# Patient Record
Sex: MALE | Race: WHITE | NOT HISPANIC OR LATINO | Employment: OTHER | URBAN - METROPOLITAN AREA
[De-identification: names, ages, dates, MRNs, and addresses within clinical notes are randomized per-mention and may not be internally consistent; named-entity substitution may affect disease eponyms.]

---

## 2017-04-26 ENCOUNTER — HOSPITAL ENCOUNTER (OUTPATIENT)
Dept: SLEEP MEDICINE | Facility: HOSPITAL | Age: 72
Discharge: HOME | End: 2017-04-26
Attending: INTERNAL MEDICINE | Admitting: INTERNAL MEDICINE

## 2017-05-04 ENCOUNTER — HOSPITAL ENCOUNTER (OUTPATIENT)
Dept: FAMILY MEDICINE CLINIC | Facility: CLINIC | Age: 72
Setting detail: SPECIMEN
Discharge: HOME | End: 2017-05-04
Attending: FAMILY MEDICINE | Admitting: FAMILY MEDICINE

## 2017-05-04 LAB
ALBUMIN SERPL-MCNC: 4.1 G/DL (ref 3.5–4.8)
ALBUMIN/GLOB SERPL: 1.4 {RATIO} (ref 1–1.7)
ALP SERPL-CCNC: 57 IU/L (ref 32–91)
ALT SERPL-CCNC: 60 IU/L (ref 17–63)
ANION GAP SERPL CALC-SCNC: 12.7 MMOL/L (ref 10–20)
AST SERPL-CCNC: 36 IU/L (ref 15–41)
BILIRUB SERPL-MCNC: 0.8 MG/DL (ref 0.3–1.2)
BUN SERPL-MCNC: 12 MG/DL (ref 8–20)
BUN/CREAT SERPL: 13.3 (ref 6.2–20.3)
CALCIUM SERPL-MCNC: 9.6 MG/DL (ref 8.9–10.3)
CHLORIDE SERPL-SCNC: 108 MMOL/L (ref 101–111)
CONV CO2: 28 MMOL/L (ref 22–32)
CONV MICROALBUM.,U,RANDOM: 5 MG/L
CONV TOTAL PROTEIN: 7.1 G/DL (ref 6.1–7.9)
CREAT UR-MCNC: 0.9 MG/DL (ref 0.7–1.2)
GLOBULIN UR ELPH-MCNC: 3 G/DL (ref 2.5–3.8)
GLUCOSE SERPL-MCNC: 110 MG/DL (ref 65–99)
POTASSIUM SERPL-SCNC: 4.7 MMOL/L (ref 3.6–5.1)
SODIUM SERPL-SCNC: 144 MMOL/L (ref 136–144)

## 2017-10-23 ENCOUNTER — HOSPITAL ENCOUNTER (OUTPATIENT)
Dept: FAMILY MEDICINE CLINIC | Facility: CLINIC | Age: 72
Setting detail: SPECIMEN
Discharge: HOME | End: 2017-10-23
Attending: FAMILY MEDICINE | Admitting: FAMILY MEDICINE

## 2017-10-26 ENCOUNTER — HOSPITAL ENCOUNTER (OUTPATIENT)
Dept: SLEEP MEDICINE | Facility: HOSPITAL | Age: 72
Discharge: HOME | End: 2017-10-26
Attending: INTERNAL MEDICINE | Admitting: INTERNAL MEDICINE

## 2017-12-11 ENCOUNTER — HOSPITAL ENCOUNTER (OUTPATIENT)
Dept: PREOP | Facility: HOSPITAL | Age: 72
Setting detail: HOSPITAL OUTPATIENT SURGERY
Discharge: HOME | End: 2017-12-11
Attending: OPHTHALMOLOGY | Admitting: OPHTHALMOLOGY

## 2017-12-11 LAB
ANION GAP SERPL CALC-SCNC: 12.9 MMOL/L (ref 10–20)
BASOPHILS # BLD AUTO: 0.1 10*3/UL (ref 0–0.2)
BASOPHILS NFR BLD AUTO: 1 % (ref 0–2)
BUN SERPL-MCNC: 10 MG/DL (ref 8–20)
BUN/CREAT SERPL: 12.5 (ref 6.2–20.3)
CALCIUM SERPL-MCNC: 8.7 MG/DL (ref 8.9–10.3)
CHLORIDE SERPL-SCNC: 106 MMOL/L (ref 101–111)
CONV CO2: 22 MMOL/L (ref 22–32)
CREAT UR-MCNC: 0.8 MG/DL (ref 0.7–1.2)
DIFFERENTIAL METHOD BLD: (no result)
EOSINOPHIL # BLD AUTO: 0.2 10*3/UL (ref 0–0.3)
EOSINOPHIL # BLD AUTO: 3 % (ref 0–3)
ERYTHROCYTE [DISTWIDTH] IN BLOOD BY AUTOMATED COUNT: 13.8 % (ref 11.5–14.5)
GLUCOSE BLD-MCNC: 112 MG/DL (ref 70–105)
GLUCOSE SERPL-MCNC: 129 MG/DL (ref 65–99)
HCT VFR BLD AUTO: 44.8 % (ref 40–54)
HGB BLD-MCNC: 14.7 G/DL (ref 14–18)
LYMPHOCYTES # BLD AUTO: 1.6 10*3/UL (ref 0.8–4.8)
LYMPHOCYTES NFR BLD AUTO: 26 % (ref 18–42)
MCH RBC QN AUTO: 29.8 PG (ref 26–32)
MCHC RBC AUTO-ENTMCNC: 32.9 G/DL (ref 32–36)
MCV RBC AUTO: 90.5 FL (ref 80–94)
MONOCYTES # BLD AUTO: 0.6 10*3/UL (ref 0.1–1.3)
MONOCYTES NFR BLD AUTO: 10 % (ref 2–11)
NEUTROPHILS # BLD AUTO: 3.8 10*3/UL (ref 2.3–8.6)
NEUTROPHILS NFR BLD AUTO: 60 % (ref 50–75)
NRBC BLD AUTO-RTO: 0 /100{WBCS}
NRBC/RBC NFR BLD MANUAL: 0 10*3/UL
PLATELET # BLD AUTO: 204 10*3/UL (ref 150–450)
PMV BLD AUTO: 8.2 FL (ref 7.4–10.4)
POTASSIUM SERPL-SCNC: 3.9 MMOL/L (ref 3.6–5.1)
RBC # BLD AUTO: 4.95 10*6/UL (ref 4.6–6)
SODIUM SERPL-SCNC: 137 MMOL/L (ref 136–144)
WBC # BLD AUTO: 6.3 10*3/UL (ref 4.5–11.5)

## 2018-03-23 ENCOUNTER — HOSPITAL ENCOUNTER (OUTPATIENT)
Dept: FAMILY MEDICINE CLINIC | Facility: CLINIC | Age: 73
Setting detail: SPECIMEN
Discharge: HOME | End: 2018-03-23
Attending: FAMILY MEDICINE | Admitting: FAMILY MEDICINE

## 2018-04-25 ENCOUNTER — HOSPITAL ENCOUNTER (OUTPATIENT)
Dept: SLEEP MEDICINE | Facility: HOSPITAL | Age: 73
Discharge: HOME | End: 2018-04-25
Attending: INTERNAL MEDICINE | Admitting: INTERNAL MEDICINE

## 2018-10-24 ENCOUNTER — HOSPITAL ENCOUNTER (OUTPATIENT)
Dept: FAMILY MEDICINE CLINIC | Facility: CLINIC | Age: 73
Setting detail: SPECIMEN
Discharge: HOME | End: 2018-10-24
Attending: FAMILY MEDICINE | Admitting: FAMILY MEDICINE

## 2018-10-24 LAB
ALBUMIN SERPL-MCNC: 3.5 G/DL (ref 3.5–4.8)
ALBUMIN/GLOB SERPL: 1.5 {RATIO} (ref 1–1.7)
ALP SERPL-CCNC: 62 IU/L (ref 32–91)
ALT SERPL-CCNC: 40 IU/L (ref 17–63)
ANION GAP SERPL CALC-SCNC: 13.2 MMOL/L (ref 10–20)
AST SERPL-CCNC: 28 IU/L (ref 15–41)
BACTERIA SPEC AEROBE CULT: NORMAL
BASOPHILS # BLD AUTO: 0 10*3/UL (ref 0–0.2)
BASOPHILS NFR BLD AUTO: 1 % (ref 0–2)
BILIRUB SERPL-MCNC: 0.5 MG/DL (ref 0.3–1.2)
BUN SERPL-MCNC: 14 MG/DL (ref 8–20)
BUN/CREAT SERPL: 17.5 (ref 6.2–20.3)
CALCIUM SERPL-MCNC: 8.5 MG/DL (ref 8.9–10.3)
CHLORIDE SERPL-SCNC: 108 MMOL/L (ref 101–111)
CHOLEST SERPL-MCNC: 121 MG/DL
CHOLEST/HDLC SERPL: 3.3 {RATIO}
COLONY COUNT: NORMAL
CONV CO2: 26 MMOL/L (ref 22–32)
CONV LDL CHOLESTEROL DIRECT: 76 MG/DL (ref 0–100)
CONV TOTAL PROTEIN: 5.9 G/DL (ref 6.1–7.9)
CREAT UR-MCNC: 0.8 MG/DL (ref 0.7–1.2)
DIFFERENTIAL METHOD BLD: (no result)
EOSINOPHIL # BLD AUTO: 0.2 10*3/UL (ref 0–0.3)
EOSINOPHIL # BLD AUTO: 4 % (ref 0–3)
ERYTHROCYTE [DISTWIDTH] IN BLOOD BY AUTOMATED COUNT: 14.2 % (ref 11.5–14.5)
GLOBULIN UR ELPH-MCNC: 2.4 G/DL (ref 2.5–3.8)
GLUCOSE SERPL-MCNC: 104 MG/DL (ref 65–99)
HBA1C MFR BLD: 6.1 % (ref 0–5.6)
HCT VFR BLD AUTO: 38.9 % (ref 40–54)
HDLC SERPL-MCNC: 36 MG/DL
HGB BLD-MCNC: 13.2 G/DL (ref 14–18)
LDLC/HDLC SERPL: 2.1 {RATIO}
LIPID INTERPRETATION: ABNORMAL
LYMPHOCYTES # BLD AUTO: 1.5 10*3/UL (ref 0.8–4.8)
LYMPHOCYTES NFR BLD AUTO: 25 % (ref 18–42)
Lab: NORMAL
MCH RBC QN AUTO: 30.5 PG (ref 26–32)
MCHC RBC AUTO-ENTMCNC: 33.8 G/DL (ref 32–36)
MCV RBC AUTO: 90.4 FL (ref 80–94)
MICRO REPORT STATUS: NORMAL
MONOCYTES # BLD AUTO: 0.5 10*3/UL (ref 0.1–1.3)
MONOCYTES NFR BLD AUTO: 8 % (ref 2–11)
NEUTROPHILS # BLD AUTO: 3.8 10*3/UL (ref 2.3–8.6)
NEUTROPHILS NFR BLD AUTO: 62 % (ref 50–75)
NRBC BLD AUTO-RTO: 0 /100{WBCS}
NRBC/RBC NFR BLD MANUAL: 0 10*3/UL
PLATELET # BLD AUTO: 206 10*3/UL (ref 150–450)
PMV BLD AUTO: 8.7 FL (ref 7.4–10.4)
POTASSIUM SERPL-SCNC: 4.2 MMOL/L (ref 3.6–5.1)
RBC # BLD AUTO: 4.31 10*6/UL (ref 4.6–6)
SODIUM SERPL-SCNC: 143 MMOL/L (ref 136–144)
SPECIMEN SOURCE: NORMAL
TRIGL SERPL-MCNC: 75 MG/DL
VLDLC SERPL CALC-MCNC: 9 MG/DL
WBC # BLD AUTO: 6 10*3/UL (ref 4.5–11.5)

## 2018-10-31 ENCOUNTER — HOSPITAL ENCOUNTER (OUTPATIENT)
Dept: SLEEP MEDICINE | Facility: HOSPITAL | Age: 73
Discharge: HOME | End: 2018-10-31
Attending: INTERNAL MEDICINE | Admitting: INTERNAL MEDICINE

## 2019-01-25 ENCOUNTER — HOSPITAL ENCOUNTER (OUTPATIENT)
Dept: CARDIOLOGY | Facility: HOSPITAL | Age: 74
Discharge: HOME | End: 2019-01-25
Attending: FAMILY MEDICINE | Admitting: FAMILY MEDICINE

## 2019-03-27 ENCOUNTER — HOSPITAL ENCOUNTER (OUTPATIENT)
Dept: PHYSICAL THERAPY | Facility: HOSPITAL | Age: 74
Setting detail: RECURRING SERIES
Discharge: HOME | End: 2019-04-24
Attending: FAMILY MEDICINE | Admitting: FAMILY MEDICINE

## 2019-07-02 RX ORDER — MAGNESIUM CARB/ALUMINUM HYDROX 105-160MG
296 TABLET,CHEWABLE ORAL ONCE
Status: DISCONTINUED | OUTPATIENT
Start: 2019-07-02 | End: 2019-07-12 | Stop reason: HOSPADM

## 2019-07-10 RX ORDER — MULTIPLE VITAMINS W/ MINERALS TAB 9MG-400MCG
1 TAB ORAL DAILY
COMMUNITY

## 2019-07-10 RX ORDER — ISOSORBIDE MONONITRATE 60 MG/1
120 TABLET, EXTENDED RELEASE ORAL DAILY
COMMUNITY
End: 2019-09-24 | Stop reason: SDUPTHER

## 2019-07-10 RX ORDER — PANTOPRAZOLE SODIUM 40 MG/1
40 TABLET, DELAYED RELEASE ORAL EVERY EVENING
COMMUNITY
End: 2019-09-24 | Stop reason: SDUPTHER

## 2019-07-10 RX ORDER — LISINOPRIL 10 MG/1
10 TABLET ORAL NIGHTLY
COMMUNITY
End: 2019-09-24 | Stop reason: SDUPTHER

## 2019-07-10 RX ORDER — ASPIRIN 325 MG
325 TABLET ORAL EVERY EVENING
COMMUNITY
End: 2021-10-20

## 2019-07-10 RX ORDER — SIMVASTATIN 40 MG
40 TABLET ORAL NIGHTLY
COMMUNITY
End: 2019-11-12 | Stop reason: SDUPTHER

## 2019-07-11 ENCOUNTER — ANESTHESIA EVENT (OUTPATIENT)
Dept: GASTROENTEROLOGY | Facility: HOSPITAL | Age: 74
End: 2019-07-11

## 2019-07-12 ENCOUNTER — ANESTHESIA (OUTPATIENT)
Dept: GASTROENTEROLOGY | Facility: HOSPITAL | Age: 74
End: 2019-07-12

## 2019-07-12 ENCOUNTER — HOSPITAL ENCOUNTER (OUTPATIENT)
Facility: HOSPITAL | Age: 74
Setting detail: HOSPITAL OUTPATIENT SURGERY
Discharge: HOME | End: 2019-07-12
Attending: INTERNAL MEDICINE | Admitting: INTERNAL MEDICINE

## 2019-07-12 ENCOUNTER — ON CAMPUS - OUTPATIENT (OUTPATIENT)
Dept: URBAN - METROPOLITAN AREA HOSPITAL 85 | Facility: HOSPITAL | Age: 74
End: 2019-07-12
Payer: COMMERCIAL

## 2019-07-12 VITALS
SYSTOLIC BLOOD PRESSURE: 124 MMHG | BODY MASS INDEX: 35.3 KG/M2 | OXYGEN SATURATION: 94 % | TEMPERATURE: 96.9 F | RESPIRATION RATE: 20 BRPM | HEIGHT: 69 IN | HEART RATE: 65 BPM | DIASTOLIC BLOOD PRESSURE: 68 MMHG | WEIGHT: 238.32 LBS

## 2019-07-12 DIAGNOSIS — K64.8 OTHER HEMORRHOIDS: ICD-10-CM

## 2019-07-12 DIAGNOSIS — Z86.010 PERSONAL HISTORY OF COLONIC POLYPS: ICD-10-CM

## 2019-07-12 DIAGNOSIS — D12.4 BENIGN NEOPLASM OF DESCENDING COLON: ICD-10-CM

## 2019-07-12 DIAGNOSIS — Z86.010 HISTORY OF COLON POLYPS: ICD-10-CM

## 2019-07-12 DIAGNOSIS — K64.4 RESIDUAL HEMORRHOIDAL SKIN TAGS: ICD-10-CM

## 2019-07-12 DIAGNOSIS — K57.30 DIVERTICULOSIS OF LARGE INTESTINE WITHOUT PERFORATION OR ABS: ICD-10-CM

## 2019-07-12 LAB — GLUCOSE BLDC GLUCOMTR-MCNC: 129 MG/DL (ref 70–105)

## 2019-07-12 PROCEDURE — 82962 GLUCOSE BLOOD TEST: CPT

## 2019-07-12 PROCEDURE — 88305 TISSUE EXAM BY PATHOLOGIST: CPT | Performed by: INTERNAL MEDICINE

## 2019-07-12 PROCEDURE — 45380 COLONOSCOPY AND BIOPSY: CPT | Mod: PT | Performed by: INTERNAL MEDICINE

## 2019-07-12 PROCEDURE — 25010000002 PROPOFOL 200 MG/20ML EMULSION: Performed by: ANESTHESIOLOGY

## 2019-07-12 RX ORDER — SODIUM CHLORIDE 0.9 % (FLUSH) 0.9 %
3-10 SYRINGE (ML) INJECTION AS NEEDED
Status: DISCONTINUED | OUTPATIENT
Start: 2019-07-12 | End: 2019-07-12 | Stop reason: HOSPADM

## 2019-07-12 RX ORDER — LIDOCAINE HYDROCHLORIDE 10 MG/ML
0.5 INJECTION, SOLUTION EPIDURAL; INFILTRATION; INTRACAUDAL; PERINEURAL ONCE AS NEEDED
Status: DISCONTINUED | OUTPATIENT
Start: 2019-07-12 | End: 2019-07-12 | Stop reason: HOSPADM

## 2019-07-12 RX ORDER — SODIUM CHLORIDE 9 MG/ML
9 INJECTION, SOLUTION INTRAVENOUS CONTINUOUS PRN
Status: DISCONTINUED | OUTPATIENT
Start: 2019-07-12 | End: 2019-07-12 | Stop reason: HOSPADM

## 2019-07-12 RX ORDER — SODIUM CHLORIDE 0.9 % (FLUSH) 0.9 %
3 SYRINGE (ML) INJECTION AS NEEDED
Status: DISCONTINUED | OUTPATIENT
Start: 2019-07-12 | End: 2019-07-12 | Stop reason: HOSPADM

## 2019-07-12 RX ORDER — LIDOCAINE HYDROCHLORIDE 20 MG/ML
INJECTION, SOLUTION EPIDURAL; INFILTRATION; INTRACAUDAL; PERINEURAL AS NEEDED
Status: DISCONTINUED | OUTPATIENT
Start: 2019-07-12 | End: 2019-07-12 | Stop reason: SURG

## 2019-07-12 RX ORDER — SODIUM CHLORIDE, SODIUM LACTATE, POTASSIUM CHLORIDE, CALCIUM CHLORIDE 600; 310; 30; 20 MG/100ML; MG/100ML; MG/100ML; MG/100ML
1000 INJECTION, SOLUTION INTRAVENOUS CONTINUOUS
Status: DISCONTINUED | OUTPATIENT
Start: 2019-07-12 | End: 2019-07-12 | Stop reason: HOSPADM

## 2019-07-12 RX ORDER — MAGNESIUM CARB/ALUMINUM HYDROX 105-160MG
296 TABLET,CHEWABLE ORAL ONCE
Status: DISCONTINUED | OUTPATIENT
Start: 2019-07-12 | End: 2019-07-12 | Stop reason: HOSPADM

## 2019-07-12 RX ORDER — PROPOFOL 10 MG/ML
INJECTION, EMULSION INTRAVENOUS AS NEEDED
Status: DISCONTINUED | OUTPATIENT
Start: 2019-07-12 | End: 2019-07-12 | Stop reason: SURG

## 2019-07-12 RX ORDER — SODIUM CHLORIDE 0.9 % (FLUSH) 0.9 %
3 SYRINGE (ML) INJECTION EVERY 12 HOURS SCHEDULED
Status: DISCONTINUED | OUTPATIENT
Start: 2019-07-12 | End: 2019-07-12 | Stop reason: HOSPADM

## 2019-07-12 RX ORDER — LIDOCAINE HYDROCHLORIDE 10 MG/ML
0.5 INJECTION, SOLUTION INFILTRATION; PERINEURAL ONCE AS NEEDED
Status: DISCONTINUED | OUTPATIENT
Start: 2019-07-12 | End: 2019-07-12 | Stop reason: HOSPADM

## 2019-07-12 RX ADMIN — SODIUM CHLORIDE 9 ML/HR: 0.9 INJECTION, SOLUTION INTRAVENOUS at 07:44

## 2019-07-12 RX ADMIN — PROPOFOL 220 MG: 10 INJECTION, EMULSION INTRAVENOUS at 09:23

## 2019-07-12 RX ADMIN — LIDOCAINE HYDROCHLORIDE 100 MG: 20 INJECTION, SOLUTION EPIDURAL; INFILTRATION; INTRACAUDAL; PERINEURAL at 09:08

## 2019-07-12 NOTE — ANESTHESIA POSTPROCEDURE EVALUATION
Patient: Heri Vasquez    Procedure Summary     Date:  07/12/19 Room / Location:  [facility] ENDOSCOPY [facility] / [facility]    Anesthesia Start:  0902 Anesthesia Stop:  0928    Procedure:  COLONOSCOPY with polypectomy x1 (N/A Rectum) Diagnosis:      Surgeon:  Graham Byrd MD Provider:  Ema Ruelas MD    Anesthesia Type:  MAC ASA Status:  3          Anesthesia Type: MAC  Last vitals  BP   124/68 (07/12/19 0950)   Temp   96.9 °F (36.1 °C) (07/12/19 0732)   Pulse   65 (07/12/19 0950)   Resp   20 (07/12/19 0930)     SpO2   94 % (07/12/19 0950)     Post Anesthesia Care and Evaluation    Patient location during evaluation: PACU  Patient participation: complete - patient participated  Level of consciousness: awake  Pain scale: See nurse's notes for pain score.  Pain management: adequate  Airway patency: patent  Anesthetic complications: No anesthetic complications  PONV Status: none  Cardiovascular status: acceptable  Respiratory status: acceptable  Hydration status: acceptable    Comments: Patient seen and examined postoperatively; vital signs stable; SpO2 greater than or equal to 90%; cardiopulmonary status stable; nausea/vomiting adequately controlled; pain adequately controlled; no apparent anesthesia complications; patient discharged from anesthesia care when discharge criteria were met

## 2019-07-12 NOTE — H&P
Patient Care Team:  Olesya Cueva MD as PCP - General (Family Medicine)  Olesya Cueva MD as PCP - Claims Attributed      Subjective .     History of present illness:    Heri Vasquez is a 74 y.o. male who presents today for Procedure(s):  COLONOSCOPY for the indications listed below.     The updated Patient Profile was reviewed prior to the procedure, in conjunction with the Physical Exam, including medical conditions, surgical procedures, medications, allergies, family history and social history.     Pre-operatively, I reviewed the indication(s) for the procedure, the risks of the procedure [including but not limited to: unexpected bleeding possibly requiring hospitalization and/or unplanned repeat procedures, perforation possibly requiring surgical treatment, missed lesions and complications of sedation/MAC (also explained by anesthesia staff)].     I have evaluated the patient for risks associated with the planned anesthesia and the procedure to be performed and find the patient an acceptable candidate for IV sedation.    Multiple opportunities were provided for any questions or concerns, and all questions were answered satisfactorily before any anesthesia was administered. We will proceed with the planned procedure.      ASSESSMENT/PLAN:  Colonoscopy      Past Medical History:  Past Medical History:   Diagnosis Date   • Arthritis     Dr Mosqueda   • Coronary heart disease 01/2016    single vessel disease, nl stress test (copied from [facility])   • Coronary heart disease 10/31/2017    cath 10/31/17 - Low % Blockages- N o Intervention   (copied from [facility])   • Diverticulosis    • GERD (gastroesophageal reflux disease)    • Hearing loss    • Hyperlipidemia    • Low back pain    • SIDDHARTHA treated with BiPAP    • Pain management     injections Lumbar spine X2 with Muprhys Pain management @ [facility] (copied from [facility])   • Physical therapy evaluation, initial     @ [facility] in [place] 6 weeks x3 per week,  Kalen leal (copied from [facility])   • Rectal bleed 2012    hemorrhoids   • Retinal hemorrhage of right eye 2014   • Type 2 diabetes mellitus (CMS/HCC)    • Uses brace        Past Surgical History:  Past Surgical History:   Procedure Laterality Date   • BELPHAROPTOSIS REPAIR  2017     Dr. grimes (copied from [facility])   • BROW LIFT  2017    Dr. Grimes at [facility]   • CARDIAC CATHETERIZATION  2012    at [facility]- single vessel disease. (copied from [facility])   • CARDIAC CATHETERIZATION  10/13/2017    no intervention - Low % Blockages.   • COLON SURGERY  2014    colonscopy   • HERNIA REPAIR  2008    umbilical hernia repair   • KNEE ARTHROSCOPY Right 2014    Dr. Mosqueda   • LASIK      lasik and Cataract Extraction, sep 11 and right 2014- [facility] (copied from [facility])   • TOTAL KNEE ARTHROPLASTY Left 2009    Dr. Mosqueda (copied from [facility])       Social History:  Social History     Tobacco Use   • Smoking status: Former Smoker     Last attempt to quit: 2000     Years since quittin.5   • Smokeless tobacco: Former User   Substance Use Topics   • Alcohol use: No   • Drug use: No       Family History:  Family History   Problem Relation Age of Onset   • Heart disease Mother         CHF   • Hypertension Mother    • Stroke Maternal Grandfather    • Diabetes Other    • Diabetes Other    • Stroke Other        Medications:  Medications Prior to Admission   Medication Sig Dispense Refill Last Dose   • aspirin 325 MG tablet Take 325 mg by mouth Every Evening.   2019 at Unknown time   • calcium citrate-vitamin d (CITRACAL) 200-250 MG-UNIT tablet tablet Take 1 tablet by mouth 2 (Two) Times a Day.   2019 at Unknown time   • docusate sodium (COLACE) 50 MG capsule Take  by mouth 3 (Three) Times a Day As Needed for Constipation.   2019 at Unknown time   • isosorbide mononitrate (IMDUR) 60 MG 24 hr tablet Take 120 mg by mouth Daily.   2019 at Unknown time   •  lisinopril (PRINIVIL,ZESTRIL) 10 MG tablet Take 10 mg by mouth Every Night.   7/11/2019 at Unknown time   • metFORMIN (GLUCOPHAGE) 500 MG tablet Take 500 mg by mouth 2 (Two) Times a Day With Meals.   7/11/2019 at Unknown time   • Multiple Vitamins-Minerals (MULTIVITAMIN WITH MINERALS) tablet tablet Take 1 tablet by mouth Daily.   7/11/2019 at Unknown time   • pantoprazole (PROTONIX) 40 MG EC tablet Take 40 mg by mouth Every Evening.   7/11/2019 at Unknown time   • simvastatin (ZOCOR) 40 MG tablet Take 40 mg by mouth Every Night.   7/11/2019 at Unknown time       Scheduled Meds:  magnesium citrate 296 mL Oral Once   sodium chloride 3 mL Intravenous Q12H     Continuous Infusions:  lactated ringers 1,000 mL    sodium chloride 9 mL/hr Last Rate: 9 mL/hr (07/12/19 0744)     PRN Meds:.lidocaine  •  lidocaine PF 1%  •  sodium chloride  •  sodium chloride  •  sodium chloride    ALLERGIES:  Patient has no known allergies.        Objective     Vital Signs:   Temp:  [96.9 °F (36.1 °C)] 96.9 °F (36.1 °C)  Heart Rate:  [70] 70  Resp:  [19] 19  BP: (137)/(62) 137/62    Physical Exam:      General Appearance:    Awake and alert, in no acute distress   Lungs:     Clear to auscultation bilaterally, respirations regular, even and unlabored    Heart:    Regular rhythm and normal rate, normal S1 and S2, no            murmur, no gallop, no rub   Abdomen:     Normal bowel sounds, soft, non-tender, no rebound or guarding, non-distended, no hepatosplenomegaly        Results Review:   I reviewed the patient's labs and imaging.  Lab Results (last 24 hours)     Procedure Component Value Units Date/Time    POC Glucose Once [[id number]]  (Abnormal) Collected:  07/12/19 0748    Specimen:  Blood Updated:  07/12/19 0753     Glucose 129 mg/dL      Comment: Serial Number: [id number]:  [id number]             Imaging Results (last 24 hours)     ** No results found for the last 24 hours. **             I discussed the patients findings and my  recommendations with the patient.  Graham Byrd MD  07/12/19  8:43 AM

## 2019-07-12 NOTE — ANESTHESIA PREPROCEDURE EVALUATION
Anesthesia Evaluation     Nursing notes reviewed                Airway   Mallampati: I  TM distance: >3 FB  Neck ROM: full  No difficulty expected  Dental - normal exam     Pulmonary - normal exam   (+) sleep apnea,   Cardiovascular - normal exam    (+) CAD, hyperlipidemia,       Neuro/Psych  GI/Hepatic/Renal/Endo    (+)  GERD, GI bleeding, diabetes mellitus type 2,     Musculoskeletal     Abdominal  - normal exam    Bowel sounds: normal.   Substance History      OB/GYN          Other   (+) arthritis                     Anesthesia Plan    ASA 3     MAC     Anesthetic plan, all risks, benefits, and alternatives have been provided, discussed and informed consent has been obtained with: patient.

## 2019-07-12 NOTE — DISCHARGE INSTRUCTIONS
A responsible adult should stay with you and you should rest quietly for the rest of the day.    Do not drink alcohol, drive, operate any heavy machinery or power tools or make any legal/important decisions for the next 24 hours.     Progress your diet as tolerated.  If you begin to experience severe pain, increased shortness of breath, racing heartbeat or a fever above 101 F, seek immediate medical attention. Follow up with MD as instructed.

## 2019-07-12 NOTE — OP NOTE
COLONOSCOPY Procedure Report    Patient Name:  Heri Vasquez  YOB: 1945    Date of Surgery:  7/12/2019     Pre-Op Diagnosis:  History of polyps         Procedure/CPT® Codes:      Procedure(s):  COLONOSCOPY with polypectomy x1    Staff:  Surgeon(s):  Graham Byrd MD      Anesthesia: Monitor Anesthesia Care    Description of Procedure:  A description of the procedure as well as risks, benefits and alternative methods were explained to the patient who voiced understanding and signed the corresponding consent form. A physical exam was performed and vital signs were monitored throughout the procedure.    A rectal exam was performed which was normal. An Olympus colonoscope was placed into the rectum and proceeded under direct visualization through the colon until the cecum and appendiceal orifice were identified. Careful visualization occurred upon slow withdraw of the scope. The scope was then retroflexed and the distal rectum was visualized. The quality of the prep was good. The procedure was not difficult and there were no immediate complications.    Findings:   Cecum ileocecal valve well identified.  Mucosa looks normal in the ascending transverse descending colon area.  One small polyp removed with a cold biopsy forcep from descending colon.  Diverticulosis sigmoid descending colon noticed with moderate-sized internal/external hemorrhoids.    Impression:  diverticulosis  Small polyp removed from descending colon with cold biopsy.  Hemorrhoids.    Recommendations:  Follow with the biopsy results.  Follow up with GI clinic as needed  Follow up with PCP as scheduled  Follow up with biopsy report  Given his advanced age and small polyp hold off on recall for colonoscopy unless symptomatic.  Benefiber 1 scoop 2x/day       Graham Byrd MD     Date: 7/12/2019    Time: 9:26 AM

## 2019-07-15 LAB
LAB AP CASE REPORT: NORMAL
PATH REPORT.FINAL DX SPEC: NORMAL
PATH REPORT.GROSS SPEC: NORMAL

## 2019-07-17 ENCOUNTER — OFFICE VISIT (OUTPATIENT)
Dept: FAMILY MEDICINE CLINIC | Facility: CLINIC | Age: 74
End: 2019-07-17

## 2019-07-17 VITALS
TEMPERATURE: 97 F | OXYGEN SATURATION: 96 % | DIASTOLIC BLOOD PRESSURE: 72 MMHG | HEART RATE: 81 BPM | BODY MASS INDEX: 37.98 KG/M2 | HEIGHT: 67 IN | SYSTOLIC BLOOD PRESSURE: 126 MMHG | WEIGHT: 242 LBS

## 2019-07-17 DIAGNOSIS — I83.811 VARICOSE VEINS OF RIGHT LOWER EXTREMITY WITH PAIN: Primary | ICD-10-CM

## 2019-07-17 DIAGNOSIS — R06.02 SHORT OF BREATH ON EXERTION: ICD-10-CM

## 2019-07-17 DIAGNOSIS — R09.81 NASAL CONGESTION: ICD-10-CM

## 2019-07-17 PROBLEM — R42 DIZZINESS AND GIDDINESS: Status: ACTIVE | Noted: 2019-03-13

## 2019-07-17 PROBLEM — R00.1 BRADYCARDIA: Status: ACTIVE | Noted: 2018-09-21

## 2019-07-17 PROBLEM — E78.5 HYPERLIPIDEMIA: Status: ACTIVE | Noted: 2019-07-17

## 2019-07-17 PROBLEM — M19.90 ARTHRITIS: Status: ACTIVE | Noted: 2019-07-17

## 2019-07-17 PROCEDURE — 99214 OFFICE O/P EST MOD 30 MIN: CPT | Performed by: FAMILY MEDICINE

## 2019-07-17 RX ORDER — LANCETS 33 GAUGE
EACH MISCELLANEOUS
Refills: 3 | COMMUNITY
Start: 2019-05-31 | End: 2020-02-24 | Stop reason: SDUPTHER

## 2019-07-17 RX ORDER — MECLIZINE HYDROCHLORIDE 25 MG/1
TABLET ORAL
COMMUNITY
Start: 2019-03-12 | End: 2019-10-16

## 2019-07-17 RX ORDER — BACLOFEN 10 MG/1
TABLET ORAL
Refills: 1 | COMMUNITY
Start: 2019-05-31 | End: 2019-10-16

## 2019-07-17 RX ORDER — NAPROXEN 500 MG/1
500 TABLET ORAL 2 TIMES DAILY PRN
Refills: 3 | COMMUNITY
Start: 2019-05-31 | End: 2019-10-16

## 2019-07-17 RX ORDER — DICLOFENAC SODIUM 75 MG/1
TABLET, DELAYED RELEASE ORAL
COMMUNITY
Start: 2019-04-20 | End: 2019-10-16

## 2019-07-17 RX ORDER — NITROGLYCERIN 0.4 MG/1
0.4 TABLET SUBLINGUAL
Refills: 4 | COMMUNITY
Start: 2019-04-17 | End: 2021-06-24 | Stop reason: SDUPTHER

## 2019-07-17 NOTE — PROGRESS NOTES
Subjective   Chief Complaint   Patient presents with   • Follow-up     post colonoscopy   • Breathing Problem   • Leg Pain     rt, thinks varicose veins     Heri Vasquez is a 74 y.o. male.     Breathing Problem   He complains of cough, difficulty breathing and shortness of breath. There is no chest tightness, frequent throat clearing, hoarse voice, sputum production or wheezing. This is a recurrent problem. The current episode started more than 1 year ago. The problem occurs intermittently. The problem has been gradually worsening. The cough is non-productive. Associated symptoms include dyspnea on exertion and nasal congestion. Pertinent negatives include no chest pain, ear congestion, fever, rhinorrhea, sore throat or weight loss. His symptoms are aggravated by exercise. His symptoms are alleviated by rest. Risk factors for lung disease include smoking/tobacco exposure.   Leg Pain    There was no injury mechanism. The pain is present in the right leg. The pain is moderate. The pain has been fluctuating since onset. He reports no foreign bodies present. The symptoms are aggravated by movement.      Past Medical History:   Diagnosis Date   • Arthritis     Dr Mosqueda   • Coronary heart disease 01/2016    single vessel disease, nl stress test (copied from [facility])   • Coronary heart disease 10/31/2017    cath 10/31/17 - Low % Blockages- N o Intervention   (copied from [facility])   • Diverticulosis    • GERD (gastroesophageal reflux disease)    • Hearing loss    • Hyperlipidemia    • Low back pain    • SIDDHARTHA treated with BiPAP    • Pain management     injections Lumbar spine X2 with [facility] Pain management @ [facility] (copied from [facility])   • Physical therapy evaluation, initial     @ [facility] in [place] 6 weeks x3 per week, [facility]al (copied from [facility])   • Rectal bleed 08/2012    hemorrhoids   • Retinal hemorrhage of right eye 05/2014   • Type 2 diabetes mellitus (CMS/HCC)    • Uses brace      Past Surgical  History:   Procedure Laterality Date   • BELPHAROPTOSIS REPAIR  2017     Dr. grimes (copied from [facility])   • BROW LIFT  2017    Dr. Grimes at [facility]   • CARDIAC CATHETERIZATION  2012    at [facility]- single vessel disease. (copied from [facility])   • CARDIAC CATHETERIZATION  10/13/2017    no intervention - Low % Blockages.   • COLON SURGERY  2014    colonscopy   • COLONOSCOPY N/A 2019    Procedure: COLONOSCOPY with polypectomy x1;  Surgeon: Graham Byrd MD;  Location: [facility] ENDOSCOPY;  Service: Gastroenterology   • HERNIA REPAIR  2008    umbilical hernia repair   • KNEE ARTHROSCOPY Right 2014    Dr. Mosqueda   • LASIK      lasik and Cataract Extraction, sep 11 and right 2014- [facility] (copied from [facility])   • TOTAL KNEE ARTHROPLASTY Left 2009    Dr. Mosqueda (copied from [facility])     No Known Allergies  Social History     Socioeconomic History   • Marital status:      Spouse name: Not on file   • Number of children: Not on file   • Years of education: Not on file   • Highest education level: Not on file   Tobacco Use   • Smoking status: Former Smoker     Last attempt to quit: 2000     Years since quittin.5   • Smokeless tobacco: Former User   Substance and Sexual Activity   • Alcohol use: No   • Drug use: No   • Sexual activity: Defer     Social History     Tobacco Use   Smoking Status Former Smoker   • Last attempt to quit:    • Years since quittin.5   Smokeless Tobacco Former User       family history includes Diabetes in his other and other; Heart disease in his mother; Hypertension in his mother; Stroke in his maternal grandfather and other.  Current Outpatient Medications on File Prior to Visit   Medication Sig Dispense Refill   • baclofen (LIORESAL) 10 MG tablet TAKE 1 2 TO 1 (ONE HALF TO ONE) TABLET BY MOUTH THREE TIMES DAILY AS NEEDED FOR MUSCLE SPASM  1   • diclofenac (VOLTAREN) 75 MG EC tablet DICLOFENAC SODIUM 75 MG TBEC     •  hydrocortisone 2.5 % cream HYDROCORTISONE 2.5 % CREA     • ONE TOUCH ULTRA TEST test strip 1 each by Other route Daily.  3   • ONETOUCH DELICA LANCETS 33G misc USE 1 LANCET TO CHECK GLUCOSE ONCE DAILY  3   • aspirin 325 MG tablet Take 325 mg by mouth Every Evening.     • betamethasone valerate (VALISONE) 0.1 % cream BETAMETHASONE VALERATE 0.1 % CREA     • calcium carbonate-vitamin d (CALTRATE 600+D) 600-400 MG-UNIT per tablet CALTRATE 600+D 600-400 MG-UNIT ORAL TABLET     • calcium citrate-vitamin d (CITRACAL) 200-250 MG-UNIT tablet tablet Take 1 tablet by mouth 2 (Two) Times a Day.     • docusate sodium (COLACE) 50 MG capsule Take  by mouth 3 (Three) Times a Day As Needed for Constipation.     • isosorbide mononitrate (IMDUR) 60 MG 24 hr tablet Take 120 mg by mouth Daily.     • lisinopril (PRINIVIL,ZESTRIL) 10 MG tablet Take 10 mg by mouth Every Night.     • meclizine (ANTIVERT) 25 MG tablet MECLIZINE HCL 25 MG TABS     • metFORMIN (GLUCOPHAGE) 500 MG tablet Take 500 mg by mouth 2 (Two) Times a Day With Meals.     • Multiple Vitamins-Minerals (MULTIVITAMIN WITH MINERALS) tablet tablet Take 1 tablet by mouth Daily.     • naproxen (NAPROSYN) 500 MG tablet Take 500 mg by mouth 2 (Two) Times a Day As Needed. for pain  3   • nitroglycerin (NITROSTAT) 0.4 MG SL tablet   4   • pantoprazole (PROTONIX) 40 MG EC tablet Take 40 mg by mouth Every Evening.     • simvastatin (ZOCOR) 40 MG tablet Take 40 mg by mouth Every Night.       No current facility-administered medications on file prior to visit.      Patient Active Problem List   Diagnosis   • Varicose veins of right lower extremity with pain   • Nasal congestion       The following portions of the patient's history were reviewed and updated as appropriate: allergies, current medications, past family history, past medical history, past social history, past surgical history and problem list.    Review of Systems   Constitutional: Negative for chills, fever and unexpected  "weight loss.   HENT: Negative for hoarse voice, rhinorrhea, sinus pressure and sore throat.    Eyes: Negative for blurred vision.   Respiratory: Positive for cough and shortness of breath. Negative for sputum production and wheezing.    Cardiovascular: Positive for dyspnea on exertion. Negative for chest pain and palpitations.   Gastrointestinal: Negative for abdominal pain.   Endocrine: Negative for polyuria.   Skin: Negative for rash.   Neurological: Negative for dizziness and headache.   Hematological: Negative for adenopathy.   Psychiatric/Behavioral: Negative for depressed mood.       Objective   /72 (BP Location: Right arm, Patient Position: Sitting, Cuff Size: Adult)   Pulse 81   Temp 97 °F (36.1 °C) (Oral)   Ht 168.9 cm (66.5\")   Wt 110 kg (242 lb)   SpO2 96%   BMI 38.47 kg/m²   Physical Exam   Constitutional: He appears well-developed and well-nourished.   HENT:   Head: Normocephalic and atraumatic.   Cardiovascular: Normal rate and regular rhythm.   Pulmonary/Chest: Effort normal and breath sounds normal.   Musculoskeletal: He exhibits tenderness.   Right leg   Neurological: He is alert.   Skin: Skin is warm.   Psychiatric: He has a normal mood and affect.       Admission on 07/12/2019, Discharged on 07/12/2019   Component Date Value Ref Range Status   • Glucose 07/12/2019 129* 70 - 105 mg/dL Final    Serial Number: [id number]:  [id number]   • Case Report 07/12/2019    Final                    Value:Surgical Pathology Report                         Case: [id number]                                  Authorizing Provider:  Graham Byrd MD          Collected:           07/12/2019 09:22 AM          Ordering Location:     [facility]  Received:            07/12/2019 10:14 AM                                 SUITES                                                                       Pathologist:           Alvarez Bryant MD                                                           "   Specimen:    Large Intestine, Left / Descending Colon, x1                                              • Final Diagnosis 07/12/2019    Final                    Value:This result contains rich text formatting which cannot be displayed here.   • Gross Description 07/12/2019    Final                    Value:This result contains rich text formatting which cannot be displayed here.           Assessment/Plan       Heri was seen today for follow-up, breathing problem and leg pain.    Diagnoses and all orders for this visit:    Varicose veins of right lower extremity with pain  -     Ambulatory Referral to Vascular Surgery    Nasal congestion  -     Ambulatory Referral to ENT (Otolaryngology)    Short of breath on exertion  -     Full Pulmonary Function Test With Bronchodilator; Future

## 2019-07-25 ENCOUNTER — HOSPITAL ENCOUNTER (OUTPATIENT)
Dept: RESPIRATORY THERAPY | Facility: HOSPITAL | Age: 74
Discharge: HOME | End: 2019-07-25
Admitting: FAMILY MEDICINE

## 2019-07-25 VITALS — BODY MASS INDEX: 36.68 KG/M2 | HEIGHT: 68 IN | WEIGHT: 242 LBS

## 2019-07-25 DIAGNOSIS — R06.02 SHORT OF BREATH ON EXERTION: ICD-10-CM

## 2019-07-25 PROCEDURE — 94010 BREATHING CAPACITY TEST: CPT

## 2019-07-25 PROCEDURE — 94726 PLETHYSMOGRAPHY LUNG VOLUMES: CPT

## 2019-07-25 PROCEDURE — 94729 DIFFUSING CAPACITY: CPT

## 2019-07-25 RX ORDER — ALBUTEROL SULFATE 2.5 MG/3ML
2.5 SOLUTION RESPIRATORY (INHALATION) ONCE AS NEEDED
Status: DISCONTINUED | OUTPATIENT
Start: 2019-07-25 | End: 2019-07-26 | Stop reason: HOSPADM

## 2019-09-12 ENCOUNTER — HOSPITAL ENCOUNTER (EMERGENCY)
Facility: HOSPITAL | Age: 74
Discharge: HOME | End: 2019-09-12
Admitting: EMERGENCY MEDICINE

## 2019-09-12 ENCOUNTER — APPOINTMENT (OUTPATIENT)
Dept: CT IMAGING | Facility: HOSPITAL | Age: 74
End: 2019-09-12

## 2019-09-12 VITALS
SYSTOLIC BLOOD PRESSURE: 143 MMHG | WEIGHT: 244.49 LBS | RESPIRATION RATE: 16 BRPM | DIASTOLIC BLOOD PRESSURE: 84 MMHG | TEMPERATURE: 98 F | OXYGEN SATURATION: 95 % | HEIGHT: 68 IN | HEART RATE: 71 BPM | BODY MASS INDEX: 37.05 KG/M2

## 2019-09-12 DIAGNOSIS — R10.32 ABDOMINAL PAIN, LEFT LOWER QUADRANT: ICD-10-CM

## 2019-09-12 DIAGNOSIS — M54.50 ACUTE LEFT-SIDED LOW BACK PAIN WITHOUT SCIATICA: Primary | ICD-10-CM

## 2019-09-12 LAB
ALBUMIN SERPL-MCNC: 3.5 G/DL (ref 3.5–4.8)
ALBUMIN/GLOB SERPL: 1.5 G/DL (ref 1–1.7)
ALP SERPL-CCNC: 51 U/L (ref 32–91)
ALT SERPL W P-5'-P-CCNC: 64 U/L (ref 17–63)
ANION GAP SERPL CALCULATED.3IONS-SCNC: 12.9 MMOL/L (ref 5–15)
AST SERPL-CCNC: 44 U/L (ref 15–41)
BASOPHILS # BLD AUTO: 0.1 10*3/MM3 (ref 0–0.2)
BASOPHILS NFR BLD AUTO: 1 % (ref 0–1.5)
BILIRUB SERPL-MCNC: 0.7 MG/DL (ref 0.3–1.2)
BILIRUB UR QL STRIP: NEGATIVE
BUN BLD-MCNC: 15 MG/DL (ref 8–20)
BUN/CREAT SERPL: 18.8 (ref 6.2–20.3)
CALCIUM SPEC-SCNC: 8.5 MG/DL (ref 8.9–10.3)
CHLORIDE SERPL-SCNC: 108 MMOL/L (ref 101–111)
CLARITY UR: CLEAR
CO2 SERPL-SCNC: 25 MMOL/L (ref 22–32)
COLOR UR: YELLOW
CREAT BLD-MCNC: 0.8 MG/DL (ref 0.7–1.2)
DEPRECATED RDW RBC AUTO: 45.9 FL (ref 37–54)
EOSINOPHIL # BLD AUTO: 0.2 10*3/MM3 (ref 0–0.4)
EOSINOPHIL NFR BLD AUTO: 2.9 % (ref 0.3–6.2)
ERYTHROCYTE [DISTWIDTH] IN BLOOD BY AUTOMATED COUNT: 14.6 % (ref 12.3–15.4)
GFR SERPL CREATININE-BSD FRML MDRD: 94 ML/MIN/1.73
GLOBULIN UR ELPH-MCNC: 2.4 GM/DL (ref 2.5–3.8)
GLUCOSE BLD-MCNC: 125 MG/DL (ref 65–99)
GLUCOSE UR STRIP-MCNC: NEGATIVE MG/DL
HCT VFR BLD AUTO: 42.3 % (ref 37.5–51)
HGB BLD-MCNC: 14.3 G/DL (ref 13–17.7)
HGB UR QL STRIP.AUTO: NEGATIVE
KETONES UR QL STRIP: NEGATIVE
LEUKOCYTE ESTERASE UR QL STRIP.AUTO: NEGATIVE
LYMPHOCYTES # BLD AUTO: 1.6 10*3/MM3 (ref 0.7–3.1)
LYMPHOCYTES NFR BLD AUTO: 19.9 % (ref 19.6–45.3)
MCH RBC QN AUTO: 30.7 PG (ref 26.6–33)
MCHC RBC AUTO-ENTMCNC: 33.7 G/DL (ref 31.5–35.7)
MCV RBC AUTO: 91.2 FL (ref 79–97)
MONOCYTES # BLD AUTO: 0.7 10*3/MM3 (ref 0.1–0.9)
MONOCYTES NFR BLD AUTO: 8.5 % (ref 5–12)
NEUTROPHILS # BLD AUTO: 5.3 10*3/MM3 (ref 1.7–7)
NEUTROPHILS NFR BLD AUTO: 67.7 % (ref 42.7–76)
NITRITE UR QL STRIP: NEGATIVE
NRBC BLD AUTO-RTO: 0.1 /100 WBC (ref 0–0.2)
PH UR STRIP.AUTO: 5.5 [PH] (ref 5–8)
PLATELET # BLD AUTO: 291 10*3/MM3 (ref 140–450)
PMV BLD AUTO: 8.7 FL (ref 6–12)
POTASSIUM BLD-SCNC: 3.9 MMOL/L (ref 3.6–5.1)
PROT SERPL-MCNC: 5.9 G/DL (ref 6.1–7.9)
PROT UR QL STRIP: NEGATIVE
RBC # BLD AUTO: 4.64 10*6/MM3 (ref 4.14–5.8)
SODIUM BLD-SCNC: 142 MMOL/L (ref 136–144)
SP GR UR STRIP: 1.02 (ref 1–1.03)
UROBILINOGEN UR QL STRIP: NORMAL
WBC NRBC COR # BLD: 7.8 10*3/MM3 (ref 3.4–10.8)

## 2019-09-12 PROCEDURE — 25010000002 MORPHINE PER 10 MG: Performed by: NURSE PRACTITIONER

## 2019-09-12 PROCEDURE — 85025 COMPLETE CBC W/AUTO DIFF WBC: CPT | Performed by: NURSE PRACTITIONER

## 2019-09-12 PROCEDURE — 81003 URINALYSIS AUTO W/O SCOPE: CPT | Performed by: NURSE PRACTITIONER

## 2019-09-12 PROCEDURE — 25010000002 ONDANSETRON PER 1 MG: Performed by: NURSE PRACTITIONER

## 2019-09-12 PROCEDURE — 80053 COMPREHEN METABOLIC PANEL: CPT | Performed by: NURSE PRACTITIONER

## 2019-09-12 PROCEDURE — 0 IOPAMIDOL PER 1 ML: Performed by: NURSE PRACTITIONER

## 2019-09-12 PROCEDURE — 99283 EMERGENCY DEPT VISIT LOW MDM: CPT

## 2019-09-12 PROCEDURE — 74177 CT ABD & PELVIS W/CONTRAST: CPT

## 2019-09-12 PROCEDURE — 96375 TX/PRO/DX INJ NEW DRUG ADDON: CPT

## 2019-09-12 PROCEDURE — 96374 THER/PROPH/DIAG INJ IV PUSH: CPT

## 2019-09-12 RX ORDER — SODIUM CHLORIDE 0.9 % (FLUSH) 0.9 %
10 SYRINGE (ML) INJECTION AS NEEDED
Status: DISCONTINUED | OUTPATIENT
Start: 2019-09-12 | End: 2019-09-12 | Stop reason: HOSPADM

## 2019-09-12 RX ORDER — MORPHINE SULFATE 4 MG/ML
4 INJECTION, SOLUTION INTRAMUSCULAR; INTRAVENOUS ONCE
Status: COMPLETED | OUTPATIENT
Start: 2019-09-12 | End: 2019-09-12

## 2019-09-12 RX ORDER — ONDANSETRON 2 MG/ML
4 INJECTION INTRAMUSCULAR; INTRAVENOUS ONCE
Status: COMPLETED | OUTPATIENT
Start: 2019-09-12 | End: 2019-09-12

## 2019-09-12 RX ORDER — TRAMADOL HYDROCHLORIDE 50 MG/1
50 TABLET ORAL EVERY 6 HOURS PRN
Qty: 12 TABLET | Refills: 0 | Status: SHIPPED | OUTPATIENT
Start: 2019-09-12 | End: 2019-10-16

## 2019-09-12 RX ORDER — ONDANSETRON 4 MG/1
4 TABLET, ORALLY DISINTEGRATING ORAL EVERY 8 HOURS PRN
Qty: 20 TABLET | Refills: 0 | Status: SHIPPED | OUTPATIENT
Start: 2019-09-12 | End: 2019-10-16

## 2019-09-12 RX ADMIN — MORPHINE SULFATE 4 MG: 4 INJECTION INTRAVENOUS at 16:22

## 2019-09-12 RX ADMIN — ONDANSETRON 4 MG: 2 INJECTION INTRAMUSCULAR; INTRAVENOUS at 16:22

## 2019-09-12 RX ADMIN — IOPAMIDOL 100 ML: 755 INJECTION, SOLUTION INTRAVENOUS at 18:14

## 2019-09-12 NOTE — DISCHARGE INSTRUCTIONS
Medication as prescribed.  May take Tylenol as well.  Follow-up with your doctor tomorrow.  Return for new or worsening concerns specifically fever, increased pain, nausea, vomiting

## 2019-09-12 NOTE — ED PROVIDER NOTES
Subjective   Chief Complaint: Abdominal pain  Context: Patient reports left lower abdominal pain that radiates into the left lower back.  He reports it started 3 days ago.  He reports nausea with no vomiting.  No fever.  He reports that he had some diarrhea yesterday.  Urinary symptoms.  Duration: 3 days  Timing: Constant  Severity: Moderate  Associated symptoms and or modifying factors: As above          History provided by:  Patient      Review of Systems   Constitutional: Negative for chills and fever.   HENT: Negative for congestion and sore throat.    Eyes: Negative for redness.   Respiratory: Negative for cough and shortness of breath.    Cardiovascular: Negative for chest pain.   Gastrointestinal: Positive for abdominal pain, diarrhea and nausea. Negative for vomiting.   Genitourinary: Negative for dysuria.   Musculoskeletal: Positive for back pain.   Skin: Negative for rash.   Neurological: Negative for headaches.   All other systems reviewed and are negative.      Past Medical History:   Diagnosis Date   • Arthritis     Dr Mosqueda   • Coronary heart disease 01/2016    single vessel disease, nl stress test (copied from [facility])   • Coronary heart disease 10/31/2017    cath 10/31/17 - Low % Blockages- N o Intervention   (copied from [facility])   • Diverticulosis    • GERD (gastroesophageal reflux disease)    • Hearing loss    • Hyperlipidemia    • Low back pain    • SIDDHARTHA treated with BiPAP    • Pain management     injections Lumbar spine X2 with Muprhys Pain management @ [facility] (copied from [facility])   • Physical therapy evaluation, initial     @ [facility] in [place] 6 weeks x3 per week, [facility] leal (copied from [facility])   • Rectal bleed 08/2012    hemorrhoids   • Retinal hemorrhage of right eye 05/2014   • Type 2 diabetes mellitus (CMS/[facility])    • Uses brace        No Known Allergies    Past Surgical History:   Procedure Laterality Date   • BELPHAROPTOSIS REPAIR  12/11/2017     Dr. grimes (copied from  [facility])   • BROW LIFT  2017    Dr. Trent at [facility]   • CARDIAC CATHETERIZATION  2012    at [facility]- single vessel disease. (copied from [facility])   • CARDIAC CATHETERIZATION  10/13/2017    no intervention - Low % Blockages.   • COLON SURGERY  2014    colonscopy   • COLONOSCOPY N/A 2019    Procedure: COLONOSCOPY with polypectomy x1;  Surgeon: Graham Byrd MD;  Location: [facility] ENDOSCOPY;  Service: Gastroenterology   • HERNIA REPAIR  2008    umbilical hernia repair   • KNEE ARTHROSCOPY Right 2014    Dr. Mosqueda   • LASIK      lasik and Cataract Extraction, sep 11 and right 2014- [facility]. (copied from [facility])   • TOTAL KNEE ARTHROPLASTY Left 2009    Dr. Mosqueda (copied from [facility])       Family History   Problem Relation Age of Onset   • Heart disease Mother         CHF   • Hypertension Mother    • Stroke Maternal Grandfather    • Diabetes Other    • Diabetes Other    • Stroke Other        Social History     Socioeconomic History   • Marital status:      Spouse name: Not on file   • Number of children: Not on file   • Years of education: Not on file   • Highest education level: Not on file   Tobacco Use   • Smoking status: Former Smoker     Last attempt to quit: 2000     Years since quittin.7   • Smokeless tobacco: Former User   Substance and Sexual Activity   • Alcohol use: No   • Drug use: No   • Sexual activity: Defer           Objective   Physical Exam  The patient is well-developed, well-nourished, alert, cooperative and in no acute distress.    HEENT exam is normocephalic and atraumatic. Pupils equal ,round, reactive to light. Extraocular muscles are intact. Conjunctivae non- injected, sclerae anicteric, lids without ptosis, edema or erythema.  Mucous membranes are moist.     Neck is supple, non-tender without lymphadenopathy.  Lungs clear to auscultation bilaterally.    Cardiovascular. Regular rate and rhythm     Abdomen is soft nondistended.  Tender  to palpate left lower quadrant.  No guarding or rebound noted.     Back shows no CVA tenderness.     Extremities: There is no significant deformity or joint abnormality. No edema.    Neurologic: Oriented x3 without focal neurological deficits.    Skin shows no rash, petechiae, or purpura.    Procedures           ED Course  ED Course as of Sep 12 1904   Thu Sep 12, 2019   1903 CBC unremarkable.  Urinalysis unremarkable.  Glucose 125, ALT 64, AST 44  [AC]   1903 CT abdomen pelvis with no acute intra-abdominal abnormalities.  There is diverticulosis but no diverticulitis  [AC]      ED Course User Index  [AC] Margarette Perales APRN      Labs Reviewed   COMPREHENSIVE METABOLIC PANEL - Abnormal; Notable for the following components:       Result Value    Glucose 125 (*)     Calcium 8.5 (*)     Total Protein 5.9 (*)     ALT (SGPT) 64 (*)     AST (SGOT) 44 (*)     Globulin 2.4 (*)     All other components within normal limits    Narrative:     The MDRD GFR formula is only valid for adults with stable renal function between ages 18 and 70.   URINALYSIS W/ CULTURE IF INDICATED - Normal    Narrative:     Urine microscopic not indicated.   CBC WITH AUTO DIFFERENTIAL - Normal   CBC AND DIFFERENTIAL    Narrative:     The following orders were created for panel order CBC & Differential.  Procedure                               Abnormality         Status                     ---------                               -----------         ------                     CBC Auto Differential[[id number]]        Normal              Final result                 Please view results for these tests on the individual orders.     Ct Abdomen Pelvis With Contrast    Result Date: 9/12/2019  1.. No acute abnormality is identified. 2. There is diverticulosis with no CT evidence of diverticulitis.  Electronically Signed By-Selina Colin On:9/12/2019 6:25 PM This report was finalized on [id number] by  Selina Colin, .    Medications   sodium chloride 0.9  "% flush 10 mL (not administered)   ondansetron (ZOFRAN) injection 4 mg (4 mg Intravenous Given 9/12/19 1622)   Morphine sulfate (PF) injection 4 mg (4 mg Intravenous Given 9/12/19 1622)   iopamidol (ISOVUE-370) 76 % injection 100 mL (100 mL Intravenous Given 9/12/19 1814)     /64 (BP Location: Right arm, Patient Position: Sitting)   Pulse 81   Temp 97.4 °F (36.3 °C) (Oral)   Resp 18   Ht 172.7 cm (68\")   Wt 111 kg (244 lb 7.8 oz)   SpO2 95%   BMI 37.17 kg/m²               MDM  Number of Diagnoses or Management Options  Abdominal pain, left lower quadrant:   Acute left-sided low back pain without sciatica:   Diagnosis management comments: MEDICAL DECISION  Comorbidities: Noted in past history  Differentials: Muscular, diverticulitis, ureteral calculus; this list is not all inclusive and does not constitute the entirety of considered causes  Radiology interpretation:  Images reviewed by me and interpreted by radiologist, ED abdomen pelvis shows no acute intra-abdominal abnormality, no diverticulitis.  No ureteral calculus.  Lab interpretation:  Labs viewed by me significant for, unremarkable      Results and plan for discharge were discussed.  Inspect was queried.  Prescription for Zofran and tramadol.         Amount and/or Complexity of Data Reviewed  Clinical lab tests: reviewed and ordered  Tests in the radiology section of CPT®: reviewed and ordered        Final diagnoses:   Acute left-sided low back pain without sciatica   Abdominal pain, left lower quadrant              Margarette Perales, APRN  09/12/19 1905    "

## 2019-09-17 ENCOUNTER — APPOINTMENT (OUTPATIENT)
Dept: CT IMAGING | Facility: HOSPITAL | Age: 74
End: 2019-09-17

## 2019-09-17 ENCOUNTER — HOSPITAL ENCOUNTER (EMERGENCY)
Facility: HOSPITAL | Age: 74
Discharge: HOME | End: 2019-09-17
Admitting: EMERGENCY MEDICINE

## 2019-09-17 VITALS
SYSTOLIC BLOOD PRESSURE: 153 MMHG | HEART RATE: 78 BPM | HEIGHT: 68 IN | RESPIRATION RATE: 17 BRPM | TEMPERATURE: 98 F | BODY MASS INDEX: 35.32 KG/M2 | WEIGHT: 233.03 LBS | DIASTOLIC BLOOD PRESSURE: 74 MMHG | OXYGEN SATURATION: 96 %

## 2019-09-17 DIAGNOSIS — R10.32 LEFT GROIN PAIN: Primary | ICD-10-CM

## 2019-09-17 DIAGNOSIS — R31.0 GROSS HEMATURIA: ICD-10-CM

## 2019-09-17 LAB
ALBUMIN SERPL-MCNC: 4 G/DL (ref 3.5–4.8)
ALBUMIN/GLOB SERPL: 1.4 G/DL (ref 1–1.7)
ALP SERPL-CCNC: 49 U/L (ref 32–91)
ALT SERPL W P-5'-P-CCNC: 62 U/L (ref 17–63)
ANION GAP SERPL CALCULATED.3IONS-SCNC: 14.9 MMOL/L (ref 5–15)
AST SERPL-CCNC: 44 U/L (ref 15–41)
BACTERIA UR QL AUTO: ABNORMAL /HPF
BASOPHILS # BLD AUTO: 0.1 10*3/MM3 (ref 0–0.2)
BASOPHILS NFR BLD AUTO: 0.9 % (ref 0–1.5)
BILIRUB SERPL-MCNC: 0.7 MG/DL (ref 0.3–1.2)
BILIRUB UR QL STRIP: NEGATIVE
BUN BLD-MCNC: 11 MG/DL (ref 8–20)
BUN/CREAT SERPL: 13.8 (ref 6.2–20.3)
CALCIUM SPEC-SCNC: 8.5 MG/DL (ref 8.9–10.3)
CHLORIDE SERPL-SCNC: 102 MMOL/L (ref 101–111)
CLARITY UR: CLEAR
CO2 SERPL-SCNC: 24 MMOL/L (ref 22–32)
COLOR UR: YELLOW
CREAT BLD-MCNC: 0.8 MG/DL (ref 0.7–1.2)
DEPRECATED RDW RBC AUTO: 45.1 FL (ref 37–54)
EOSINOPHIL # BLD AUTO: 0 10*3/MM3 (ref 0–0.4)
EOSINOPHIL NFR BLD AUTO: 0.5 % (ref 0.3–6.2)
ERYTHROCYTE [DISTWIDTH] IN BLOOD BY AUTOMATED COUNT: 14.4 % (ref 12.3–15.4)
GFR SERPL CREATININE-BSD FRML MDRD: 94 ML/MIN/1.73
GLOBULIN UR ELPH-MCNC: 2.9 GM/DL (ref 2.5–3.8)
GLUCOSE BLD-MCNC: 147 MG/DL (ref 65–99)
GLUCOSE UR STRIP-MCNC: NEGATIVE MG/DL
HCT VFR BLD AUTO: 45.4 % (ref 37.5–51)
HGB BLD-MCNC: 15.4 G/DL (ref 13–17.7)
HGB UR QL STRIP.AUTO: ABNORMAL
HYALINE CASTS UR QL AUTO: ABNORMAL /LPF
KETONES UR QL STRIP: NEGATIVE
LEUKOCYTE ESTERASE UR QL STRIP.AUTO: NEGATIVE
LIPASE SERPL-CCNC: 18 U/L (ref 22–51)
LYMPHOCYTES # BLD AUTO: 1 10*3/MM3 (ref 0.7–3.1)
LYMPHOCYTES NFR BLD AUTO: 13.6 % (ref 19.6–45.3)
MCH RBC QN AUTO: 30.3 PG (ref 26.6–33)
MCHC RBC AUTO-ENTMCNC: 33.9 G/DL (ref 31.5–35.7)
MCV RBC AUTO: 89.4 FL (ref 79–97)
MONOCYTES # BLD AUTO: 0.4 10*3/MM3 (ref 0.1–0.9)
MONOCYTES NFR BLD AUTO: 5.7 % (ref 5–12)
NEUTROPHILS # BLD AUTO: 5.8 10*3/MM3 (ref 1.7–7)
NEUTROPHILS NFR BLD AUTO: 79.3 % (ref 42.7–76)
NITRITE UR QL STRIP: NEGATIVE
NRBC BLD AUTO-RTO: 0 /100 WBC (ref 0–0.2)
PH UR STRIP.AUTO: 6.5 [PH] (ref 5–8)
PLATELET # BLD AUTO: 256 10*3/MM3 (ref 140–450)
PMV BLD AUTO: 8.1 FL (ref 6–12)
POTASSIUM BLD-SCNC: 3.9 MMOL/L (ref 3.6–5.1)
PROT SERPL-MCNC: 6.9 G/DL (ref 6.1–7.9)
PROT UR QL STRIP: NEGATIVE
RBC # BLD AUTO: 5.08 10*6/MM3 (ref 4.14–5.8)
RBC # UR: ABNORMAL /HPF
REF LAB TEST METHOD: ABNORMAL
SODIUM BLD-SCNC: 137 MMOL/L (ref 136–144)
SP GR UR STRIP: 1.01 (ref 1–1.03)
SQUAMOUS #/AREA URNS HPF: ABNORMAL /HPF
UROBILINOGEN UR QL STRIP: ABNORMAL
WBC NRBC COR # BLD: 7.3 10*3/MM3 (ref 3.4–10.8)
WBC UR QL AUTO: ABNORMAL /HPF

## 2019-09-17 PROCEDURE — 81001 URINALYSIS AUTO W/SCOPE: CPT | Performed by: PHYSICIAN ASSISTANT

## 2019-09-17 PROCEDURE — 80053 COMPREHEN METABOLIC PANEL: CPT | Performed by: PHYSICIAN ASSISTANT

## 2019-09-17 PROCEDURE — 85025 COMPLETE CBC W/AUTO DIFF WBC: CPT | Performed by: PHYSICIAN ASSISTANT

## 2019-09-17 PROCEDURE — 83690 ASSAY OF LIPASE: CPT | Performed by: PHYSICIAN ASSISTANT

## 2019-09-17 PROCEDURE — 99284 EMERGENCY DEPT VISIT MOD MDM: CPT

## 2019-09-17 PROCEDURE — 74176 CT ABD & PELVIS W/O CONTRAST: CPT

## 2019-09-17 PROCEDURE — 96374 THER/PROPH/DIAG INJ IV PUSH: CPT

## 2019-09-17 PROCEDURE — 25010000002 KETOROLAC TROMETHAMINE PER 15 MG: Performed by: PHYSICIAN ASSISTANT

## 2019-09-17 RX ORDER — LIDOCAINE 50 MG/G
1 PATCH TOPICAL ONCE
Status: DISCONTINUED | OUTPATIENT
Start: 2019-09-17 | End: 2019-09-17 | Stop reason: HOSPADM

## 2019-09-17 RX ORDER — HYDROCODONE BITARTRATE AND ACETAMINOPHEN 7.5; 325 MG/1; MG/1
1 TABLET ORAL ONCE
Status: COMPLETED | OUTPATIENT
Start: 2019-09-17 | End: 2019-09-17

## 2019-09-17 RX ORDER — SODIUM CHLORIDE 0.9 % (FLUSH) 0.9 %
10 SYRINGE (ML) INJECTION AS NEEDED
Status: DISCONTINUED | OUTPATIENT
Start: 2019-09-17 | End: 2019-09-17 | Stop reason: HOSPADM

## 2019-09-17 RX ORDER — METHOCARBAMOL 500 MG/1
500 TABLET, FILM COATED ORAL ONCE
Status: COMPLETED | OUTPATIENT
Start: 2019-09-17 | End: 2019-09-17

## 2019-09-17 RX ORDER — KETOROLAC TROMETHAMINE 15 MG/ML
15 INJECTION, SOLUTION INTRAMUSCULAR; INTRAVENOUS ONCE
Status: COMPLETED | OUTPATIENT
Start: 2019-09-17 | End: 2019-09-17

## 2019-09-17 RX ORDER — CEFDINIR 300 MG/1
300 CAPSULE ORAL 2 TIMES DAILY
Qty: 14 CAPSULE | Refills: 0 | Status: SHIPPED | OUTPATIENT
Start: 2019-09-17 | End: 2019-10-16

## 2019-09-17 RX ADMIN — KETOROLAC TROMETHAMINE 15 MG: 15 INJECTION, SOLUTION INTRAMUSCULAR; INTRAVENOUS at 10:44

## 2019-09-17 RX ADMIN — LIDOCAINE 1 PATCH: 50 PATCH CUTANEOUS at 10:44

## 2019-09-17 RX ADMIN — HYDROCODONE BITARTRATE AND ACETAMINOPHEN 1 TABLET: 7.5; 325 TABLET ORAL at 14:13

## 2019-09-17 RX ADMIN — METHOCARBAMOL 500 MG: 500 TABLET ORAL at 10:43

## 2019-09-17 NOTE — DISCHARGE INSTRUCTIONS
Return to the ER for any worsening symptoms.  Follow-up with your primary care doctor tomorrow for further management.

## 2019-09-17 NOTE — ED PROVIDER NOTES
Subjective   History:  Patient is a 74-year-old male who presents to the ER with left lower quadrant pain radiating to the back.  He reports he was seen here a week ago for similar he had a CT at that time which was negative.  He reports his pain is no better.  He has not followed up with his PCP yet.  Denies any other symptoms such as nausea vomiting fevers chills diarrhea constipation.    Onset: 1 Week  Location: [facility]  Duration: constant  Character: sharp pain   Aggravating/Alleviating factors: None  Radiation radiating to back  Severity: moderate              Review of Systems   HENT: Negative.    Respiratory: Negative.    Cardiovascular: Negative.    Gastrointestinal: Positive for abdominal pain.   Genitourinary: Negative.    Musculoskeletal: Positive for back pain.   Skin: Negative.    Neurological: Negative.    Psychiatric/Behavioral: Negative.        Past Medical History:   Diagnosis Date   • Arthritis     Dr Mosqueda   • Coronary heart disease 01/2016    single vessel disease, nl stress test (copied from [facility])   • Coronary heart disease 10/31/2017    cath 10/31/17 - Low % Blockages- N o Intervention   (copied from [facility])   • Diverticulosis    • GERD (gastroesophageal reflux disease)    • Hearing loss    • Hyperlipidemia    • Low back pain    • SIDDHARTHA treated with BiPAP    • Pain management     injections Lumbar spine X2 with Muprhys Pain management @ [facility] (copied from [facility])   • Physical therapy evaluation, initial     @ [facility] in [place] 6 weeks x3 per week, Kalen leal (copied from [facility])   • Rectal bleed 08/2012    hemorrhoids   • Retinal hemorrhage of right eye 05/2014   • Type 2 diabetes mellitus (CMS/HCC)    • Uses brace        No Known Allergies    Past Surgical History:   Procedure Laterality Date   • BELPHAROPTOSIS REPAIR  12/11/2017     Dr. grimes (copied from [facility])   • BROW LIFT  12/11/2017    Dr. Grimes at [facility]   • CARDIAC CATHETERIZATION  03/2012    at [facility]- single vessel  disease. (copied from [facility])   • CARDIAC CATHETERIZATION  10/13/2017    no intervention - Low % Blockages.   • COLON SURGERY  2014    colonscopy   • COLONOSCOPY N/A 2019    Procedure: COLONOSCOPY with polypectomy x1;  Surgeon: Graham Byrd MD;  Location: [facility] ENDOSCOPY;  Service: Gastroenterology   • HERNIA REPAIR  2008    umbilical hernia repair   • KNEE ARTHROSCOPY Right 2014    Dr. Mosqueda   • LASIK      lasik and Cataract Extraction, sep 11 and right 2014- [facility]. (copied from [facility])   • TOTAL KNEE ARTHROPLASTY Left 2009    Dr. Mosqueda (copied from [facility])       Family History   Problem Relation Age of Onset   • Heart disease Mother         CHF   • Hypertension Mother    • Stroke Maternal Grandfather    • Diabetes Other    • Diabetes Other    • Stroke Other        Social History     Socioeconomic History   • Marital status:      Spouse name: Not on file   • Number of children: Not on file   • Years of education: Not on file   • Highest education level: Not on file   Tobacco Use   • Smoking status: Former Smoker     Last attempt to quit: 2000     Years since quittin.7   • Smokeless tobacco: Former User   Substance and Sexual Activity   • Alcohol use: No   • Drug use: No   • Sexual activity: Defer           Objective   Physical Exam   Constitutional: He is oriented to person, place, and time. He appears well-developed and well-nourished.   HENT:   Head: Normocephalic and atraumatic.   Eyes: Pupils are equal, round, and reactive to light.   Neck: Normal range of motion.   Pulmonary/Chest: Effort normal.   Abdominal:   Mild pain with deep palpation of left lower quadrant.   Musculoskeletal: Normal range of motion.   Neurological: He is alert and oriented to person, place, and time.   Skin: Skin is warm and dry.   Psychiatric: He has a normal mood and affect. His behavior is normal.       Procedures           ED Course        Ct Abdomen Pelvis Without  Contrast    Result Date: 9/17/2019   1. No renal calculi. 2. No obstructive uropathy. 3. Mildly enlarged prostate gland. 4. Sigmoid colonic diverticulosis without acute diverticulitis. 5. Chronic scarring versus subsegmental atelectasis in the lung bases. 6. Benign right renal cyst. 7. The study is limited by noncontrast technique.  Electronically Signed By-José Miguel Weiss On:9/17/2019 2:20 PM This report was finalized on [id number] by  José Miguel Weiss, .    Labs Reviewed   COMPREHENSIVE METABOLIC PANEL - Abnormal; Notable for the following components:       Result Value    Glucose 147 (*)     Calcium 8.5 (*)     AST (SGOT) 44 (*)     All other components within normal limits    Narrative:     The MDRD GFR formula is only valid for adults with stable renal function between ages 18 and 70.   LIPASE - Abnormal; Notable for the following components:    Lipase 18 (*)     All other components within normal limits   URINALYSIS W/ CULTURE IF INDICATED - Abnormal; Notable for the following components:    Blood, UA Large (3+) (*)     All other components within normal limits   CBC WITH AUTO DIFFERENTIAL - Abnormal; Notable for the following components:    Neutrophil % 79.3 (*)     Lymphocyte % 13.6 (*)     All other components within normal limits   URINALYSIS, MICROSCOPIC ONLY - Abnormal; Notable for the following components:    RBC, UA Too Numerous to Count (*)     WBC, UA 0-2 (*)     All other components within normal limits   CBC AND DIFFERENTIAL    Narrative:     The following orders were created for panel order CBC & Differential.  Procedure                               Abnormality         Status                     ---------                               -----------         ------                     CBC Auto Differential[[id number]]        Abnormal            Final result                 Please view results for these tests on the individual orders.     Medications   sodium chloride 0.9 % flush 10 mL (not administered)    lidocaine (LIDODERM) 5 % 1 patch (1 patch Transdermal Medication Applied 9/17/19 1044)   ketorolac (TORADOL) injection 15 mg (15 mg Intravenous Given 9/17/19 1044)   methocarbamol (ROBAXIN) tablet 500 mg (500 mg Oral Given 9/17/19 1043)   HYDROcodone-acetaminophen (NORCO) 7.5-325 MG per tablet 1 tablet (1 tablet Oral Given 9/17/19 1413)               MDM  Number of Diagnoses or Management Options  Gross hematuria:   Left groin pain:   Diagnosis management comments: DISPOSITION:   Chart Review:  Comorbidity:  has a past medical history of Arthritis, Coronary heart disease (01/2016), Coronary heart disease (10/31/2017), Diverticulosis, GERD (gastroesophageal reflux disease), Hearing loss, Hyperlipidemia, Low back pain, SIDDHARTHA treated with BiPAP, Pain management, Physical therapy evaluation, initial, Rectal bleed (08/2012), Retinal hemorrhage of right eye (05/2014), Type 2 diabetes mellitus (CMS/[facility]), and Uses brace.  Differentials:this list is not all inclusive and does not constitute the entirety of considered causes -->  Nepphrolithiasis, UTI, left groin strain   Labs: CBC - WBC/Hgb/Hct/Plts: --/15.4/45.4/-- (09/17 1031) LYTES - Na/K/Cl/CO2: 137/3.9/102/24.0 (09/17 1031) CHEM - BUN/Cr/glu/ALT/AST/amyl/lip:11/--/--/62/44*/--/18* (09/17 1031)  UA shows gross hematuria and WBCs    Imaging: Was interpreted by physician and reviewed by myself:  Ct Abdomen Pelvis Without Contrast    Result Date: 9/17/2019   1. No renal calculi. 2. No obstructive uropathy. 3. Mildly enlarged prostate gland. 4. Sigmoid colonic diverticulosis without acute diverticulitis. 5. Chronic scarring versus subsegmental atelectasis in the lung bases. 6. Benign right renal cyst. 7. The study is limited by noncontrast technique.  Electronically Signed By-José Miguel Weiss On:9/17/2019 2:20 PM This report was finalized on [id number] by  José Miguel Weiss, .      Disposition/Treatment:  74-year-old male who presents to the ER complaining of left groin pain.  He was  seen here a week ago but reports he is no better.  He now has gross hematuria.  Patient CT without contrast was unremarkable.  Patient was given antibiotics and Robaxin he was told to follow-up tomorrow with his PCP and urology he was stable at time of discharge agreement with plan.       Amount and/or Complexity of Data Reviewed  Clinical lab tests: reviewed  Tests in the radiology section of CPT®: reviewed    Patient Progress  Patient progress: stable      Final diagnoses:   Left groin pain   Gross hematuria              Shani Nuñez, EVERETT  09/17/19 2434

## 2019-09-17 NOTE — ED NOTES
"Pt seen on 12th for left groin pain that radiates to left flank. Reports \"they didn't know what was wrong with me and I ran out of pain pills they game me.\" Reports left groin pain and left flank pain today. States he has some problems urinating.     Brooke Dorado, RN  09/17/19 1007    "

## 2019-09-18 ENCOUNTER — OFFICE VISIT (OUTPATIENT)
Dept: FAMILY MEDICINE CLINIC | Facility: CLINIC | Age: 74
End: 2019-09-18

## 2019-09-18 VITALS
DIASTOLIC BLOOD PRESSURE: 71 MMHG | HEART RATE: 88 BPM | BODY MASS INDEX: 34.97 KG/M2 | TEMPERATURE: 97.2 F | SYSTOLIC BLOOD PRESSURE: 134 MMHG | OXYGEN SATURATION: 96 % | WEIGHT: 230 LBS

## 2019-09-18 DIAGNOSIS — M54.50 ACUTE LEFT-SIDED LOW BACK PAIN WITHOUT SCIATICA: Primary | ICD-10-CM

## 2019-09-18 PROCEDURE — 96372 THER/PROPH/DIAG INJ SC/IM: CPT | Performed by: FAMILY MEDICINE

## 2019-09-18 PROCEDURE — 99213 OFFICE O/P EST LOW 20 MIN: CPT | Performed by: FAMILY MEDICINE

## 2019-09-18 RX ORDER — METHYLPREDNISOLONE SODIUM SUCCINATE 125 MG/2ML
125 INJECTION, POWDER, LYOPHILIZED, FOR SOLUTION INTRAMUSCULAR; INTRAVENOUS EVERY 6 HOURS
Status: DISCONTINUED | OUTPATIENT
Start: 2019-09-18 | End: 2019-09-18

## 2019-09-18 RX ORDER — HYDROCODONE BITARTRATE AND ACETAMINOPHEN 5; 325 MG/1; MG/1
1 TABLET ORAL EVERY 6 HOURS PRN
Qty: 28 TABLET | Refills: 0 | Status: SHIPPED | OUTPATIENT
Start: 2019-09-18 | End: 2020-04-14

## 2019-09-18 RX ORDER — METHYLPREDNISOLONE SODIUM SUCCINATE 125 MG/2ML
125 INJECTION, POWDER, LYOPHILIZED, FOR SOLUTION INTRAMUSCULAR; INTRAVENOUS ONCE
Status: COMPLETED | OUTPATIENT
Start: 2019-09-18 | End: 2019-09-18

## 2019-09-18 RX ADMIN — METHYLPREDNISOLONE SODIUM SUCCINATE 125 MG: 125 INJECTION, POWDER, LYOPHILIZED, FOR SOLUTION INTRAMUSCULAR; INTRAVENOUS at 11:30

## 2019-09-18 NOTE — PATIENT INSTRUCTIONS
Acute Back Pain, Adult  Acute back pain is sudden and usually short-lived. It is often caused by an injury to the muscles and tissues in the back. The injury may result from:  · A muscle or ligament getting overstretched or torn (strained). Ligaments are tissues that connect bones to each other. Lifting something improperly can cause a back strain.  · Wear and tear (degeneration) of the spinal disks. Spinal disks are circular tissue that provides cushioning between the bones of the spine (vertebrae).  · Twisting motions, such as while playing sports or doing yard work.  · A hit to the back.  · Arthritis.  You may have a physical exam, lab tests, and imaging tests to find the cause of your pain. Acute back pain usually goes away with rest and home care.  Follow these instructions at home:  Managing pain, stiffness, and swelling  · Take over-the-counter and prescription medicines only as told by your health care provider.  · Your health care provider may recommend applying ice during the first 24-48 hours after your pain starts. To do this:  ? Put ice in a plastic bag.  ? Place a towel between your skin and the bag.  ? Leave the ice on for 20 minutes, 2-3 times a day.  · If directed, apply heat to the affected area as often as told by your health care provider. Use the heat source that your health care provider recommends, such as a moist heat pack or a heating pad.  ? Place a towel between your skin and the heat source.  ? Leave the heat on for 20-30 minutes.  ? Remove the heat if your skin turns bright red. This is especially important if you are unable to feel pain, heat, or cold. You have a greater risk of getting burned.  Activity    · Do not stay in bed. Staying in bed for more than 1-2 days can delay your recovery.  · Sit up and stand up straight. Avoid leaning forward when you sit, or hunching over when you stand.  ? If you work at a desk, sit close to it so you do not need to lean over. Keep your chin tucked  "in. Keep your neck drawn back, and keep your elbows bent at a right angle. Your arms should look like the letter \"L.\"  ? Sit high and close to the steering wheel when you drive. Add lower back (lumbar) support to your car seat, if needed.  · Take short walks on even surfaces as soon as you are able. Try to increase the length of time you walk each day.  · Do not sit, drive, or  one place for more than 30 minutes at a time. Sitting or standing for long periods of time can put stress on your back.  · Do not drive or use heavy machinery while taking prescription pain medicine.  · Use proper lifting techniques. When you bend and lift, use positions that put less stress on your back:  ? Bend your knees.  ? Keep the load close to your body.  ? Avoid twisting.  · Exercise regularly as told by your health care provider. Exercising helps your back heal faster and helps prevent back injuries by keeping muscles strong and flexible.  · Work with a physical therapist to make a safe exercise program, as recommended by your health care provider. Do any exercises as told by your physical therapist.  Lifestyle  · Maintain a healthy weight. Extra weight puts stress on your back and makes it difficult to have good posture.  · Avoid activities or situations that make you feel anxious or stressed. Stress and anxiety increase muscle tension and can make back pain worse. Learn ways to manage anxiety and stress, such as through exercise.  General instructions    · Sleep on a firm mattress in a comfortable position. Try lying on your side with your knees slightly bent. If you lie on your back, put a pillow under your knees.  · Follow your treatment plan as told by your health care provider. This may include:  ? Cognitive or behavioral therapy.  ? Acupuncture or massage therapy.  ? Meditation or yoga.  Contact a health care provider if:  · You have pain that is not relieved with rest or medicine.  · You have increasing pain going " down into your legs or buttocks.  · Your pain does not improve after 2 weeks.  · You have pain at night.  · You lose weight without trying.  · You have a fever or chills.  Get help right away if:  · You develop new bowel or bladder control problems.  · You have unusual weakness or numbness in your arms or legs.  · You develop nausea or vomiting.  · You develop abdominal pain.  · You feel faint.  Summary  · Acute back pain is sudden and usually short-lived.  · Use proper lifting techniques. When you bend and lift, use positions that put less stress on your back.  · Take over-the-counter and prescription medicines and apply heat or ice as directed by your health care provider.  This information is not intended to replace advice given to you by your health care provider. Make sure you discuss any questions you have with your health care provider.  Document Released: 12/18/2006 Document Revised: 08/01/2018 Document Reviewed: 08/01/2018  [employer] Patient Education © 2019 [employer]

## 2019-09-18 NOTE — PROGRESS NOTES
Subjective   Chief Complaint   Patient presents with   • Back Pain     low back pain on the Lt side x 1 wk has been to [facility] ER two times since then   • Groin Pain     Lt side     Heri Vasquez is a 74 y.o. male.     ER x 2 .  CT x 2.  Records reviewed.       Back Pain   This is a new problem. The current episode started in the past 7 days. The problem occurs constantly. The problem has been gradually worsening since onset. The pain is present in the lumbar spine. The quality of the pain is described as aching. The pain is severe. The symptoms are aggravated by bending and sitting. Associated symptoms include abdominal pain. Pertinent negatives include no chest pain, dysuria, fever, leg pain, numbness, tingling or weakness. He has tried bed rest (tramadol) for the symptoms. The treatment provided mild relief.   Groin Pain   This is a new problem. The current episode started in the past 7 days. Associated symptoms include abdominal pain. Pertinent negatives include no chest pain, chills, coughing, dysuria, fever, rash, shortness of breath or sore throat.      Past Medical History:   Diagnosis Date   • Arthritis     Dr Mosqueda   • Coronary heart disease 01/2016    single vessel disease, nl stress test (copied from [facility])   • Coronary heart disease 10/31/2017    cath 10/31/17 - Low % Blockages- N o Intervention   (copied from [facility])   • Diverticulosis    • GERD (gastroesophageal reflux disease)    • Hearing loss    • Hyperlipidemia    • Low back pain    • SIDDHARTHA treated with BiPAP    • Pain management     injections Lumbar spine X2 with [facility] @ [facility] (copied from [facility])   • Physical therapy evaluation, initial     @ [facility] in [place] 6 weeks x3 per week, [facility] (copied from [facility])   • Rectal bleed 08/2012    hemorrhoids   • Retinal hemorrhage of right eye 05/2014   • Type 2 diabetes mellitus (CMS/HCC)    • Uses brace      Past Surgical History:   Procedure Laterality Date   •  BELPHAROPTOSIS REPAIR  2017     Dr. grimes (copied from NX[facility])   • BROW LIFT  2017    Dr. Grimes at [facility]   • CARDIAC CATHETERIZATION  2012    at [facility]- single vessel disease. (copied from [facility])   • CARDIAC CATHETERIZATION  10/13/2017    no intervention - Low % Blockages.   • COLON SURGERY  2014    colonscopy   • COLONOSCOPY N/A 2019    Procedure: COLONOSCOPY with polypectomy x1;  Surgeon: Graham Byrd MD;  Location: [facility] ENDOSCOPY;  Service: Gastroenterology   • HERNIA REPAIR  2008    umbilical hernia repair   • KNEE ARTHROSCOPY Right 2014    Dr. Mosqueda   • LASIK      lasik and Cataract Extraction, sep 11 and right 2014- [facility]. (copied from [facility])   • TOTAL KNEE ARTHROPLASTY Left 2009    Dr. Mosqueda (copied from NXTM)     No Known Allergies  Social History     Socioeconomic History   • Marital status:      Spouse name: Not on file   • Number of children: Not on file   • Years of education: Not on file   • Highest education level: Not on file   Tobacco Use   • Smoking status: Former Smoker     Last attempt to quit: 2000     Years since quittin.7   • Smokeless tobacco: Former User   Substance and Sexual Activity   • Alcohol use: No   • Drug use: No   • Sexual activity: Defer     Social History     Tobacco Use   Smoking Status Former Smoker   • Last attempt to quit:    • Years since quittin.7   Smokeless Tobacco Former User       family history includes Diabetes in his other and other; Heart disease in his mother; Hypertension in his mother; Stroke in his maternal grandfather and other.  Current Outpatient Medications on File Prior to Visit   Medication Sig Dispense Refill   • aspirin 325 MG tablet Take 325 mg by mouth Every Evening.     • baclofen (LIORESAL) 10 MG tablet TAKE 1 2 TO 1 (ONE HALF TO ONE) TABLET BY MOUTH THREE TIMES DAILY AS NEEDED FOR MUSCLE SPASM  1   • betamethasone valerate (VALISONE) 0.1 % cream BETAMETHASONE  VALERATE 0.1 % CREA     • calcium carbonate-vitamin d (CALTRATE 600+D) 600-400 MG-UNIT per tablet CALTRATE 600+D 600-400 MG-UNIT ORAL TABLET     • calcium citrate-vitamin d (CITRACAL) 200-250 MG-UNIT tablet tablet Take 1 tablet by mouth 2 (Two) Times a Day.     • cefdinir (OMNICEF) 300 MG capsule Take 1 capsule by mouth 2 (Two) Times a Day. 14 capsule 0   • diclofenac (VOLTAREN) 75 MG EC tablet DICLOFENAC SODIUM 75 MG TBEC     • docusate sodium (COLACE) 50 MG capsule Take  by mouth 3 (Three) Times a Day As Needed for Constipation.     • hydrocortisone 2.5 % cream HYDROCORTISONE 2.5 % CREA     • isosorbide mononitrate (IMDUR) 60 MG 24 hr tablet Take 120 mg by mouth Daily.     • lisinopril (PRINIVIL,ZESTRIL) 10 MG tablet Take 10 mg by mouth Every Night.     • meclizine (ANTIVERT) 25 MG tablet MECLIZINE HCL 25 MG TABS     • metFORMIN (GLUCOPHAGE) 500 MG tablet Take 500 mg by mouth 2 (Two) Times a Day With Meals.     • Multiple Vitamins-Minerals (MULTIVITAMIN WITH MINERALS) tablet tablet Take 1 tablet by mouth Daily.     • naproxen (NAPROSYN) 500 MG tablet Take 500 mg by mouth 2 (Two) Times a Day As Needed. for pain  3   • nitroglycerin (NITROSTAT) 0.4 MG SL tablet   4   • ondansetron ODT (ZOFRAN ODT) 4 MG disintegrating tablet Take 1 tablet by mouth Every 8 (Eight) Hours As Needed for Nausea or Vomiting. 20 tablet 0   • ONE TOUCH ULTRA TEST test strip 1 each by Other route Daily.  3   • ONETOUCH DELICA LANCETS 33G misc USE 1 LANCET TO CHECK GLUCOSE ONCE DAILY  3   • pantoprazole (PROTONIX) 40 MG EC tablet Take 40 mg by mouth Every Evening.     • simvastatin (ZOCOR) 40 MG tablet Take 40 mg by mouth Every Night.     • traMADol (ULTRAM) 50 MG tablet Take 1 tablet by mouth Every 6 (Six) Hours As Needed for Moderate Pain . 12 tablet 0     Current Facility-Administered Medications on File Prior to Visit   Medication Dose Route Frequency Provider Last Rate Last Dose   • [COMPLETED] HYDROcodone-acetaminophen (NORCO) 7.5-325 MG  per tablet 1 tablet  1 tablet Oral Once Yg Erazo MD   1 tablet at 09/17/19 1413   • [COMPLETED] lidocaine (LIDODERM) 5 % 1 patch  1 patch Transdermal Once Shani Nuñez PA-C   1 patch at 09/17/19 1044   • [DISCONTINUED] sodium chloride 0.9 % flush 10 mL  10 mL Intravenous PRN Shani Nuñez PA-C         Patient Active Problem List   Diagnosis   • Varicose veins of right lower extremity with pain   • Nasal congestion   • Short of breath on exertion   • Actinic keratosis   • Arthritis   • Bradycardia   • Chronic coronary artery disease   • Degeneration of intervertebral disc of lumbar region   • Dependent edema   • Diabetic peripheral neuropathy (CMS/HCC)   • Encounter for general adult medical examination without abnormal findings   • Fatigue   • Gastroesophageal reflux disease   • Hay fever   • Hearing loss   • Hyperlipidemia   • Knee pain   • Dizziness and giddiness   • Strain of lumbar region   • Type 2 diabetes mellitus (CMS/HCC)       The following portions of the patient's history were reviewed and updated as appropriate: allergies, current medications, past family history, past medical history, past social history, past surgical history and problem list.    Review of Systems   Constitutional: Negative for chills and fever.   HENT: Negative for sinus pressure and sore throat.    Eyes: Negative for blurred vision.   Respiratory: Negative for cough and shortness of breath.    Cardiovascular: Negative for chest pain and palpitations.   Gastrointestinal: Positive for abdominal pain.   Endocrine: Negative for polyuria.   Genitourinary: Negative for dysuria.   Musculoskeletal: Positive for back pain.   Skin: Negative for rash.   Neurological: Negative for dizziness, tingling, weakness, numbness and headache.   Hematological: Negative for adenopathy.   Psychiatric/Behavioral: Negative for depressed mood.       Objective   /71 (BP Location: Right arm, Patient Position: Lying, Cuff Size: Adult)    Pulse 88   Temp 97.2 °F (36.2 °C) (Oral)   Wt 104 kg (230 lb)   SpO2 96%   BMI 34.97 kg/m²   Physical Exam   Constitutional: Vital signs are normal. He appears well-nourished.   Cardiovascular: Normal rate and regular rhythm.   Pulmonary/Chest: Effort normal and breath sounds normal.   Abdominal: Soft. Bowel sounds are normal.   Musculoskeletal: He exhibits tenderness.        Lumbar back: He exhibits decreased range of motion and tenderness.   Skin: Skin is warm.   Psychiatric: He has a normal mood and affect.       Admission on 09/17/2019, Discharged on 09/17/2019   Component Date Value Ref Range Status   • Glucose 09/17/2019 147* 65 - 99 mg/dL Final   • BUN 09/17/2019 11  8 - 20 mg/dL Final   • Creatinine 09/17/2019 0.80  0.70 - 1.20 mg/dL Final   • Sodium 09/17/2019 137  136 - 144 mmol/L Final   • Potassium 09/17/2019 3.9  3.6 - 5.1 mmol/L Final   • Chloride 09/17/2019 102  101 - 111 mmol/L Final   • CO2 09/17/2019 24.0  22.0 - 32.0 mmol/L Final   • Calcium 09/17/2019 8.5* 8.9 - 10.3 mg/dL Final   • Total Protein 09/17/2019 6.9  6.1 - 7.9 g/dL Final   • Albumin 09/17/2019 4.00  3.50 - 4.80 g/dL Final   • ALT (SGPT) 09/17/2019 62  17 - 63 U/L Final   • AST (SGOT) 09/17/2019 44* 15 - 41 U/L Final   • Alkaline Phosphatase 09/17/2019 49  32 - 91 U/L Final   • Total Bilirubin 09/17/2019 0.7  0.3 - 1.2 mg/dL Final   • eGFR Non African Amer 09/17/2019 94  >60 mL/min/1.73 Final   • Globulin 09/17/2019 2.9  2.5 - 3.8 gm/dL Final   • A/G Ratio 09/17/2019 1.4  1.0 - 1.7 g/dL Final   • BUN/Creatinine Ratio 09/17/2019 13.8  6.2 - 20.3 Final   • Anion Gap 09/17/2019 14.9  5.0 - 15.0 mmol/L Final   • Lipase 09/17/2019 18* 22 - 51 U/L Final   • Color, UA 09/17/2019 Yellow  Yellow, Straw Final   • Appearance, UA 09/17/2019 Clear  Clear Final   • pH, UA 09/17/2019 6.5  5.0 - 8.0 Final   • Specific Gravity,  09/17/2019 1.015  1.005 - 1.030 Final   • Glucose, UA 09/17/2019 Negative  Negative Final   • Ketones,  09/17/2019  Negative  Negative Final   • Bilirubin, UA 09/17/2019 Negative  Negative Final   • Blood, UA 09/17/2019 Large (3+)* Negative Final   • Protein, UA 09/17/2019 Negative  Negative Final   • Leuk Esterase, UA 09/17/2019 Negative  Negative Final   • Nitrite, UA 09/17/2019 Negative  Negative Final   • Urobilinogen, UA 09/17/2019 0.2 E.U./dL  0.2 - 1.0 E.U./dL Final   • WBC 09/17/2019 7.30  3.40 - 10.80 10*3/mm3 Final   • RBC 09/17/2019 5.08  4.14 - 5.80 10*6/mm3 Final   • Hemoglobin 09/17/2019 15.4  13.0 - 17.7 g/dL Final   • Hematocrit 09/17/2019 45.4  37.5 - 51.0 % Final   • MCV 09/17/2019 89.4  79.0 - 97.0 fL Final   • MCH 09/17/2019 30.3  26.6 - 33.0 pg Final   • MCHC 09/17/2019 33.9  31.5 - 35.7 g/dL Final   • RDW 09/17/2019 14.4  12.3 - 15.4 % Final   • RDW-SD 09/17/2019 45.1  37.0 - 54.0 fl Final   • MPV 09/17/2019 8.1  6.0 - 12.0 fL Final   • Platelets 09/17/2019 256  140 - 450 10*3/mm3 Final   • Neutrophil % 09/17/2019 79.3* 42.7 - 76.0 % Final   • Lymphocyte % 09/17/2019 13.6* 19.6 - 45.3 % Final   • Monocyte % 09/17/2019 5.7  5.0 - 12.0 % Final   • Eosinophil % 09/17/2019 0.5  0.3 - 6.2 % Final   • Basophil % 09/17/2019 0.9  0.0 - 1.5 % Final   • Neutrophils, Absolute 09/17/2019 5.80  1.70 - 7.00 10*3/mm3 Final   • Lymphocytes, Absolute 09/17/2019 1.00  0.70 - 3.10 10*3/mm3 Final   • Monocytes, Absolute 09/17/2019 0.40  0.10 - 0.90 10*3/mm3 Final   • Eosinophils, Absolute 09/17/2019 0.00  0.00 - 0.40 10*3/mm3 Final   • Basophils, Absolute 09/17/2019 0.10  0.00 - 0.20 10*3/mm3 Final   • nRBC 09/17/2019 0.0  0.0 - 0.2 /100 WBC Final   • RBC, UA 09/17/2019 Too Numerous to Count* None Seen /HPF Final   • WBC, UA 09/17/2019 0-2* None Seen /HPF Final   • Bacteria, UA 09/17/2019 None Seen  None Seen /HPF Final   • Squamous Epithelial Cells, UA 09/17/2019 0-2  None Seen, 0-2 /HPF Final   • Hyaline Casts, UA 09/17/2019 0-2  None Seen /LPF Final   • Methodology 09/17/2019 Automated Microscopy   Final   Admission on  09/12/2019, Discharged on 09/12/2019   Component Date Value Ref Range Status   • Glucose 09/12/2019 125* 65 - 99 mg/dL Final   • BUN 09/12/2019 15  8 - 20 mg/dL Final   • Creatinine 09/12/2019 0.80  0.70 - 1.20 mg/dL Final   • Sodium 09/12/2019 142  136 - 144 mmol/L Final   • Potassium 09/12/2019 3.9  3.6 - 5.1 mmol/L Final   • Chloride 09/12/2019 108  101 - 111 mmol/L Final   • CO2 09/12/2019 25.0  22.0 - 32.0 mmol/L Final   • Calcium 09/12/2019 8.5* 8.9 - 10.3 mg/dL Final   • Total Protein 09/12/2019 5.9* 6.1 - 7.9 g/dL Final   • Albumin 09/12/2019 3.50  3.50 - 4.80 g/dL Final   • ALT (SGPT) 09/12/2019 64* 17 - 63 U/L Final   • AST (SGOT) 09/12/2019 44* 15 - 41 U/L Final   • Alkaline Phosphatase 09/12/2019 51  32 - 91 U/L Final   • Total Bilirubin 09/12/2019 0.7  0.3 - 1.2 mg/dL Final   • eGFR Non African Amer 09/12/2019 94  >60 mL/min/1.73 Final   • Globulin 09/12/2019 2.4* 2.5 - 3.8 gm/dL Final   • A/G Ratio 09/12/2019 1.5  1.0 - 1.7 g/dL Final   • BUN/Creatinine Ratio 09/12/2019 18.8  6.2 - 20.3 Final   • Anion Gap 09/12/2019 12.9  5.0 - 15.0 mmol/L Final   • Color, UA 09/12/2019 Yellow  Yellow, Straw Final   • Appearance, UA 09/12/2019 Clear  Clear Final   • pH, UA 09/12/2019 5.5  5.0 - 8.0 Final   • Specific Gravity, UA 09/12/2019 1.018  1.005 - 1.030 Final   • Glucose, UA 09/12/2019 Negative  Negative Final   • Ketones, UA 09/12/2019 Negative  Negative Final   • Bilirubin, UA 09/12/2019 Negative  Negative Final   • Blood, UA 09/12/2019 Negative  Negative Final   • Protein, UA 09/12/2019 Negative  Negative Final   • Leuk Esterase, UA 09/12/2019 Negative  Negative Final   • Nitrite, UA 09/12/2019 Negative  Negative Final   • Urobilinogen, UA 09/12/2019 0.2 E.U./dL  0.2 - 1.0 E.U./dL Final   • WBC 09/12/2019 7.80  3.40 - 10.80 10*3/mm3 Final   • RBC 09/12/2019 4.64  4.14 - 5.80 10*6/mm3 Final   • Hemoglobin 09/12/2019 14.3  13.0 - 17.7 g/dL Final   • Hematocrit 09/12/2019 42.3  37.5 - 51.0 % Final   • MCV  09/12/2019 91.2  79.0 - 97.0 fL Final   • MCH 09/12/2019 30.7  26.6 - 33.0 pg Final   • MCHC 09/12/2019 33.7  31.5 - 35.7 g/dL Final   • RDW 09/12/2019 14.6  12.3 - 15.4 % Final   • RDW-SD 09/12/2019 45.9  37.0 - 54.0 fl Final   • MPV 09/12/2019 8.7  6.0 - 12.0 fL Final   • Platelets 09/12/2019 291  140 - 450 10*3/mm3 Final   • Neutrophil % 09/12/2019 67.7  42.7 - 76.0 % Final   • Lymphocyte % 09/12/2019 19.9  19.6 - 45.3 % Final   • Monocyte % 09/12/2019 8.5  5.0 - 12.0 % Final   • Eosinophil % 09/12/2019 2.9  0.3 - 6.2 % Final   • Basophil % 09/12/2019 1.0  0.0 - 1.5 % Final   • Neutrophils, Absolute 09/12/2019 5.30  1.70 - 7.00 10*3/mm3 Final   • Lymphocytes, Absolute 09/12/2019 1.60  0.70 - 3.10 10*3/mm3 Final   • Monocytes, Absolute 09/12/2019 0.70  0.10 - 0.90 10*3/mm3 Final   • Eosinophils, Absolute 09/12/2019 0.20  0.00 - 0.40 10*3/mm3 Final   • Basophils, Absolute 09/12/2019 0.10  0.00 - 0.20 10*3/mm3 Final   • nRBC 09/12/2019 0.1  0.0 - 0.2 /100 WBC Final           Assessment/Plan       Heri was seen today for back pain and groin pain.    Diagnoses and all orders for this visit:    Acute left-sided low back pain without sciatica  -     HYDROcodone-acetaminophen (NORCO) 5-325 MG per tablet; Take 1 tablet by mouth Every 6 (Six) Hours As Needed for Moderate Pain  or Severe Pain .  -     Discontinue: methylPREDNISolone sodium succinate (SOLU-Medrol) injection 125 mg  -     methylPREDNISolone sodium succinate (SOLU-Medrol) injection 125 mg

## 2019-09-25 RX ORDER — ISOSORBIDE MONONITRATE 60 MG/1
TABLET, EXTENDED RELEASE ORAL
Qty: 135 TABLET | Refills: 1 | Status: SHIPPED | OUTPATIENT
Start: 2019-09-25 | End: 2020-04-14 | Stop reason: SDUPTHER

## 2019-09-25 RX ORDER — LISINOPRIL 10 MG/1
TABLET ORAL
Qty: 90 TABLET | Refills: 3 | Status: SHIPPED | OUTPATIENT
Start: 2019-09-25 | End: 2020-07-01

## 2019-09-25 RX ORDER — PANTOPRAZOLE SODIUM 40 MG/1
TABLET, DELAYED RELEASE ORAL
Qty: 90 TABLET | Refills: 3 | Status: SHIPPED | OUTPATIENT
Start: 2019-09-25 | End: 2020-07-01

## 2019-10-07 ENCOUNTER — FLU SHOT (OUTPATIENT)
Dept: FAMILY MEDICINE CLINIC | Facility: CLINIC | Age: 74
End: 2019-10-07

## 2019-10-07 DIAGNOSIS — Z23 NEED FOR IMMUNIZATION AGAINST INFLUENZA: Primary | ICD-10-CM

## 2019-10-07 PROCEDURE — G0008 ADMIN INFLUENZA VIRUS VAC: HCPCS | Performed by: FAMILY MEDICINE

## 2019-10-07 PROCEDURE — 90653 IIV ADJUVANT VACCINE IM: CPT | Performed by: FAMILY MEDICINE

## 2019-10-16 ENCOUNTER — OFFICE VISIT (OUTPATIENT)
Dept: CARDIOLOGY | Facility: CLINIC | Age: 74
End: 2019-10-16

## 2019-10-16 VITALS
SYSTOLIC BLOOD PRESSURE: 130 MMHG | HEIGHT: 69 IN | DIASTOLIC BLOOD PRESSURE: 69 MMHG | WEIGHT: 238.2 LBS | HEART RATE: 86 BPM | BODY MASS INDEX: 35.28 KG/M2 | OXYGEN SATURATION: 94 %

## 2019-10-16 DIAGNOSIS — I10 ESSENTIAL HYPERTENSION: ICD-10-CM

## 2019-10-16 DIAGNOSIS — I25.10 CORONARY ARTERY DISEASE INVOLVING NATIVE CORONARY ARTERY OF NATIVE HEART WITHOUT ANGINA PECTORIS: Primary | ICD-10-CM

## 2019-10-16 DIAGNOSIS — E78.5 HYPERLIPIDEMIA, UNSPECIFIED HYPERLIPIDEMIA TYPE: ICD-10-CM

## 2019-10-16 PROCEDURE — 99213 OFFICE O/P EST LOW 20 MIN: CPT | Performed by: INTERNAL MEDICINE

## 2019-10-16 PROCEDURE — 93000 ELECTROCARDIOGRAM COMPLETE: CPT | Performed by: INTERNAL MEDICINE

## 2019-10-16 NOTE — PROGRESS NOTES
"CC: CP, Dyslipidemia     CC:  Follow-up  for coronary artery disease, dyslipidemia, gastroesophageal reflux disease, obstructive sleep apnea     Mr. Heri Vasquez is a delightful 74 years old gentleman with history of mild coronary artery disease, ,  dyslipidemia, and gastroesophageal reflux disease.  He uses BiPAP machine for obstructive sleep apnea. he has had occasional burning discomfort   attributed to gastroesophageal reflux disease.  He denies any exertional chest pain.  In particular, there is no history of increasing episodes of chest pain.     See readmitted at [facility] with chest pains and underwent cardiac catheterization which showed mild coronary artery disease in October 2017.  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  Left main was normal.  The distal LAD   showed a 50-60% lesion and was too small to do any intervention.  30-40% lesion was noted in the right coronary artery.  Left circumflex and ramus intermedius branches were normal.       He is taking  isosorbide mononitrate 90 mg daily.  His chest pain frequency and intensity has significantly decreased.          /69 (BP Location: Left arm)   Pulse 86 Comment: sinus rhythm  Ht 175.3 cm (69\")   Wt 108 kg (238 lb 3.2 oz)   SpO2 94%   BMI 35.18 kg/m² .      Indication for EKG: Coronary artery disease:    EKG showed normal sinus rhythm early repolarization with borderline left metzger axis and no precordial voltage.  GA interval was 145 ms QRS duration 106 ms  ms with QRS axis of -17 no significant change was noted as compared to previous EKG of 3/8/2019.      He is stable from cardiac   standpoint his current therapy will be continued will see him in the office in 6 months for follow-up       Assessment/plan    1.  Coronary artery disease stable with no anginal chest discomfort    2.  Dyslipidemia.  Lipids are being checked periodically.    3.  Hypertension under fairly good control.      Thank you very " much for allowing us to participate in the care of your patients                Problems: Active problems were reviewed with the patient during this visit.  Medications: Medications were reviewed with the patient during this visit.  Allergies: Allergies were reviewed with the patient during this visit.  No Known Allergy.           Vitals Entered By: Lluvia Rogers LPN (October 17, 2018 9:01 AM)        Past Medical History:     Reviewed history from 03/23/2018 and no changes required:        SIDDHARTHA-Bi-Pap         Arthritis        Hyperlipidemia        rectal bleed 8/12 - hemorrhoids        Coronary Heart Disease: single vessel disease , nl stress test 1/16        GERD         Diverticulosis         Retinal Hemorrhage of right eye : May 2014        No Drug Allergies?         Hearing loss        low back pain         Diabetes, Type 2        Coronary Heart Disease  - Cath 10/31/17 - Low % Blockages - No Intervention         lumbar corset brace         Physical Therapy @ [facility] in [place] 6 weeks x3 per week        Physical Therapy @ [facility], [place]        Injections Lumbar spine x2 with [facility] @ [facility]      Past Surgical History:     Reviewed history from 01/10/2018 and no changes required:        Umbilical hernia repair 11/2008        left Total Knee Arthroplasty 11/2009, Dr. Mosqueda        heart cath 3/12 at [place] - single vessel disease        Lasik and Cataract Extraction: Left on Sept. 11th and right Sept. 25th, 2014 - [facility]        Rt Knee Arthroscopy-11/2014, Dr. Mosqueda        colonscopy 6/2014        Heart Catherization 10/31/17  No intervention - Low % Blockages         Brow lift and eyelid repair-12/11/2017 Dr. Trent at [facility]     Active Medications (reviewed today):  NAPROSYN 500 MG ORAL TABLET (NAPROXEN) Take one (1) tablet by mouth twice a day as needed for pain  ASPIRIN  MG ORAL TABLET DELAYED RELEASE (ASPIRIN) Take 1 tablet by mouth daily  BETAMETHASONE VALERATE 0.1 % EXTERNAL CREAM  (BETAMETHASONE VALERATE) apply to affected area 2-3 times per day  CALTRATE 600+D 600-400 MG-UNIT ORAL TABLET (CALCIUM CARBONATE-VITAMIN D) p.o.  b.i.d  COLACE 100 MG ORAL CAPSULE (DOCUSATE SODIUM) p.o.  qhs  FISH OIL-FLAX OIL-BORAGE OIL ORAL CAPSULE (FLAX OIL-FISH OIL-BORAGE OIL)   HYDROCORTISONE 2.5 % EXTERNAL CREAM (HYDROCORTISONE) apply to affected area 2-3 times per day  ISOSORBIDE MONONITRATE ER 60 MG ORAL TABLET EXTENDED RELEASE 24 HOUR (ISOSORBIDE MONONITRATE) 1- 1/2  tablets  daily (90 mg)  LISINOPRIL 10 MG ORAL TABLET (LISINOPRIL) Take 1 tablet by mouth daily  METFORMIN 500MG TABLET (METFORMIN HCL) Take one (1) tablet by mouth twice a day  MULTI VITAMIN/MINERALS ORAL TABLET (MULTIPLE VITAMINS-MINERALS) p.o. qd  NITROSTAT 0.4 MG SUBLINGUAL TABLET SUBLINGUAL (NITROGLYCERIN) Use as directed  NITROSTAT 0.4 MG SUBLINGUAL TABLET SUBLINGUAL (NITROGLYCERIN) one under tongueevery 5 mins as neededfor chest pains,total of3 doses in 15 mins  ONETOUCH DELICA LANCETS 33G (LANCETS) USE ONE UNIT TO CHECK GLUCOSE ONCE DAILY  ONETOUCH LANCETS (LANCETS) use to test blood sugar once daily  ONETOUCH ULTRA BLUE IN VITRO STRIP (GLUCOSE BLOOD) test blood sugar once daily     Dx. E11.65  ONETOUCH ULTRA SYSTEM w/Device KIT (BLOOD GLUCOSE MONITORING SUPPL) use to test blood sugar once daily  PANTOPRAZOLE SOD 40MG EC TABLET (PANTOPRAZOLE SODIUM) TAKE 1 TABLET BY MOUTH  DAILY  SIMVASTATIN  40MG  TAB (SIMVASTATIN) TAKE 1 TABLET BY MOUTH  DAILY AT BEDTIME     Current Allergies (reviewed today):  No known allergies     Family History Summary:      Reviewed history Last on 09/21/2018 and no changes required:10/17/2018  Mother - Has FH Hypertension - Entered On: 6/10/2016  Mother - Has FH Heart Disease - Entered On: 6/10/2016     General Comments - FH:  FH Diabetes-half brother , aunt   FH Heart Disease-mother had CHF -   FH Hypertension-mother   FH Stroke-maternal grandfather and half brother         Social History:     Reviewed history  from 07/10/2018 and no changes required:        Patient is a former smoker.        Passive Smoke: N        Alcohol Use: N        Drug Use: N        HIV/High Risk: N        Regular Exercise: N        Marital Status:         Children:         Occupation: retired            Risk Factors:      Smoked Tobacco Use:  Former smoker     Cigarettes:  Yes -- 1.5 pack(s) per day,    Pack-years:  20 years - 1-1/2 ppd         Year started:  age 10         Year quit:  2009        Years Since Last Quit:  9   Smokeless Tobacco Use:  Former  Passive smoke exposure:  no  Drug use:  no  HIV high-risk behavior:  no  Caffeine use:  4 drinks per day  Alcohol use:  no  Exercise:  no  Seatbelt use:  100 %  Sun Exposure:  occasionally     Family History Risk Factors:     Family History of MI in females < 65 years old:  no     Family History of MI in males < 55 years old:  no           Review of Systems   General: Denies fever, chills, sweats, anorexia, fatigue, weakness, malaise, weight loss, weight gain, sleep disorder  Eyes: denies blurring, diplopia, irritation, discharge, vision loss, eye pain, photophobia  Ear/Nose/Throat: Complains of earache; Denies decreased hearing, nasal congestion, nosebleeds  Cardiovascular:  mild-to-moderate coronary artery disease cardiac catheterization October 2017.Complains of fatigue; Denies chest pain or discomfort, racing/skipping heart beats, lightheadedness, shortness of breath with exertion, palpitations, swelling of   hands or feet, difficulty breathing while lying down, fainting  Respiratory: Denies cough, shortness of breath  Gastrointestinal: Denies indigestion, nausea  Musculoskeletal: Complains of joint pain, stiffness  Skin: denies rash, itching, dryness, suspicious lesions  Neurologic: Complains of numbness, tingling; Denies headaches, seizures, weakness, tremors, fainting  Psychiatric: denies depression, anxiety, memory loss, mental disturbance, suicidal ideation, hallucinations,  paranoia  Endocrine:  history of diabetes  Hematologic/Lymphatic: Denies skin discoloration  Allergic/Immunologic: Complains of seasonal allergies        Physical Exam     General:      well developed, well nourished, in no acute distress.    Neck:      no masses, thyromegaly, or abnormal cervical nodes.  no JVD. No carotid bruit   Lungs:      clear bilaterally to auscultation.    Heart:      non-displaced PMI, chest non-tender; regular rate and rhythm, S1, S2 without murmurs, rubs, or gallops  Pulses:      pulses normal in all 4 extremities.    Extremities:       no edema

## 2019-10-29 ENCOUNTER — OFFICE VISIT (OUTPATIENT)
Dept: FAMILY MEDICINE CLINIC | Facility: CLINIC | Age: 74
End: 2019-10-29

## 2019-10-29 VITALS
HEART RATE: 81 BPM | OXYGEN SATURATION: 92 % | DIASTOLIC BLOOD PRESSURE: 66 MMHG | WEIGHT: 238 LBS | TEMPERATURE: 97.7 F | SYSTOLIC BLOOD PRESSURE: 117 MMHG | BODY MASS INDEX: 35.15 KG/M2

## 2019-10-29 DIAGNOSIS — Z12.5 SCREENING FOR PROSTATE CANCER: ICD-10-CM

## 2019-10-29 DIAGNOSIS — Z00.00 MEDICARE ANNUAL WELLNESS VISIT, SUBSEQUENT: Primary | ICD-10-CM

## 2019-10-29 DIAGNOSIS — E11.9 TYPE 2 DIABETES MELLITUS WITHOUT COMPLICATION, WITHOUT LONG-TERM CURRENT USE OF INSULIN (HCC): ICD-10-CM

## 2019-10-29 LAB
ALBUMIN SERPL-MCNC: 4.4 G/DL (ref 3.5–5.2)
ALBUMIN/GLOB SERPL: 1.6 G/DL
ALP SERPL-CCNC: 58 U/L (ref 39–117)
ALT SERPL W P-5'-P-CCNC: 75 U/L (ref 1–41)
ANION GAP SERPL CALCULATED.3IONS-SCNC: 12.3 MMOL/L (ref 5–15)
AST SERPL-CCNC: 51 U/L (ref 1–40)
BILIRUB SERPL-MCNC: 0.6 MG/DL (ref 0.2–1.2)
BUN BLD-MCNC: 12 MG/DL (ref 8–23)
BUN/CREAT SERPL: 13.5 (ref 7–25)
CALCIUM SPEC-SCNC: 9.6 MG/DL (ref 8.6–10.5)
CHLORIDE SERPL-SCNC: 100 MMOL/L (ref 98–107)
CHOLEST SERPL-MCNC: 157 MG/DL (ref 0–200)
CO2 SERPL-SCNC: 28.7 MMOL/L (ref 22–29)
CREAT BLD-MCNC: 0.89 MG/DL (ref 0.76–1.27)
GFR SERPL CREATININE-BSD FRML MDRD: 83 ML/MIN/1.73
GLOBULIN UR ELPH-MCNC: 2.8 GM/DL
GLUCOSE BLD-MCNC: 122 MG/DL (ref 65–99)
HBA1C MFR BLD: 6.3 % (ref 3.5–5.6)
HDLC SERPL-MCNC: 39 MG/DL (ref 40–60)
LDLC SERPL CALC-MCNC: 88 MG/DL (ref 0–100)
LDLC/HDLC SERPL: 2.26 {RATIO}
POTASSIUM BLD-SCNC: 5.2 MMOL/L (ref 3.5–5.2)
PROT SERPL-MCNC: 7.2 G/DL (ref 6–8.5)
PSA SERPL-MCNC: 1.18 NG/ML (ref 0–4)
SODIUM BLD-SCNC: 141 MMOL/L (ref 136–145)
TRIGL SERPL-MCNC: 150 MG/DL (ref 0–150)
VLDLC SERPL-MCNC: 30 MG/DL (ref 5–40)

## 2019-10-29 PROCEDURE — 36415 COLL VENOUS BLD VENIPUNCTURE: CPT | Performed by: FAMILY MEDICINE

## 2019-10-29 PROCEDURE — 83036 HEMOGLOBIN GLYCOSYLATED A1C: CPT | Performed by: FAMILY MEDICINE

## 2019-10-29 PROCEDURE — 80061 LIPID PANEL: CPT | Performed by: FAMILY MEDICINE

## 2019-10-29 PROCEDURE — 80053 COMPREHEN METABOLIC PANEL: CPT | Performed by: FAMILY MEDICINE

## 2019-10-29 PROCEDURE — G0103 PSA SCREENING: HCPCS | Performed by: FAMILY MEDICINE

## 2019-10-29 PROCEDURE — G0439 PPPS, SUBSEQ VISIT: HCPCS | Performed by: FAMILY MEDICINE

## 2019-10-29 RX ORDER — FLUTICASONE PROPIONATE 50 MCG
SPRAY, SUSPENSION (ML) NASAL
Refills: 6 | COMMUNITY
Start: 2019-10-02 | End: 2020-09-10

## 2019-10-29 NOTE — PATIENT INSTRUCTIONS
Medicare Wellness  Personal Prevention Plan of Service     Date of Office Visit:  10/29/2019  Encounter Provider:  Olesya Cueva MD  Place of Service:  [facility]  Patient Name: Heri Vasquez  :  1945    As part of the Medicare Wellness portion of your visit today, we are providing you with this personalized preventive plan of services (PPPS). This plan is based upon recommendations of the [place] Preventive Services Task Force (USPSTF) and the Advisory Committee on Immunization Practices (ACIP).    This lists the preventive care services that should be considered, and provides dates of when you are due. Items listed as completed are up-to-date and do not require any further intervention.    Health Maintenance   Topic Date Due   • TDAP/TD VACCINES (1 - Tdap) 1962   • PNEUMOCOCCAL VACCINES (65+ LOW/MEDIUM RISK) (2 of 2 - PPSV23) 2014   • URINE MICROALBUMIN  2018   • MEDICARE ANNUAL WELLNESS  2019   • HEMOGLOBIN A1C  2019   • DIABETIC EYE EXAM  10/22/2019   • LIPID PANEL  10/24/2019   • ZOSTER VACCINE (2 of 2) 2019   • INFLUENZA VACCINE  Completed       Orders Placed This Encounter   Procedures   • Comprehensive Metabolic Panel   • Hemoglobin A1c   • Lipid Panel   • PSA Screen       No Follow-up on file.          Advance Directive    Advance directives are legal documents that let you make choices ahead of time about your health care and medical treatment in case you become unable to communicate for yourself. Advance directives are a way for you to communicate your wishes to family, friends, and health care providers. This can help convey your decisions about end-of-life care if you become unable to communicate.  Discussing and writing advance directives should happen over time rather than all at once. Advance directives can be changed depending on your situation and what you want, even after you have signed the advance  directives.  If you do not have an advance directive, some states assign family decision makers to act on your behalf based on how closely you are related to them. Each state has its own laws regarding advance directives. You may want to check with your health care provider, , or state representative about the laws in your state. There are different types of advance directives, such as:  · Medical power of .  · Living will.  · Do not resuscitate (DNR) or do not attempt resuscitation (DNAR) order.  Health care proxy and medical power of   A health care proxy, also called a health care agent, is a person who is appointed to make medical decisions for you in cases in which you are unable to make the decisions yourself. Generally, people choose someone they know well and trust to represent their preferences. Make sure to ask this person for an agreement to act as your proxy. A proxy may have to exercise judgment in the event of a medical decision for which your wishes are not known.  A medical power of  is a legal document that names your health care proxy. Depending on the laws in your state, after the document is written, it may also need to be:  · Signed.  · Notarized.  · Dated.  · Copied.  · Witnessed.  · Incorporated into your medical record.  You may also want to appoint someone to manage your financial affairs in a situation in which you are unable to do so. This is called a durable power of  for finances. It is a separate legal document from the durable power of  for health care. You may choose the same person or someone different from your health care proxy to act as your agent in financial matters.  If you do not appoint a proxy, or if there is a concern that the proxy is not acting in your best interests, a court-appointed guardian may be designated to act on your behalf.  Living will  A living will is a set of instructions documenting your wishes about medical  care when you cannot express them yourself. Health care providers should keep a copy of your living will in your medical record. You may want to give a copy to family members or friends. To alert caregivers in case of an emergency, you can place a card in your wallet to let them know that you have a living will and where they can find it. A living will is used if you become:  · Terminally ill.  · Incapacitated.  · Unable to communicate or make decisions.  Items to consider in your living will include:  · The use or non-use of life-sustaining equipment, such as dialysis machines and breathing machines (ventilators).  · A DNR or DNAR order, which is the instruction not to use cardiopulmonary resuscitation (CPR) if breathing or heartbeat stops.  · The use or non-use of tube feeding.  · Withholding of food and fluids.  · Comfort (palliative) care when the goal becomes comfort rather than a cure.  · Organ and tissue donation.  A living will does not give instructions for distributing your money and property if you should pass away. It is recommended that you seek the advice of a  when writing a will. Decisions about taxes, beneficiaries, and asset distribution will be legally binding. This process can relieve your family and friends of any concerns surrounding disputes or questions that may come up about the distribution of your assets.  DNR or DNAR  A DNR or DNAR order is a request not to have CPR in the event that your heart stops beating or you stop breathing. If a DNR or DNAR order has not been made and shared, a health care provider will try to help any patient whose heart has stopped or who has stopped breathing. If you plan to have surgery, talk with your health care provider about how your DNR or DNAR order will be followed if problems occur.  Summary  · Advance directives are the legal documents that allow you to make choices ahead of time about your health care and medical treatment in case you become  unable to communicate for yourself.  · The process of discussing and writing advance directives should happen over time. You can change the advance directives, even after you have signed them.  · Advance directives include DNR or DNAR orders, living longoria, and designating an agent as your medical power of .  This information is not intended to replace advice given to you by your health care provider. Make sure you discuss any questions you have with your health care provider.  Document Released: 03/26/2009 Document Revised: 11/06/2017 Document Reviewed: 11/06/2017  Elsevier Interactive Patient Education © 2019 [employer]

## 2019-10-29 NOTE — PROGRESS NOTES
Medicare Wellness Visit   The ABC's of the Annual Wellness Visit    Chief Complaint   Patient presents with   • Medicare Wellness-subsequent     fasting labs       HPI:  Heri Vasquez, -1945, is a 74 y.o. male who presents for a Medicare Wellness Visit.    Recent Hospitalizations:  No hospitalization(s) within the last year..    Current Medical Providers:  Patient Care Team:  Olesya Cueva MD as PCP - General (Family Medicine)  Olesya Cueva MD as PCP - Claims Attributed  Tan Rowley OD (Optometry)  PETER Jang MD as Consulting Physician (Cardiology)  Banet, Duane Edward, MD as Consulting Physician (Dermatology)    Health Habits and Functional and Cognitive Screening and Depression Screening:  Functional & Cognitive Status 10/29/2019   Do you have difficulty preparing food and eating? No   Do you have difficulty bathing yourself, getting dressed or grooming yourself? No   Do you have difficulty using the toilet? No   Do you have difficulty moving around from place to place? No   Do you have trouble with steps or getting out of a bed or a chair? Yes   Current Diet Well Balanced Diet   Dental Exam Not up to date   Eye Exam Up to date   Exercise (times per week) 1 times per week   Current Exercise Activities Include Walking   Do you need help using the phone?  No   Are you deaf or do you have serious difficulty hearing?  No   Do you need help with transportation? No   Do you need help shopping? No   Do you need help preparing meals?  No   Do you need help with housework?  No   Do you need help with laundry? No   Do you need help taking your medications? No   Do you need help managing money? No   Do you ever drive or ride in a car without wearing a seat belt? No   Have you felt unusual stress, anger or loneliness in the last month? No   Who do you live with? Spouse   If you need help, do you have trouble finding someone available to you? No   Have you been bothered in the last four weeks  by sexual problems? No   Do you have difficulty concentrating, remembering or making decisions? No       Compared to one year ago, the patient feels his physical health is the same and his mental health is the same.    Depression Screen:  PHQ-2/PHQ-9 Depression Screening 10/29/2019   Little interest or pleasure in doing things 1   Feeling down, depressed, or hopeless 1   Trouble falling or staying asleep, or sleeping too much 0   Feeling tired or having little energy 1   Poor appetite or overeating 0   Feeling bad about yourself - or that you are a failure or have let yourself or your family down 0   Trouble concentrating on things, such as reading the newspaper or watching television 0   Moving or speaking so slowly that other people could have noticed. Or the opposite - being so fidgety or restless that you have been moving around a lot more than usual 0   Thoughts that you would be better off dead, or of hurting yourself in some way 0   Total Score 3   If you checked off any problems, how difficult have these problems made it for you to do your work, take care of things at home, or get along with other people? Not difficult at all         Past Medical/Family/Social History:  The following portions of the patient's history were reviewed and updated as appropriate: allergies, current medications, past family history, past medical history, past social history, past surgical history and problem list.    No Known Allergies      Current Outpatient Medications:   •  aspirin 325 MG tablet, Take 325 mg by mouth Every Evening., Disp: , Rfl:   •  docusate sodium (COLACE) 50 MG capsule, Take  by mouth 3 (Three) Times a Day As Needed for Constipation., Disp: , Rfl:   •  fluticasone (FLONASE) 50 MCG/ACT nasal spray, USE 2 SPRAY(S) IN EACH NOSTRIL ONCE DAILY FOR 30 DAYS, Disp: , Rfl: 6  •  HYDROcodone-acetaminophen (NORCO) 5-325 MG per tablet, Take 1 tablet by mouth Every 6 (Six) Hours As Needed for Moderate Pain  or Severe  Pain ., Disp: 28 tablet, Rfl: 0  •  hydrocortisone 2.5 % cream, HYDROCORTISONE 2.5 % CREA, Disp: , Rfl:   •  isosorbide mononitrate (IMDUR) 60 MG 24 hr tablet, TAKE 1 AND 1/2 TABLETS BY  MOUTH DAILY, Disp: 135 tablet, Rfl: 1  •  lisinopril (PRINIVIL,ZESTRIL) 10 MG tablet, TAKE 1 TABLET BY MOUTH  DAILY, Disp: 90 tablet, Rfl: 3  •  metFORMIN (GLUCOPHAGE) 500 MG tablet, Take 500 mg by mouth 2 (Two) Times a Day With Meals., Disp: , Rfl:   •  Multiple Vitamins-Minerals (MULTIVITAMIN WITH MINERALS) tablet tablet, Take 1 tablet by mouth Daily., Disp: , Rfl:   •  nitroglycerin (NITROSTAT) 0.4 MG SL tablet, , Disp: , Rfl: 4  •  ONE TOUCH ULTRA TEST test strip, 1 each by Other route Daily., Disp: , Rfl: 3  •  ONETOUCH DELICA LANCETS 33G mis, USE 1 LANCET TO CHECK GLUCOSE ONCE DAILY, Disp: , Rfl: 3  •  pantoprazole (PROTONIX) 40 MG EC tablet, TAKE 1 TABLET BY MOUTH  DAILY, Disp: 90 tablet, Rfl: 3  •  simvastatin (ZOCOR) 40 MG tablet, Take 40 mg by mouth Every Night., Disp: , Rfl:     Aspirin use counseling: Taking ASA appropriately as indicated    Current medication list contains no high risk medications.  No harmful drug interactions have been identified.     Family History   Problem Relation Age of Onset   • Heart disease Mother         CHF   • Hypertension Mother    • Heart failure Mother    • Stroke Maternal Grandfather    • Diabetes Other    • Diabetes Other    • Stroke Other        Social History     Tobacco Use   • Smoking status: Former Smoker     Last attempt to quit: 2000     Years since quittin.8   • Smokeless tobacco: Former User   Substance Use Topics   • Alcohol use: No       Past Surgical History:   Procedure Laterality Date   • BELPHAROPTOSIS REPAIR  2017     Dr. grimes (copied from [facility])   • BROW LIFT  2017    Dr[facility] Grimes at [facility]   • CARDIAC CATHETERIZATION  2012    at [facility]- single vessel disease. (copied from [facility])   • CARDIAC CATHETERIZATION  10/13/2017    no intervention - Low  % Blockages.   • CATARACT EXTRACTION  12/11/2017    brow lift and eye lid repair   • COLON SURGERY  06/2014    colonscopy   • COLONOSCOPY N/A 7/12/2019    Procedure: COLONOSCOPY with polypectomy x1;  Surgeon: Graham Byrd MD;  Location: [facility] ENDOSCOPY;  Service: Gastroenterology   • HERNIA REPAIR  11/2008    umbilical hernia repair   • JOINT REPLACEMENT     • KNEE ARTHROSCOPY Right 11/2014    Dr. Mosqueda   • LASIK      lasik and Cataract Extraction, sep 11 and right Sept. 25th, 2014- [facility]. (copied from [facility])   • TOTAL KNEE ARTHROPLASTY Left 11/2009    Dr. Mosqueda (copied from [facility])       Patient Active Problem List   Diagnosis   • Varicose veins of right lower extremity with pain   • Nasal congestion   • Short of breath on exertion   • Actinic keratosis   • Arthritis   • Bradycardia   • Chronic coronary artery disease   • Degeneration of intervertebral disc of lumbar region   • Dependent edema   • Diabetic peripheral neuropathy (CMS/HCC)   • Encounter for general adult medical examination without abnormal findings   • Fatigue   • Gastroesophageal reflux disease   • Hay fever   • Hearing loss   • Hyperlipidemia   • Knee pain   • Dizziness and giddiness   • Strain of lumbar region   • Type 2 diabetes mellitus (CMS/HCC)       Review of Systems   Constitutional: Negative for chills and fever.   HENT: Negative for sinus pressure and sore throat.    Eyes: Negative for blurred vision.   Respiratory: Negative for cough and shortness of breath.    Cardiovascular: Negative for chest pain and palpitations.   Gastrointestinal: Negative for abdominal pain.   Endocrine: Negative for polyuria.   Skin: Negative for rash.   Neurological: Negative for dizziness and headache.   Hematological: Negative for adenopathy.   Psychiatric/Behavioral: Negative for depressed mood.       Objective     Vitals:    10/29/19 0926   BP: 117/66   BP Location: Left arm   Patient Position: Sitting   Cuff Size: Large Adult   Pulse: 81    Temp: 97.7 °F (36.5 °C)   TempSrc: Oral   SpO2: 92%   Weight: 108 kg (238 lb)       Patient's Body mass index is 35.15 kg/m². BMI is above normal parameters. Recommendations include: exercise counseling.      No exam data present    The patient has no evidence of cognitve impairment.     Physical Exam   Constitutional: He is oriented to person, place, and time. He appears well-developed. No distress.   HENT:   Head: Normocephalic.   Eyes: Conjunctivae and lids are normal.   Neck: Trachea normal. No thyroid mass and no thyromegaly present.   Cardiovascular: Normal rate, regular rhythm and normal heart sounds.   Pulmonary/Chest: Effort normal and breath sounds normal.   Musculoskeletal:   Antalgic gait   Lymphadenopathy:     He has no cervical adenopathy.   Neurological: He is alert and oriented to person, place, and time.   Skin: Skin is warm and dry.   Psychiatric: He has a normal mood and affect. His speech is normal and behavior is normal. He is attentive.       Recent Lab Results:     Lab Results   Component Value Date    CHOL 121 10/24/2018    TRIG 75 10/24/2018    HDL 36 (L) 10/24/2018    LDLHDL 2.1 10/24/2018     No visits with results within 1 Week(s) from this visit.   Latest known visit with results is:   Admission on 09/17/2019, Discharged on 09/17/2019   Component Date Value Ref Range Status   • Glucose 09/17/2019 147* 65 - 99 mg/dL Final   • BUN 09/17/2019 11  8 - 20 mg/dL Final   • Creatinine 09/17/2019 0.80  0.70 - 1.20 mg/dL Final   • Sodium 09/17/2019 137  136 - 144 mmol/L Final   • Potassium 09/17/2019 3.9  3.6 - 5.1 mmol/L Final   • Chloride 09/17/2019 102  101 - 111 mmol/L Final   • CO2 09/17/2019 24.0  22.0 - 32.0 mmol/L Final   • Calcium 09/17/2019 8.5* 8.9 - 10.3 mg/dL Final   • Total Protein 09/17/2019 6.9  6.1 - 7.9 g/dL Final   • Albumin 09/17/2019 4.00  3.50 - 4.80 g/dL Final   • ALT (SGPT) 09/17/2019 62  17 - 63 U/L Final   • AST (SGOT) 09/17/2019 44* 15 - 41 U/L Final   • Alkaline  Phosphatase 09/17/2019 49  32 - 91 U/L Final   • Total Bilirubin 09/17/2019 0.7  0.3 - 1.2 mg/dL Final   • eGFR Non African Amer 09/17/2019 94  >60 mL/min/1.73 Final   • Globulin 09/17/2019 2.9  2.5 - 3.8 gm/dL Final   • A/G Ratio 09/17/2019 1.4  1.0 - 1.7 g/dL Final   • BUN/Creatinine Ratio 09/17/2019 13.8  6.2 - 20.3 Final   • Anion Gap 09/17/2019 14.9  5.0 - 15.0 mmol/L Final   • Lipase 09/17/2019 18* 22 - 51 U/L Final   • Color, UA 09/17/2019 Yellow  Yellow, Straw Final   • Appearance, UA 09/17/2019 Clear  Clear Final   • pH, UA 09/17/2019 6.5  5.0 - 8.0 Final   • Specific Gravity, UA 09/17/2019 1.015  1.005 - 1.030 Final   • Glucose, UA 09/17/2019 Negative  Negative Final   • Ketones, UA 09/17/2019 Negative  Negative Final   • Bilirubin, UA 09/17/2019 Negative  Negative Final   • Blood, UA 09/17/2019 Large (3+)* Negative Final   • Protein, UA 09/17/2019 Negative  Negative Final   • Leuk Esterase, UA 09/17/2019 Negative  Negative Final   • Nitrite, UA 09/17/2019 Negative  Negative Final   • Urobilinogen, UA 09/17/2019 0.2 E.U./dL  0.2 - 1.0 E.U./dL Final   • WBC 09/17/2019 7.30  3.40 - 10.80 10*3/mm3 Final   • RBC 09/17/2019 5.08  4.14 - 5.80 10*6/mm3 Final   • Hemoglobin 09/17/2019 15.4  13.0 - 17.7 g/dL Final   • Hematocrit 09/17/2019 45.4  37.5 - 51.0 % Final   • MCV 09/17/2019 89.4  79.0 - 97.0 fL Final   • MCH 09/17/2019 30.3  26.6 - 33.0 pg Final   • MCHC 09/17/2019 33.9  31.5 - 35.7 g/dL Final   • RDW 09/17/2019 14.4  12.3 - 15.4 % Final   • RDW-SD 09/17/2019 45.1  37.0 - 54.0 fl Final   • MPV 09/17/2019 8.1  6.0 - 12.0 fL Final   • Platelets 09/17/2019 256  140 - 450 10*3/mm3 Final   • Neutrophil % 09/17/2019 79.3* 42.7 - 76.0 % Final   • Lymphocyte % 09/17/2019 13.6* 19.6 - 45.3 % Final   • Monocyte % 09/17/2019 5.7  5.0 - 12.0 % Final   • Eosinophil % 09/17/2019 0.5  0.3 - 6.2 % Final   • Basophil % 09/17/2019 0.9  0.0 - 1.5 % Final   • Neutrophils, Absolute 09/17/2019 5.80  1.70 - 7.00 10*3/mm3 Final    • Lymphocytes, Absolute 09/17/2019 1.00  0.70 - 3.10 10*3/mm3 Final   • Monocytes, Absolute 09/17/2019 0.40  0.10 - 0.90 10*3/mm3 Final   • Eosinophils, Absolute 09/17/2019 0.00  0.00 - 0.40 10*3/mm3 Final   • Basophils, Absolute 09/17/2019 0.10  0.00 - 0.20 10*3/mm3 Final   • nRBC 09/17/2019 0.0  0.0 - 0.2 /100 WBC Final   • RBC, UA 09/17/2019 Too Numerous to Count* None Seen /HPF Final   • WBC, UA 09/17/2019 0-2* None Seen /HPF Final   • Bacteria, UA 09/17/2019 None Seen  None Seen /HPF Final   • Squamous Epithelial Cells, UA 09/17/2019 0-2  None Seen, 0-2 /HPF Final   • Hyaline Casts, UA 09/17/2019 0-2  None Seen /LPF Final   • Methodology 09/17/2019 Automated Microscopy   Final         Assessment/Plan   Age-appropriate Screening Schedule:  Refer to the list below for future screening recommendations based on patient's age, sex and/or medical conditions.      Health Maintenance   Topic Date Due   • TDAP/TD VACCINES (1 - Tdap) [birth year]/1962   • PNEUMOCOCCAL VACCINES (65+ LOW/MEDIUM RISK) (2 of 2 - PPSV23) 12/18/2014   • URINE MICROALBUMIN  05/04/2018   • HEMOGLOBIN A1C  05/22/2019   • DIABETIC EYE EXAM  10/22/2019   • LIPID PANEL  10/24/2019   • ZOSTER VACCINE (2 of 2) 12/23/2019   • INFLUENZA VACCINE  Completed       Medicare Risks and Personalized Health Plan:  Advance Directive Discussion  Cardiovascular risk  Colon Cancer Screening  Glaucoma Risk  Inactivity/Sedentary  Prostate Cancer Screening       CMS-Preventive Services Quick Reference  Medicare Preventive Services Addressed:  Annual Wellness Visit (AWV)  Cardiovascular Disease Screening Tests (may do this order every 5 years in beneficiaries without signs or symptoms of cardiovascular disease)  Colorectal Cancer Screening, Colonoscopy  Diabetes Screening-Lab Order for either glucose quantitative blood (except reagent strip), glucose;post glucose dose(includes glucose), or glucose tolerance test-3 specimens(includes glucose)  Prostate Cancer Screening      Advance Care Planning:  Patient does not have an advance directive - information provided to the patient today    Diagnoses and all orders for this visit:    1. Medicare annual wellness visit, subsequent (Primary)  -     Comprehensive Metabolic Panel  -     Hemoglobin A1c  -     Lipid Panel  -     PSA Screen    2. Type 2 diabetes mellitus without complication, without long-term current use of insulin (CMS/[facility])  -     Comprehensive Metabolic Panel  -     Hemoglobin A1c  -     Lipid Panel    3. Screening for prostate cancer  -     PSA Screen        An After Visit Summary and PPPS with all of these plans were given to the patient.      Follow Up:  Return in about 1 year (around 10/29/2020) for Medicare Wellness.

## 2019-10-30 ENCOUNTER — OFFICE VISIT (OUTPATIENT)
Dept: SLEEP MEDICINE | Facility: HOSPITAL | Age: 74
End: 2019-10-30

## 2019-10-30 VITALS
HEIGHT: 69 IN | BODY MASS INDEX: 35.37 KG/M2 | HEART RATE: 76 BPM | SYSTOLIC BLOOD PRESSURE: 110 MMHG | OXYGEN SATURATION: 95 % | WEIGHT: 238.8 LBS | DIASTOLIC BLOOD PRESSURE: 64 MMHG

## 2019-10-30 DIAGNOSIS — G47.33 OBSTRUCTIVE SLEEP APNEA, ADULT: Primary | ICD-10-CM

## 2019-10-30 PROCEDURE — G0463 HOSPITAL OUTPT CLINIC VISIT: HCPCS

## 2019-11-12 RX ORDER — SIMVASTATIN 40 MG
TABLET ORAL
Qty: 90 TABLET | Refills: 3 | Status: SHIPPED | OUTPATIENT
Start: 2019-11-12 | End: 2020-08-28

## 2019-12-31 ENCOUNTER — HOSPITAL ENCOUNTER (OUTPATIENT)
Dept: GENERAL RADIOLOGY | Facility: HOSPITAL | Age: 74
Discharge: HOME | End: 2019-12-31

## 2019-12-31 ENCOUNTER — OFFICE VISIT (OUTPATIENT)
Dept: FAMILY MEDICINE CLINIC | Facility: CLINIC | Age: 74
End: 2019-12-31

## 2019-12-31 ENCOUNTER — HOSPITAL ENCOUNTER (OUTPATIENT)
Dept: GENERAL RADIOLOGY | Facility: HOSPITAL | Age: 74
Discharge: HOME | End: 2019-12-31
Admitting: FAMILY MEDICINE

## 2019-12-31 VITALS
HEART RATE: 92 BPM | BODY MASS INDEX: 35.29 KG/M2 | DIASTOLIC BLOOD PRESSURE: 66 MMHG | WEIGHT: 239 LBS | OXYGEN SATURATION: 94 % | TEMPERATURE: 98.1 F | SYSTOLIC BLOOD PRESSURE: 141 MMHG

## 2019-12-31 DIAGNOSIS — M54.31 SCIATIC NERVE PAIN, RIGHT: Primary | ICD-10-CM

## 2019-12-31 DIAGNOSIS — M54.31 SCIATIC NERVE PAIN, RIGHT: ICD-10-CM

## 2019-12-31 DIAGNOSIS — L03.116 CELLULITIS OF LEFT THIGH: ICD-10-CM

## 2019-12-31 DIAGNOSIS — M25.551 HIP PAIN, RIGHT: ICD-10-CM

## 2019-12-31 DIAGNOSIS — M25.561 ACUTE PAIN OF RIGHT KNEE: ICD-10-CM

## 2019-12-31 PROCEDURE — 73562 X-RAY EXAM OF KNEE 3: CPT

## 2019-12-31 PROCEDURE — 99214 OFFICE O/P EST MOD 30 MIN: CPT | Performed by: FAMILY MEDICINE

## 2019-12-31 PROCEDURE — 73502 X-RAY EXAM HIP UNI 2-3 VIEWS: CPT

## 2019-12-31 RX ORDER — CEPHALEXIN 500 MG/1
500 CAPSULE ORAL 3 TIMES DAILY
Qty: 21 CAPSULE | Refills: 0 | Status: SHIPPED | OUTPATIENT
Start: 2019-12-31 | End: 2020-04-14

## 2019-12-31 NOTE — PROGRESS NOTES
Subjective   Chief Complaint   Patient presents with   • Leg Pain     rt     Heri Vasquez is a 74 y.o. male.     Leg Pain    The incident occurred more than 1 week ago. There was no injury mechanism. The pain is present in the right hip, right knee and right leg. The quality of the pain is described as aching. The pain is moderate. The pain has been constant since onset. Pertinent negatives include no inability to bear weight, loss of motion, numbness or tingling. He reports no foreign bodies present. The symptoms are aggravated by movement. He has tried acetaminophen for the symptoms. The treatment provided mild relief.   Rash   This is a new problem. The current episode started in the past 7 days. The problem is unchanged. The affected locations include the left upper leg. The rash is characterized by redness and swelling. He was exposed to nothing. Pertinent negatives include no cough, fever, shortness of breath or sore throat. Past treatments include nothing.   Back Pain   This is a recurrent problem. The current episode started more than 1 month ago. The problem occurs constantly. The problem is unchanged. The pain is present in the lumbar spine. The pain radiates to the right thigh and right knee. Associated symptoms include leg pain. Pertinent negatives include no abdominal pain, chest pain, fever, numbness or tingling.      Past Medical History:   Diagnosis Date   • Arthritis     Dr Mosqueda   • Coronary heart disease 01/2016    single vessel disease, nl stress test (copied from [facility])   • Coronary heart disease 10/31/2017    cath 10/31/17 - Low % Blockages- N o Intervention   (copied from [facility])   • Diverticulosis    • GERD (gastroesophageal reflux disease)    • Hearing loss    • Hyperlipidemia    • Low back pain    • SIDDHARTHA treated with BiPAP    • Pain management     injections Lumbar spine X2 with Mup[facility]ys Pain management @ [facility] (copied from [facility])   • Physical therapy evaluation, initial     @  [facility] in [place] 6 weeks x3 per week, [facility] (copied from [facility])   • Rectal bleed 2012    hemorrhoids   • Retinal hemorrhage of right eye 2014   • Type 2 diabetes mellitus (CMS/HCC)    • Uses brace      Past Surgical History:   Procedure Laterality Date   • BELPHAROPTOSIS REPAIR  2017     Dr. grimes (copied from [facility])   • BROW LIFT  2017    Dr[facility]imes at [facility]   • CARDIAC CATHETERIZATION  2012    at [facility]- single vessel disease. (copied from [facility])   • CARDIAC CATHETERIZATION  10/13/2017    no intervention - Low % Blockages.   • CATARACT EXTRACTION  2017    brow lift and eye lid repair   • COLON SURGERY  2014    colonscopy   • COLONOSCOPY N/A 2019    Procedure: COLONOSCOPY with polypectomy x1;  Surgeon: Graham Byrd MD;  Location: [facility] ENDOSCOPY;  Service: Gastroenterology   • HERNIA REPAIR  2008    umbilical hernia repair   • JOINT REPLACEMENT     • KNEE ARTHROSCOPY Right 2014    Dr. Mosqueda   • LASIK      lasik and Cataract Extraction, sep 11 and right 2014- [facility]. (copied from [facility])   • TOTAL KNEE ARTHROPLASTY Left 2009    Dr. Mosqueda (copied from [facility])     No Known Allergies  Social History     Socioeconomic History   • Marital status:      Spouse name: Shani   • Number of children: Not on file   • Years of education: Not on file   • Highest education level: Not on file   Occupational History   • Occupation: retired    Tobacco Use   • Smoking status: Former Smoker     Last attempt to quit:      Years since quittin.0   • Smokeless tobacco: Former User   Substance and Sexual Activity   • Alcohol use: No   • Drug use: No   • Sexual activity: Defer     Social History     Tobacco Use   Smoking Status Former Smoker   • Last attempt to quit:    • Years since quittin.0   Smokeless Tobacco Former User       family history includes Diabetes in some other family members; Heart disease in his mother;  Heart failure in his mother; Hypertension in his mother; Stroke in his maternal grandfather and another family member.  Current Outpatient Medications on File Prior to Visit   Medication Sig Dispense Refill   • aspirin 325 MG tablet Take 325 mg by mouth Every Evening.     • docusate sodium (COLACE) 50 MG capsule Take  by mouth 3 (Three) Times a Day As Needed for Constipation.     • fluticasone (FLONASE) 50 MCG/ACT nasal spray USE 2 SPRAY(S) IN EACH NOSTRIL ONCE DAILY FOR 30 DAYS  6   • HYDROcodone-acetaminophen (NORCO) 5-325 MG per tablet Take 1 tablet by mouth Every 6 (Six) Hours As Needed for Moderate Pain  or Severe Pain . 28 tablet 0   • hydrocortisone 2.5 % cream HYDROCORTISONE 2.5 % CREA     • isosorbide mononitrate (IMDUR) 60 MG 24 hr tablet TAKE 1 AND 1/2 TABLETS BY  MOUTH DAILY 135 tablet 1   • lisinopril (PRINIVIL,ZESTRIL) 10 MG tablet TAKE 1 TABLET BY MOUTH  DAILY 90 tablet 3   • metFORMIN (GLUCOPHAGE) 500 MG tablet Take 500 mg by mouth 2 (Two) Times a Day With Meals.     • Multiple Vitamins-Minerals (MULTIVITAMIN WITH MINERALS) tablet tablet Take 1 tablet by mouth Daily.     • nitroglycerin (NITROSTAT) 0.4 MG SL tablet   4   • ONE TOUCH ULTRA TEST test strip 1 each by Other route Daily.  3   • ONETOUCH DELICA LANCETS 33G misc USE 1 LANCET TO CHECK GLUCOSE ONCE DAILY  3   • pantoprazole (PROTONIX) 40 MG EC tablet TAKE 1 TABLET BY MOUTH  DAILY 90 tablet 3   • simvastatin (ZOCOR) 40 MG tablet TAKE 1 TABLET BY MOUTH  DAILY AT BEDTIME 90 tablet 3     No current facility-administered medications on file prior to visit.      Patient Active Problem List   Diagnosis   • Varicose veins of right lower extremity with pain   • Nasal congestion   • Short of breath on exertion   • Actinic keratosis   • Arthritis   • Bradycardia   • Chronic coronary artery disease   • Degeneration of intervertebral disc of lumbar region   • Dependent edema   • Diabetic peripheral neuropathy (CMS/HCC)   • Encounter for general adult  medical examination without abnormal findings   • Fatigue   • Gastroesophageal reflux disease   • Hay fever   • Hearing loss   • Hyperlipidemia   • Knee pain   • Dizziness and giddiness   • Strain of lumbar region   • Type 2 diabetes mellitus (CMS/[facility])   • Medicare annual wellness visit, subsequent   • Screening for prostate cancer   • Sciatic nerve pain, right   • Hip pain, right   • Cellulitis of left thigh       The following portions of the patient's history were reviewed and updated as appropriate: allergies, current medications, past family history, past medical history, past social history, past surgical history and problem list.    Review of Systems   Constitutional: Negative for chills and fever.   HENT: Negative for sinus pressure and sore throat.    Eyes: Negative for blurred vision.   Respiratory: Negative for cough and shortness of breath.    Cardiovascular: Negative for chest pain and palpitations.   Gastrointestinal: Negative for abdominal pain.   Endocrine: Negative for polyuria.   Musculoskeletal: Positive for back pain.   Skin: Positive for rash.   Neurological: Negative for dizziness, tingling, numbness and headache.   Hematological: Negative for adenopathy.   Psychiatric/Behavioral: Negative for depressed mood.       Objective   /66 (BP Location: Left arm, Patient Position: Sitting, Cuff Size: Large Adult)   Pulse 92   Temp 98.1 °F (36.7 °C) (Oral)   Wt 108 kg (239 lb)   SpO2 94%   BMI 35.29 kg/m²   Physical Exam   Constitutional: He is oriented to person, place, and time. He appears well-developed. No distress.   HENT:   Head: Normocephalic.   Eyes: Conjunctivae and lids are normal.   Neck: Trachea normal. No thyroid mass and no thyromegaly present.   Cardiovascular: Normal rate, regular rhythm and normal heart sounds.   Pulmonary/Chest: Effort normal and breath sounds normal.   Musculoskeletal:        Right hip: He exhibits decreased range of motion and tenderness.        Right knee:  Tenderness found.        Lumbar back: He exhibits decreased range of motion and tenderness.   Lymphadenopathy:     He has no cervical adenopathy.   Neurological: He is alert and oriented to person, place, and time.   Skin: Skin is warm and dry.   Psychiatric: He has a normal mood and affect. His speech is normal and behavior is normal. He is attentive.       No visits with results within 1 Week(s) from this visit.   Latest known visit with results is:   Office Visit on 10/29/2019   Component Date Value Ref Range Status   • Glucose 10/29/2019 122* 65 - 99 mg/dL Final   • BUN 10/29/2019 12  8 - 23 mg/dL Final   • Creatinine 10/29/2019 0.89  0.76 - 1.27 mg/dL Final   • Sodium 10/29/2019 141  136 - 145 mmol/L Final   • Potassium 10/29/2019 5.2  3.5 - 5.2 mmol/L Final   • Chloride 10/29/2019 100  98 - 107 mmol/L Final   • CO2 10/29/2019 28.7  22.0 - 29.0 mmol/L Final   • Calcium 10/29/2019 9.6  8.6 - 10.5 mg/dL Final   • Total Protein 10/29/2019 7.2  6.0 - 8.5 g/dL Final   • Albumin 10/29/2019 4.40  3.50 - 5.20 g/dL Final   • ALT (SGPT) 10/29/2019 75* 1 - 41 U/L Final   • AST (SGOT) 10/29/2019 51* 1 - 40 U/L Final   • Alkaline Phosphatase 10/29/2019 58  39 - 117 U/L Final   • Total Bilirubin 10/29/2019 0.6  0.2 - 1.2 mg/dL Final   • eGFR Non African Amer 10/29/2019 83  >60 mL/min/1.73 Final   • Globulin 10/29/2019 2.8  gm/dL Final   • A/G Ratio 10/29/2019 1.6  g/dL Final   • BUN/Creatinine Ratio 10/29/2019 13.5  7.0 - 25.0 Final   • Anion Gap 10/29/2019 12.3  5.0 - 15.0 mmol/L Final   • Hemoglobin A1C 10/29/2019 6.3* 3.5 - 5.6 % Final   • Total Cholesterol 10/29/2019 157  0 - 200 mg/dL Final   • Triglycerides 10/29/2019 150  0 - 150 mg/dL Final   • HDL Cholesterol 10/29/2019 39* 40 - 60 mg/dL Final   • LDL Cholesterol  10/29/2019 88  0 - 100 mg/dL Final   • VLDL Cholesterol 10/29/2019 30  5 - 40 mg/dL Final   • LDL/HDL Ratio 10/29/2019 2.26   Final   • PSA 10/29/2019 1.180  0.000 - 4.000 ng/mL Final            Assessment/Plan   Problems Addressed this Visit        Nervous and Auditory    Sciatic nerve pain, right - Primary    Relevant Orders    XR Hip With or Without Pelvis 2 - 3 View Right    Hip pain, right    Relevant Orders    XR Hip With or Without Pelvis 2 - 3 View Right       Musculoskeletal and Integument    Knee pain    Relevant Orders    XR Knee 3 View Right       Other    Cellulitis of left thigh          Heri was seen today for leg pain.    Diagnoses and all orders for this visit:    Sciatic nerve pain, right  -     XR Hip With or Without Pelvis 2 - 3 View Right; Future    Acute pain of right knee  -     XR Knee 3 View Right; Future    Hip pain, right  -     XR Hip With or Without Pelvis 2 - 3 View Right; Future    Cellulitis of left thigh    Other orders  -     cephalexin (KEFLEX) 500 MG capsule; Take 1 capsule by mouth 3 (Three) Times a Day.

## 2020-02-24 ENCOUNTER — OFFICE VISIT (OUTPATIENT)
Dept: FAMILY MEDICINE CLINIC | Facility: CLINIC | Age: 75
End: 2020-02-24

## 2020-02-24 VITALS
HEART RATE: 83 BPM | BODY MASS INDEX: 35.55 KG/M2 | RESPIRATION RATE: 16 BRPM | TEMPERATURE: 97.9 F | OXYGEN SATURATION: 93 % | WEIGHT: 240 LBS | DIASTOLIC BLOOD PRESSURE: 65 MMHG | SYSTOLIC BLOOD PRESSURE: 146 MMHG | HEIGHT: 69 IN

## 2020-02-24 DIAGNOSIS — E11.9 TYPE 2 DIABETES MELLITUS WITHOUT COMPLICATION, WITHOUT LONG-TERM CURRENT USE OF INSULIN (HCC): ICD-10-CM

## 2020-02-24 DIAGNOSIS — R10.11 RUQ ABDOMINAL PAIN: Primary | ICD-10-CM

## 2020-02-24 PROCEDURE — 99213 OFFICE O/P EST LOW 20 MIN: CPT | Performed by: FAMILY MEDICINE

## 2020-02-24 RX ORDER — LANCETS 33 GAUGE
EACH MISCELLANEOUS
Qty: 100 EACH | Refills: 3 | Status: SHIPPED | OUTPATIENT
Start: 2020-02-24 | End: 2020-04-14

## 2020-02-24 NOTE — PATIENT INSTRUCTIONS
Abdominal Pain, Adult    Many things can cause belly (abdominal) pain. Most times, belly pain is not dangerous. Many cases of belly pain can be watched and treated at home. Sometimes belly pain is serious, though. Your doctor will try to find the cause of your belly pain.  Follow these instructions at home:  · Take over-the-counter and prescription medicines only as told by your doctor. Do not take medicines that help you poop (laxatives) unless told to by your doctor.  · Drink enough fluid to keep your pee (urine) clear or pale yellow.  · Watch your belly pain for any changes.  · Keep all follow-up visits as told by your doctor. This is important.  Contact a doctor if:  · Your belly pain changes or gets worse.  · You are not hungry, or you lose weight without trying.  · You are having trouble pooping (constipated) or have watery poop (diarrhea) for more than 2-3 days.  · You have pain when you pee or poop.  · Your belly pain wakes you up at night.  · Your pain gets worse with meals, after eating, or with certain foods.  · You are throwing up and cannot keep anything down.  · You have a fever.  Get help right away if:  · Your pain does not go away as soon as your doctor says it should.  · You cannot stop throwing up.  · Your pain is only in areas of your belly, such as the right side or the left lower part of the belly.  · You have bloody or black poop, or poop that looks like tar.  · You have very bad pain, cramping, or bloating in your belly.  · You have signs of not having enough fluid or water in your body (dehydration), such as:  ? Dark pee, very little pee, or no pee.  ? Cracked lips.  ? Dry mouth.  ? Sunken eyes.  ? Sleepiness.  ? Weakness.  This information is not intended to replace advice given to you by your health care provider. Make sure you discuss any questions you have with your health care provider.  Document Released: 06/05/2009 Document Revised: 07/07/2017 Document Reviewed: 05/31/2017  [employer]  Interactive Patient Education © 2020 [employer]

## 2020-02-24 NOTE — PROGRESS NOTES
Subjective   Chief Complaint   Patient presents with   • Abdominal Pain     Heri Vasquez is a 74 y.o. male.     Abdominal Pain   This is a new problem. The current episode started more than 1 month ago. The onset quality is undetermined. The problem occurs intermittently. The problem has been gradually worsening. The pain is located in the RUQ. The quality of the pain is sharp. The abdominal pain does not radiate. Associated symptoms include diarrhea. Pertinent negatives include no anorexia, fever, nausea or vomiting. Nothing aggravates the pain. The pain is relieved by nothing. He has tried nothing for the symptoms.      Past Medical History:   Diagnosis Date   • Arthritis     Dr Mosqueda   • Coronary heart disease 01/2016    single vessel disease, nl stress test (copied from [facility])   • Coronary heart disease 10/31/2017    cath 10/31/17 - Low % Blockages- N o Intervention   (copied from [facility])   • Diverticulosis    • GERD (gastroesophageal reflux disease)    • Hearing loss    • Hyperlipidemia    • Low back pain    • SIDDHARTHA treated with BiPAP    • Pain management     injections Lumbar spine X2 with [facility] Pain management @ [facility] (copied from [facility])   • Physical therapy evaluation, initial     @ [facility] in [place] 6 weeks x3 per week, [facility] (copied from [facility])   • Rectal bleed 08/2012    hemorrhoids   • Retinal hemorrhage of right eye 05/2014   • Type 2 diabetes mellitus (CMS/HCC)    • Uses brace      Past Surgical History:   Procedure Laterality Date   • BELPHAROPTOSIS REPAIR  12/11/2017     Dr. grimes (copied from [facility])   • BROW LIFT  12/11/2017    Dr. Grimes at [facility]   • CARDIAC CATHETERIZATION  03/2012    at [facility]- single vessel disease. (copied from [facility])   • CARDIAC CATHETERIZATION  10/13/2017    no intervention - Low % Blockages.   • CATARACT EXTRACTION  12/11/2017    brow lift and eye lid repair   • COLON SURGERY  06/2014    colonscopy   • COLONOSCOPY N/A 7/12/2019     Procedure: COLONOSCOPY with polypectomy x1;  Surgeon: Graham Byrd MD;  Location: [facility] ENDOSCOPY;  Service: Gastroenterology   • HERNIA REPAIR  2008    umbilical hernia repair   • JOINT REPLACEMENT     • KNEE ARTHROSCOPY Right 2014    Dr. Mosqueda   • LASIK      lasik and Cataract Extraction, sep 11 and right 2014- [facility]. (copied from [facility])   • TOTAL KNEE ARTHROPLASTY Left 2009    Dr. Mosqueda (copied from [facility])     No Known Allergies  Social History     Socioeconomic History   • Marital status:      Spouse name: Shani   • Number of children: Not on file   • Years of education: Not on file   • Highest education level: Not on file   Occupational History   • Occupation: retired    Tobacco Use   • Smoking status: Former Smoker     Last attempt to quit:      Years since quittin.1   • Smokeless tobacco: Former User   Substance and Sexual Activity   • Alcohol use: No   • Drug use: No   • Sexual activity: Defer     Social History     Tobacco Use   Smoking Status Former Smoker   • Last attempt to quit:    • Years since quittin.1   Smokeless Tobacco Former User       family history includes Diabetes in some other family members; Heart disease in his mother; Heart failure in his mother; Hypertension in his mother; Stroke in his maternal grandfather and another family member.  Current Outpatient Medications on File Prior to Visit   Medication Sig Dispense Refill   • aspirin 325 MG tablet Take 325 mg by mouth Every Evening.     • cephalexin (KEFLEX) 500 MG capsule Take 1 capsule by mouth 3 (Three) Times a Day. 21 capsule 0   • docusate sodium (COLACE) 50 MG capsule Take  by mouth 3 (Three) Times a Day As Needed for Constipation.     • fluticasone (FLONASE) 50 MCG/ACT nasal spray USE 2 SPRAY(S) IN EACH NOSTRIL ONCE DAILY FOR 30 DAYS  6   • HYDROcodone-acetaminophen (NORCO) 5-325 MG per tablet Take 1 tablet by mouth Every 6 (Six) Hours As Needed for Moderate Pain   or Severe Pain . 28 tablet 0   • hydrocortisone 2.5 % cream HYDROCORTISONE 2.5 % CREA     • isosorbide mononitrate (IMDUR) 60 MG 24 hr tablet TAKE 1 AND 1/2 TABLETS BY  MOUTH DAILY 135 tablet 1   • lisinopril (PRINIVIL,ZESTRIL) 10 MG tablet TAKE 1 TABLET BY MOUTH  DAILY 90 tablet 3   • metFORMIN (GLUCOPHAGE) 500 MG tablet Take 500 mg by mouth 2 (Two) Times a Day With Meals.     • Multiple Vitamins-Minerals (MULTIVITAMIN WITH MINERALS) tablet tablet Take 1 tablet by mouth Daily.     • nitroglycerin (NITROSTAT) 0.4 MG SL tablet   4   • pantoprazole (PROTONIX) 40 MG EC tablet TAKE 1 TABLET BY MOUTH  DAILY 90 tablet 3   • simvastatin (ZOCOR) 40 MG tablet TAKE 1 TABLET BY MOUTH  DAILY AT BEDTIME 90 tablet 3   • [DISCONTINUED] ONE TOUCH ULTRA TEST test strip 1 each by Other route Daily.  3   • [DISCONTINUED] ONETOUCH DELICA LANCETS 33G misc USE 1 LANCET TO CHECK GLUCOSE ONCE DAILY  3     No current facility-administered medications on file prior to visit.      Patient Active Problem List   Diagnosis   • Varicose veins of right lower extremity with pain   • Nasal congestion   • Short of breath on exertion   • Actinic keratosis   • Arthritis   • Bradycardia   • Chronic coronary artery disease   • Degeneration of intervertebral disc of lumbar region   • Dependent edema   • Diabetic peripheral neuropathy (CMS/HCC)   • Encounter for general adult medical examination without abnormal findings   • Fatigue   • Gastroesophageal reflux disease   • Hay fever   • Hearing loss   • Hyperlipidemia   • Knee pain   • Dizziness and giddiness   • Strain of lumbar region   • Type 2 diabetes mellitus (CMS/HCC)   • Medicare annual wellness visit, subsequent   • Screening for prostate cancer   • Sciatic nerve pain, right   • Hip pain, right   • Cellulitis of left thigh   • RUQ abdominal pain       The following portions of the patient's history were reviewed and updated as appropriate: allergies, current medications, past family history, past  "medical history, past social history, past surgical history and problem list.    Review of Systems   Constitutional: Negative for chills and fever.   HENT: Negative for sinus pressure and sore throat.    Eyes: Negative for blurred vision.   Respiratory: Negative for cough and shortness of breath.    Cardiovascular: Negative for chest pain and palpitations.   Gastrointestinal: Positive for abdominal pain and diarrhea. Negative for anorexia, nausea and vomiting.   Endocrine: Negative for polyuria.   Skin: Negative for rash.   Neurological: Negative for dizziness and headache.   Hematological: Negative for adenopathy.   Psychiatric/Behavioral: Negative for depressed mood.       Objective   /65 (BP Location: Left arm, Patient Position: Sitting, Cuff Size: Adult)   Pulse 83   Temp 97.9 °F (36.6 °C) (Oral)   Resp 16   Ht 175.3 cm (69\")   Wt 109 kg (240 lb)   SpO2 93%   BMI 35.44 kg/m²   Physical Exam   Constitutional: He is oriented to person, place, and time. He appears well-developed. No distress.   HENT:   Head: Normocephalic.   Eyes: Conjunctivae and lids are normal.   Neck: Trachea normal. No thyroid mass and no thyromegaly present.   Cardiovascular: Normal rate, regular rhythm and normal heart sounds.   Pulmonary/Chest: Effort normal and breath sounds normal.   Abdominal: There is tenderness in the right upper quadrant. There is no rebound and no guarding.   Lymphadenopathy:     He has no cervical adenopathy.   Neurological: He is alert and oriented to person, place, and time.   Skin: Skin is warm and dry.   Psychiatric: He has a normal mood and affect. His speech is normal and behavior is normal. He is attentive.       No visits with results within 1 Week(s) from this visit.   Latest known visit with results is:   Office Visit on 10/29/2019   Component Date Value Ref Range Status   • Glucose 10/29/2019 122* 65 - 99 mg/dL Final   • BUN 10/29/2019 12  8 - 23 mg/dL Final   • Creatinine 10/29/2019 0.89  " 0.76 - 1.27 mg/dL Final   • Sodium 10/29/2019 141  136 - 145 mmol/L Final   • Potassium 10/29/2019 5.2  3.5 - 5.2 mmol/L Final   • Chloride 10/29/2019 100  98 - 107 mmol/L Final   • CO2 10/29/2019 28.7  22.0 - 29.0 mmol/L Final   • Calcium 10/29/2019 9.6  8.6 - 10.5 mg/dL Final   • Total Protein 10/29/2019 7.2  6.0 - 8.5 g/dL Final   • Albumin 10/29/2019 4.40  3.50 - 5.20 g/dL Final   • ALT (SGPT) 10/29/2019 75* 1 - 41 U/L Final   • AST (SGOT) 10/29/2019 51* 1 - 40 U/L Final   • Alkaline Phosphatase 10/29/2019 58  39 - 117 U/L Final   • Total Bilirubin 10/29/2019 0.6  0.2 - 1.2 mg/dL Final   • eGFR Non African Amer 10/29/2019 83  >60 mL/min/1.73 Final   • Globulin 10/29/2019 2.8  gm/dL Final   • A/G Ratio 10/29/2019 1.6  g/dL Final   • BUN/Creatinine Ratio 10/29/2019 13.5  7.0 - 25.0 Final   • Anion Gap 10/29/2019 12.3  5.0 - 15.0 mmol/L Final   • Hemoglobin A1C 10/29/2019 6.3* 3.5 - 5.6 % Final   • Total Cholesterol 10/29/2019 157  0 - 200 mg/dL Final   • Triglycerides 10/29/2019 150  0 - 150 mg/dL Final   • HDL Cholesterol 10/29/2019 39* 40 - 60 mg/dL Final   • LDL Cholesterol  10/29/2019 88  0 - 100 mg/dL Final   • VLDL Cholesterol 10/29/2019 30  5 - 40 mg/dL Final   • LDL/HDL Ratio 10/29/2019 2.26   Final   • PSA 10/29/2019 1.180  0.000 - 4.000 ng/mL Final           Assessment/Plan   Problems Addressed this Visit        Endocrine    Type 2 diabetes mellitus (CMS/HCC)    Relevant Medications    ONE TOUCH ULTRA TEST test strip    ONETOUCH DELICA LANCETS 33G misc       Nervous and Auditory    RUQ abdominal pain - Primary    Relevant Orders    US Gallbladder

## 2020-02-27 ENCOUNTER — HOSPITAL ENCOUNTER (OUTPATIENT)
Dept: ULTRASOUND IMAGING | Facility: HOSPITAL | Age: 75
Discharge: HOME | End: 2020-02-27
Admitting: FAMILY MEDICINE

## 2020-02-27 DIAGNOSIS — R10.11 RUQ ABDOMINAL PAIN: ICD-10-CM

## 2020-02-27 PROCEDURE — 76705 ECHO EXAM OF ABDOMEN: CPT

## 2020-03-06 ENCOUNTER — OFFICE VISIT (OUTPATIENT)
Dept: FAMILY MEDICINE CLINIC | Facility: CLINIC | Age: 75
End: 2020-03-06

## 2020-03-06 VITALS
BODY MASS INDEX: 34.8 KG/M2 | SYSTOLIC BLOOD PRESSURE: 115 MMHG | TEMPERATURE: 98 F | HEIGHT: 69 IN | RESPIRATION RATE: 16 BRPM | WEIGHT: 235 LBS | DIASTOLIC BLOOD PRESSURE: 68 MMHG | HEART RATE: 83 BPM | OXYGEN SATURATION: 95 %

## 2020-03-06 DIAGNOSIS — K76.0 FATTY LIVER: ICD-10-CM

## 2020-03-06 DIAGNOSIS — E66.01 MORBIDLY OBESE (HCC): ICD-10-CM

## 2020-03-06 DIAGNOSIS — K82.4 GALLBLADDER POLYP: Primary | ICD-10-CM

## 2020-03-06 PROCEDURE — 99213 OFFICE O/P EST LOW 20 MIN: CPT | Performed by: FAMILY MEDICINE

## 2020-03-06 NOTE — PROGRESS NOTES
Subjective   Chief Complaint   Patient presents with   • discuss test results     Heri Vasquez is a 74 y.o. male.     Pt here to discuss recent RUQ ultrasound results.  US shows 0.36 cm gallbladder polyp and fatty liver.  Patient is concerned that the polyp could be a cancer.  Still having some mild abdominal pain but no fever or jaundice.     Abdominal Pain   This is a recurrent problem. The current episode started 1 to 4 weeks ago. The onset quality is undetermined. The problem occurs intermittently. The problem has been waxing and waning. The pain is located in the RUQ. The pain is moderate. The quality of the pain is aching. The abdominal pain does not radiate. Pertinent negatives include no fever.      Past Medical History:   Diagnosis Date   • Arthritis     Dr Mosqueda   • Coronary heart disease 01/2016    single vessel disease, nl stress test (copied from [facility])   • Coronary heart disease 10/31/2017    cath 10/31/17 - Low % Blockages- N o Intervention   (copied from [facility])   • Diverticulosis    • GERD (gastroesophageal reflux disease)    • Hearing loss    • Hyperlipidemia    • Low back pain    • SIDDHARTHA treated with BiPAP    • Pain management     injections Lumbar spine X2 with Muprhys Pain management @ [facility] (copied from [facility])   • Physical therapy evaluation, initial     @ [facility] in [place] 6 weeks x3 per week, Kalen leal (copied from [facility])   • Rectal bleed 08/2012    hemorrhoids   • Retinal hemorrhage of right eye 05/2014   • Type 2 diabetes mellitus (CMS/HCC)    • Uses brace      Past Surgical History:   Procedure Laterality Date   • BELPHAROPTOSIS REPAIR  12/11/2017     Dr. grimes (copied from [facility])   • BROW LIFT  12/11/2017    Dr. Grimes at [facility]   • CARDIAC CATHETERIZATION  03/2012    at [facility]- single vessel disease. (copied from [facility])   • CARDIAC CATHETERIZATION  10/13/2017    no intervention - Low % Blockages.   • CATARACT EXTRACTION  12/11/2017    brow lift and eye lid  repair   • COLON SURGERY  2014    colonscopy   • COLONOSCOPY N/A 2019    Procedure: COLONOSCOPY with polypectomy x1;  Surgeon: Graham Byrd MD;  Location: [facility];  Service: Gastroenterology   • HERNIA REPAIR  2008    umbilical hernia repair   • JOINT REPLACEMENT     • KNEE ARTHROSCOPY Right 2014    Dr. Mosqueda   • LASIK      lasik and Cataract Extraction, sep  and right 2014- [facility]. (copied from [facility])   • TOTAL KNEE ARTHROPLASTY Left 2009    Dr. Mosqueda (copied from [facility])     No Known Allergies  Social History     Socioeconomic History   • Marital status:      Spouse name: Shani   • Number of children: Not on file   • Years of education: Not on file   • Highest education level: Not on file   Occupational History   • Occupation: retired    Tobacco Use   • Smoking status: Former Smoker     Last attempt to quit: 2000     Years since quittin.2   • Smokeless tobacco: Former User   Substance and Sexual Activity   • Alcohol use: No   • Drug use: No   • Sexual activity: Defer     Social History     Tobacco Use   Smoking Status Former Smoker   • Last attempt to quit:    • Years since quittin.2   Smokeless Tobacco Former User       family history includes Diabetes in some other family members; Heart disease in his mother; Heart failure in his mother; Hypertension in his mother; Stroke in his maternal grandfather and another family member.  Current Outpatient Medications on File Prior to Visit   Medication Sig Dispense Refill   • aspirin 325 MG tablet Take 325 mg by mouth Every Evening.     • cephalexin (KEFLEX) 500 MG capsule Take 1 capsule by mouth 3 (Three) Times a Day. 21 capsule 0   • docusate sodium (COLACE) 50 MG capsule Take  by mouth 3 (Three) Times a Day As Needed for Constipation.     • fluticasone (FLONASE) 50 MCG/ACT nasal spray USE 2 SPRAY(S) IN EACH NOSTRIL ONCE DAILY FOR 30 DAYS  6   • HYDROcodone-acetaminophen (NORCO) 5-325 MG  per tablet Take 1 tablet by mouth Every 6 (Six) Hours As Needed for Moderate Pain  or Severe Pain . 28 tablet 0   • hydrocortisone 2.5 % cream HYDROCORTISONE 2.5 % CREA     • isosorbide mononitrate (IMDUR) 60 MG 24 hr tablet TAKE 1 AND 1/2 TABLETS BY  MOUTH DAILY 135 tablet 1   • lisinopril (PRINIVIL,ZESTRIL) 10 MG tablet TAKE 1 TABLET BY MOUTH  DAILY 90 tablet 3   • Multiple Vitamins-Minerals (MULTIVITAMIN WITH MINERALS) tablet tablet Take 1 tablet by mouth Daily.     • nitroglycerin (NITROSTAT) 0.4 MG SL tablet   4   • ONE TOUCH ULTRA TEST test strip 1 each by Other route Daily. 100 each 3   • ONETOUCH DELICA LANCETS 33G misc Use to test blood sugar once daily 100 each 3   • pantoprazole (PROTONIX) 40 MG EC tablet TAKE 1 TABLET BY MOUTH  DAILY 90 tablet 3   • simvastatin (ZOCOR) 40 MG tablet TAKE 1 TABLET BY MOUTH  DAILY AT BEDTIME 90 tablet 3     No current facility-administered medications on file prior to visit.      Patient Active Problem List   Diagnosis   • Varicose veins of right lower extremity with pain   • Nasal congestion   • Short of breath on exertion   • Actinic keratosis   • Arthritis   • Bradycardia   • Chronic coronary artery disease   • Degeneration of intervertebral disc of lumbar region   • Dependent edema   • Diabetic peripheral neuropathy (CMS/HCC)   • Encounter for general adult medical examination without abnormal findings   • Fatigue   • Gastroesophageal reflux disease   • Hay fever   • Hearing loss   • Hyperlipidemia   • Knee pain   • Dizziness and giddiness   • Strain of lumbar region   • Type 2 diabetes mellitus (CMS/HCC)   • Medicare annual wellness visit, subsequent   • Screening for prostate cancer   • Sciatic nerve pain, right   • Hip pain, right   • Cellulitis of left thigh   • RUQ abdominal pain   • Gallbladder polyp   • Fatty liver   • Morbidly obese (CMS/HCC)       The following portions of the patient's history were reviewed and updated as appropriate: allergies, current  "medications, past family history, past medical history, past social history, past surgical history and problem list.    Review of Systems   Constitutional: Negative for chills and fever.   HENT: Negative for sinus pressure and sore throat.    Eyes: Negative for blurred vision.   Respiratory: Negative for cough and shortness of breath.    Cardiovascular: Negative for chest pain and palpitations.   Gastrointestinal: Positive for abdominal pain.   Endocrine: Negative for polyuria.   Skin: Negative for rash.   Neurological: Negative for dizziness and headache.   Hematological: Negative for adenopathy.   Psychiatric/Behavioral: Negative for depressed mood.       Objective   /68 (BP Location: Left arm, Patient Position: Sitting, Cuff Size: Adult)   Pulse 83   Temp 98 °F (36.7 °C) (Oral)   Resp 16   Ht 175.3 cm (69\")   Wt 107 kg (235 lb)   SpO2 95%   BMI 34.70 kg/m²   Physical Exam   Constitutional: He is oriented to person, place, and time. He appears well-developed. No distress.   HENT:   Head: Normocephalic.   Eyes: Conjunctivae and lids are normal.   Neck: Trachea normal. No thyroid mass and no thyromegaly present.   Cardiovascular: Normal rate, regular rhythm and normal heart sounds.   Pulmonary/Chest: Effort normal and breath sounds normal.   Abdominal: There is tenderness in the right upper quadrant. There is no rigidity, no rebound and no guarding.   Lymphadenopathy:     He has no cervical adenopathy.   Neurological: He is alert and oriented to person, place, and time.   Skin: Skin is warm and dry.   Psychiatric: He has a normal mood and affect. His speech is normal and behavior is normal. He is attentive.       No visits with results within 1 Week(s) from this visit.   Latest known visit with results is:   Office Visit on 10/29/2019   Component Date Value Ref Range Status   • Glucose 10/29/2019 122* 65 - 99 mg/dL Final   • BUN 10/29/2019 12  8 - 23 mg/dL Final   • Creatinine 10/29/2019 0.89  0.76 - " 1.27 mg/dL Final   • Sodium 10/29/2019 141  136 - 145 mmol/L Final   • Potassium 10/29/2019 5.2  3.5 - 5.2 mmol/L Final   • Chloride 10/29/2019 100  98 - 107 mmol/L Final   • CO2 10/29/2019 28.7  22.0 - 29.0 mmol/L Final   • Calcium 10/29/2019 9.6  8.6 - 10.5 mg/dL Final   • Total Protein 10/29/2019 7.2  6.0 - 8.5 g/dL Final   • Albumin 10/29/2019 4.40  3.50 - 5.20 g/dL Final   • ALT (SGPT) 10/29/2019 75* 1 - 41 U/L Final   • AST (SGOT) 10/29/2019 51* 1 - 40 U/L Final   • Alkaline Phosphatase 10/29/2019 58  39 - 117 U/L Final   • Total Bilirubin 10/29/2019 0.6  0.2 - 1.2 mg/dL Final   • eGFR Non African Amer 10/29/2019 83  >60 mL/min/1.73 Final   • Globulin 10/29/2019 2.8  gm/dL Final   • A/G Ratio 10/29/2019 1.6  g/dL Final   • BUN/Creatinine Ratio 10/29/2019 13.5  7.0 - 25.0 Final   • Anion Gap 10/29/2019 12.3  5.0 - 15.0 mmol/L Final   • Hemoglobin A1C 10/29/2019 6.3* 3.5 - 5.6 % Final   • Total Cholesterol 10/29/2019 157  0 - 200 mg/dL Final   • Triglycerides 10/29/2019 150  0 - 150 mg/dL Final   • HDL Cholesterol 10/29/2019 39* 40 - 60 mg/dL Final   • LDL Cholesterol  10/29/2019 88  0 - 100 mg/dL Final   • VLDL Cholesterol 10/29/2019 30  5 - 40 mg/dL Final   • LDL/HDL Ratio 10/29/2019 2.26   Final   • PSA 10/29/2019 1.180  0.000 - 4.000 ng/mL Final           Assessment/Plan   Problems Addressed this Visit        Digestive    Gallbladder polyp - Primary     No need for concern at this time.          Fatty liver     Decrease fat in diet.          Morbidly obese (CMS/HCC)

## 2020-03-24 ENCOUNTER — OFFICE VISIT (OUTPATIENT)
Dept: FAMILY MEDICINE CLINIC | Facility: CLINIC | Age: 75
End: 2020-03-24

## 2020-03-24 VITALS
WEIGHT: 232 LBS | DIASTOLIC BLOOD PRESSURE: 69 MMHG | BODY MASS INDEX: 34.36 KG/M2 | HEART RATE: 83 BPM | SYSTOLIC BLOOD PRESSURE: 130 MMHG | OXYGEN SATURATION: 97 % | HEIGHT: 69 IN | TEMPERATURE: 96.7 F

## 2020-03-24 DIAGNOSIS — M54.42 LEFT-SIDED LOW BACK PAIN WITH LEFT-SIDED SCIATICA, UNSPECIFIED CHRONICITY: Primary | ICD-10-CM

## 2020-03-24 PROCEDURE — 99213 OFFICE O/P EST LOW 20 MIN: CPT | Performed by: FAMILY MEDICINE

## 2020-03-24 RX ORDER — MELOXICAM 15 MG/1
15 TABLET ORAL DAILY
Qty: 30 TABLET | Refills: 1 | Status: SHIPPED | OUTPATIENT
Start: 2020-03-24 | End: 2020-04-14

## 2020-03-24 RX ORDER — METHYLPREDNISOLONE 4 MG/1
TABLET ORAL
Qty: 21 TABLET | Refills: 0 | Status: SHIPPED | OUTPATIENT
Start: 2020-03-24 | End: 2020-06-05

## 2020-03-24 NOTE — PROGRESS NOTES
Subjective   Heri Vasquez is a 74 y.o. male.     Back Pain   This is a new problem. Episode onset: 1-2 weeks ago. The problem occurs daily. The problem has been gradually worsening since onset. The pain is present in the lumbar spine. The pain radiates to the left knee and left thigh. The pain is at a severity of 8/10. The pain is moderate. The symptoms are aggravated by standing and bending. Associated symptoms include leg pain and numbness. Pertinent negatives include no abdominal pain, bladder incontinence, bowel incontinence, chest pain, dysuria, fever, tingling or weakness.        The following portions of the patient's history were reviewed and updated as appropriate: past medical history, past social history, past surgical history and problem list.    Review of Systems   Constitutional: Negative for fever.   Respiratory: Negative for shortness of breath.    Cardiovascular: Negative for chest pain.   Gastrointestinal: Negative for abdominal pain and bowel incontinence.   Genitourinary: Negative for difficulty urinating, dysuria, frequency, hematuria, urgency and urinary incontinence.   Musculoskeletal: Positive for back pain.        Left buttock and left knee pain   Neurological: Positive for numbness. Negative for dizziness, tingling, weakness and confusion.       Objective   Physical Exam   Constitutional: He is oriented to person, place, and time. He appears well-developed.   Pulmonary/Chest: Effort normal and breath sounds normal.   Musculoskeletal:        Lumbar back: He exhibits decreased range of motion and tenderness.        Left upper leg: He exhibits tenderness.   Neurological: He is alert and oriented to person, place, and time.   Vitals reviewed.        Assessment/Plan   Problems Addressed this Visit        Nervous and Auditory    Left-sided low back pain with left-sided sciatica - Primary     Rx meloxicam and Medrol Dosepak take as directed.  Discussed strengthening and stretching back  exercise.  Call us back in 2 to 3 weeks if symptoms no better.

## 2020-03-24 NOTE — ASSESSMENT & PLAN NOTE
Rx meloxicam and Medrol Dosepak take as directed.  Discussed strengthening and stretching back exercise.  Call us back in 2 to 3 weeks if symptoms no better.

## 2020-04-14 ENCOUNTER — OFFICE VISIT (OUTPATIENT)
Dept: CARDIOLOGY | Facility: CLINIC | Age: 75
End: 2020-04-14

## 2020-04-14 VITALS
BODY MASS INDEX: 33.33 KG/M2 | HEART RATE: 89 BPM | HEIGHT: 69 IN | SYSTOLIC BLOOD PRESSURE: 130 MMHG | DIASTOLIC BLOOD PRESSURE: 65 MMHG | WEIGHT: 225 LBS

## 2020-04-14 DIAGNOSIS — I25.10 CHRONIC CORONARY ARTERY DISEASE: Primary | ICD-10-CM

## 2020-04-14 DIAGNOSIS — E78.2 MIXED HYPERLIPIDEMIA: ICD-10-CM

## 2020-04-14 DIAGNOSIS — R00.1 BRADYCARDIA: ICD-10-CM

## 2020-04-14 DIAGNOSIS — I83.811 VARICOSE VEINS OF RIGHT LOWER EXTREMITY WITH PAIN: ICD-10-CM

## 2020-04-14 PROCEDURE — 99443 PR PHYS/QHP TELEPHONE EVALUATION 21-30 MIN: CPT | Performed by: INTERNAL MEDICINE

## 2020-04-14 RX ORDER — ISOSORBIDE MONONITRATE 60 MG/1
90 TABLET, EXTENDED RELEASE ORAL DAILY
Qty: 135 TABLET | Refills: 3 | Status: SHIPPED | OUTPATIENT
Start: 2020-04-14 | End: 2021-02-15

## 2020-04-14 RX ORDER — ACETAMINOPHEN 500 MG
500 TABLET ORAL EVERY 6 HOURS PRN
COMMUNITY
End: 2022-03-22

## 2020-04-14 NOTE — PROGRESS NOTES
Cardiology Office Visit      Encounter Date:  04/14/2020    Patient ID:   Hrei Vasquez is a 75 y.o. male.    Reason For Followup:  Follow-up  for coronary artery disease, dyslipidemia, gastroesophageal reflux disease, obstructive sleep apnea    Brief Clinical History:  Dear Dr. Cueva, Olesya Bay MD    I had the pleasure of seeing Heri Vasquez today. As you are well aware, this is a 75 y.o. male  with history of mild coronary artery disease, ,  dyslipidemia, and gastroesophageal reflux disease.  He uses BiPAP machine for obstructive sleep apnea.     See readmitted at [facility] with chest pains and underwent cardiac catheterization which showed mild coronary artery disease in October 2017.  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  Left main was normal.  The distal LAD showed a 50-60% lesion and was too small to do any intervention.  30-40% lesion was noted in the right coronary artery.  Left circumflex and ramus intermedius branches were normal.       He is taking  isosorbide mononitrate 90 mg daily.       Interval History:  Denies any further symptoms of chest pain shortness of breath dizziness or syncope    Assessment & Plan    Impressions:    1.  Coronary artery disease stable with no anginal chest discomfort    2.  Dyslipidemia.  Lipids are being checked periodically.    3.  Hypertension under fairly good control.    Recommendations:  Labs done in October discussed with the patient  Medication refills  Continue current medical therapy  Stable from cardiac standpoint  Spent 25 minutes on this office visit  We will see patient in the office in 6 months  Thank you very much for allowing us to participate in the care of your patients    Telephone visit  You have chosen to receive care through a telephone visit. Do you consent to use a telephone visit for your medical care today? Yes    Objective:    Vitals:  Vitals:    04/14/20 0937   BP: 130/65   BP Location: Left arm   Pulse:  "89   Weight: 102 kg (225 lb)   Height: 175.3 cm (69\")           Allergies:  No Known Allergies    Medication Review:     Current Outpatient Medications:   •  acetaminophen (TYLENOL) 500 MG tablet, Take 500 mg by mouth Every 6 (Six) Hours As Needed for Mild Pain ., Disp: , Rfl:   •  aspirin 325 MG tablet, Take 325 mg by mouth Every Evening., Disp: , Rfl:   •  docusate sodium (COLACE) 50 MG capsule, Take  by mouth 3 (Three) Times a Day As Needed for Constipation., Disp: , Rfl:   •  fluticasone (FLONASE) 50 MCG/ACT nasal spray, USE 2 SPRAY(S) IN EACH NOSTRIL ONCE DAILY FOR 30 DAYS, Disp: , Rfl: 6  •  hydrocortisone 2.5 % cream, HYDROCORTISONE 2.5 % CREA, Disp: , Rfl:   •  isosorbide mononitrate (IMDUR) 60 MG 24 hr tablet, Take 1.5 tablets by mouth Daily., Disp: 135 tablet, Rfl: 3  •  lisinopril (PRINIVIL,ZESTRIL) 10 MG tablet, TAKE 1 TABLET BY MOUTH  DAILY, Disp: 90 tablet, Rfl: 3  •  metFORMIN (GLUCOPHAGE) 500 MG tablet, TAKE 1 TABLET BY MOUTH   TWICE DAILY WITH A MEAL, Disp: 180 tablet, Rfl: 3  •  methylPREDNISolone (MEDROL, CHLOE,) 4 MG tablet, Take as directed on package instructions., Disp: 21 tablet, Rfl: 0  •  Multiple Vitamins-Minerals (MULTIVITAMIN WITH MINERALS) tablet tablet, Take 1 tablet by mouth Daily., Disp: , Rfl:   •  nitroglycerin (NITROSTAT) 0.4 MG SL tablet, , Disp: , Rfl: 4  •  pantoprazole (PROTONIX) 40 MG EC tablet, TAKE 1 TABLET BY MOUTH  DAILY, Disp: 90 tablet, Rfl: 3  •  simvastatin (ZOCOR) 40 MG tablet, TAKE 1 TABLET BY MOUTH  DAILY AT BEDTIME, Disp: 90 tablet, Rfl: 3    Family History:  Family History   Problem Relation Age of Onset   • Heart disease Mother         CHF   • Hypertension Mother    • Heart failure Mother    • Stroke Maternal Grandfather    • Diabetes Other    • Diabetes Other    • Stroke Other        Past Medical History:  Past Medical History:   Diagnosis Date   • Arthritis     Dr Mosqueda   • Coronary heart disease 01/2016    single vessel disease, nl stress test (copied from " [facility])   • Coronary heart disease 10/31/2017    cath 10/31/17 - Low % Blockages- N o Intervention   (copied from [facility])   • Diverticulosis    • GERD (gastroesophageal reflux disease)    • Hearing loss    • Hyperlipidemia    • Low back pain    • SIDDHARTHA treated with BiPAP    • Pain management     injections Lumbar spine X2 with [facility]uprhys Pain management @ [facility] (copied from [facility])   • Physical therapy evaluation, initial     @ [facility] in [place] 6 weeks x3 per week, Kalen leal (copied from [facility])   • Rectal bleed 08/2012    hemorrhoids   • Retinal hemorrhage of right eye 05/2014   • Type 2 diabetes mellitus (CMS/HCC)    • Uses brace        Past surgical History:  Past Surgical History:   Procedure Laterality Date   • BELPHAROPTOSIS REPAIR  12/11/2017     Dr. grimes (copied from [facility])   • BROW LIFT  12/11/2017    Dr. Grimes at [facility]   • CARDIAC CATHETERIZATION  03/2012    at [place]- single vessel disease. (copied from [facility])   • CARDIAC CATHETERIZATION  10/13/2017    no intervention - Low % Blockages.   • CATARACT EXTRACTION  12/11/2017    brow lift and eye lid repair   • COLON SURGERY  06/2014    colonscopy   • COLONOSCOPY N/A 7/12/2019    Procedure: COLONOSCOPY with polypectomy x1;  Surgeon: Graham Byrd MD;  Location: [facility] ENDOSCOPY;  Service: Gastroenterology   • HERNIA REPAIR  11/2008    umbilical hernia repair   • JOINT REPLACEMENT     • KNEE ARTHROSCOPY Right 11/2014    Dr. Mosqueda   • LASIK      lasik and Cataract Extraction, sep 11 and right Sept. 25th, 2014- [facility]. (copied from [facility])   • TOTAL KNEE ARTHROPLASTY Left 11/2009    Dr. Mosqueda (copied from [facility])       Social History:  Social History     Socioeconomic History   • Marital status:      Spouse name: Shani   • Number of children: Not on file   • Years of education: Not on file   • Highest education level: Not on file   Occupational History   • Occupation: retired    Tobacco Use   • Smoking status:  Former Smoker     Last attempt to quit: 2000     Years since quittin.2   • Smokeless tobacco: Former User   Substance and Sexual Activity   • Alcohol use: No   • Drug use: No   • Sexual activity: Defer       Review of Systems:  The following systems were reviewed as they relate to the cardiovascular system: Constitutional, Eyes, ENT, Cardiovascular, Respiratory, Gastrointestinal, Integumentary, Neurological, Psychiatric, Hematologic, Endocrine, Musculoskeletal, and Genitourinary. The pertinent cardiovascular findings are reported above with all other cardiovascular points within those systems being negative.    Diagnostic Study Review:     Current Electrocardiogram:  Procedures      NOTE: The following portions of the patient's history were reviewed and updated this visit as appropriate: allergies, current medications, past family history, past medical history, past social history, past surgical history and problem list.

## 2020-05-29 RX ORDER — NAPROXEN 500 MG/1
TABLET ORAL
Qty: 180 TABLET | Refills: 0 | Status: SHIPPED | OUTPATIENT
Start: 2020-05-29 | End: 2020-07-06 | Stop reason: SDUPTHER

## 2020-06-05 ENCOUNTER — OFFICE VISIT (OUTPATIENT)
Dept: FAMILY MEDICINE CLINIC | Facility: CLINIC | Age: 75
End: 2020-06-05

## 2020-06-05 VITALS
SYSTOLIC BLOOD PRESSURE: 125 MMHG | BODY MASS INDEX: 33.82 KG/M2 | WEIGHT: 229 LBS | OXYGEN SATURATION: 94 % | TEMPERATURE: 97.3 F | DIASTOLIC BLOOD PRESSURE: 67 MMHG

## 2020-06-05 DIAGNOSIS — M25.511 CHRONIC RIGHT SHOULDER PAIN: Primary | ICD-10-CM

## 2020-06-05 DIAGNOSIS — G89.29 CHRONIC RIGHT SHOULDER PAIN: Primary | ICD-10-CM

## 2020-06-05 PROCEDURE — 99213 OFFICE O/P EST LOW 20 MIN: CPT | Performed by: FAMILY MEDICINE

## 2020-06-05 RX ORDER — LIDOCAINE 50 MG/G
1 PATCH TOPICAL EVERY 24 HOURS
Qty: 30 PATCH | Refills: 0 | Status: SHIPPED | OUTPATIENT
Start: 2020-06-05 | End: 2020-11-13

## 2020-06-05 RX ORDER — MELOXICAM 15 MG/1
15 TABLET ORAL DAILY
COMMUNITY
Start: 2020-04-23 | End: 2020-06-05

## 2020-06-05 RX ORDER — BLOOD SUGAR DIAGNOSTIC
1 STRIP MISCELLANEOUS DAILY
COMMUNITY
Start: 2020-05-29 | End: 2020-11-17 | Stop reason: SDUPTHER

## 2020-06-05 NOTE — PROGRESS NOTES
Subjective   Chief Complaint   Patient presents with   • Shoulder Pain     rt     Heri Vasquez is a 75 y.o. male.     Shoulder Injury    The right shoulder is affected. The incident occurred more than 1 week ago. There was no injury mechanism. The quality of the pain is described as aching. The pain radiates to the right arm. The pain is moderate. Associated symptoms include numbness and tingling. Pertinent negatives include no chest pain. The symptoms are aggravated by movement. He has tried acetaminophen for the symptoms. The treatment provided mild relief.      Past Medical History:   Diagnosis Date   • Arthritis     Dr Mosqueda   • Coronary heart disease 01/2016    single vessel disease, nl stress test (copied from [facility] [facility])   • Coronary heart disease 10/31/2017    cath 10/31/17 - Low % Blockages- N o Intervention   (copied from [employer])   • Diverticulosis    • GERD (gastroesophageal reflux disease)    • Hearing loss    • Hyperlipidemia    • Low back pain    • SIDDHARTHA treated with BiPAP    • Pain management     injections Lumbar spine X2 with [facility] Pain management @ [facility] (copied from [employer])   • Physical therapy evaluation, initial     @ [facility] in [place] 6 weeks x3 per week, Kalen leal (copied from [facility])   • Rectal bleed 08/2012    hemorrhoids   • Retinal hemorrhage of right eye 05/2014   • Type 2 diabetes mellitus (CMS/HCC)    • Uses brace      Past Surgical History:   Procedure Laterality Date   • BELPHAROPTOSIS REPAIR  12/11/2017     Dr. grimes (copied from [facility] [facility])   • BROW LIFT  12/11/2017    Dr. Grimes at [facility]   • CARDIAC CATHETERIZATION  03/2012    at [facility]- single vessel disease. (copied from [employer])   • CARDIAC CATHETERIZATION  10/13/2017    no intervention - Low % Blockages.   • CATARACT EXTRACTION  12/11/2017    brow lift and eye lid repair   • COLON SURGERY  06/2014    colonscopy   • COLONOSCOPY N/A 7/12/2019    Procedure: COLONOSCOPY with polypectomy x1;  Surgeon: Graham Byrd  MD;  Location: [facility];  Service: Gastroenterology   • HERNIA REPAIR  2008    umbilical hernia repair   • JOINT REPLACEMENT     • KNEE ARTHROSCOPY Right 2014    Dr. Mosqueda   • LASIK      lasik and Cataract Extraction, sep 11 and right 2014- [facility]. (copied from [facility])   • TOTAL KNEE ARTHROPLASTY Left 2009    Dr. Mosqueda (copied from [facility])     No Known Allergies  Social History     Socioeconomic History   • Marital status:      Spouse name: Shani   • Number of children: Not on file   • Years of education: Not on file   • Highest education level: Not on file   Occupational History   • Occupation: retired    Tobacco Use   • Smoking status: Former Smoker     Last attempt to quit:      Years since quittin.4   • Smokeless tobacco: Former User   Substance and Sexual Activity   • Alcohol use: No   • Drug use: No   • Sexual activity: Defer     Social History     Tobacco Use   Smoking Status Former Smoker   • Last attempt to quit:    • Years since quittin.4   Smokeless Tobacco Former User       family history includes Diabetes in some other family members; Heart disease in his mother; Heart failure in his mother; Hypertension in his mother; Stroke in his maternal grandfather and another family member.  Current Outpatient Medications on File Prior to Visit   Medication Sig Dispense Refill   • acetaminophen (TYLENOL) 500 MG tablet Take 500 mg by mouth Every 6 (Six) Hours As Needed for Mild Pain .     • aspirin 325 MG tablet Take 325 mg by mouth Every Evening.     • docusate sodium (COLACE) 50 MG capsule Take  by mouth 3 (Three) Times a Day As Needed for Constipation.     • fluticasone (FLONASE) 50 MCG/ACT nasal spray USE 2 SPRAY(S) IN EACH NOSTRIL ONCE DAILY FOR 30 DAYS  6   • hydrocortisone 2.5 % cream HYDROCORTISONE 2.5 % CREA     • isosorbide mononitrate (IMDUR) 60 MG 24 hr tablet Take 1.5 tablets by mouth Daily. 135 tablet 3   • lisinopril  (PRINIVIL,ZESTRIL) 10 MG tablet TAKE 1 TABLET BY MOUTH  DAILY 90 tablet 3   • metFORMIN (GLUCOPHAGE) 500 MG tablet TAKE 1 TABLET BY MOUTH   TWICE DAILY WITH A MEAL 180 tablet 3   • Multiple Vitamins-Minerals (MULTIVITAMIN WITH MINERALS) tablet tablet Take 1 tablet by mouth Daily.     • naproxen (NAPROSYN) 500 MG tablet Take 1 tablet by mouth twice daily as needed for pain 180 tablet 0   • nitroglycerin (NITROSTAT) 0.4 MG SL tablet   4   • ONETOUCH ULTRA test strip 1 each by Other route Daily.     • pantoprazole (PROTONIX) 40 MG EC tablet TAKE 1 TABLET BY MOUTH  DAILY 90 tablet 3   • simvastatin (ZOCOR) 40 MG tablet TAKE 1 TABLET BY MOUTH  DAILY AT BEDTIME 90 tablet 3     No current facility-administered medications on file prior to visit.      Patient Active Problem List   Diagnosis   • Varicose veins of right lower extremity with pain   • Nasal congestion   • Short of breath on exertion   • Actinic keratosis   • Arthritis   • Bradycardia   • Chronic coronary artery disease   • Degeneration of intervertebral disc of lumbar region   • Dependent edema   • Diabetic peripheral neuropathy (CMS/HCC)   • Encounter for general adult medical examination without abnormal findings   • Fatigue   • Gastroesophageal reflux disease   • Hay fever   • Hearing loss   • Hyperlipidemia   • Knee pain   • Dizziness and giddiness   • Strain of lumbar region   • Type 2 diabetes mellitus (CMS/HCC)   • Medicare annual wellness visit, subsequent   • Screening for prostate cancer   • Sciatic nerve pain, right   • Hip pain, right   • Cellulitis of left thigh   • RUQ abdominal pain   • Gallbladder polyp   • Fatty liver   • Morbidly obese (CMS/HCC)   • Left-sided low back pain with left-sided sciatica   • Chronic right shoulder pain       The following portions of the patient's history were reviewed and updated as appropriate: allergies, current medications, past family history, past medical history, past social history, past surgical history and  problem list.    Review of Systems   Constitutional: Negative for chills and fever.   HENT: Negative for sinus pressure and sore throat.    Eyes: Negative for blurred vision.   Respiratory: Negative for cough and shortness of breath.    Cardiovascular: Negative for chest pain and palpitations.   Gastrointestinal: Negative for abdominal pain.   Endocrine: Negative for polyuria.   Skin: Negative for rash.   Neurological: Positive for tingling and numbness. Negative for dizziness and headache.   Hematological: Negative for adenopathy.   Psychiatric/Behavioral: Negative for depressed mood.       Objective   /67   Temp 97.3 °F (36.3 °C)   Wt 104 kg (229 lb)   SpO2 94%   BMI 33.82 kg/m²   Physical Exam   Constitutional: He is oriented to person, place, and time. He appears well-developed. No distress.   HENT:   Head: Normocephalic.   Eyes: Conjunctivae and lids are normal.   Neck: Trachea normal. No thyroid mass and no thyromegaly present.   Cardiovascular: Normal rate, regular rhythm and normal heart sounds.   Pulmonary/Chest: Effort normal and breath sounds normal.   Musculoskeletal:        Right shoulder: He exhibits decreased range of motion and tenderness.   Lymphadenopathy:     He has no cervical adenopathy.   Neurological: He is alert and oriented to person, place, and time.   Skin: Skin is warm and dry.   Psychiatric: He has a normal mood and affect. His speech is normal and behavior is normal. He is attentive.       No visits with results within 1 Week(s) from this visit.   Latest known visit with results is:   Office Visit on 10/29/2019   Component Date Value Ref Range Status   • Glucose 10/29/2019 122* 65 - 99 mg/dL Final   • BUN 10/29/2019 12  8 - 23 mg/dL Final   • Creatinine 10/29/2019 0.89  0.76 - 1.27 mg/dL Final   • Sodium 10/29/2019 141  136 - 145 mmol/L Final   • Potassium 10/29/2019 5.2  3.5 - 5.2 mmol/L Final   • Chloride 10/29/2019 100  98 - 107 mmol/L Final   • CO2 10/29/2019 28.7  22.0 -  29.0 mmol/L Final   • Calcium 10/29/2019 9.6  8.6 - 10.5 mg/dL Final   • Total Protein 10/29/2019 7.2  6.0 - 8.5 g/dL Final   • Albumin 10/29/2019 4.40  3.50 - 5.20 g/dL Final   • ALT (SGPT) 10/29/2019 75* 1 - 41 U/L Final   • AST (SGOT) 10/29/2019 51* 1 - 40 U/L Final   • Alkaline Phosphatase 10/29/2019 58  39 - 117 U/L Final   • Total Bilirubin 10/29/2019 0.6  0.2 - 1.2 mg/dL Final   • eGFR Non African Amer 10/29/2019 83  >60 mL/min/1.73 Final   • Globulin 10/29/2019 2.8  gm/dL Final   • A/G Ratio 10/29/2019 1.6  g/dL Final   • BUN/Creatinine Ratio 10/29/2019 13.5  7.0 - 25.0 Final   • Anion Gap 10/29/2019 12.3  5.0 - 15.0 mmol/L Final   • Hemoglobin A1C 10/29/2019 6.3* 3.5 - 5.6 % Final   • Total Cholesterol 10/29/2019 157  0 - 200 mg/dL Final   • Triglycerides 10/29/2019 150  0 - 150 mg/dL Final   • HDL Cholesterol 10/29/2019 39* 40 - 60 mg/dL Final   • LDL Cholesterol  10/29/2019 88  0 - 100 mg/dL Final   • VLDL Cholesterol 10/29/2019 30  5 - 40 mg/dL Final   • LDL/HDL Ratio 10/29/2019 2.26   Final   • PSA 10/29/2019 1.180  0.000 - 4.000 ng/mL Final           Assessment/Plan   Problems Addressed this Visit        Nervous and Auditory    Chronic right shoulder pain - Primary    Relevant Medications    lidocaine (LIDODERM) 5 %

## 2020-06-19 PROBLEM — M25.511 CHRONIC RIGHT SHOULDER PAIN: Status: ACTIVE | Noted: 2020-06-19

## 2020-06-19 PROBLEM — G89.29 CHRONIC RIGHT SHOULDER PAIN: Status: ACTIVE | Noted: 2020-06-19

## 2020-07-01 RX ORDER — LISINOPRIL 10 MG/1
TABLET ORAL
Qty: 90 TABLET | Refills: 3 | Status: SHIPPED | OUTPATIENT
Start: 2020-07-01 | End: 2021-05-26

## 2020-07-01 RX ORDER — PANTOPRAZOLE SODIUM 40 MG/1
TABLET, DELAYED RELEASE ORAL
Qty: 90 TABLET | Refills: 3 | Status: SHIPPED | OUTPATIENT
Start: 2020-07-01 | End: 2021-06-01

## 2020-07-02 ENCOUNTER — TELEPHONE (OUTPATIENT)
Dept: FAMILY MEDICINE CLINIC | Facility: CLINIC | Age: 75
End: 2020-07-02

## 2020-07-07 RX ORDER — NAPROXEN 500 MG/1
500 TABLET ORAL 2 TIMES DAILY PRN
Qty: 180 TABLET | Refills: 0 | Status: SHIPPED | OUTPATIENT
Start: 2020-07-07 | End: 2020-08-25

## 2020-08-14 ENCOUNTER — OFFICE VISIT (OUTPATIENT)
Dept: FAMILY MEDICINE CLINIC | Facility: CLINIC | Age: 75
End: 2020-08-14

## 2020-08-14 VITALS
DIASTOLIC BLOOD PRESSURE: 68 MMHG | WEIGHT: 231 LBS | HEART RATE: 80 BPM | OXYGEN SATURATION: 95 % | BODY MASS INDEX: 34.11 KG/M2 | SYSTOLIC BLOOD PRESSURE: 122 MMHG | TEMPERATURE: 98.2 F

## 2020-08-14 DIAGNOSIS — M54.12 CERVICAL RADICULOPATHY: ICD-10-CM

## 2020-08-14 DIAGNOSIS — M25.811 MASS OF JOINT OF RIGHT SHOULDER: Primary | ICD-10-CM

## 2020-08-14 DIAGNOSIS — E11.9 TYPE 2 DIABETES MELLITUS WITHOUT COMPLICATION, WITHOUT LONG-TERM CURRENT USE OF INSULIN (HCC): ICD-10-CM

## 2020-08-14 DIAGNOSIS — M54.12 CERVICAL RADICULITIS: ICD-10-CM

## 2020-08-14 PROCEDURE — 99214 OFFICE O/P EST MOD 30 MIN: CPT | Performed by: FAMILY MEDICINE

## 2020-08-14 RX ORDER — GABAPENTIN 300 MG/1
300 CAPSULE ORAL NIGHTLY
Qty: 30 CAPSULE | Refills: 0 | Status: SHIPPED | OUTPATIENT
Start: 2020-08-14 | End: 2020-08-31 | Stop reason: SDUPTHER

## 2020-08-14 NOTE — PROGRESS NOTES
Subjective   Chief Complaint   Patient presents with   • Shoulder Pain     rt that radiates down his arm, when gripping something it gets worse, has some swelling in wrist occ.     Heri Vasquez is a 75 y.o. male.     Shoulder Injury    The right shoulder is affected. The incident occurred more than 1 week ago. There was no injury mechanism. The quality of the pain is described as aching. The pain radiates to the right arm. The pain is moderate. Associated symptoms include numbness and tingling. Pertinent negatives include no chest pain or muscle weakness. The symptoms are aggravated by movement. He has tried rest and NSAIDs for the symptoms. The treatment provided no relief.   Diabetes   He presents for his follow-up diabetic visit. He has type 2 diabetes mellitus. His disease course has been stable. Pertinent negatives for hypoglycemia include no confusion, dizziness, headaches or nervousness/anxiousness. Pertinent negatives for diabetes include no blurred vision, no chest pain, no fatigue, no foot paresthesias, no polydipsia, no polyphagia and no polyuria. Pertinent negatives for hypoglycemia complications include no blackouts. Symptoms are stable. Pertinent negatives for diabetic complications include no autonomic neuropathy, CVA or heart disease. Current diabetic treatment includes diet and oral agent (monotherapy). He is compliant with treatment most of the time. His weight is stable. He is following a diabetic diet. There is no change in his home blood glucose trend. An ACE inhibitor/angiotensin II receptor blocker is being taken.      Past Medical History:   Diagnosis Date   • Arthritis     Dr Mosqueda   • Coronary heart disease 01/2016    single vessel disease, nl stress test (copied from [email or web address])   • Coronary heart disease 10/31/2017    cath 10/31/17 - Low % Blockages- N o Intervention   (copied from [email or web address])   • Diverticulosis    • GERD (gastroesophageal reflux disease)    • Hearing loss    •  Hyperlipidemia    • Low back pain    • SIDDHARTHA treated with BiPAP    • Pain management     injections Lumbar spine X2 with [facility] management @ [facility] (copied from [facility])   • Physical therapy evaluation, initial     @ [facility] in [place] 6 weeks x3 per week, Kalen leal (copied from [facility])   • Rectal bleed 2012    hemorrhoids   • Retinal hemorrhage of right eye 2014   • Type 2 diabetes mellitus (CMS/HCC)    • Uses brace      Past Surgical History:   Procedure Laterality Date   • BELPHAROPTOSIS REPAIR  2017     Dr. grimes (copied from [facility])   • BROW LIFT  2017    Dr. Grimes at [facility]   • CARDIAC CATHETERIZATION  2012    at [place]- single vessel disease. (copied from [facility] [facility])   • CARDIAC CATHETERIZATION  10/13/2017    no intervention - Low % Blockages.   • CATARACT EXTRACTION  2017    brow lift and eye lid repair   • COLON SURGERY  2014    colonscopy   • COLONOSCOPY N/A 2019    Procedure: COLONOSCOPY with polypectomy x1;  Surgeon: Graham Byrd MD;  Location: [facility] ENDOSCOPY;  Service: Gastroenterology   • HERNIA REPAIR  2008    umbilical hernia repair   • JOINT REPLACEMENT     • KNEE ARTHROSCOPY Right 2014    Dr. Mosqueda   • LASIK      lasik and Cataract Extraction, sep 11 and right 2014- [facility]. (copied from [facility])   • TOTAL KNEE ARTHROPLASTY Left 2009    Dr. Mosqueda (copied from [facility])     No Known Allergies  Social History     Socioeconomic History   • Marital status:      Spouse name: Shani   • Number of children: Not on file   • Years of education: Not on file   • Highest education level: Not on file   Occupational History   • Occupation: retired    Tobacco Use   • Smoking status: Former Smoker     Last attempt to quit: 2000     Years since quittin.6   • Smokeless tobacco: Former User   Substance and Sexual Activity   • Alcohol use: No   • Drug use: No   • Sexual activity: Defer     Social History     Tobacco Use    Smoking Status Former Smoker   • Last attempt to quit:    • Years since quittin.6   Smokeless Tobacco Former User       family history includes Diabetes in some other family members; Heart disease in his mother; Heart failure in his mother; Hypertension in his mother; Stroke in his maternal grandfather and another family member.  Current Outpatient Medications on File Prior to Visit   Medication Sig Dispense Refill   • acetaminophen (TYLENOL) 500 MG tablet Take 500 mg by mouth Every 6 (Six) Hours As Needed for Mild Pain .     • aspirin 325 MG tablet Take 325 mg by mouth Every Evening.     • docusate sodium (COLACE) 50 MG capsule Take  by mouth 3 (Three) Times a Day As Needed for Constipation.     • fluticasone (FLONASE) 50 MCG/ACT nasal spray USE 2 SPRAY(S) IN EACH NOSTRIL ONCE DAILY FOR 30 DAYS  6   • hydrocortisone 2.5 % cream HYDROCORTISONE 2.5 % CREA     • isosorbide mononitrate (IMDUR) 60 MG 24 hr tablet Take 1.5 tablets by mouth Daily. 135 tablet 3   • lidocaine (LIDODERM) 5 % Place 1 patch on the skin as directed by provider Daily. Remove & Discard patch within 12 hours or as directed by MD 30 patch 0   • lisinopril (PRINIVIL,ZESTRIL) 10 MG tablet TAKE 1 TABLET BY MOUTH  DAILY 90 tablet 3   • metFORMIN (GLUCOPHAGE) 500 MG tablet TAKE 1 TABLET BY MOUTH   TWICE DAILY WITH A MEAL 180 tablet 3   • Multiple Vitamins-Minerals (MULTIVITAMIN WITH MINERALS) tablet tablet Take 1 tablet by mouth Daily.     • nitroglycerin (NITROSTAT) 0.4 MG SL tablet   4   • ONETOUCH ULTRA test strip 1 each by Other route Daily.     • pantoprazole (PROTONIX) 40 MG EC tablet TAKE 1 TABLET BY MOUTH  DAILY 90 tablet 3     No current facility-administered medications on file prior to visit.      Patient Active Problem List   Diagnosis   • Varicose veins of right lower extremity with pain   • Nasal congestion   • Short of breath on exertion   • Actinic keratosis   • Arthritis   • Bradycardia   • Chronic coronary artery disease   •  Degeneration of intervertebral disc of lumbar region   • Dependent edema   • Diabetic peripheral neuropathy (CMS/HCC)   • Encounter for general adult medical examination without abnormal findings   • Fatigue   • Gastroesophageal reflux disease   • Hay fever   • Hearing loss   • Hyperlipidemia   • Knee pain   • Dizziness and giddiness   • Strain of lumbar region   • Type 2 diabetes mellitus (CMS/HCC)   • Medicare annual wellness visit, subsequent   • Screening for prostate cancer   • Sciatic nerve pain, right   • Hip pain, right   • RUQ abdominal pain   • Gallbladder polyp   • Fatty liver   • Morbidly obese (CMS/HCC)   • Left-sided low back pain with left-sided sciatica   • Chronic right shoulder pain   • Mass of joint of right shoulder   • Cervical radiculopathy   • Cervical radiculitis       The following portions of the patient's history were reviewed and updated as appropriate: allergies, current medications, past family history, past medical history, past social history, past surgical history and problem list.    Review of Systems   Constitutional: Negative for chills, fatigue and fever.   HENT: Negative for sinus pressure and sore throat.    Eyes: Negative for blurred vision.   Respiratory: Negative for cough and shortness of breath.    Cardiovascular: Negative for chest pain and palpitations.   Gastrointestinal: Negative for abdominal pain.   Endocrine: Negative for polydipsia, polyphagia and polyuria.   Skin: Negative for rash.   Neurological: Positive for tingling and numbness. Negative for dizziness, headache and confusion.   Hematological: Negative for adenopathy.   Psychiatric/Behavioral: Negative for depressed mood. The patient is not nervous/anxious.        Objective   /68 (BP Location: Left arm, Patient Position: Sitting, Cuff Size: Adult)   Pulse 80   Temp 98.2 °F (36.8 °C)   Wt 105 kg (231 lb)   SpO2 95%   BMI 34.11 kg/m²   Physical Exam   Constitutional: He is oriented to person, place,  and time. He appears well-developed. No distress.   HENT:   Head: Normocephalic.   Eyes: Conjunctivae and lids are normal.   Neck: Trachea normal. No thyroid mass and no thyromegaly present.   Cardiovascular: Normal rate, regular rhythm and normal heart sounds.   Pulmonary/Chest: Effort normal and breath sounds normal.   Musculoskeletal:        Right shoulder: He exhibits decreased range of motion, tenderness, bony tenderness, pain and decreased strength.   Lymphadenopathy:     He has no cervical adenopathy.   Neurological: He is alert and oriented to person, place, and time.   Skin: Skin is warm and dry.   Psychiatric: He has a normal mood and affect. His speech is normal and behavior is normal. He is attentive.       No visits with results within 1 Week(s) from this visit.   Latest known visit with results is:   Office Visit on 10/29/2019   Component Date Value Ref Range Status   • Glucose 10/29/2019 122* 65 - 99 mg/dL Final   • BUN 10/29/2019 12  8 - 23 mg/dL Final   • Creatinine 10/29/2019 0.89  0.76 - 1.27 mg/dL Final   • Sodium 10/29/2019 141  136 - 145 mmol/L Final   • Potassium 10/29/2019 5.2  3.5 - 5.2 mmol/L Final   • Chloride 10/29/2019 100  98 - 107 mmol/L Final   • CO2 10/29/2019 28.7  22.0 - 29.0 mmol/L Final   • Calcium 10/29/2019 9.6  8.6 - 10.5 mg/dL Final   • Total Protein 10/29/2019 7.2  6.0 - 8.5 g/dL Final   • Albumin 10/29/2019 4.40  3.50 - 5.20 g/dL Final   • ALT (SGPT) 10/29/2019 75* 1 - 41 U/L Final   • AST (SGOT) 10/29/2019 51* 1 - 40 U/L Final   • Alkaline Phosphatase 10/29/2019 58  39 - 117 U/L Final   • Total Bilirubin 10/29/2019 0.6  0.2 - 1.2 mg/dL Final   • eGFR Non African Amer 10/29/2019 83  >60 mL/min/1.73 Final   • Globulin 10/29/2019 2.8  gm/dL Final   • A/G Ratio 10/29/2019 1.6  g/dL Final   • BUN/Creatinine Ratio 10/29/2019 13.5  7.0 - 25.0 Final   • Anion Gap 10/29/2019 12.3  5.0 - 15.0 mmol/L Final   • Hemoglobin A1C 10/29/2019 6.3* 3.5 - 5.6 % Final   • Total Cholesterol  10/29/2019 157  0 - 200 mg/dL Final   • Triglycerides 10/29/2019 150  0 - 150 mg/dL Final   • HDL Cholesterol 10/29/2019 39* 40 - 60 mg/dL Final   • LDL Cholesterol  10/29/2019 88  0 - 100 mg/dL Final   • VLDL Cholesterol 10/29/2019 30  5 - 40 mg/dL Final   • LDL/HDL Ratio 10/29/2019 2.26   Final   • PSA 10/29/2019 1.180  0.000 - 4.000 ng/mL Final           Assessment/Plan   Heri was seen today for shoulder pain.    Diagnoses and all orders for this visit:    Mass of joint of right shoulder  -     XR Shoulder 2+ View Right; Future    Cervical radiculopathy  -     MRI Cervical Spine Without Contrast; Future    Type 2 diabetes mellitus without complication, without long-term current use of insulin (CMS/HCC)  -     gabapentin (Neurontin) 300 MG capsule; Take 1 capsule by mouth Every Night.    Cervical radiculitis  -     MRI Cervical Spine Without Contrast; Future

## 2020-08-25 RX ORDER — NAPROXEN 500 MG/1
TABLET ORAL
Qty: 180 TABLET | Refills: 3 | Status: SHIPPED | OUTPATIENT
Start: 2020-08-25 | End: 2021-05-26

## 2020-08-26 ENCOUNTER — TRANSCRIBE ORDERS (OUTPATIENT)
Dept: ADMINISTRATIVE | Facility: HOSPITAL | Age: 75
End: 2020-08-26

## 2020-08-26 ENCOUNTER — HOSPITAL ENCOUNTER (OUTPATIENT)
Dept: GENERAL RADIOLOGY | Facility: HOSPITAL | Age: 75
Discharge: HOME | End: 2020-08-26

## 2020-08-26 ENCOUNTER — HOSPITAL ENCOUNTER (OUTPATIENT)
Dept: MRI IMAGING | Facility: HOSPITAL | Age: 75
Discharge: HOME | End: 2020-08-26
Admitting: FAMILY MEDICINE

## 2020-08-26 DIAGNOSIS — M54.12 CERVICAL RADICULITIS: ICD-10-CM

## 2020-08-26 DIAGNOSIS — M25.811 ENLARGEMENT OF STERNOCLAVICULAR JOINT, RIGHT: Primary | ICD-10-CM

## 2020-08-26 DIAGNOSIS — M25.811 ENLARGEMENT OF STERNOCLAVICULAR JOINT, RIGHT: ICD-10-CM

## 2020-08-26 PROCEDURE — 73030 X-RAY EXAM OF SHOULDER: CPT

## 2020-08-26 PROCEDURE — 72141 MRI NECK SPINE W/O DYE: CPT

## 2020-08-28 ENCOUNTER — OFFICE VISIT (OUTPATIENT)
Dept: FAMILY MEDICINE CLINIC | Facility: CLINIC | Age: 75
End: 2020-08-28

## 2020-08-28 VITALS
BODY MASS INDEX: 33.97 KG/M2 | WEIGHT: 230 LBS | DIASTOLIC BLOOD PRESSURE: 73 MMHG | TEMPERATURE: 97 F | SYSTOLIC BLOOD PRESSURE: 158 MMHG | OXYGEN SATURATION: 96 % | HEART RATE: 72 BPM

## 2020-08-28 DIAGNOSIS — M54.12 CERVICAL RADICULOPATHY: Primary | ICD-10-CM

## 2020-08-28 DIAGNOSIS — E11.8 TYPE 2 DIABETES MELLITUS WITH UNSPECIFIED COMPLICATIONS (HCC): ICD-10-CM

## 2020-08-28 PROCEDURE — 99213 OFFICE O/P EST LOW 20 MIN: CPT | Performed by: FAMILY MEDICINE

## 2020-08-28 RX ORDER — SIMVASTATIN 40 MG
TABLET ORAL
Qty: 90 TABLET | Refills: 3 | Status: SHIPPED | OUTPATIENT
Start: 2020-08-28 | End: 2021-10-11

## 2020-08-28 RX ORDER — LANCETS 33 GAUGE
1 EACH MISCELLANEOUS DAILY
COMMUNITY
Start: 2020-06-15 | End: 2021-05-26

## 2020-08-30 PROBLEM — M54.12 CERVICAL RADICULITIS: Status: ACTIVE | Noted: 2020-08-30

## 2020-08-31 ENCOUNTER — TELEPHONE (OUTPATIENT)
Dept: FAMILY MEDICINE CLINIC | Facility: CLINIC | Age: 75
End: 2020-08-31

## 2020-08-31 DIAGNOSIS — E11.9 TYPE 2 DIABETES MELLITUS WITHOUT COMPLICATION, WITHOUT LONG-TERM CURRENT USE OF INSULIN (HCC): ICD-10-CM

## 2020-08-31 RX ORDER — GABAPENTIN 300 MG/1
300 CAPSULE ORAL NIGHTLY
Qty: 90 CAPSULE | Refills: 1 | Status: SHIPPED | OUTPATIENT
Start: 2020-08-31 | End: 2021-01-27

## 2020-09-08 RX ORDER — NAPROXEN 500 MG/1
TABLET ORAL
Qty: 180 TABLET | Refills: 0 | OUTPATIENT
Start: 2020-09-08

## 2020-09-08 NOTE — PROGRESS NOTES
CHIEF COMPLAINT  Cervical radiculopathy       Subjective   Heri Vasquez is a 75 y.o. male who was referred by Dr. Cueva, Olesya Bay MD  to our pain management clinic for consultation, evaluation and treatment of neck and right shoulder pain. His pain started about 2 months ago and has progressively got worse. He had right 5th digit numbness since 4-5 years but shoulder and arm pain has been new. He is accompanied by his wife Shani today.     Neck pain is 3/10 on VAS, at maximum is 9/10. Pain is aching, numbness and throbbin in nature. Pain is referred right arm, right neck. The pain is constant. The pain is improved by tylenol and holding arm up with shoulder abduction at 90 degrees. The pain is worse with holding things. Has numbness and tingling in 4th and 5th digits. Has been dropping things since last 2 months, trouble holding things like pencils. Numbness increases with gripping.     PMH: DM-2, GERD, arthritis, CAD, hearing problems.      Current Medications:   Aspirin - 325 mg   Gabapentin 300 mg qhs   Naproxen 500 mg BID PRN    Past Medications:    Past Modalities:  TENS:       no          Physical Therapy Within The Last 6 Months     no  Psychotherapy     no  Massage Therapy      no    Patient Complains Of:  Uro-Fecal Incontinence no  Weight Gain/Loss  no  Fever/Chills   no  Weakness   Yes (right arm)      PEG Assessment   What number best describes your pain on average in the past week?6  What number best describes how, during the past week, pain has interfered with your enjoyment of life?6  What number best describes how, during the past week, pain has interfered with your general activity?  6        Current Outpatient Medications:   •  acetaminophen (TYLENOL) 500 MG tablet, Take 500 mg by mouth Every 6 (Six) Hours As Needed for Mild Pain ., Disp: , Rfl:   •  aspirin 325 MG tablet, Take 325 mg by mouth Every Evening., Disp: , Rfl:   •  docusate sodium (COLACE) 50 MG capsule, Take  by mouth 3 (Three)  Times a Day As Needed for Constipation., Disp: , Rfl:   •  gabapentin (Neurontin) 300 MG capsule, Take 1 capsule by mouth Every Night., Disp: 90 capsule, Rfl: 1  •  hydrocortisone 2.5 % cream, HYDROCORTISONE 2.5 % CREA, Disp: , Rfl:   •  isosorbide mononitrate (IMDUR) 60 MG 24 hr tablet, Take 1.5 tablets by mouth Daily., Disp: 135 tablet, Rfl: 3  •  Lancets (ONETOUCH DELICA PLUS [id number]) misc, 1 each by Other route Daily., Disp: , Rfl:   •  lisinopril (PRINIVIL,ZESTRIL) 10 MG tablet, TAKE 1 TABLET BY MOUTH  DAILY, Disp: 90 tablet, Rfl: 3  •  metFORMIN (GLUCOPHAGE) 500 MG tablet, TAKE 1 TABLET BY MOUTH   TWICE DAILY WITH A MEAL, Disp: 180 tablet, Rfl: 3  •  Multiple Vitamins-Minerals (MULTIVITAMIN WITH MINERALS) tablet tablet, Take 1 tablet by mouth Daily., Disp: , Rfl:   •  naproxen (NAPROSYN) 500 MG tablet, TAKE 1 TABLET BY MOUTH 2  TIMES A DAY AS NEEDED FOR  MODERATE PAIN, Disp: 180 tablet, Rfl: 3  •  nitroglycerin (NITROSTAT) 0.4 MG SL tablet, , Disp: , Rfl: 4  •  ONETOUCH ULTRA test strip, 1 each by Other route Daily., Disp: , Rfl:   •  pantoprazole (PROTONIX) 40 MG EC tablet, TAKE 1 TABLET BY MOUTH  DAILY, Disp: 90 tablet, Rfl: 3  •  simvastatin (ZOCOR) 40 MG tablet, TAKE 1 TABLET BY MOUTH  DAILY AT BEDTIME, Disp: 90 tablet, Rfl: 3  •  lidocaine (LIDODERM) 5 %, Place 1 patch on the skin as directed by provider Daily. Remove & Discard patch within 12 hours or as directed by MD, Disp: 30 patch, Rfl: 0  •  TiZANidine (ZANAFLEX) 2 MG capsule, Take 1 capsule by mouth At Night As Needed for Muscle Spasms., Disp: 30 capsule, Rfl: 1    The following portions of the patient's history were reviewed and updated as appropriate: allergies, current medications, past family history, past medical history, past social history, past surgical history and problem list.      REVIEW OF PERTINENT MEDICAL DATA    Past Medical History:   Diagnosis Date   • Arthritis     Dr Mosqueda   • Coronary heart disease 01/2016    single vessel  disease, nl stress test (copied from [employer] [facility]   • Coronary heart disease 10/31/2017    cath 10/31/17 - Low % Blockages- N o Intervention   (copied from [employer] [facility]   • Diverticulosis    • GERD (gastroesophageal reflux disease)    • Hearing loss    • Hyperlipemia    • Hyperlipidemia    • Low back pain    • SIDDHARTHA treated with BiPAP    • Pain management     injections Lumbar spine X2 with [facility] @ [facility] (copied from [employer] [facility]   • Physical therapy evaluation, initial     @ [facility] in [place] 6 weeks x3 per week, Kalen leal (copied from [employer] [facility]   • Rectal bleed 08/2012    hemorrhoids   • Retinal hemorrhage of right eye 05/2014   • Sleep apnea    • Type 2 diabetes mellitus (CMS/HCC)    • Uses brace      Past Surgical History:   Procedure Laterality Date   • BELPHAROPTOSIS REPAIR  12/11/2017     Dr. grimes (copied from [employer] [facility]   • BROW LIFT  12/11/2017    Dr. Grimes at [facility]   • CARDIAC CATHETERIZATION  03/2012    at [place]- single vessel disease. (copied from [employer] [facility]   • CARDIAC CATHETERIZATION  10/13/2017    no intervention - Low % Blockages.   • CATARACT EXTRACTION  12/11/2017    brow lift and eye lid repair   • COLON SURGERY  06/2014    colonscopy   • COLONOSCOPY N/A 7/12/2019    Procedure: COLONOSCOPY with polypectomy x1;  Surgeon: Graham Byrd MD;  Location: [facility] ENDOSCOPY;  Service: Gastroenterology   • HERNIA REPAIR  11/2008    umbilical hernia repair   • JOINT REPLACEMENT     • KNEE ARTHROSCOPY Right 11/2014    Dr. Mosqueda   • LASIK      lasik and Cataract Extraction, sep 11 and right Sept. 25th, 2014- [facility]. (copied from [employer] [facility]   • TOTAL KNEE ARTHROPLASTY Left 11/2009    Dr. Mosqueda (copied from [employer] [facility]     Family History   Problem Relation Age of Onset   • Heart disease Mother         CHF   • Hypertension Mother    • Heart failure Mother    • Stroke Maternal Grandfather    • Diabetes Other    • Diabetes Other    • Stroke Other      Social History     Socioeconomic  "History   • Marital status:      Spouse name: Shani   • Number of children: Not on file   • Years of education: Not on file   • Highest education level: Not on file   Occupational History   • Occupation: retired    Tobacco Use   • Smoking status: Former Smoker     Last attempt to quit: 2000     Years since quittin.7   • Smokeless tobacco: Former User   Substance and Sexual Activity   • Alcohol use: No   • Drug use: No   • Sexual activity: Defer         Review of Systems   Constitutional: Negative.  Negative for appetite change, chills, fatigue, fever and unexpected weight change.   HENT: Negative.    Eyes: Negative for pain and redness.   Respiratory: Negative.    Cardiovascular: Negative.    Gastrointestinal: Negative.    Endocrine: Negative.    Musculoskeletal: Positive for neck pain and neck stiffness.        Neck pain   Skin: Negative.    Neurological: Negative.    Psychiatric/Behavioral: Negative.          Vitals:    09/10/20 0914   BP: 148/68   Pulse: 78   Resp: 16   Temp: 97.7 °F (36.5 °C)   SpO2: 96%   Weight: 99.3 kg (219 lb)   Height: 172.7 cm (68\")   PainSc:   6         Objective   Physical Exam   Constitutional: He is oriented to person, place, and time. He appears well-developed.   HENT:   Head: Normocephalic and atraumatic.   Eyes: Conjunctivae are normal.   Cardiovascular: Normal rate, normal heart sounds and intact distal pulses.   Pulmonary/Chest: Effort normal.   Abdominal: Soft.   Musculoskeletal:        Right shoulder: He exhibits tenderness.        Cervical back: He exhibits decreased range of motion, tenderness and pain.        Back:         Arms:  Neurological: He is alert and oriented to person, place, and time.   Reflex Scores:       Tricep reflexes are 2+ on the right side and 2+ on the left side.       Bicep reflexes are 2+ on the right side and 2+ on the left side.       Brachioradialis reflexes are 2+ on the right side and 2+ on the left side.  Motor strength b/l upper " extremity   Sensory loss on right C8-T1    Psychiatric: He has a normal mood and affect. His behavior is normal. Judgment and thought content normal.   Nursing note and vitals reviewed.        Imaging Reviewed:  MRI CERVICAL SPINE WO CONTRAST-     Date of Exam: 8/26/2020 11:00 AM     Indication: Neck pain with right arm numbness, tingling and pain.  Symptoms for couple of months. No known injury.     Comparison: None available.     Technique: Multiplanar multisequence images of the cervical spine were  performed according to routine cervical spine MRI protocol.     FINDINGS:   The cervical vertebral bodies demonstrate normal height, alignment and  marrow signal intensity. There is mild diminished disc height at C3-4,  C4-5, C5-6. There is mild disc desiccation at each cervical level. No  evidence of discitis/osteomyelitis. Mild degenerative change of the C2  dens-anterior C1 ring junction. Cervical spinal cord demonstrates normal  signal intensity. There is mild extrinsic compression upon the cervical  spinal cord at C6-7 without evidence of edema or myelomalacia.              At C2-3, no disc bulge, canal or foraminal stenosis.     At C3-4, broad-based disc osteophyte complex eccentric to the right,  with right greater than left uncovertebral spurring. Mild canal  stenosis. Right lateral recess stenosis and high-grade right neural  foraminal stenosis. Mild left neural foraminal stenosis.     At C4-5, broad-based posterior disc ossified complex is present with  mild canal stenosis. Right greater than left uncovertebral spurring with  mild bilateral facet arthropathy. Mild canal stenosis. Moderate right  and mild left neural foraminal stenosis.     At C5-6, broad-based disc osteophyte complex is present. There is mild  to moderate canal stenosis. Mild right neural foraminal stenosis. Left  neural foramen appears patent.     At C6-7, broad-based disc bulge with small central disc protrusion is  present with moderate  central canal stenosis, indenting the thoracic  spinal cord. No cord edema or myelomalacia. Mild to moderate bilateral  facet arthropathy and mild bilateral neural foraminal stenosis.     At C7-T1, mild disc bulge is seen barely indenting the thecal sac..  There is mild right greater than left facet arthropathy. No foraminal  stenosis is evident.        IMPRESSION:  Multilevel degenerative changes of the cervical spine. Please refer to  body of report for level by level findings. The most significant level  is thought to be at C6-7 where there is moderate canal stenosis  secondary to disc bulge with superimposed protrusion, which indents the  anterior margin of the cervical spinal cord. No evidence of cord edema  or myelomalacia..    R shoulder X-ray 8/26/2020:   IMPRESSION:  Moderate osteoarthritic change of the right shoulder. No acute right  shoulder findings. Osteopenia.        Assessment:    1. DDD (degenerative disc disease), cervical    2. Cervical spondylosis without myelopathy    3. Chronic right shoulder pain    4. Cervical radiculopathy    5. Cervical radiculitis         Plan:  1. Will defer UDS for now.   2. Discussed with the patient that pain medicine physicians use a variety of evidence based treatment modalities in order to treat various painful conditions. This may include referral to physical therapy, the use of interventional procedures, referral for psychological assessment and treatment, and the use of both opioid and non-opioid medications as appropriate for the treatment of pain. Informed the patient that opioids have not been proven to be effective for the long term treatment chronic pain conditions, hence our conservative use of these medications. Discussed with the patient that our goal is to use a variety of treatment options that may be indicated for their condition in order to manage their pain and improve their overall functionality. The patient expressed understanding.   3. We  discussed trying a course of formal physical therapy.  Physical therapy can help strengthen and stretch the muscles around the joints. Continue to be as active as possible. Start physical therapy as it will help generalized pain and follow up with HEP.   4. Discussed with the patient regarding the etiology of their pain. Informed them that they would likely benefit from a ELVIS C5-6. Spurling's positive. MRI shows moderate RIGHT foraminal stenosis at C4-5 and high grade foraminal stenosis on right at C3-4. . The procedure was described in detail and the risks, benefits and alternatives were discussed with the patient (including but not limited to: bleeding, infection, nerve damage, worsening of pain, CSF leak, inability to perform injection, paralysis, seizures, and death) who agreed to proceed. Will schedule for procedure.   5. Continue gabapentin at qhs as prescribed, would hold off on increasing the dose for now due to his age.    6. Will start Tizanidine 2 mg at bed time as needed for muscle spasms. Common side effects discussed with the patient including but not limited to dry mouth, drowsiness, dizziness, lightheadedness, constipation, weakness and tiredness. Patient understands and agrees with the plan.     RTC for injection and then 4 weeks follow up.        Kulwant Sanchez, DO     INSPECT REPORT    As part of the patient's treatment plan, I may be prescribing controlled substances. The patient has been made aware of appropriate use of such medications, including potential risk of somnolence, limited ability to drive and/or work safely, and the potential for dependence or overdose. It has also been made clear that these medications are for use by this patient only, without concomitant use of alcohol or other substances unless prescribed.     Patient has completed prescribing agreement detailing terms of continued prescribing of controlled substances, including monitoring INSPECT reports, urine drug screening,  and pill counts if necessary. The patient is aware that inappropriate use will results in cessation of prescribing such medications.    INSPECT report has been reviewed and scanned into the patient's chart.

## 2020-09-10 ENCOUNTER — OFFICE VISIT (OUTPATIENT)
Dept: PAIN MEDICINE | Facility: CLINIC | Age: 75
End: 2020-09-10

## 2020-09-10 VITALS
SYSTOLIC BLOOD PRESSURE: 148 MMHG | RESPIRATION RATE: 16 BRPM | OXYGEN SATURATION: 96 % | TEMPERATURE: 97.7 F | HEART RATE: 78 BPM | DIASTOLIC BLOOD PRESSURE: 68 MMHG | WEIGHT: 219 LBS | BODY MASS INDEX: 33.19 KG/M2 | HEIGHT: 68 IN

## 2020-09-10 DIAGNOSIS — M47.812 CERVICAL SPONDYLOSIS WITHOUT MYELOPATHY: ICD-10-CM

## 2020-09-10 DIAGNOSIS — G89.29 CHRONIC RIGHT SHOULDER PAIN: ICD-10-CM

## 2020-09-10 DIAGNOSIS — M54.12 CERVICAL RADICULOPATHY: ICD-10-CM

## 2020-09-10 DIAGNOSIS — M50.30 DDD (DEGENERATIVE DISC DISEASE), CERVICAL: Primary | ICD-10-CM

## 2020-09-10 DIAGNOSIS — M54.12 CERVICAL RADICULITIS: ICD-10-CM

## 2020-09-10 DIAGNOSIS — M25.511 CHRONIC RIGHT SHOULDER PAIN: ICD-10-CM

## 2020-09-10 PROCEDURE — G0463 HOSPITAL OUTPT CLINIC VISIT: HCPCS | Performed by: STUDENT IN AN ORGANIZED HEALTH CARE EDUCATION/TRAINING PROGRAM

## 2020-09-10 PROCEDURE — 99204 OFFICE O/P NEW MOD 45 MIN: CPT | Performed by: STUDENT IN AN ORGANIZED HEALTH CARE EDUCATION/TRAINING PROGRAM

## 2020-09-10 RX ORDER — TIZANIDINE HYDROCHLORIDE 2 MG/1
2 CAPSULE, GELATIN COATED ORAL NIGHTLY PRN
Qty: 30 CAPSULE | Refills: 1 | Status: SHIPPED | OUTPATIENT
Start: 2020-09-10 | End: 2020-10-15 | Stop reason: SDUPTHER

## 2020-09-10 NOTE — PATIENT INSTRUCTIONS
1 Discussed with the patient regarding the etiology of their pain. Informed them that they would likely benefit from a ELVIS C5-6. Spurling's positive. MRI shows moderate RIGHT foraminal stenosis at C4-5 and high grade foraminal stenosis on right at C3-4. . The procedure was described in detail and the risks, benefits and alternatives were discussed with the patient (including but not limited to: bleeding, infection, nerve damage, worsening of pain, CSF leak, inability to perform injection, paralysis, seizures, and death) who agreed to proceed. Will schedule for procedure.   2. Continue gabapentin at qhs as prescribed, would hold off on increasing the dose for now due to his age.   3. Will start Tizanidine 2 mg at b  Epidural Steroid Injection  An epidural steroid injection is a shot of steroid medicine and numbing medicine that is given into the space between the spinal cord and the bones in your back (epidural space). The shot helps relieve pain caused by an irritated or swollen nerve root.  The amount of pain relief you get from the injection depends on what is causing the nerve to be swollen and irritated, and how long your pain lasts. You are more likely to benefit from this injection if your pain is strong and comes on suddenly rather than if you have had pain for a long time.  Tell a health care provider about:    · Any allergies you have.  · All medicines you are taking, including vitamins, herbs, eye drops, creams, and over-the-counter medicines.  · Any problems you or family members have had with anesthetic medicines.  · Any blood disorders you have.  · Any surgeries you have had.  · Any medical conditions you have.  · Whether you are pregnant or may be pregnant.  What are the risks?  Generally, this is a safe procedure. However, problems may occur, including:  · Headache.  · Bleeding.  · Infection.  · Allergic reaction to medicines.  · Damage to your nerves.  What happens before the procedure?  Staying  hydrated  Follow instructions from your health care provider about hydration, which may include:  · Up to 2 hours before the procedure - you may continue to drink clear liquids, such as water, clear fruit juice, black coffee, and plain tea.  Eating and drinking restrictions  Follow instructions from your health care provider about eating and drinking, which may include:  · 8 hours before the procedure - stop eating heavy meals or foods such as meat, fried foods, or fatty foods.  · 6 hours before the procedure - stop eating light meals or foods, such as toast or cereal.  · 6 hours before the procedure - stop drinking milk or drinks that contain milk.  · 2 hours before the procedure - stop drinking clear liquids.  Medicine  · You may be given medicines to lower anxiety.  · Ask your health care provider about:  ? Changing or stopping your regular medicines. This is especially important if you are taking diabetes medicines or blood thinners.  ? Taking medicines such as aspirin and ibuprofen. These medicines can thin your blood. Do not take these medicines before your procedure if your health care provider instructs you not to.  General instructions  · Plan to have someone take you home from the hospital or clinic.  What happens during the procedure?  · You may receive a medicine to help you relax (sedative).  · You will be asked to lie on your abdomen.  · The injection site will be cleaned.  · A numbing medicine (local anesthetic) will be used to numb the injection site.  · A needle will be inserted through your skin into the epidural space. You may feel some discomfort when this happens. An X-ray machine will be used to make sure the needle is put as close as possible to the affected nerve.  · A steroid medicine and a local anesthetic will be injected into the epidural space.  · The needle will be removed.  · A bandage (dressing) will be put over the injection site.  What happens after the procedure?  · Your blood  pressure, heart rate, breathing rate, and blood oxygen level will be monitored until the medicines you were given have worn off.  · Your arm or leg may feel weak or numb for a few hours.  · The injection site may feel sore.  · Do not drive for 24 hours if you received a sedative.  This information is not intended to replace advice given to you by your health care provider. Make sure you discuss any questions you have with your health care provider.  Document Released: 03/26/2009 Document Revised: 11/30/2018 Document Reviewed: 04/04/2017  Elsevier Patient Education © 2020 [employer]  ed time as needed for muscle spasms. Common side effects discussed with the patient including but not limited to dry mouth, drowsiness, dizziness, lightheadedness, constipation, weakness and tiredness. Patient understands and agrees with the plan.

## 2020-09-13 ENCOUNTER — DOCUMENTATION (OUTPATIENT)
Dept: PAIN MEDICINE | Facility: HOSPITAL | Age: 75
End: 2020-09-13

## 2020-09-13 NOTE — PROGRESS NOTES
Subjective   Chief Complaint   Patient presents with   • Results     discuss MRI and x-ray     Heri Vasquez is a 75 y.o. male.     Neck Pain   This is a new problem. The current episode started more than 1 month ago. The problem occurs constantly. The problem has been unchanged. The pain is associated with nothing. The pain is present in the right side. The quality of the pain is described as aching. The pain is moderate. The symptoms are aggravated by bending and position. Associated symptoms include numbness, tingling and weakness. Pertinent negatives include no chest pain, fever, headaches, pain with swallowing, syncope or trouble swallowing. He has tried NSAIDs, bed rest, ice and heat for the symptoms. The treatment provided no relief.      Past Medical History:   Diagnosis Date   • Arthritis     Dr Mosqueda   • Coronary heart disease 01/2016    single vessel disease, nl stress test (copied from [facility])   • Coronary heart disease 10/31/2017    cath 10/31/17 - Low % Blockages- N o Intervention   (copied from [facility])   • Diverticulosis    • GERD (gastroesophageal reflux disease)    • Hearing loss    • Hyperlipemia    • Hyperlipidemia    • Low back pain    • SIDDHARTHA treated with BiPAP    • Pain management     injections Lumbar spine X2 with [facility]up[facility]ys Pain management @ [facility] (copied from [facility])   • Physical therapy evaluation, initial     @ [facility] in [place] 6 weeks x3 per week, Kalen leal (copied from [facility])   • Rectal bleed 08/2012    hemorrhoids   • Retinal hemorrhage of right eye 05/2014   • Sleep apnea    • Type 2 diabetes mellitus (CMS/HCC)    • Uses brace      Past Surgical History:   Procedure Laterality Date   • BELPHAROPTOSIS REPAIR  12/11/2017     Dr. grimes (copied from [facility])   • BROW LIFT  12/11/2017    Dr. Grimes at [facility]   • CARDIAC CATHETERIZATION  03/2012    at [facility]- single vessel disease. (copied from [facility])   • CARDIAC CATHETERIZATION  10/13/2017    no intervention - Low %  Blockages.   • CATARACT EXTRACTION  2017    brow lift and eye lid repair   • COLON SURGERY  2014    colonscopy   • COLONOSCOPY N/A 2019    Procedure: COLONOSCOPY with polypectomy x1;  Surgeon: Graham Byrd MD;  Location: [facility] ENDOSCOPY;  Service: Gastroenterology   • HERNIA REPAIR  2008    umbilical hernia repair   • JOINT REPLACEMENT     • KNEE ARTHROSCOPY Right 2014    Dr. Mosqueda   • LASIK      lasik and Cataract Extraction, sep 11 and right 2014- [facility] Perez. (copied from [facility])   • TOTAL KNEE ARTHROPLASTY Left 2009    Dr. Mosqueda (copied from [facility])     No Known Allergies  Social History     Socioeconomic History   • Marital status:      Spouse name: Shani   • Number of children: Not on file   • Years of education: Not on file   • Highest education level: Not on file   Occupational History   • Occupation: retired    Tobacco Use   • Smoking status: Former Smoker     Quit date:      Years since quittin.7   • Smokeless tobacco: Former User   Substance and Sexual Activity   • Alcohol use: No   • Drug use: No   • Sexual activity: Defer     Social History     Tobacco Use   Smoking Status Former Smoker   • Quit date:    • Years since quittin.7   Smokeless Tobacco Former User       family history includes Diabetes in some other family members; Heart disease in his mother; Heart failure in his mother; Hypertension in his mother; Stroke in his maternal grandfather and another family member.  Current Outpatient Medications on File Prior to Visit   Medication Sig Dispense Refill   • Lancets (ONETOUCH DELICA PLUS [id number]) misc 1 each by Other route Daily.     • acetaminophen (TYLENOL) 500 MG tablet Take 500 mg by mouth Every 6 (Six) Hours As Needed for Mild Pain .     • aspirin 325 MG tablet Take 325 mg by mouth Every Evening.     • docusate sodium (COLACE) 50 MG capsule Take  by mouth 3 (Three) Times a Day As Needed for Constipation.     •  hydrocortisone 2.5 % cream HYDROCORTISONE 2.5 % CREA     • isosorbide mononitrate (IMDUR) 60 MG 24 hr tablet Take 1.5 tablets by mouth Daily. 135 tablet 3   • lidocaine (LIDODERM) 5 % Place 1 patch on the skin as directed by provider Daily. Remove & Discard patch within 12 hours or as directed by MD 30 patch 0   • lisinopril (PRINIVIL,ZESTRIL) 10 MG tablet TAKE 1 TABLET BY MOUTH  DAILY 90 tablet 3   • metFORMIN (GLUCOPHAGE) 500 MG tablet TAKE 1 TABLET BY MOUTH   TWICE DAILY WITH A MEAL 180 tablet 3   • Multiple Vitamins-Minerals (MULTIVITAMIN WITH MINERALS) tablet tablet Take 1 tablet by mouth Daily.     • naproxen (NAPROSYN) 500 MG tablet TAKE 1 TABLET BY MOUTH 2  TIMES A DAY AS NEEDED FOR  MODERATE PAIN 180 tablet 3   • nitroglycerin (NITROSTAT) 0.4 MG SL tablet   4   • ONETOUCH ULTRA test strip 1 each by Other route Daily.     • pantoprazole (PROTONIX) 40 MG EC tablet TAKE 1 TABLET BY MOUTH  DAILY 90 tablet 3     No current facility-administered medications on file prior to visit.      Patient Active Problem List   Diagnosis   • Varicose veins of right lower extremity with pain   • Nasal congestion   • Short of breath on exertion   • Actinic keratosis   • Arthritis   • Bradycardia   • Chronic coronary artery disease   • Degeneration of intervertebral disc of lumbar region   • Dependent edema   • Diabetic peripheral neuropathy (CMS/HCC)   • Encounter for general adult medical examination without abnormal findings   • Fatigue   • Gastroesophageal reflux disease   • Hay fever   • Hearing loss   • Hyperlipidemia   • Knee pain   • Dizziness and giddiness   • Strain of lumbar region   • Type 2 diabetes mellitus with unspecified complications (CMS/HCC)   • Medicare annual wellness visit, subsequent   • Screening for prostate cancer   • Sciatic nerve pain, right   • Hip pain, right   • RUQ abdominal pain   • Gallbladder polyp   • Fatty liver   • Morbidly obese (CMS/HCC)   • Left-sided low back pain with left-sided  sciatica   • Chronic right shoulder pain   • Mass of joint of right shoulder   • Cervical radiculopathy   • Cervical radiculitis       The following portions of the patient's history were reviewed and updated as appropriate: allergies, current medications, past family history, past medical history, past social history, past surgical history and problem list.    Review of Systems   Constitutional: Negative for fever.   HENT: Negative for trouble swallowing.    Cardiovascular: Negative for chest pain and syncope.   Musculoskeletal: Positive for neck pain.   Neurological: Positive for tingling, weakness and numbness.       Objective   /73 (BP Location: Left arm, Patient Position: Sitting, Cuff Size: Adult)   Pulse 72   Temp 97 °F (36.1 °C)   Wt 104 kg (230 lb)   SpO2 96%   BMI 33.97 kg/m²   Physical Exam  Constitutional:       General: He is not in acute distress.     Appearance: He is well-developed.   HENT:      Head: Normocephalic.   Eyes:      General: Lids are normal.      Conjunctiva/sclera: Conjunctivae normal.   Neck:      Thyroid: No thyroid mass or thyromegaly.      Trachea: Trachea normal.   Cardiovascular:      Rate and Rhythm: Normal rate and regular rhythm.      Heart sounds: Normal heart sounds.   Pulmonary:      Effort: Pulmonary effort is normal.      Breath sounds: Normal breath sounds.   Lymphadenopathy:      Cervical: No cervical adenopathy.   Skin:     General: Skin is warm and dry.   Neurological:      Mental Status: He is alert and oriented to person, place, and time.   Psychiatric:         Attention and Perception: He is attentive.         Speech: Speech normal.         Behavior: Behavior normal.         No visits with results within 1 Week(s) from this visit.   Latest known visit with results is:   Office Visit on 10/29/2019   Component Date Value Ref Range Status   • Glucose 10/29/2019 122* 65 - 99 mg/dL Final   • BUN 10/29/2019 12  8 - 23 mg/dL Final   • Creatinine 10/29/2019 0.89   0.76 - 1.27 mg/dL Final   • Sodium 10/29/2019 141  136 - 145 mmol/L Final   • Potassium 10/29/2019 5.2  3.5 - 5.2 mmol/L Final   • Chloride 10/29/2019 100  98 - 107 mmol/L Final   • CO2 10/29/2019 28.7  22.0 - 29.0 mmol/L Final   • Calcium 10/29/2019 9.6  8.6 - 10.5 mg/dL Final   • Total Protein 10/29/2019 7.2  6.0 - 8.5 g/dL Final   • Albumin 10/29/2019 4.40  3.50 - 5.20 g/dL Final   • ALT (SGPT) 10/29/2019 75* 1 - 41 U/L Final   • AST (SGOT) 10/29/2019 51* 1 - 40 U/L Final   • Alkaline Phosphatase 10/29/2019 58  39 - 117 U/L Final   • Total Bilirubin 10/29/2019 0.6  0.2 - 1.2 mg/dL Final   • eGFR Non African Amer 10/29/2019 83  >60 mL/min/1.73 Final   • Globulin 10/29/2019 2.8  gm/dL Final   • A/G Ratio 10/29/2019 1.6  g/dL Final   • BUN/Creatinine Ratio 10/29/2019 13.5  7.0 - 25.0 Final   • Anion Gap 10/29/2019 12.3  5.0 - 15.0 mmol/L Final   • Hemoglobin A1C 10/29/2019 6.3* 3.5 - 5.6 % Final   • Total Cholesterol 10/29/2019 157  0 - 200 mg/dL Final   • Triglycerides 10/29/2019 150  0 - 150 mg/dL Final   • HDL Cholesterol 10/29/2019 39* 40 - 60 mg/dL Final   • LDL Cholesterol  10/29/2019 88  0 - 100 mg/dL Final   • VLDL Cholesterol 10/29/2019 30  5 - 40 mg/dL Final   • LDL/HDL Ratio 10/29/2019 2.26   Final   • PSA 10/29/2019 1.180  0.000 - 4.000 ng/mL Final           Assessment/Plan   Heri was seen today for results.    Diagnoses and all orders for this visit:    Cervical radiculopathy  -     Ambulatory Referral to Pain Management    Type 2 diabetes mellitus with unspecified complications (CMS/HCC)

## 2020-09-17 ENCOUNTER — APPOINTMENT (OUTPATIENT)
Dept: PAIN MEDICINE | Facility: HOSPITAL | Age: 75
End: 2020-09-17

## 2020-09-18 ENCOUNTER — HOSPITAL ENCOUNTER (OUTPATIENT)
Dept: PAIN MEDICINE | Facility: HOSPITAL | Age: 75
Discharge: HOME | End: 2020-09-18

## 2020-09-18 VITALS
RESPIRATION RATE: 16 BRPM | TEMPERATURE: 97.1 F | HEIGHT: 68 IN | BODY MASS INDEX: 33.8 KG/M2 | WEIGHT: 223 LBS | SYSTOLIC BLOOD PRESSURE: 153 MMHG | DIASTOLIC BLOOD PRESSURE: 79 MMHG | HEART RATE: 56 BPM | OXYGEN SATURATION: 96 %

## 2020-09-18 DIAGNOSIS — M50.30 DDD (DEGENERATIVE DISC DISEASE), CERVICAL: Primary | ICD-10-CM

## 2020-09-18 DIAGNOSIS — R52 PAIN: ICD-10-CM

## 2020-09-18 PROCEDURE — 25010000002 DEXAMETHASONE SODIUM PHOSPHATE 10 MG/ML SOLUTION

## 2020-09-18 PROCEDURE — 77003 FLUOROGUIDE FOR SPINE INJECT: CPT

## 2020-09-18 PROCEDURE — 0 IOPAMIDOL 41 % SOLUTION: Performed by: STUDENT IN AN ORGANIZED HEALTH CARE EDUCATION/TRAINING PROGRAM

## 2020-09-18 PROCEDURE — 62321 NJX INTERLAMINAR CRV/THRC: CPT | Performed by: STUDENT IN AN ORGANIZED HEALTH CARE EDUCATION/TRAINING PROGRAM

## 2020-09-18 RX ORDER — DEXAMETHASONE SODIUM PHOSPHATE 10 MG/ML
INJECTION, SOLUTION INTRAMUSCULAR; INTRAVENOUS
Status: DISCONTINUED
Start: 2020-09-18 | End: 2020-09-19 | Stop reason: HOSPADM

## 2020-09-18 RX ORDER — METHYLPREDNISOLONE ACETATE 80 MG/ML
80 INJECTION, SUSPENSION INTRA-ARTICULAR; INTRALESIONAL; INTRAMUSCULAR; SOFT TISSUE ONCE
Status: COMPLETED | OUTPATIENT
Start: 2020-09-18 | End: 2020-09-18

## 2020-09-18 RX ORDER — DEXAMETHASONE SODIUM PHOSPHATE 10 MG/ML
10 INJECTION, SOLUTION INTRAMUSCULAR; INTRAVENOUS ONCE
Status: DISCONTINUED | OUTPATIENT
Start: 2020-09-18 | End: 2020-09-19 | Stop reason: HOSPADM

## 2020-09-18 RX ADMIN — METHYLPREDNISOLONE ACETATE 80 MG: 80 INJECTION, SUSPENSION INTRA-ARTICULAR; INTRALESIONAL; INTRAMUSCULAR; SOFT TISSUE at 10:39

## 2020-09-18 RX ADMIN — IOPAMIDOL 1 ML: 408 INJECTION, SOLUTION INTRATHECAL at 10:39

## 2020-09-18 NOTE — H&P
H and P reviewed from previous visit and no changes to patient's clinical presentation. Will proceed with procedure as planned. Patient denies history of DM. Was on Aspirin 325 mg and held it for 4 days before procedure.      Kulwant Sanchez DO  Pain Management   [facility]

## 2020-09-18 NOTE — PROCEDURES
PREOPERATIVE DIAGNOSIS:    1. Cervical DDD    POSTOPERATIVE DIAGNOSIS:  Same.    PROCEDURE PERFORMED: Cervical LICHA C6-7     PROCEDURE IN DETAIL:    Written informed consent was taken from the patient. Pre-procedure blood pressure and pulse were stable and recorded in patients clinic chart. The patient was brought to the procedure room and placed in the prone position on fluoroscopy table. The patient’s neck was prepped with chloraprep and draped in the usual sterile fashion.   The skin over the C6-7 space was identified under fluoroscopic guidance and infiltrated with 1% lidocaine for local anesthesia via 25 gauge needle.  A 20 gauge Tuohy needle was inserted through the skin under fluoroscopic guidance until we got to the epidural space with loss of resistance technique.  Negative for CSF and heme.  2 ml of omnipaque contrast was injected under continuous fluoroscopic guidance and good spread was noted from C5-7 space bilaterally. 10 mg of dexamethasone mixed with 3 ml of preservative free normal saline was injected with minimal pressure. The needle was cleared with preservative free normal saline and removed. Band aid was applied.  Following the procedure the patient's vital signs were stable. The patient was discharged home in good condition after being given discharge instructions.    COMPLICATIONS: None     Kulwant Sanchez DO  Pain Management   [facility]

## 2020-09-18 NOTE — ADDENDUM NOTE
Encounter addended by: Cindy Blackman LPN on: 9/18/2020 11:04 AM   Actions taken: Flowsheet accepted

## 2020-10-08 ENCOUNTER — CLINICAL SUPPORT (OUTPATIENT)
Dept: FAMILY MEDICINE CLINIC | Facility: CLINIC | Age: 75
End: 2020-10-08

## 2020-10-08 DIAGNOSIS — Z23 NEED FOR IMMUNIZATION AGAINST INFLUENZA: Primary | ICD-10-CM

## 2020-10-08 PROCEDURE — 90694 VACC AIIV4 NO PRSRV 0.5ML IM: CPT | Performed by: FAMILY MEDICINE

## 2020-10-08 PROCEDURE — G0008 ADMIN INFLUENZA VIRUS VAC: HCPCS | Performed by: FAMILY MEDICINE

## 2020-10-15 ENCOUNTER — OFFICE VISIT (OUTPATIENT)
Dept: PAIN MEDICINE | Facility: CLINIC | Age: 75
End: 2020-10-15

## 2020-10-15 VITALS
HEIGHT: 68 IN | WEIGHT: 223 LBS | OXYGEN SATURATION: 97 % | BODY MASS INDEX: 33.8 KG/M2 | TEMPERATURE: 97.1 F | RESPIRATION RATE: 16 BRPM | DIASTOLIC BLOOD PRESSURE: 77 MMHG | SYSTOLIC BLOOD PRESSURE: 162 MMHG | HEART RATE: 79 BPM

## 2020-10-15 DIAGNOSIS — M50.30 DDD (DEGENERATIVE DISC DISEASE), CERVICAL: Primary | ICD-10-CM

## 2020-10-15 DIAGNOSIS — M47.812 CERVICAL SPONDYLOSIS WITHOUT MYELOPATHY: ICD-10-CM

## 2020-10-15 DIAGNOSIS — M25.511 CHRONIC RIGHT SHOULDER PAIN: ICD-10-CM

## 2020-10-15 DIAGNOSIS — G89.29 CHRONIC RIGHT SHOULDER PAIN: ICD-10-CM

## 2020-10-15 PROCEDURE — G0463 HOSPITAL OUTPT CLINIC VISIT: HCPCS | Performed by: STUDENT IN AN ORGANIZED HEALTH CARE EDUCATION/TRAINING PROGRAM

## 2020-10-15 PROCEDURE — 99213 OFFICE O/P EST LOW 20 MIN: CPT | Performed by: STUDENT IN AN ORGANIZED HEALTH CARE EDUCATION/TRAINING PROGRAM

## 2020-10-15 RX ORDER — TIZANIDINE HYDROCHLORIDE 2 MG/1
2 CAPSULE, GELATIN COATED ORAL NIGHTLY PRN
Qty: 30 CAPSULE | Refills: 3 | Status: SHIPPED | OUTPATIENT
Start: 2020-10-15 | End: 2021-04-13

## 2020-10-15 NOTE — PROGRESS NOTES
Subjective   Heri Vasquez is a 75 y.o. male is here for follow up for neck pain and right shoulder pain. Patient was last seen on 9/18/2020 when he had ELVIS C6-7 with 80% pain relief. He states that he is able to drive better, his  is better and he is able to hold fork while eating. He is sleeping better. No side effects from tizanidine.     On last visit: Tizanidine 2 mg at bed time, ELVIS    Neck pain is 2/10 on VAS, at maximum is 3/10. Pain is numbness in nature. Pain is referred right arm. Has chronic numbness in right 5th digit. The pain is intermittent. The pain is improved by ELVIS. The pain is worse with laying in recliner..    Previous Injection:  9/18/2020: ELVIS C6-7 - 80% ongoing relief.     Hx: Comes with wife Shani.     PMH: DM-2, GERD, arthritis, CAD, hearing problems.       Current Medications:   Aspirin - 325 mg   Gabapentin 300 mg qhs   Naproxen 500 mg BID PRN     Past Medications:     Past Modalities:  TENS:                                                                          no                                                  Physical Therapy Within The Last 6 Months              no  Psychotherapy                                                            no  Massage Therapy                                                       no     Patient Complains Of:  Uro-Fecal Incontinence          no  Weight Gain/Loss                   no  Fever/Chills                             no  Weakness                               Yes (right arm)           Current Outpatient Medications:   •  acetaminophen (TYLENOL) 500 MG tablet, Take 500 mg by mouth Every 6 (Six) Hours As Needed for Mild Pain ., Disp: , Rfl:   •  aspirin 325 MG tablet, Take 325 mg by mouth Every Evening., Disp: , Rfl:   •  docusate sodium (COLACE) 50 MG capsule, Take  by mouth 3 (Three) Times a Day As Needed for Constipation., Disp: , Rfl:   •  gabapentin (Neurontin) 300 MG capsule, Take 1 capsule by mouth Every Night., Disp: 90 capsule,  Rfl: 1  •  hydrocortisone 2.5 % cream, HYDROCORTISONE 2.5 % CREA, Disp: , Rfl:   •  isosorbide mononitrate (IMDUR) 60 MG 24 hr tablet, Take 1.5 tablets by mouth Daily., Disp: 135 tablet, Rfl: 3  •  Lancets (ONETOUCH DELICA PLUS [id number]) misc, 1 each by Other route Daily., Disp: , Rfl:   •  lisinopril (PRINIVIL,ZESTRIL) 10 MG tablet, TAKE 1 TABLET BY MOUTH  DAILY, Disp: 90 tablet, Rfl: 3  •  metFORMIN (GLUCOPHAGE) 500 MG tablet, TAKE 1 TABLET BY MOUTH   TWICE DAILY WITH A MEAL, Disp: 180 tablet, Rfl: 3  •  Multiple Vitamins-Minerals (MULTIVITAMIN WITH MINERALS) tablet tablet, Take 1 tablet by mouth Daily., Disp: , Rfl:   •  naproxen (NAPROSYN) 500 MG tablet, TAKE 1 TABLET BY MOUTH 2  TIMES A DAY AS NEEDED FOR  MODERATE PAIN, Disp: 180 tablet, Rfl: 3  •  nitroglycerin (NITROSTAT) 0.4 MG SL tablet, , Disp: , Rfl: 4  •  ONETOUCH ULTRA test strip, 1 each by Other route Daily., Disp: , Rfl:   •  pantoprazole (PROTONIX) 40 MG EC tablet, TAKE 1 TABLET BY MOUTH  DAILY, Disp: 90 tablet, Rfl: 3  •  simvastatin (ZOCOR) 40 MG tablet, TAKE 1 TABLET BY MOUTH  DAILY AT BEDTIME, Disp: 90 tablet, Rfl: 3  •  TiZANidine (ZANAFLEX) 2 MG capsule, Take 1 capsule by mouth At Night As Needed for Muscle Spasms., Disp: 30 capsule, Rfl: 3  •  lidocaine (LIDODERM) 5 %, Place 1 patch on the skin as directed by provider Daily. Remove & Discard patch within 12 hours or as directed by MD, Disp: 30 patch, Rfl: 0    The following portions of the patient's history were reviewed and updated as appropriate: allergies, current medications, past family history, past medical history, past social history, past surgical history and problem list.      REVIEW OF PERTINENT MEDICAL DATA    Past Medical History:   Diagnosis Date   • Arthritis     Dr Mosqueda   • Coronary heart disease 01/2016    single vessel disease, nl [facility]   • Coronary heart disease 10/31/2017    cath 10/31/17 - Low % Blockages- N o Intervention   (copied from  [facility])   • Diverticulosis    • GERD (gastroesophageal reflux disease)    • Hearing loss    • Hyperlipemia    • Hyperlipidemia    • Low back pain    • SIDDHARTHA treated with BiPAP    • Pain management     injections Lumbar spine X2 with Muprhys Pain management @ [facility] (copied from [facility] [facility])   • Physical therapy evaluation, initial     @ [facility] in [place] 6 weeks x3 per week, Kalen leal (copied from [employer] [facility] [facility])   • Rectal bleed 08/2012    hemorrhoids   • Retinal hemorrhage of right eye 05/2014   • Sleep apnea    • Type 2 diabetes mellitus (CMS/HCC)    • Uses brace      Past Surgical History:   Procedure Laterality Date   • BELPHAROPTOSIS REPAIR  12/11/2017     Dr. grimes (copied from [facility])   • BROW LIFT  12/11/2017    Dr. Grimes at [facility]   • CARDIAC CATHETERIZATION  03/2012    at [facility]- single vessel disease. (copied from [employer] [facility] [facility])   • CARDIAC CATHETERIZATION  10/13/2017    no intervention - Low % Blockages.   • CATARACT EXTRACTION  12/11/2017    brow lift and eye lid repair   • COLON SURGERY  06/2014    colonscopy   • COLONOSCOPY N/A 7/12/2019    Procedure: COLONOSCOPY with polypectomy x1;  Surgeon: Graham Byrd MD;  Location: [facility] ENDOSCOPY;  Service: Gastroenterology   • HERNIA REPAIR  11/2008    umbilical hernia repair   • JOINT REPLACEMENT     • KNEE ARTHROSCOPY Right 11/2014    Dr. Mosqueda   • LASIK      lasik and Cataract Extraction, sep 11 and right Sept. 25th, 2014- [facility]. (copied from [employer] [facility] [facility])   • TOTAL KNEE ARTHROPLASTY Left 11/2009    Dr. Mosqueda (copied from [facility])     Family History   Problem Relation Age of Onset   • Heart disease Mother         CHF   • Hypertension Mother    • Heart failure Mother    • Stroke Maternal Grandfather    • Diabetes Other    • Diabetes Other    • Stroke Other      Social History     Socioeconomic History   • Marital status:      Spouse name: Shani   • Number of children: Not on file   • Years of education: Not on file   • Highest education level:  "Not on file   Occupational History   • Occupation: retired    Tobacco Use   • Smoking status: Former Smoker     Quit date: 2000     Years since quittin.8   • Smokeless tobacco: Former User   Substance and Sexual Activity   • Alcohol use: No   • Drug use: No   • Sexual activity: Defer         Review of Systems   Constitutional: Negative.  Negative for appetite change, chills, fatigue, fever and unexpected weight change.   HENT: Negative.    Eyes: Negative for pain and redness.   Respiratory: Negative.    Cardiovascular: Negative.    Gastrointestinal: Negative.    Endocrine: Negative.    Musculoskeletal: Positive for neck pain.   Skin: Negative.    Neurological: Negative.    Psychiatric/Behavioral: Negative.          Vitals:    10/15/20 1001   BP: 162/77   BP Location: Left arm   Patient Position: Sitting   Pulse: 79   Resp: 16   Temp: 97.1 °F (36.2 °C)   TempSrc: Skin   SpO2: 97%   Weight: 101 kg (223 lb)   Height: 172.7 cm (68\")   PainSc:   3         Objective   Physical Exam  Vitals signs and nursing note reviewed.   Constitutional:       Appearance: He is well-developed.   HENT:      Head: Normocephalic and atraumatic.   Eyes:      Conjunctiva/sclera: Conjunctivae normal.   Cardiovascular:      Rate and Rhythm: Normal rate.      Heart sounds: Normal heart sounds.   Pulmonary:      Effort: Pulmonary effort is normal.   Abdominal:      Palpations: Abdomen is soft.   Musculoskeletal:      Right shoulder: He exhibits decreased range of motion and tenderness.      Cervical back: He exhibits decreased range of motion and tenderness.        Back:         Arms:    Neurological:      Mental Status: He is alert and oriented to person, place, and time.      Comments: Motor strength 5/5 b/l UE  Sensory intact in RUE, LUE - sensory loss in right 5th digit   Psychiatric:         Behavior: Behavior normal.         Thought Content: Thought content normal.         Judgment: Judgment normal.           Imaging " Reviewed:  MRI CERVICAL SPINE WO CONTRAST-     Date of Exam: 8/26/2020 11:00 AM     Indication: Neck pain with right arm numbness, tingling and pain.  Symptoms for couple of months. No known injury.     Comparison: None available.     Technique: Multiplanar multisequence images of the cervical spine were  performed according to routine cervical spine MRI protocol.     FINDINGS:   The cervical vertebral bodies demonstrate normal height, alignment and  marrow signal intensity. There is mild diminished disc height at C3-4,  C4-5, C5-6. There is mild disc desiccation at each cervical level. No  evidence of discitis/osteomyelitis. Mild degenerative change of the C2  dens-anterior C1 ring junction. Cervical spinal cord demonstrates normal  signal intensity. There is mild extrinsic compression upon the cervical  spinal cord at C6-7 without evidence of edema or myelomalacia.              At C2-3, no disc bulge, canal or foraminal stenosis.     At C3-4, broad-based disc osteophyte complex eccentric to the right,  with right greater than left uncovertebral spurring. Mild canal  stenosis. Right lateral recess stenosis and high-grade right neural  foraminal stenosis. Mild left neural foraminal stenosis.     At C4-5, broad-based posterior disc ossified complex is present with  mild canal stenosis. Right greater than left uncovertebral spurring with  mild bilateral facet arthropathy. Mild canal stenosis. Moderate right  and mild left neural foraminal stenosis.     At C5-6, broad-based disc osteophyte complex is present. There is mild  to moderate canal stenosis. Mild right neural foraminal stenosis. Left  neural foramen appears patent.     At C6-7, broad-based disc bulge with small central disc protrusion is  present with moderate central canal stenosis, indenting the thoracic  spinal cord. No cord edema or myelomalacia. Mild to moderate bilateral  facet arthropathy and mild bilateral neural foraminal stenosis.     At C7-T1, mild  disc bulge is seen barely indenting the thecal sac..  There is mild right greater than left facet arthropathy. No foraminal  stenosis is evident.        IMPRESSION:  Multilevel degenerative changes of the cervical spine. Please refer to  body of report for level by level findings. The most significant level  is thought to be at C6-7 where there is moderate canal stenosis  secondary to disc bulge with superimposed protrusion, which indents the  anterior margin of the cervical spinal cord. No evidence of cord edema  or myelomalacia..     R shoulder X-ray 8/26/2020:   IMPRESSION:  Moderate osteoarthritic change of the right shoulder. No acute right  shoulder findings. Osteopenia.            Assessment:    1. DDD (degenerative disc disease), cervical    2. Cervical spondylosis without myelopathy    3. Chronic right shoulder pain            Plan:  1. Will defer UDS for now.  2. Excellent relief from ELVIS 6-7. Will consider repeating in future if needed.  Patient will call if pain returns.   3. Will consider increasing gabapentin from qhs to BID in future for neuropathic pain. Prescribed by Dr. Cueva currently.   4. ContinueTizanidine 2 mg at bed time as needed for muscle spasms. Common side effects discussed with the patient including but not limited to dry mouth, drowsiness, dizziness, lightheadedness, constipation, weakness and tiredness. Patient understands and agrees with the plan. Sent 3 refills.   5. Start and continue PT.   6. May consider right shoulder steroid injection if future if shoulder pain worsens.   7. Continue to follow up with Dr. Cueva.     Kulwant Sanchez DO  Pain Management   [facility]       INSPECT REPORT    As part of the patient's treatment plan, I may be prescribing controlled substances. The patient has been made aware of appropriate use of such medications, including potential risk of somnolence, limited ability to drive and/or work safely, and the potential for dependence or overdose. It  has also been made clear that these medications are for use by this patient only, without concomitant use of alcohol or other substances unless prescribed.     Patient has completed prescribing agreement detailing terms of continued prescribing of controlled substances, including monitoring INSPECT reports, urine drug screening, and pill counts if necessary. The patient is aware that inappropriate use will results in cessation of prescribing such medications.    INSPECT report has been reviewed and scanned into the patient's chart.

## 2020-10-20 ENCOUNTER — OFFICE VISIT (OUTPATIENT)
Dept: CARDIOLOGY | Facility: CLINIC | Age: 75
End: 2020-10-20

## 2020-10-20 ENCOUNTER — TREATMENT (OUTPATIENT)
Dept: PHYSICAL THERAPY | Facility: CLINIC | Age: 75
End: 2020-10-20

## 2020-10-20 VITALS
HEIGHT: 68 IN | HEART RATE: 81 BPM | RESPIRATION RATE: 18 BRPM | BODY MASS INDEX: 34.1 KG/M2 | OXYGEN SATURATION: 95 % | DIASTOLIC BLOOD PRESSURE: 68 MMHG | SYSTOLIC BLOOD PRESSURE: 133 MMHG | WEIGHT: 225 LBS

## 2020-10-20 DIAGNOSIS — E78.2 MIXED HYPERLIPIDEMIA: ICD-10-CM

## 2020-10-20 DIAGNOSIS — M47.812 CERVICAL SPONDYLOSIS WITHOUT MYELOPATHY: ICD-10-CM

## 2020-10-20 DIAGNOSIS — I10 ESSENTIAL HYPERTENSION: Primary | ICD-10-CM

## 2020-10-20 DIAGNOSIS — M50.30 DDD (DEGENERATIVE DISC DISEASE), CERVICAL: Primary | ICD-10-CM

## 2020-10-20 DIAGNOSIS — K76.0 FATTY LIVER: ICD-10-CM

## 2020-10-20 DIAGNOSIS — R00.1 BRADYCARDIA: ICD-10-CM

## 2020-10-20 DIAGNOSIS — I25.10 CHRONIC CORONARY ARTERY DISEASE: ICD-10-CM

## 2020-10-20 PROCEDURE — 97161 PT EVAL LOW COMPLEX 20 MIN: CPT | Performed by: PHYSICAL THERAPIST

## 2020-10-20 PROCEDURE — 93000 ELECTROCARDIOGRAM COMPLETE: CPT | Performed by: INTERNAL MEDICINE

## 2020-10-20 PROCEDURE — G0283 ELEC STIM OTHER THAN WOUND: HCPCS | Performed by: PHYSICAL THERAPIST

## 2020-10-20 PROCEDURE — 99214 OFFICE O/P EST MOD 30 MIN: CPT | Performed by: INTERNAL MEDICINE

## 2020-10-20 NOTE — PROGRESS NOTES
Physical Therapy Initial Evaluation and Plan of Care    Patient: Heri Vasquez   : 1945  Diagnosis/ICD-10 Code:  DDD (degenerative disc disease), cervical [M50.30]  Referring practitioner: Kulwant Sanchez DO   Outcome Measures:NDI:22% disability    Subjective Evaluation    History of Present Illness  Mechanism of injury: Pt reports to therapy with neck and R shoulder and arm pain that started  roughly 2 months ago with gradual onset. Pt reports that he recently received an injection in his neck with some relief noted but is continuing to have pain with the majority of his pain in his R shoulder and R neck. Numbness and tingling noted in R arm down to his 4th and 5th fingers. Since the injection the pt reports noted improvements in numbness with some numbness still noted. He also reports since his injection he is able to sleep for longer durations.       Aggravating factors: cervical extension, showering, dressing    Alleviating factors: rest, neck pillow      PMHx:arthritis- R shoulder, LBP      Patient Occupation: retired-   Quality of life: good    Pain  Current pain ratin  At best pain ratin  At worst pain rating: 10    Hand dominance: right    Patient Goals  Patient goals for therapy: increased motion, decreased pain, increased strength, independence with ADLs/IADLs and return to sport/leisure activities             Objective        Special Questions  Patient is experiencing night pain, disturbed sleep, headaches and tinnitus.       Postural Observations  Seated posture: fair  Standing posture: fair        Neurological Testing     Sensation   Cervical/Thoracic   Left   Intact: light touch    Right   Hyposensation: light touch    Active Range of Motion   Cervical/Thoracic Spine   Cervical    Flexion: 35 degrees with pain  Extension: 30 degrees with pain  Left lateral flexion: 35 degrees with pain  Right lateral flexion: 20 degrees with pain  Left rotation: 53 degrees with pain  Right rotation:  55 degrees with pain  Left Shoulder   Flexion: 160 degrees   Abduction: 132 degrees   External rotation 0°: 61 degrees   Internal rotation BTB: T8     Right Shoulder   Flexion: 135 degrees with pain  Abduction: 122 degrees with pain  External rotation 0°: 50 degrees with pain  Internal rotation BTB: L5 with pain    Strength/Myotome Testing     Left Shoulder     Planes of Motion   Flexion: 4+   Abduction: 4+   External rotation at 0°: 5   Internal rotation at 0°: 5     Isolated Muscles   Biceps: 5   Triceps: 5     Right Shoulder     Planes of Motion   Flexion: 3+   Abduction: 3+   External rotation at 0°: 4   Internal rotation at 0°: 4     Isolated Muscles   Biceps: 4   Triceps: 4     Tests   Cervical     Left   Negative active compression ([place]), cervical distraction, Spurling's sign and ULTT1.     Right   Positive Spurling's sign and ULTT1.   Negative active compression ([place]) and cervical distraction.     Additional Tests Details  TTP R C2-C7 TP and SP, along with R UT and scalenes          Assessment & Plan     Assessment  Impairments: abnormal muscle firing, abnormal muscle tone, abnormal or restricted ROM, activity intolerance, impaired physical strength, lacks appropriate home exercise program and pain with function  Assessment details: Patient is a 75 y.o. year old male who presents to therapy with cervical and R shoulder pain.  he presents with significant impairments including decreased cervical and RUE ROM, decreased RUE strength, + spurlings R, + ULNT1, decreased activity tolerance, and pain. Impairments affect ADL/IADL's, functional activities, recreational activities, and community activities. Pt will benefit from skilled physical therapy to address impairments, decrease pain and restore function.  Prognosis: good  Functional Limitations: carrying objects, lifting, sleeping, pulling, pushing, uncomfortable because of pain, moving in bed, sitting, reaching overhead and unable to perform repetitive  tasks  Goals  Plan Goals: SHORT TERM GOALS:  Time for Goal Achievement: 4 weeks   1.  Patient to be compliant with HEP  2.  Pt to report increased ease to drive and exercise with less pain.  3.  Increased cervical and thoracic segmental mobility to allow for increased ease with driving and ADL's.  4.  Pt. to be independent with CT stabilization exercises to allow for improved posture while in clinic with fatiguing exercises.    LONG TERM GOALS: Time for Goal Achievement: 8 weeks  1.  Pt to score < 10% perceived disability on Neck Index  2.  Pain level < 3/10 at worse with all activities  3.  Increased cervical AROM to WFL to allow for driving and household tasks without restrictions.  4.  Pt able to  drive, lift and daily activities without complaints of weakness or pain limiting function.    Plan  Therapy options: will be seen for skilled physical therapy services  Planned modality interventions: cryotherapy, electrical stimulation/Russian stimulation, high voltage pulsed current (pain management), TENS, thermotherapy (hydrocollator packs) and traction  Planned therapy interventions: balance/weight-bearing training, body mechanics training, ADL retraining, fine motor coordination training, flexibility, functional ROM exercises, home exercise program, IADL retraining, joint mobilization, stretching, strengthening, spinal/joint mobilization, soft tissue mobilization, postural training, neuromuscular re-education, motor coordination training and manual therapy  Duration in visits: 16  Treatment plan discussed with: patient        History # of Personal Factors and/or Comorbidities: MODERATE (1-2)  Examination of Body System(s): # of elements: LOW (1-2)  Clinical Presentation: STABLE   Clinical Decision Making: LOW     Timed:         Manual Therapy:         mins  41661;     Therapeutic Exercise:         mins  89529;     Neuromuscular Alejandro:        mins  90300;    Therapeutic Activity:          mins  06305;     Gait  Training:           mins  32690;     Ultrasound:          mins  21208;    Ionto                                   mins   47658  Self Care                            mins   00876        Un-Timed:  Electrical Stimulation:    15     mins  38159 ( );  Dry Needling          mins self-pay  Traction          mins 16082  Low Eval     30     Mins  17679  Mod Eval          Mins  54520  High Eval                            Mins  21815  Re-Eval                               mins  93870        Timed Treatment:   0   mins   Total Treatment:     45   mins  PT SIGNATURE: Taryn Pugh PT, DPT    DATE TREATMENT INITIATED: 10/20/2020    Initial Certification  Certification Period: 1/18/2021  I certify that the therapy services are furnished while this patient is under my care.  The services outlined above are required by this patient, and will be reviewed every 90 days.     PHYSICIAN: Kulwant Sanchez, DO      DATE:     Please sign and return via fax to [phone].. Thank you, [facility].

## 2020-10-20 NOTE — PROGRESS NOTES
Cardiology Office Visit      Encounter Date:  10/20/2020    Patient ID:   Heri Vasquez is a 75 y.o. male.    Reason For Followup:  Follow-up  for coronary artery disease, dyslipidemia, gastroesophageal reflux disease, obstructive sleep apnea    Brief Clinical History:  Dear Dr. Cueva, O[place]ya Bay MD    I had the pleasure of seeing Heri Vasquez today. As you are well aware, this is a 75 y.o. male  with history of mild coronary artery disease, ,  dyslipidemia, and gastroesophageal reflux disease.  He uses BiPAP machine for obstructive sleep apnea.     See readmitted at [facility] with chest pains and underwent cardiac catheterization which showed mild coronary artery disease in October 2017.  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  Left main was normal.  The distal LAD showed a 50-60% lesion and was too small to do any intervention.  30-40% lesion was noted in the right coronary artery.  Left circumflex and ramus intermedius branches were normal.       He is taking  isosorbide mononitrate 90 mg daily.       Interval History:  Denies any further symptoms of chest pain shortness of breath dizziness or syncope  Denies any cardiac symptoms  Atypical pain involving the right upper chest most likely secondary to underlying shoulder arthritis  Assessment & Plan    Impressions:    Coronary artery disease stable with no anginal chest discomfort  Dyslipidemia.  Lipids are being checked periodically.  Hypertension under fairly good control.  Fatty liver  Obesity    Recommendations:  Labs done in October discussed with the patient  Medication refills  Continue current medical therapy  Stable from cardiac standpoint  Continue current medical therapy continued aggressive risk factor modification  Cardiac work-up reviewed and discussed with the patient  We will see patient in the office in 6 months  Thank you very much for allowing us to participate in the care of your  "patients    Objective:    Vitals:  Vitals:    10/20/20 0955   BP: 133/68   BP Location: Left arm   Pulse: 81   Resp: 18   SpO2: 95%   Weight: 102 kg (225 lb)   Height: 172.7 cm (68\")       Physical Exam:    General: Alert, cooperative, no distress, appears stated age  Head:  Normocephalic, atraumatic, mucous membranes moist  Eyes:  Conjunctiva/corneas clear, EOM's intact     Neck:  Supple,  no adenopathy;      Lungs: Clear to auscultation bilaterally, no wheezes rhonchi rales are noted  Chest wall: No tenderness  Heart::  Regular rate and rhythm, S1 and S2 normal, no murmur, rub or gallop  Abdomen: Soft, non-tender, nondistended bowel sounds active  Extremities: No cyanosis, clubbing, or edema  Pulses: 2+ and symmetric all extremities  Skin:  No rashes or lesions  Neuro/psych: A&O x3. CN II through XII are grossly intact with appropriate affect      Allergies:  No Known Allergies    Medication Review:     Current Outpatient Medications:   •  acetaminophen (TYLENOL) 500 MG tablet, Take 500 mg by mouth Every 6 (Six) Hours As Needed for Mild Pain ., Disp: , Rfl:   •  aspirin 325 MG tablet, Take 325 mg by mouth Every Evening., Disp: , Rfl:   •  docusate sodium (COLACE) 50 MG capsule, Take  by mouth 3 (Three) Times a Day As Needed for Constipation., Disp: , Rfl:   •  gabapentin (Neurontin) 300 MG capsule, Take 1 capsule by mouth Every Night., Disp: 90 capsule, Rfl: 1  •  hydrocortisone 2.5 % cream, HYDROCORTISONE 2.5 % CREA, Disp: , Rfl:   •  isosorbide mononitrate (IMDUR) 60 MG 24 hr tablet, Take 1.5 tablets by mouth Daily., Disp: 135 tablet, Rfl: 3  •  Lancets (ONETOUCH DELICA PLUS [id number]) misc, 1 each by Other route Daily., Disp: , Rfl:   •  lisinopril (PRINIVIL,ZESTRIL) 10 MG tablet, TAKE 1 TABLET BY MOUTH  DAILY, Disp: 90 tablet, Rfl: 3  •  metFORMIN (GLUCOPHAGE) 500 MG tablet, TAKE 1 TABLET BY MOUTH   TWICE DAILY WITH A MEAL, Disp: 180 tablet, Rfl: 3  •  Multiple Vitamins-Minerals (MULTIVITAMIN WITH MINERALS) " tablet tablet, Take 1 tablet by mouth Daily., Disp: , Rfl:   •  naproxen (NAPROSYN) 500 MG tablet, TAKE 1 TABLET BY MOUTH 2  TIMES A DAY AS NEEDED FOR  MODERATE PAIN, Disp: 180 tablet, Rfl: 3  •  nitroglycerin (NITROSTAT) 0.4 MG SL tablet, , Disp: , Rfl: 4  •  ONETOUCH ULTRA test strip, 1 each by Other route Daily., Disp: , Rfl:   •  pantoprazole (PROTONIX) 40 MG EC tablet, TAKE 1 TABLET BY MOUTH  DAILY, Disp: 90 tablet, Rfl: 3  •  simvastatin (ZOCOR) 40 MG tablet, TAKE 1 TABLET BY MOUTH  DAILY AT BEDTIME, Disp: 90 tablet, Rfl: 3  •  TiZANidine (ZANAFLEX) 2 MG capsule, Take 1 capsule by mouth At Night As Needed for Muscle Spasms., Disp: 30 capsule, Rfl: 3  •  lidocaine (LIDODERM) 5 %, Place 1 patch on the skin as directed by provider Daily. Remove & Discard patch within 12 hours or as directed by MD, Disp: 30 patch, Rfl: 0    Family History:  Family History   Problem Relation Age of Onset   • Heart disease Mother         CHF   • Hypertension Mother    • Heart failure Mother    • Stroke Maternal Grandfather    • Diabetes Other    • Diabetes Other    • Stroke Other        Past Medical History:  Past Medical History:   Diagnosis Date   • Arthritis     Dr Mosqueda   • Coronary heart disease 01/2016    single vessel disease, nl stress test (copied from [facility])   • Coronary heart disease 10/31/2017    cath 10/31/17 - Low % Blockages- N o Intervention   (copied from [facility])   • Diverticulosis    • GERD (gastroesophageal reflux disease)    • Hearing loss    • Hyperlipemia    • Hyperlipidemia    • Low back pain    • SIDDHARTHA treated with BiPAP    • Pain management     injections Lumbar spine X2 with [facility] management @ [facility] (copied from [facility])   • [facility] [place] [facility] x3 per [facility], Kalen leal (copied from [facility])   • Rectal bleed 08/2012    hemorrhoids   • Retinal hemorrhage of right eye 05/2014   • Sleep apnea    • Type 2 diabetes mellitus (CMS/[facility])    • Uses  brace        Past surgical History:  Past Surgical History:   Procedure Laterality Date   • BELPHAROPTOSIS REPAIR  2017     Dr. grimes (copied from [facility])   • BROW LIFT  2017    Dr. Grimes at [facility]   • CARDIAC CATHETERIZATION  2012    at [place]- single vessel disease. (copied from [facility])   • CARDIAC CATHETERIZATION  10/13/2017    no intervention - Low % Blockages.   • CATARACT EXTRACTION  2017    brow lift and eye lid repair   • COLON SURGERY  2014    colonscopy   • COLONOSCOPY N/A 2019    Procedure: COLONOSCOPY with polypectomy x1;  Surgeon: Graham Byrd MD;  Location: [facility] ENDOSCOPY;  Service: Gastroenterology   • HERNIA REPAIR  2008    umbilical hernia repair   • JOINT REPLACEMENT     • KNEE ARTHROSCOPY Right 2014    Dr. Mosqueda   • LASIK      lasik and Cataract Extraction, sep 11 and right 2014- Martínez Perez. (copied from [facility])   • TOTAL KNEE ARTHROPLASTY Left 2009    Dr. Mosqueda (copied from [facility])       Social History:  Social History     Socioeconomic History   • Marital status:      Spouse name: Shani   • Number of children: Not on file   • Years of education: Not on file   • Highest education level: Not on file   Occupational History   • Occupation: retired    Tobacco Use   • Smoking status: Former Smoker     Quit date: 2000     Years since quittin.8   • Smokeless tobacco: Former User   Substance and Sexual Activity   • Alcohol use: No   • Drug use: No   • Sexual activity: Defer       Review of Systems:  The following systems were reviewed as they relate to the cardiovascular system: Constitutional, Eyes, ENT, Cardiovascular, Respiratory, Gastrointestinal, Integumentary, Neurological, Psychiatric, Hematologic, Endocrine, Musculoskeletal, and Genitourinary. The pertinent cardiovascular findings are reported above with all other cardiovascular points within those systems being negative.    Diagnostic Study Review:     Current  Electrocardiogram:    ECG 12 Lead    Date/Time: 10/20/2020 10:31 AM  Performed by: Leslie Clark MD  Authorized by: Leslie Clark MD   Comparison: compared with previous ECG   Similar to previous ECG  Rhythm: sinus rhythm  Rate: normal  BPM: 82  Conduction: conduction normal  QRS axis: normal  Other findings: non-specific ST-T wave changes    Clinical impression: abnormal EKG              NOTE: The following portions of the patient's history were reviewed and updated this visit as appropriate: allergies, current medications, past family history, past medical history, past social history, past surgical history and problem list.

## 2020-10-22 ENCOUNTER — TREATMENT (OUTPATIENT)
Dept: PHYSICAL THERAPY | Facility: CLINIC | Age: 75
End: 2020-10-22

## 2020-10-22 DIAGNOSIS — M50.30 DDD (DEGENERATIVE DISC DISEASE), CERVICAL: Primary | ICD-10-CM

## 2020-10-22 DIAGNOSIS — M47.812 CERVICAL SPONDYLOSIS WITHOUT MYELOPATHY: ICD-10-CM

## 2020-10-22 PROCEDURE — 97140 MANUAL THERAPY 1/> REGIONS: CPT | Performed by: PHYSICAL THERAPIST

## 2020-10-22 PROCEDURE — G0283 ELEC STIM OTHER THAN WOUND: HCPCS | Performed by: PHYSICAL THERAPIST

## 2020-10-22 PROCEDURE — 97110 THERAPEUTIC EXERCISES: CPT | Performed by: PHYSICAL THERAPIST

## 2020-10-22 NOTE — PROGRESS NOTES
Physical Therapy Daily Progress Note    VISIT#: 2    Subjective   Heri Vasquez reports that he is doing well today and was not very sore following his previous session. Pt states he took some tylenol this morning to help with some pain.   Pain Rating (0/10): 2    Objective     See Exercise, Manual, and Modality Logs for complete treatment.     Assessment/Plan   Pt was introduced on ROM and scapular and cervical strengthening activities with pt instructed to stay within a pain free range while performing exercises. Pt continues to demonstrate limited activity tolerance with fatigue noted early in the therapy session today.     Plan  Progress per Plan of Care and Progress strengthening /stabilization /functional activity            Timed:         Manual Therapy:    12     mins  53524;     Therapeutic Exercise:    15     mins  09221;     Neuromuscular Alejandro:        mins  60553;    Therapeutic Activity:          mins  93862;     Gait Training:           mins  76652;     Ultrasound:          mins  00944;    Ionto                                   mins   08554  Self Care                            mins   98644    Un-Timed:  Electrical Stimulation:    15     mins  91011 ( );  Dry Needling          mins self-pay  Traction          mins 00913  Low Eval          Mins  26883  Mod Eval          Mins  21375  High Eval                            Mins  98488  Re-Eval                               mins  58980    Timed Treatment:   27   mins   Total Treatment:     42   mins    Taryn Pugh PT, DPT  Physical Therapist

## 2020-10-26 ENCOUNTER — TREATMENT (OUTPATIENT)
Dept: PHYSICAL THERAPY | Facility: CLINIC | Age: 75
End: 2020-10-26

## 2020-10-26 DIAGNOSIS — M50.30 DDD (DEGENERATIVE DISC DISEASE), CERVICAL: Primary | ICD-10-CM

## 2020-10-26 DIAGNOSIS — M47.812 CERVICAL SPONDYLOSIS WITHOUT MYELOPATHY: ICD-10-CM

## 2020-10-26 PROCEDURE — 97012 MECHANICAL TRACTION THERAPY: CPT | Performed by: PHYSICAL THERAPIST

## 2020-10-26 PROCEDURE — 97110 THERAPEUTIC EXERCISES: CPT | Performed by: PHYSICAL THERAPIST

## 2020-10-26 PROCEDURE — G0283 ELEC STIM OTHER THAN WOUND: HCPCS | Performed by: PHYSICAL THERAPIST

## 2020-10-26 PROCEDURE — 97140 MANUAL THERAPY 1/> REGIONS: CPT | Performed by: PHYSICAL THERAPIST

## 2020-10-26 NOTE — PROGRESS NOTES
Physical Therapy Daily Progress Note    VISIT#: 3    Subjective   Heri Vasquez reports: Neck is doing ok, is still hurting and the arm/hand is still numb some.     Objective     See Exercise, Manual, and Modality Logs for complete treatment.   Positive response to manual traction, initiated mechanical traction   Patient Education: additional strength for HEP     Assessment/Plan  Good julio césar to new progressions today, along with good julio césar to traction. Decreased numbness/tingling in arm/hand afterwards. Ed pt to monitor sx's over next few days to determine effectiveness of traction. Pt with good understanding.     Progress per Plan of Care        Timed:         Manual Therapy:    12     mins  22356;     Therapeutic Exercise:    16     mins  61438;     Neuromuscular Alejandro:        mins  81134;    Therapeutic Activity:          mins  24789;     Gait Training:           mins  95538;     Ultrasound:          mins  83002;    Ionto                                   mins   85777  Self Care                            mins   86754  Canalith Repos                   mins  69247    Un-Timed:  Electrical Stimulation:    15     mins  65909 ( );  Traction     15     mins 49230  Dry Needle                 ______ mins DRYNDL  Low Eval          Mins  12855  Mod Eval          Mins  01641  High Eval                            Mins  49998  Re-Eval                               mins  19067    Timed Treatment:   28   mins   Total Treatment:     63   mins    Angela Michaud, PT    Physical Therapist

## 2020-10-28 ENCOUNTER — OFFICE VISIT (OUTPATIENT)
Dept: SLEEP MEDICINE | Facility: HOSPITAL | Age: 75
End: 2020-10-28

## 2020-10-28 VITALS
HEIGHT: 68 IN | BODY MASS INDEX: 33.49 KG/M2 | SYSTOLIC BLOOD PRESSURE: 114 MMHG | OXYGEN SATURATION: 96 % | WEIGHT: 221 LBS | DIASTOLIC BLOOD PRESSURE: 63 MMHG | HEART RATE: 75 BPM

## 2020-10-28 DIAGNOSIS — G47.33 OBSTRUCTIVE SLEEP APNEA, ADULT: Primary | ICD-10-CM

## 2020-10-28 PROCEDURE — G0463 HOSPITAL OUTPT CLINIC VISIT: HCPCS

## 2020-10-28 NOTE — PROGRESS NOTES
SLEEP MEDICINE CONSULT      Patient Identification:     Name: Heri Vasquez   Age: 75 y.o.   Gender: male   : 1945   MRN: [id number]     Date of consult: 10/28/2020    PCP/Referring Physician(s):     PCP: Olesya Cueva MD  Referring Provider: Olesya Cueva MD      Chief Complaint:   Obstructive sleep apnea.  Yearly follow-up for compliance.    History of Present Illness:   This is a very pleasant gentleman who has been diagnosed with obstructive sleep apnea.  He was provided auto BiPAP mode of therapy.  He is due today for yearly follow-up for compliance.  Memory card has been downloaded.    He uses a nasal mask which he finds quite comfortable.  Occasional air leaks has bothered him.  The pressures are quite tolerable to him.  Uses humidifier on regular basis.    He has slept much better with the given pressure and the mask.  He has snored if the mask is slipped off his face.  His bedtime nowadays is 11 PM.  He dozes off within 15 minutes.  He usually wakes up at 7 AM to start his day.  He has woken up none to once  at night for a bathroom break.  He denies symptoms of headache or heartburn at night or in the morning.  He has sometimes woken up with a dry mouth at night as well as in the morning.  The dependent nostril is sometimes congested.  He denies symptoms of palpitation or choking at night or in the morning.  He has felt in his bedroom.    He has woken up in the morning awake alert and rested.  He likes to have 32 ounces of caffeinated beverage during the daytime.  He seldom takes a nap during the daytime he has never dozed off during sedentary activities. His sleepiness scale is 10/24      Allergies, Medications, and Past History:   Allergies:    No Known Allergies  Current Medications:    Prior to Admission medications    Medication Sig Start Date End Date Taking? Authorizing Provider   aspirin 325 MG tablet Take 325 mg by mouth Every Evening.   Yes Provider, Historical, MD   docusate  sodium (COLACE) 50 MG capsule Take  by mouth 3 (Three) Times a Day As Needed for Constipation.   Yes Arielle Carmona MD   fluticasone (FLONASE) 50 MCG/ACT nasal spray USE 2 SPRAY(S) IN EACH NOSTRIL ONCE DAILY FOR 30 DAYS 10/2/19  Yes Arielle Carmona MD   HYDROcodone-acetaminophen (NORCO) 5-325 MG per tablet Take 1 tablet by mouth Every 6 (Six) Hours As Needed for Moderate Pain  or Severe Pain . 9/18/19  Yes Olesya Cueva MD   hydrocortisone 2.5 % cream HYDROCORTISONE 2.5 % CREA 1/15/14  Yes Arielle Carmona MD   isosorbide mononitrate (IMDUR) 60 MG 24 hr tablet TAKE 1 AND 1/2 TABLETS BY  MOUTH DAILY 9/25/19  Yes PETER Jang MD   lisinopril (PRINIVIL,ZESTRIL) 10 MG tablet TAKE 1 TABLET BY MOUTH  DAILY 9/25/19  Yes Olesya Cueva MD   metFORMIN (GLUCOPHAGE) 500 MG tablet Take 500 mg by mouth 2 (Two) Times a Day With Meals.   Yes Arielle Carmona MD   Multiple Vitamins-Minerals (MULTIVITAMIN WITH MINERALS) tablet tablet Take 1 tablet by mouth Daily.   Yes Arielle Carmona MD   nitroglycerin (NITROSTAT) 0.4 MG SL tablet  4/17/19  Yes Arielle Carmona MD   ONE TOUCH ULTRA TEST test strip 1 each by Other route Daily. 6/18/19  Yes Provider, Historical, MD   ONETOUCH DELICA LANCETS 33G misc USE 1 LANCET TO CHECK GLUCOSE ONCE DAILY 5/31/19  Yes Arielle Carmona MD   pantoprazole (PROTONIX) 40 MG EC tablet TAKE 1 TABLET BY MOUTH  DAILY 9/25/19  Yes Olesya Cueva MD   simvastatin (ZOCOR) 40 MG tablet Take 40 mg by mouth Every Night.   Yes Arielle Carmona MD     Past Medical History:    Past Medical History:   Diagnosis Date   • Arthritis     Dr Mosqueda   • Coronary heart disease 01/2016    single vessel disease, nl stress test (copied from [facility])   • Coronary heart disease 10/31/2017    cath 10/31/17 - Low % Blockages- N o Intervention   (copied from [facility])   • Diverticulosis    • GERD (gastroesophageal reflux disease)    • Hearing loss    • Hyperlipemia     • Hyperlipidemia    • Low back pain    • SIDDHARTHA treated with BiPAP    • Pain management     injections Lumbar spine X2 with [facility] @ [facility] (copied from [facility])   • Physical therapy evaluation, initial     @ [facility] in [place] 6 weeks x3 per week, Kalen leal (copied from [facility])   • Rectal bleed 2012    hemorrhoids   • Retinal hemorrhage of right eye 2014   • Sleep apnea    • Type 2 diabetes mellitus (CMS/HCC)    • Uses brace       Past Surgical History:    Past Surgical History:   Procedure Laterality Date   • BELPHAROPTOSIS REPAIR  2017     Dr. grimes (copied from [facility])   • BROW LIFT  2017    Dr. Grimes at [facility]   • CARDIAC CATHETERIZATION  2012    at [facility]- single vessel disease. (copied from [facility])   • CARDIAC CATHETERIZATION  10/13/2017    no intervention - Low % Blockages.   • CATARACT EXTRACTION  2017    brow lift and eye lid repair   • COLON SURGERY  2014    colonscopy   • COLONOSCOPY N/A 2019    Procedure: COLONOSCOPY with polypectomy x1;  Surgeon: Graham Byrd MD;  Location: [facility];  Service: Gastroenterology   • HERNIA REPAIR  2008    umbilical hernia repair   • JOINT REPLACEMENT     • KNEE ARTHROSCOPY Right 2014    Dr. Mosqueda   • LASIK      lasik and Cataract Extraction, sep 11 and right 2014- Martínez Perez. (copied from [facility])   • TOTAL KNEE ARTHROPLASTY Left 2009    Dr. Mosqueda (copied from [facility])      Social History:    Social History     Tobacco Use   • Smoking status: Former Smoker     Quit date: 2000     Years since quittin.8   • Smokeless tobacco: Former User   Substance Use Topics   • Alcohol use: No   • Drug use: No     Family Medical History:  Family History   Problem Relation Age of Onset   • Heart disease Mother         CHF   • Hypertension Mother    • Heart failure Mother    • Stroke Maternal Grandfather    • Diabetes Other    • Diabetes Other    • Stroke Other          Review of  "Systems:   Constitutional:  Denies fever or chills and weight gain  Eyes:  Denies change in visual acuity   HENT:  Denies nasal congestion, sore throat or post nasal drainage   Respiratory:  Denies cough, shortness of breath .  He has experienced dyspnea on exertion    Cardiovascular:  Denies chest pain or edema   GI:  Denies abdominal pain, nausea, vomiting, bloody stools or diarrhea   :  Denies dysuria or nocturia   Musculoskeletal:   He has symptoms of arthritis  Integument:  Denies rash   Neurologic:  Denies headache, focal weakness or sensory changes. No history of stroke or seizures.   Endocrine:  Denies polyuria or polydipsia   Lymphatic:  Denies swollen glands   Psychiatric:  Denies depression, anxiety or claustrophobia      Physical Exam:     Vitals:    10/28/20 1108   BP: 114/63   BP Location: Right arm   Patient Position: Sitting   Cuff Size: Adult   Pulse: 75   SpO2: 96%   Weight: 100 kg (221 lb)   Height: 172.7 cm (68\")     HEENT: Head is normocephalic, atraumatic, normal distribution of hair. Pupils reactive to light. Ocular movements intact. No nasal congestion on sniff test. No deviated nasal septum. No micrognathia.  Throat: Mallapatti stage 4, tongue midline, mucosa moist.  Neck: no JVD, thyromegaly, mass, +midline trachea, Neck supple no adenopathy noted.  Lungs: clear, no wheeze, rhonchi, crackles  Cardiovascular: S1, S2, RRR no murmurs, rubs or gallops  Abd: +BS's soft NT/ND, no CVA tenderness.  Obese.   Ext: no edema, cyanosis, clubbing, pulses intact  Neuro: Awake alert, oriented to time place and person. Grossly intact to motor, sensory cerebral, and cerebellar (without focal deficit)  Psych: No overt sabrina, depression, psychosis or inappropriate behavior  Skin: No rash, cellulitis, or ulcerations.  No facial rash or erosions    DIAGNOSTIC DATA  Memory card download shows data from 10/30/2019 to 10/25/2020.  Overall 362 days of data reviewed.  99.7% of use noted.  Maximum hours use 10 hours " 10 minutes.  Minimum time used 3 hours 31 minutes.  99.4% of the time the mask has been used for more than 4 hours.  The apnea hypopnea  index is 2.3 events per hour on an auto BiPAP mode of therapy with a pressure range between 5-20.  Pressure support between 3-7 CWP.  Assessment/Plan:   Obstructive sleep apnea:  This is a very pleasant gentleman who uses an auto BiPAP mode of therapy to combat symptoms of obstructive sleep apnea.  He is compliant with therapy.  He is getting full benefit from it.    The results of the memory card download were discussed in detail with the patient all questions were answered.  Recommendation.  He is advised to continue using his mask with given pressures on a nightly basis.  Is advised to continue using the mask with given pressures for at least 4 hours for minimum benefit on as long as he sleeps for maximum benefit.  He is advised to use the mask with given pressure if he takes a nap during the daytime.  Obesity:  Based on BMI of 33.6.  Recommendation.  Weight loss is recommended  Driving instructions. Patient is advised to avoid driving if tired or somnolent. To avoid all alcoholic beverages or medications causing somnolence.         Diagnosis Plan   1. Obstructive sleep apnea, adult  CPAP Therapy     No orders of the defined types were placed in this encounter.    Orders Placed This Encounter   Procedures   • CPAP Therapy     Order Specific Question:   Equipment:     Answer:   PAP Supplies Only     Order Specific Question:   PAP Tubing (1 per 3 months)     Answer:    6' Standard tubing     Order Specific Question:   PAP Mask (1 per 3 months)     Answer:    Nasal mask     Order Specific Question:   Nasal cushion or pillow (2 per month)     Answer:    Cushion replacement for nasal type     Order Specific Question:   PAP Accessories     Answer:    Water Chamber for PAP Humidifier (1 per 6 month) &  Filter PAP Disposable (2 per month) &  Filter PAP  Non-Disposable (1 per 6 months) &  Chinstrap to be used with PAP (1 per 6 months) &  Headgear to be used with PAP (1 per 6 mo)     Order Specific Question:   Does the patient have Obstructive Sleep Apnea or other qualifying diagnosis for PAP ordered?     Answer:   Yes     Order Specific Question:   Does the patient require humidification?     Answer:   Yes     Order Specific Question:   Humidifier     Answer:    Humidifier Heated for CPAP or BiPAP     Order Specific Question:   If ordering a BiPAP for SIDDHARTHA a CPAP has been tried and/or ruled out?     Answer:   Yes     Order Specific Question:   The patient requires a PAP Device & continued use of Supplies to administer effective PAP therapy at the frequency of use ordered above     Answer:   Yes     Order Specific Question:   Initial Supplies and refills authorized for 12 months?     Answer:   Yes     Order Specific Question:   Which [email or web address] company is this being sent to?     Answer:   [employer] [facility] [[id number]]     Order Specific Question:   Length of Need (99 Months = Lifetime)     Answer:   99 Months = Lifetime       This document has been electronically signed by:  Chioma Murphy MD  10/28/2020  13:48 EDT

## 2020-10-30 ENCOUNTER — TREATMENT (OUTPATIENT)
Dept: PHYSICAL THERAPY | Facility: CLINIC | Age: 75
End: 2020-10-30

## 2020-10-30 DIAGNOSIS — M47.812 CERVICAL SPONDYLOSIS WITHOUT MYELOPATHY: ICD-10-CM

## 2020-10-30 DIAGNOSIS — M50.30 DDD (DEGENERATIVE DISC DISEASE), CERVICAL: Primary | ICD-10-CM

## 2020-10-30 PROCEDURE — G0283 ELEC STIM OTHER THAN WOUND: HCPCS | Performed by: PHYSICAL THERAPIST

## 2020-10-30 PROCEDURE — 97110 THERAPEUTIC EXERCISES: CPT | Performed by: PHYSICAL THERAPIST

## 2020-10-30 PROCEDURE — 97012 MECHANICAL TRACTION THERAPY: CPT | Performed by: PHYSICAL THERAPIST

## 2020-10-30 NOTE — PROGRESS NOTES
Physical Therapy Daily Progress Note    VISIT#: 4    Subjective   Heri Vasquez reports: Liked the traction, pain is less today but the numbness in the pinky is still there.     Objective     See Exercise, Manual, and Modality Logs for complete treatment.   Add: scap retraction with band resistance     Assessment/Plan  Pt with improved pain levels at end of session. Able to julio césar additional strength training with theraband     Progress per Plan of Care        Timed:         Manual Therapy:         mins  81959;     Therapeutic Exercise:    19     mins  66010;     Neuromuscular Alejandro:        mins  19208;    Therapeutic Activity:          mins  44965;     Gait Training:           mins  67361;     Ultrasound:          mins  90710;    Ionto                                   mins   46494  Self Care                            mins   76117  Canalith Repos                   mins  20152    Un-Timed:  Electrical Stimulation:    15     mins  27397 ( );  Traction     17     mins 78213  Dry Needle                 ______ mins DRYNDL  Low Eval          Mins  62263  Mod Eval          Mins  55079  High Eval                            Mins  67619  Re-Eval                               mins  38571    Timed Treatment:   19   mins   Total Treatment:     51   mins    Angela Michaud PT    Physical Therapist

## 2020-11-03 ENCOUNTER — TREATMENT (OUTPATIENT)
Dept: PHYSICAL THERAPY | Facility: CLINIC | Age: 75
End: 2020-11-03

## 2020-11-03 DIAGNOSIS — M50.30 DDD (DEGENERATIVE DISC DISEASE), CERVICAL: Primary | ICD-10-CM

## 2020-11-03 DIAGNOSIS — M47.812 CERVICAL SPONDYLOSIS WITHOUT MYELOPATHY: ICD-10-CM

## 2020-11-03 PROCEDURE — 97110 THERAPEUTIC EXERCISES: CPT | Performed by: PHYSICAL THERAPIST

## 2020-11-03 PROCEDURE — G0283 ELEC STIM OTHER THAN WOUND: HCPCS | Performed by: PHYSICAL THERAPIST

## 2020-11-03 PROCEDURE — 97012 MECHANICAL TRACTION THERAPY: CPT | Performed by: PHYSICAL THERAPIST

## 2020-11-03 NOTE — PROGRESS NOTES
Physical Therapy Daily Progress Note    VISIT#: 5    Subjective   Heri Vasquez reports his neck pain comes and goes. The numbness in his pinky is still there but seems to be a little better. Last night he was having trouble gripping his fork but this morning his hand/ feels normal.  Current Pain Level: 2-3/10    Objective     See Exercise, Manual, and Modality Logs for complete treatment.     Patient Education: cues for correct form and posture during exercises. Gave him another print out of the ulnar nerve flossing and reviewed it with him.    Assessment/Plan  Patient presents with significant FHP and rounded shoulders. Compensates by using his UT while trying to perform cervical ROM exercises. No reports of pain or increased N/T down the RUE.     Progress per Plan of Care    Goals  Plan Goals: SHORT TERM GOALS:  Time for Goal Achievement: 4 weeks   1.  Patient to be compliant with HEP  2.  Pt to report increased ease to drive and exercise with less pain.  3.  Increased cervical and thoracic segmental mobility to allow for increased ease with driving and ADL's.  4.  Pt. to be independent with CT stabilization exercises to allow for improved posture while in clinic with fatiguing exercises.    LONG TERM GOALS: Time for Goal Achievement: 8 weeks  1.  Pt to score < 10% perceived disability on Neck Index  2.  Pain level < 3/10 at worse with all activities  3.  Increased cervical AROM to WFL to allow for driving and household tasks without restrictions.  4.  Pt able to  drive, lift and daily activities without complaints of weakness or pain limiting function.          Timed:            Therapeutic Exercise:    18     mins  60369;         Un-Timed:  Electrical Stimulation:    15     mins  44528 ( );  Traction     17     mins 76280      Timed Treatment:   50   mins   Total Treatment:     50   mins    Tova Huber PTA    Physical Therapist Assistant

## 2020-11-05 ENCOUNTER — TREATMENT (OUTPATIENT)
Dept: PHYSICAL THERAPY | Facility: CLINIC | Age: 75
End: 2020-11-05

## 2020-11-05 DIAGNOSIS — M50.30 DDD (DEGENERATIVE DISC DISEASE), CERVICAL: Primary | ICD-10-CM

## 2020-11-05 DIAGNOSIS — M47.812 CERVICAL SPONDYLOSIS WITHOUT MYELOPATHY: ICD-10-CM

## 2020-11-05 PROCEDURE — 97012 MECHANICAL TRACTION THERAPY: CPT | Performed by: PHYSICAL THERAPIST

## 2020-11-05 PROCEDURE — 97110 THERAPEUTIC EXERCISES: CPT | Performed by: PHYSICAL THERAPIST

## 2020-11-05 PROCEDURE — G0283 ELEC STIM OTHER THAN WOUND: HCPCS | Performed by: PHYSICAL THERAPIST

## 2020-11-05 NOTE — PROGRESS NOTES
Physical Therapy Daily Progress Note    VISIT#: 6    Subjective   Heri Vasquez reports her neck feels good today. No change with the symptoms in his pinky.  Current Pain Level: 0/10    Objective     See Exercise, Manual, and Modality Logs for complete treatment.     Patient Education: added median nerve glide to HEP    Assessment/Plan  Pt still has a hard time avoiding UT compensation during his exercises and requires constant cues. His pain level is improving. Remains limited with cervical side bending.      Progress per Plan of Care     Goals  Plan Goals: SHORT TERM GOALS:  Time for Goal Achievement: 4 weeks   1.  Patient to be compliant with HEP  2.  Pt to report increased ease to drive and exercise with less pain.  3.  Increased cervical and thoracic segmental mobility to allow for increased ease with driving and ADL's.  4.  Pt. to be independent with CT stabilization exercises to allow for improved posture while in clinic with fatiguing exercises.    LONG TERM GOALS: Time for Goal Achievement: 8 weeks  1.  Pt to score < 10% perceived disability on Neck Index  2.  Pain level < 3/10 at worse with all activities  3.  Increased cervical AROM to WFL to allow for driving and household tasks without restrictions.  4.  Pt able to  drive, lift and daily activities without complaints of weakness or pain limiting function.          Timed:           Therapeutic Exercise:    23     mins  08503;            Un-Timed:  Electrical Stimulation:    15     mins  87298 ( );  Traction     17     mins 00283      Timed Treatment:   23   mins   Total Treatment:     55   mins    Tova Huber PTA    Physical Therapist Assistant

## 2020-11-09 ENCOUNTER — TREATMENT (OUTPATIENT)
Dept: PHYSICAL THERAPY | Facility: CLINIC | Age: 75
End: 2020-11-09

## 2020-11-09 DIAGNOSIS — M50.30 DDD (DEGENERATIVE DISC DISEASE), CERVICAL: Primary | ICD-10-CM

## 2020-11-09 DIAGNOSIS — M47.812 CERVICAL SPONDYLOSIS WITHOUT MYELOPATHY: ICD-10-CM

## 2020-11-09 PROCEDURE — 97110 THERAPEUTIC EXERCISES: CPT | Performed by: PHYSICAL THERAPIST

## 2020-11-09 PROCEDURE — 97012 MECHANICAL TRACTION THERAPY: CPT | Performed by: PHYSICAL THERAPIST

## 2020-11-09 NOTE — PROGRESS NOTES
Physical Therapy Daily Progress Note    VISIT#: 7    Subjective   Heri Vasquez reports he did a lot of work this weekend. He was climbing ladders and cleaning out his gutters. His R shoulder is very sore but his neck seems to be okay.  Current Pain Level: 2-3/10    Objective     See Exercise, Manual, and Modality Logs for complete treatment.     Patient Education:    Assessment/Plan  Numbness in the R pinky is improving. Before the numbness was constant but now it just comes and goes. Able to correct his UT compensations on his own about 50% of the time. Neck pain remains low.      Progress per Plan of Care     Goals  Plan Goals: SHORT TERM GOALS:  Time for Goal Achievement: 4 weeks   1.  Patient to be compliant with HEP  2.  Pt to report increased ease to drive and exercise with less pain.  3.  Increased cervical and thoracic segmental mobility to allow for increased ease with driving and ADL's.  4.  Pt. to be independent with CT stabilization exercises to allow for improved posture while in clinic with fatiguing exercises.    LONG TERM GOALS: Time for Goal Achievement: 8 weeks  1.  Pt to score < 10% perceived disability on Neck Index  2.  Pain level < 3/10 at worse with all activities  3.  Increased cervical AROM to WFL to allow for driving and household tasks without restrictions.  4.  Pt able to  drive, lift and daily activities without complaints of weakness or pain limiting function.          Timed:           Therapeutic Exercise:    23     mins  17327;         Un-Timed:  Traction     17     mins 32867      Timed Treatment:   40   mins   Total Treatment:     40   mins    Tova Huber PTA    Physical Therapist Assistant

## 2020-11-11 ENCOUNTER — TREATMENT (OUTPATIENT)
Dept: PHYSICAL THERAPY | Facility: CLINIC | Age: 75
End: 2020-11-11

## 2020-11-11 DIAGNOSIS — M54.2 CERVICAL PAIN: Primary | ICD-10-CM

## 2020-11-11 PROCEDURE — 97012 MECHANICAL TRACTION THERAPY: CPT | Performed by: PHYSICAL THERAPIST

## 2020-11-11 PROCEDURE — 97110 THERAPEUTIC EXERCISES: CPT | Performed by: PHYSICAL THERAPIST

## 2020-11-11 NOTE — PROGRESS NOTES
Physical Therapy Daily Progress Note    VISIT#: 8  Subjective   Heri Vasquez reports no pain his neck. He seems to like the traction okay and says that it has been helping with the pain.  Pain level: 2/10      Objective     See Exercise, Manual, and Modality Logs for complete treatment.     Patient Education: cued the patient while performing the cervical side bends.     Assessment/Plan   The pt complained of pain in the right shoulder while performing the cervical exercises. The patient is compensating by raising his shoulder while performing the cervical side bends but corrects himself upon command.     Progress per plan of care  Next visit: will add the e-stim with moist heat, if he needs it     Goals  Plan Goals: SHORT TERM GOALS:  Time for Goal Achievement: 4 weeks   1.  Patient to be compliant with HEP  2.  Pt to report increased ease to drive and exercise with less pain.  3.  Increased cervical and thoracic segmental mobility to allow for increased ease with driving and ADL's.  4.  Pt. to be independent with CT stabilization exercises to allow for improved posture while in clinic with fatiguing exercises.    LONG TERM GOALS: Time for Goal Achievement: 8 weeks  1.  Pt to score < 10% perceived disability on Neck Index  2.  Pain level < 3/10 at worse with all activities  3.  Increased cervical AROM to WFL to allow for driving and household tasks without restrictions.  4.  Pt able to  drive, lift and daily activities without complaints of weakness or pain limiting function.              Timed:             Therapeutic Exercise:  25   mins  20025;     Therapeutic Activity:     0 mins  38535;          Un-Timed:  Traction:    15    mins 72246    Timed Treatment:  25   mins   Total Treatment:     40  mins    Tova Huber PTA    Physical Therapist Assistant

## 2020-11-13 ENCOUNTER — OFFICE VISIT (OUTPATIENT)
Dept: FAMILY MEDICINE CLINIC | Facility: CLINIC | Age: 75
End: 2020-11-13

## 2020-11-13 VITALS
SYSTOLIC BLOOD PRESSURE: 155 MMHG | HEART RATE: 75 BPM | OXYGEN SATURATION: 96 % | DIASTOLIC BLOOD PRESSURE: 81 MMHG | BODY MASS INDEX: 33.75 KG/M2 | TEMPERATURE: 96.9 F | WEIGHT: 222 LBS

## 2020-11-13 DIAGNOSIS — Z00.00 MEDICARE ANNUAL WELLNESS VISIT, SUBSEQUENT: Primary | ICD-10-CM

## 2020-11-13 DIAGNOSIS — Z12.5 SCREENING FOR PROSTATE CANCER: ICD-10-CM

## 2020-11-13 DIAGNOSIS — E11.9 TYPE 2 DIABETES MELLITUS WITHOUT COMPLICATION, WITHOUT LONG-TERM CURRENT USE OF INSULIN (HCC): ICD-10-CM

## 2020-11-13 DIAGNOSIS — E78.2 MIXED HYPERLIPIDEMIA: ICD-10-CM

## 2020-11-13 PROCEDURE — G0439 PPPS, SUBSEQ VISIT: HCPCS | Performed by: FAMILY MEDICINE

## 2020-11-13 NOTE — PATIENT INSTRUCTIONS
Medicare Wellness  Personal Prevention Plan of Service     Date of Office Visit:  2020  Encounter Provider:  Olesya Cueva MD  Place of Service:  [facility]  Patient Name: Heri Vasquez  :  1945    As part of the Medicare Wellness portion of your visit today, we are providing you with this personalized preventive plan of services (PPPS). This plan is based upon recommendations of the [place] Preventive Services Task Force (USPSTF) and the Advisory Committee on Immunization Practices (ACIP).    This lists the preventive care services that should be considered, and provides dates of when you are due. Items listed as completed are up-to-date and do not require any further intervention.    Health Maintenance   Topic Date Due   • TDAP/TD VACCINES (1 - Tdap) 1962   • Pneumococcal Vaccine 65+ (2 of 2 - PPSV23) 2014   • URINE MICROALBUMIN  2018   • HEPATITIS C SCREENING  2019   • ZOSTER VACCINE (2 of 2) 2019   • HEMOGLOBIN A1C  2020   • ANNUAL WELLNESS VISIT  10/29/2020   • LIPID PANEL  10/29/2020   • DIABETIC EYE EXAM  2021   • INFLUENZA VACCINE  Completed       Orders Placed This Encounter   Procedures   • Comprehensive Metabolic Panel   • Hemoglobin A1c   • Lipid Panel   • PSA Screen       Return in about 1 year (around 2021) for Medicare Wellness.        Please check with your insurance and get Shingrix vaccine series at your pharmacy to help protect you from getting shingles.

## 2020-11-13 NOTE — PROGRESS NOTES
Medicare Wellness Visit   The ABC's of the Annual Wellness Visit    Chief Complaint   Patient presents with   • Medicare Wellness-subsequent     fast labs       HPI:  Heri Vasquez, -1945, is a 75 y.o. male who presents for a Medicare Wellness Visit.    Recent Hospitalizations:  No hospitalization(s) within the last year..    Current Medical Providers:  Patient Care Team:  Olesya Cueva MD as PCP - General (Family Medicine)  Tan Rowley OD (Optometry)  Banet, Duane Edward, MD as Consulting Physician (Dermatology)  Leslie Clark MD as Consulting Physician (Cardiology)    Health Habits and Functional and Cognitive Screening and Depression Screening:  Functional & Cognitive Status 2020   Do you have difficulty preparing food and eating? No   Do you have difficulty bathing yourself, getting dressed or grooming yourself? No   Do you have difficulty using the toilet? No   Do you have difficulty moving around from place to place? No   Do you have trouble with steps or getting out of a bed or a chair? No   Current Diet Well Balanced Diet   Dental Exam Not up to date   Eye Exam Up to date   Exercise (times per week) 3 times per week   Current Exercises Include Other   Current Exercise Activities Include -   Do you need help using the phone?  No   Are you deaf or do you have serious difficulty hearing?  Yes   Do you need help with transportation? No   Do you need help shopping? No   Do you need help preparing meals?  No   Do you need help with housework?  No   Do you need help with laundry? No   Do you need help taking your medications? No   Do you need help managing money? -   Do you ever drive or ride in a car without wearing a seat belt? No   Have you felt unusual stress, anger or loneliness in the last month? No   Who do you live with? Spouse   If you need help, do you have trouble finding someone available to you? No   Have you been bothered in the last four weeks by sexual problems?  -   Do you have difficulty concentrating, remembering or making decisions? No       Compared to one year ago, the patient feels his physical health is the same and his mental health is the same.    Depression Screen:  PHQ-2/PHQ-9 Depression Screening 11/13/2020   Little interest or pleasure in doing things 0   Feeling down, depressed, or hopeless 0   Trouble falling or staying asleep, or sleeping too much -   Feeling tired or having little energy -   Poor appetite or overeating -   Feeling bad about yourself - or that you are a failure or have let yourself or your family down -   Trouble concentrating on things, such as reading the newspaper or watching television -   Moving or speaking so slowly that other people could have noticed. Or the opposite - being so fidgety or restless that you have been moving around a lot more than usual -   Thoughts that you would be better off dead, or of hurting yourself in some way -   Total Score 0   If you checked off any problems, how difficult have these problems made it for you to do your work, take care of things at home, or get along with other people? -         Past Medical/Family/Social History:  The following portions of the patient's history were reviewed and updated as appropriate: allergies, current medications, past family history, past medical history, past social history, past surgical history and problem list.    No Known Allergies      Current Outpatient Medications:   •  acetaminophen (TYLENOL) 500 MG tablet, Take 500 mg by mouth Every 6 (Six) Hours As Needed for Mild Pain ., Disp: , Rfl:   •  aspirin 325 MG tablet, Take 325 mg by mouth Every Evening., Disp: , Rfl:   •  docusate sodium (COLACE) 50 MG capsule, Take  by mouth 3 (Three) Times a Day As Needed for Constipation., Disp: , Rfl:   •  gabapentin (Neurontin) 300 MG capsule, Take 1 capsule by mouth Every Night., Disp: 90 capsule, Rfl: 1  •  hydrocortisone 2.5 % cream, HYDROCORTISONE 2.5 % CREA, Disp: , Rfl:    •  isosorbide mononitrate (IMDUR) 60 MG 24 hr tablet, Take 1.5 tablets by mouth Daily., Disp: 135 tablet, Rfl: 3  •  Lancets (ONETOUCH DELICA PLUS UOVNBB43R) misc, 1 each by Other route Daily., Disp: , Rfl:   •  lisinopril (PRINIVIL,ZESTRIL) 10 MG tablet, TAKE 1 TABLET BY MOUTH  DAILY, Disp: 90 tablet, Rfl: 3  •  metFORMIN (GLUCOPHAGE) 500 MG tablet, TAKE 1 TABLET BY MOUTH   TWICE DAILY WITH A MEAL, Disp: 180 tablet, Rfl: 3  •  methylPREDNISolone (MEDROL) 4 MG dose pack, Take as directed on package instructions., Disp: 21 tablet, Rfl: 0  •  Multiple Vitamins-Minerals (MULTIVITAMIN WITH MINERALS) tablet tablet, Take 1 tablet by mouth Daily., Disp: , Rfl:   •  naproxen (NAPROSYN) 500 MG tablet, TAKE 1 TABLET BY MOUTH 2  TIMES A DAY AS NEEDED FOR  MODERATE PAIN, Disp: 180 tablet, Rfl: 3  •  nitroglycerin (NITROSTAT) 0.4 MG SL tablet, , Disp: , Rfl: 4  •  OneTouch Ultra test strip, Use to test blood sugar once daily.  E11.9, Disp: 100 each, Rfl: 3  •  pantoprazole (PROTONIX) 40 MG EC tablet, TAKE 1 TABLET BY MOUTH  DAILY, Disp: 90 tablet, Rfl: 3  •  simvastatin (ZOCOR) 40 MG tablet, TAKE 1 TABLET BY MOUTH  DAILY AT BEDTIME, Disp: 90 tablet, Rfl: 3  •  TiZANidine (ZANAFLEX) 2 MG capsule, Take 1 capsule by mouth At Night As Needed for Muscle Spasms., Disp: 30 capsule, Rfl: 3    Aspirin use counseling: Taking ASA appropriately as indicated    Current medication list contains no high risk medications.  No harmful drug interactions have been identified.     Family History   Problem Relation Age of Onset   • Heart disease Mother         CHF   • Hypertension Mother    • Heart failure Mother    • Stroke Maternal Grandfather    • Diabetes Other    • Diabetes Other    • Stroke Other        Social History     Tobacco Use   • Smoking status: Former Smoker     Quit date:      Years since quittin.   • Smokeless tobacco: Former User   Substance Use Topics   • Alcohol use: No       Past Surgical History:   Procedure  Laterality Date   • BELPHAROPTOSIS REPAIR  12/11/2017     Dr. grimes (copied from [employer])   • BROW LIFT  12/11/2017    Dr. Grimes at [facility]   • CARDIAC CATHETERIZATION  03/2012    at [facility]- single vessel disease. (copied from [employer])   • CARDIAC CATHETERIZATION  10/13/2017    no intervention - Low % Blockages.   • CATARACT EXTRACTION  12/11/2017    brow lift and eye lid repair   • COLON SURGERY  06/2014    colonscopy   • COLONOSCOPY N/A 7/12/2019    Procedure: COLONOSCOPY with polypectomy x1;  Surgeon: Graham Byrd MD;  Location: [facility] ENDOSCOPY;  Service: Gastroenterology   • HERNIA REPAIR  11/2008    umbilical hernia repair   • JOINT REPLACEMENT     • KNEE ARTHROSCOPY Right 11/2014    Dr. Mosqueda   • LASIK      lasik and Cataract Extraction, sep 11 and right Sept. 25th, 2014- [facility]. (copied from [employer])   • TOTAL KNEE ARTHROPLASTY Left 11/2009    Dr. Mosqueda (copied from [employer] [facility])       Patient Active Problem List   Diagnosis   • Varicose veins of right lower extremity with pain   • Nasal congestion   • Short of breath on exertion   • Actinic keratosis   • Arthritis   • Bradycardia   • Chronic coronary artery disease   • Degeneration of intervertebral disc of lumbar region   • Dependent edema   • Diabetic peripheral neuropathy (CMS/HCC)   • Encounter for general adult medical examination without abnormal findings   • Fatigue   • Gastroesophageal reflux disease   • Hay fever   • Hearing loss   • Hyperlipidemia   • Knee pain   • Dizziness and giddiness   • Strain of lumbar region   • Type 2 diabetes mellitus with unspecified complications (CMS/HCC)   • Medicare annual wellness visit, subsequent   • Screening for prostate cancer   • Sciatic nerve pain, right   • Hip pain, right   • RUQ abdominal pain   • Gallbladder polyp   • Fatty liver   • Morbidly obese (CMS/HCC)   • Left-sided low back pain with left-sided sciatica   • Chronic right shoulder pain   • Mass of joint of right shoulder   • Cervical  radiculopathy   • Cervical radiculitis   • Essential hypertension       Review of Systems   Constitutional: Negative for chills and fever.   HENT: Positive for congestion. Negative for sinus pressure, sore throat and swollen glands.    Eyes: Negative for blurred vision.   Respiratory: Negative for cough, shortness of breath and wheezing.    Cardiovascular: Negative for chest pain and palpitations.   Gastrointestinal: Negative for abdominal pain.   Endocrine: Negative for polyuria.   Genitourinary: Negative for difficulty urinating.   Skin: Negative for rash.   Neurological: Negative for dizziness, seizures and headache.   Hematological: Negative for adenopathy.   Psychiatric/Behavioral: Negative for depressed mood.       Objective     Vitals:    11/13/20 1022   BP: 155/81   BP Location: Left arm   Patient Position: Sitting   Cuff Size: Adult   Pulse: 75   Temp: 96.9 °F (36.1 °C)   SpO2: 96%   Weight: 101 kg (222 lb)       Patient's Body mass index is 33.75 kg/m². BMI is above normal parameters. Recommendations include: exercise counseling.      No exam data present    The patient has no evidence of cognitve impairment.     Physical Exam  Constitutional:       General: He is not in acute distress.     Appearance: He is well-developed.   HENT:      Head: Normocephalic.   Eyes:      General: Lids are normal.      Conjunctiva/sclera: Conjunctivae normal.   Neck:      Musculoskeletal: Normal range of motion.      Thyroid: No thyroid mass or thyromegaly.      Trachea: Trachea normal.   Cardiovascular:      Rate and Rhythm: Normal rate and regular rhythm.      Heart sounds: Normal heart sounds.   Pulmonary:      Effort: Pulmonary effort is normal.      Breath sounds: Normal breath sounds.   Abdominal:      Palpations: Abdomen is soft.   Lymphadenopathy:      Cervical: No cervical adenopathy.   Skin:     General: Skin is warm and dry.   Neurological:      Mental Status: He is alert and oriented to person, place, and time.    Psychiatric:         Attention and Perception: He is attentive.         Mood and Affect: Mood normal.         Speech: Speech normal.         Behavior: Behavior normal.         Recent Lab Results:     Lab Results   Component Value Date    CHOL 122 11/16/2020    TRIG 107 11/16/2020    HDL 34 (L) 11/16/2020    VLDL 20 11/16/2020    LDLHDL 1.96 11/16/2020     Office Visit on 11/13/2020   Component Date Value Ref Range Status   • Glucose 11/16/2020 94  65 - 99 mg/dL Final   • BUN 11/16/2020 19  8 - 23 mg/dL Final   • Creatinine 11/16/2020 0.80  0.76 - 1.27 mg/dL Final   • Sodium 11/16/2020 140  136 - 145 mmol/L Final   • Potassium 11/16/2020 4.7  3.5 - 5.2 mmol/L Final    Slight hemolysis detected by analyzer. Results may be affected.   • Chloride 11/16/2020 102  98 - 107 mmol/L Final   • CO2 11/16/2020 25.4  22.0 - 29.0 mmol/L Final   • Calcium 11/16/2020 10.0  8.6 - 10.5 mg/dL Final   • Total Protein 11/16/2020 7.3  6.0 - 8.5 g/dL Final   • Albumin 11/16/2020 4.50  3.50 - 5.20 g/dL Final   • ALT (SGPT) 11/16/2020 27  1 - 41 U/L Final   • AST (SGOT) 11/16/2020 22  1 - 40 U/L Final   • Alkaline Phosphatase 11/16/2020 60  39 - 117 U/L Final   • Total Bilirubin 11/16/2020 0.4  0.0 - 1.2 mg/dL Final   • eGFR Non African Amer 11/16/2020 94  >60 mL/min/1.73 Final   • Globulin 11/16/2020 2.8  gm/dL Final   • A/G Ratio 11/16/2020 1.6  g/dL Final   • BUN/Creatinine Ratio 11/16/2020 23.8  7.0 - 25.0 Final   • Anion Gap 11/16/2020 12.6  5.0 - 15.0 mmol/L Final   • Hemoglobin A1C 11/16/2020 6.2* 3.5 - 5.6 % Final   • Total Cholesterol 11/16/2020 122  0 - 200 mg/dL Final   • Triglycerides 11/16/2020 107  0 - 150 mg/dL Final   • HDL Cholesterol 11/16/2020 34* 40 - 60 mg/dL Final   • LDL Cholesterol  11/16/2020 68  0 - 100 mg/dL Final   • VLDL Cholesterol 11/16/2020 20  5 - 40 mg/dL Final   • LDL/HDL Ratio 11/16/2020 1.96   Final   • PSA 11/16/2020 1.860  0.000 - 4.000 ng/mL Final         Assessment/Plan   Age-appropriate Screening  Schedule:  Refer to the list below for future screening recommendations based on patient's age, sex and/or medical conditions.      Health Maintenance   Topic Date Due   • TDAP/TD VACCINES (1 - Tdap) 04/04/1962   • URINE MICROALBUMIN  05/04/2018   • ZOSTER VACCINE (2 of 2) 12/23/2019   • HEMOGLOBIN A1C  05/16/2021   • DIABETIC EYE EXAM  11/04/2021   • LIPID PANEL  11/16/2021   • INFLUENZA VACCINE  Completed       Medicare Risks and Personalized Health Plan:  Advance Directive Discussion  Diabetic Lab Screening   Immunizations Discussed/Encouraged (specific immunizations; Influenza )  Prostate Cancer Screening       CMS-Preventive Services Quick Reference  Medicare Preventive Services Addressed:  Annual Wellness Visit (AWV)  Diabetes Screening-Lab Order for either glucose quantitative blood (except reagent strip), glucose;post glucose dose(includes glucose), or glucose tolerance test-3 specimens(includes glucose)    Advance Care Planning:  ACP discussion was held with the patient during this visit. Patient does not have an advance directive, information provided.    Diagnoses and all orders for this visit:    1. Medicare annual wellness visit, subsequent (Primary)  -     Comprehensive Metabolic Panel  -     Hemoglobin A1c  -     Lipid Panel  -     PSA Screen    2. Type 2 diabetes mellitus without complication, without long-term current use of insulin (CMS/[facility])  -     Comprehensive Metabolic Panel  -     Hemoglobin A1c    3. Screening for prostate cancer  -     PSA Screen    4. Mixed hyperlipidemia  -     Lipid Panel        An After Visit Summary and PPPS with all of these plans were given to the patient.      Follow Up:  Return in about 1 year (around 11/13/2021) for Medicare Wellness.

## 2020-11-16 ENCOUNTER — LAB (OUTPATIENT)
Dept: LAB | Facility: HOSPITAL | Age: 75
End: 2020-11-16

## 2020-11-16 LAB
ALBUMIN SERPL-MCNC: 4.5 G/DL (ref 3.5–5.2)
ALBUMIN/GLOB SERPL: 1.6 G/DL
ALP SERPL-CCNC: 60 U/L (ref 39–117)
ALT SERPL W P-5'-P-CCNC: 27 U/L (ref 1–41)
ANION GAP SERPL CALCULATED.3IONS-SCNC: 12.6 MMOL/L (ref 5–15)
AST SERPL-CCNC: 22 U/L (ref 1–40)
BILIRUB SERPL-MCNC: 0.4 MG/DL (ref 0–1.2)
BUN SERPL-MCNC: 19 MG/DL (ref 8–23)
BUN/CREAT SERPL: 23.8 (ref 7–25)
CALCIUM SPEC-SCNC: 10 MG/DL (ref 8.6–10.5)
CHLORIDE SERPL-SCNC: 102 MMOL/L (ref 98–107)
CHOLEST SERPL-MCNC: 122 MG/DL (ref 0–200)
CO2 SERPL-SCNC: 25.4 MMOL/L (ref 22–29)
CREAT SERPL-MCNC: 0.8 MG/DL (ref 0.76–1.27)
GFR SERPL CREATININE-BSD FRML MDRD: 94 ML/MIN/1.73
GLOBULIN UR ELPH-MCNC: 2.8 GM/DL
GLUCOSE SERPL-MCNC: 94 MG/DL (ref 65–99)
HBA1C MFR BLD: 6.2 % (ref 3.5–5.6)
HDLC SERPL-MCNC: 34 MG/DL (ref 40–60)
LDLC SERPL CALC-MCNC: 68 MG/DL (ref 0–100)
LDLC/HDLC SERPL: 1.96 {RATIO}
POTASSIUM SERPL-SCNC: 4.7 MMOL/L (ref 3.5–5.2)
PROT SERPL-MCNC: 7.3 G/DL (ref 6–8.5)
PSA SERPL-MCNC: 1.86 NG/ML (ref 0–4)
SODIUM SERPL-SCNC: 140 MMOL/L (ref 136–145)
TRIGL SERPL-MCNC: 107 MG/DL (ref 0–150)
VLDLC SERPL-MCNC: 20 MG/DL (ref 5–40)

## 2020-11-16 PROCEDURE — 36415 COLL VENOUS BLD VENIPUNCTURE: CPT | Performed by: FAMILY MEDICINE

## 2020-11-16 PROCEDURE — 83036 HEMOGLOBIN GLYCOSYLATED A1C: CPT | Performed by: FAMILY MEDICINE

## 2020-11-16 PROCEDURE — 80053 COMPREHEN METABOLIC PANEL: CPT | Performed by: FAMILY MEDICINE

## 2020-11-16 PROCEDURE — G0103 PSA SCREENING: HCPCS | Performed by: FAMILY MEDICINE

## 2020-11-16 PROCEDURE — 80061 LIPID PANEL: CPT | Performed by: FAMILY MEDICINE

## 2020-11-16 RX ORDER — BLOOD SUGAR DIAGNOSTIC
1 STRIP MISCELLANEOUS DAILY
Qty: 100 EACH | Refills: 3 | Status: CANCELLED | OUTPATIENT
Start: 2020-11-16

## 2020-11-17 ENCOUNTER — HOSPITAL ENCOUNTER (EMERGENCY)
Facility: HOSPITAL | Age: 75
Discharge: HOME | End: 2020-11-17
Attending: EMERGENCY MEDICINE | Admitting: EMERGENCY MEDICINE

## 2020-11-17 ENCOUNTER — APPOINTMENT (OUTPATIENT)
Dept: CT IMAGING | Facility: HOSPITAL | Age: 75
End: 2020-11-17

## 2020-11-17 VITALS
RESPIRATION RATE: 14 BRPM | DIASTOLIC BLOOD PRESSURE: 66 MMHG | HEART RATE: 78 BPM | WEIGHT: 229.06 LBS | HEIGHT: 68 IN | BODY MASS INDEX: 34.72 KG/M2 | TEMPERATURE: 97.7 F | SYSTOLIC BLOOD PRESSURE: 102 MMHG | OXYGEN SATURATION: 95 %

## 2020-11-17 DIAGNOSIS — R10.9 LEFT FLANK PAIN: Primary | ICD-10-CM

## 2020-11-17 LAB
ALBUMIN SERPL-MCNC: 4.5 G/DL (ref 3.5–5.2)
ALBUMIN/GLOB SERPL: 1.9 G/DL
ALP SERPL-CCNC: 66 U/L (ref 39–117)
ALT SERPL W P-5'-P-CCNC: 27 U/L (ref 1–41)
ANION GAP SERPL CALCULATED.3IONS-SCNC: 13 MMOL/L (ref 5–15)
AST SERPL-CCNC: 21 U/L (ref 1–40)
BASOPHILS # BLD AUTO: 0.1 10*3/MM3 (ref 0–0.2)
BASOPHILS NFR BLD AUTO: 0.7 % (ref 0–1.5)
BILIRUB SERPL-MCNC: 0.5 MG/DL (ref 0–1.2)
BILIRUB UR QL STRIP: NEGATIVE
BUN SERPL-MCNC: 18 MG/DL (ref 8–23)
BUN/CREAT SERPL: 24 (ref 7–25)
CALCIUM SPEC-SCNC: 9.3 MG/DL (ref 8.6–10.5)
CHLORIDE SERPL-SCNC: 101 MMOL/L (ref 98–107)
CLARITY UR: CLEAR
CO2 SERPL-SCNC: 23 MMOL/L (ref 22–29)
COLOR UR: YELLOW
CREAT SERPL-MCNC: 0.75 MG/DL (ref 0.76–1.27)
DEPRECATED RDW RBC AUTO: 45.5 FL (ref 37–54)
EOSINOPHIL # BLD AUTO: 0 10*3/MM3 (ref 0–0.4)
EOSINOPHIL NFR BLD AUTO: 0.4 % (ref 0.3–6.2)
ERYTHROCYTE [DISTWIDTH] IN BLOOD BY AUTOMATED COUNT: 15.1 % (ref 12.3–15.4)
GFR SERPL CREATININE-BSD FRML MDRD: 101 ML/MIN/1.73
GLOBULIN UR ELPH-MCNC: 2.4 GM/DL
GLUCOSE SERPL-MCNC: 125 MG/DL (ref 65–99)
GLUCOSE UR STRIP-MCNC: NEGATIVE MG/DL
HCT VFR BLD AUTO: 44.7 % (ref 37.5–51)
HGB BLD-MCNC: 14.6 G/DL (ref 13–17.7)
HGB UR QL STRIP.AUTO: NEGATIVE
HOLD SPECIMEN: NORMAL
KETONES UR QL STRIP: NEGATIVE
LEUKOCYTE ESTERASE UR QL STRIP.AUTO: NEGATIVE
LIPASE SERPL-CCNC: 11 U/L (ref 13–60)
LYMPHOCYTES # BLD AUTO: 0.8 10*3/MM3 (ref 0.7–3.1)
LYMPHOCYTES NFR BLD AUTO: 7.6 % (ref 19.6–45.3)
MCH RBC QN AUTO: 28.6 PG (ref 26.6–33)
MCHC RBC AUTO-ENTMCNC: 32.7 G/DL (ref 31.5–35.7)
MCV RBC AUTO: 87.5 FL (ref 79–97)
MONOCYTES # BLD AUTO: 0.4 10*3/MM3 (ref 0.1–0.9)
MONOCYTES NFR BLD AUTO: 3.6 % (ref 5–12)
NEUTROPHILS NFR BLD AUTO: 87.7 % (ref 42.7–76)
NEUTROPHILS NFR BLD AUTO: 9.4 10*3/MM3 (ref 1.7–7)
NITRITE UR QL STRIP: NEGATIVE
NRBC BLD AUTO-RTO: 0 /100 WBC (ref 0–0.2)
PH UR STRIP.AUTO: 6 [PH] (ref 5–8)
PLATELET # BLD AUTO: 319 10*3/MM3 (ref 140–450)
PMV BLD AUTO: 8.5 FL (ref 6–12)
POTASSIUM SERPL-SCNC: 4.4 MMOL/L (ref 3.5–5.2)
PROT SERPL-MCNC: 6.9 G/DL (ref 6–8.5)
PROT UR QL STRIP: NEGATIVE
RBC # BLD AUTO: 5.11 10*6/MM3 (ref 4.14–5.8)
SODIUM SERPL-SCNC: 137 MMOL/L (ref 136–145)
SP GR UR STRIP: 1.02 (ref 1–1.03)
UROBILINOGEN UR QL STRIP: NORMAL
WBC # BLD AUTO: 10.7 10*3/MM3 (ref 3.4–10.8)

## 2020-11-17 PROCEDURE — 96375 TX/PRO/DX INJ NEW DRUG ADDON: CPT

## 2020-11-17 PROCEDURE — 81003 URINALYSIS AUTO W/O SCOPE: CPT | Performed by: EMERGENCY MEDICINE

## 2020-11-17 PROCEDURE — 74176 CT ABD & PELVIS W/O CONTRAST: CPT

## 2020-11-17 PROCEDURE — 99283 EMERGENCY DEPT VISIT LOW MDM: CPT

## 2020-11-17 PROCEDURE — 85025 COMPLETE CBC W/AUTO DIFF WBC: CPT | Performed by: EMERGENCY MEDICINE

## 2020-11-17 PROCEDURE — 25010000002 HYDROMORPHONE PER 4 MG: Performed by: EMERGENCY MEDICINE

## 2020-11-17 PROCEDURE — 25010000002 ONDANSETRON PER 1 MG: Performed by: EMERGENCY MEDICINE

## 2020-11-17 PROCEDURE — 25010000002 KETOROLAC TROMETHAMINE PER 15 MG: Performed by: EMERGENCY MEDICINE

## 2020-11-17 PROCEDURE — 96374 THER/PROPH/DIAG INJ IV PUSH: CPT

## 2020-11-17 PROCEDURE — 80053 COMPREHEN METABOLIC PANEL: CPT | Performed by: EMERGENCY MEDICINE

## 2020-11-17 PROCEDURE — 83690 ASSAY OF LIPASE: CPT | Performed by: EMERGENCY MEDICINE

## 2020-11-17 RX ORDER — SODIUM CHLORIDE 0.9 % (FLUSH) 0.9 %
10 SYRINGE (ML) INJECTION AS NEEDED
Status: DISCONTINUED | OUTPATIENT
Start: 2020-11-17 | End: 2020-11-17 | Stop reason: HOSPADM

## 2020-11-17 RX ORDER — METHYLPREDNISOLONE 4 MG/1
TABLET ORAL
Qty: 21 TABLET | Refills: 0 | Status: SHIPPED | OUTPATIENT
Start: 2020-11-17 | End: 2020-12-15

## 2020-11-17 RX ORDER — BLOOD SUGAR DIAGNOSTIC
STRIP MISCELLANEOUS
Qty: 100 EACH | Refills: 3 | Status: SHIPPED | OUTPATIENT
Start: 2020-11-17 | End: 2021-05-26

## 2020-11-17 RX ORDER — KETOROLAC TROMETHAMINE 15 MG/ML
15 INJECTION, SOLUTION INTRAMUSCULAR; INTRAVENOUS ONCE
Status: COMPLETED | OUTPATIENT
Start: 2020-11-17 | End: 2020-11-17

## 2020-11-17 RX ORDER — ONDANSETRON 2 MG/ML
4 INJECTION INTRAMUSCULAR; INTRAVENOUS ONCE
Status: COMPLETED | OUTPATIENT
Start: 2020-11-17 | End: 2020-11-17

## 2020-11-17 RX ORDER — HYDROMORPHONE HCL 110MG/55ML
1 PATIENT CONTROLLED ANALGESIA SYRINGE INTRAVENOUS ONCE
Status: COMPLETED | OUTPATIENT
Start: 2020-11-17 | End: 2020-11-17

## 2020-11-17 RX ADMIN — HYDROMORPHONE HYDROCHLORIDE 1 MG: 2 INJECTION, SOLUTION INTRAMUSCULAR; INTRAVENOUS; SUBCUTANEOUS at 12:17

## 2020-11-17 RX ADMIN — ONDANSETRON 4 MG: 2 INJECTION INTRAMUSCULAR; INTRAVENOUS at 12:17

## 2020-11-17 RX ADMIN — KETOROLAC TROMETHAMINE 15 MG: 15 INJECTION, SOLUTION INTRAMUSCULAR; INTRAVENOUS at 15:01

## 2020-11-17 NOTE — ED PROVIDER NOTES
Subjective   Chief complaint: Left flank pain    75-year-old male presents with left flank pain.  Patient states the pain started earlier this morning and has been constant.  The pain radiates to the left side of the abdomen.  He has had nausea but no vomiting or diarrhea.  He denies any dysuria or hematuria.  He denies any alleviating or exacerbating factors.  Pain is described as severe.      History provided by:  Patient      Review of Systems   Constitutional: Negative for fever.   HENT: Negative for congestion and sore throat.    Eyes: Negative for redness.   Respiratory: Negative for cough and shortness of breath.    Cardiovascular: Negative for chest pain.   Gastrointestinal: Positive for abdominal pain and nausea. Negative for diarrhea and vomiting.   Genitourinary: Positive for flank pain. Negative for dysuria and hematuria.   Musculoskeletal: Negative for back pain.   Skin: Negative for rash.   Neurological: Negative for dizziness and headaches.   Psychiatric/Behavioral: Negative for confusion.       Past Medical History:   Diagnosis Date   • Arthritis     Dr Mosqueda   • Coronary heart disease 01/2016    single vessel disease, nl stress test (copied from [facility])   • Coronary heart disease 10/31/2017    cath 10/31/17 - Low % Blockages- N o Intervention   (copied from [facility])   • Diverticulosis    • GERD (gastroesophageal reflux disease)    • Hearing loss    • Hyperlipemia    • Hyperlipidemia    • Low back pain    • SIDDHARTHA treated with BiPAP    • Pain management     injections Lumbar spine X2 with [facility] management @ [facility] (copied from [facility])   • Physical therapy evaluation, initial     @ [facility] in [place] 6 weeks x3 per week, [facility] leal (copied from [facility])   • Rectal bleed 08/2012    hemorrhoids   • Retinal hemorrhage of right eye 05/2014   • Sleep apnea    • Type 2 diabetes mellitus (CMS/HCC)    • Uses brace        No Known Allergies    Past Surgical History:   Procedure Laterality  "Date   • BELPHAROPTOSIS REPAIR  2017     Dr. grimes (copied from [facility] [employer])   • BROW LIFT  2017    Dr. Grimes at [facility]   • CARDIAC CATHETERIZATION  2012    at [facility]- single vessel disease. (copied from [facility] [employer])   • CARDIAC CATHETERIZATION  10/13/2017    no intervention - Low % Blockages.   • CATARACT EXTRACTION  2017    brow lift and eye lid repair   • COLON SURGERY  2014    colonscopy   • COLONOSCOPY N/A 2019    Procedure: COLONOSCOPY with polypectomy x1;  Surgeon: Graham Byrd MD;  Location: [facility] ENDOSCOPY;  Service: Gastroenterology   • HERNIA REPAIR  2008    umbilical hernia repair   • JOINT REPLACEMENT     • KNEE ARTHROSCOPY Right 2014    Dr. Mosqueda   • LASIK      lasik and Cataract Extraction, sep 11 and right 2014- [facility]. (copied from [facility] [employer])   • TOTAL KNEE ARTHROPLASTY Left 2009    Dr. Mosqueda (copied from [facility] [employer])       Family History   Problem Relation Age of Onset   • Heart disease Mother         CHF   • Hypertension Mother    • Heart failure Mother    • Stroke Maternal Grandfather    • Diabetes Other    • Diabetes Other    • Stroke Other        Social History     Socioeconomic History   • Marital status:      Spouse name: Shani   • Number of children: Not on file   • Years of education: Not on file   • Highest education level: Not on file   Occupational History   • Occupation: retired    Tobacco Use   • Smoking status: Former Smoker     Quit date: 2000     Years since quittin.8   • Smokeless tobacco: Former User   Substance and Sexual Activity   • Alcohol use: No   • Drug use: No   • Sexual activity: Defer       /63   Pulse 77   Temp 97.6 °F (36.4 °C) (Oral)   Resp 14   Ht 172.7 cm (68\")   Wt 104 kg (229 lb 0.9 oz)   SpO2 95%   BMI 34.83 kg/m²       Objective   Physical Exam  Vitals signs and nursing note reviewed.   Constitutional:       Appearance: He is well-developed.   HENT:      Head: Normocephalic and " atraumatic.   Eyes:      Extraocular Movements: Extraocular movements intact.      Pupils: Pupils are equal, round, and reactive to light.   Cardiovascular:      Rate and Rhythm: Normal rate and regular rhythm.      Heart sounds: Normal heart sounds.   Pulmonary:      Effort: Pulmonary effort is normal. No respiratory distress.      Breath sounds: Normal breath sounds.   Abdominal:      General: Abdomen is flat. Bowel sounds are normal.      Palpations: Abdomen is soft.      Tenderness: There is no abdominal tenderness. There is no right CVA tenderness or left CVA tenderness.   Skin:     General: Skin is warm and dry.   Neurological:      General: No focal deficit present.      Mental Status: He is alert and oriented to person, place, and time.         Procedures           ED Course      Results for orders placed or performed during the hospital encounter of 11/17/20   Comprehensive Metabolic Panel    Specimen: Blood   Result Value Ref Range    Glucose 125 (H) 65 - 99 mg/dL    BUN 18 8 - 23 mg/dL    Creatinine 0.75 (L) 0.76 - 1.27 mg/dL    Sodium 137 136 - 145 mmol/L    Potassium 4.4 3.5 - 5.2 mmol/L    Chloride 101 98 - 107 mmol/L    CO2 23.0 22.0 - 29.0 mmol/L    Calcium 9.3 8.6 - 10.5 mg/dL    Total Protein 6.9 6.0 - 8.5 g/dL    Albumin 4.50 3.50 - 5.20 g/dL    ALT (SGPT) 27 1 - 41 U/L    AST (SGOT) 21 1 - 40 U/L    Alkaline Phosphatase 66 39 - 117 U/L    Total Bilirubin 0.5 0.0 - 1.2 mg/dL    eGFR Non African Amer 101 >60 mL/min/1.73    Globulin 2.4 gm/dL    A/G Ratio 1.9 g/dL    BUN/Creatinine Ratio 24.0 7.0 - 25.0    Anion Gap 13.0 5.0 - 15.0 mmol/L   Lipase    Specimen: Blood   Result Value Ref Range    Lipase 11 (L) 13 - 60 U/L   Urinalysis With Culture If Indicated - Urine, Clean Catch    Specimen: Urine, Clean Catch   Result Value Ref Range    Color, UA Yellow Yellow, Straw    Appearance, UA Clear Clear    pH, UA 6.0 5.0 - 8.0    Specific Gravity, UA 1.023 1.005 - 1.030    Glucose, UA Negative Negative     Ketones, UA Negative Negative    Bilirubin, UA Negative Negative    Blood, UA Negative Negative    Protein, UA Negative Negative    Leuk Esterase, UA Negative Negative    Nitrite, UA Negative Negative    Urobilinogen, UA 0.2 E.U./dL 0.2 - 1.0 E.U./dL   CBC Auto Differential    Specimen: Blood   Result Value Ref Range    WBC 10.70 3.40 - 10.80 10*3/mm3    RBC 5.11 4.14 - 5.80 10*6/mm3    Hemoglobin 14.6 13.0 - 17.7 g/dL    Hematocrit 44.7 37.5 - 51.0 %    MCV 87.5 79.0 - 97.0 fL    MCH 28.6 26.6 - 33.0 pg    MCHC 32.7 31.5 - 35.7 g/dL    RDW 15.1 12.3 - 15.4 %    RDW-SD 45.5 37.0 - 54.0 fl    MPV 8.5 6.0 - 12.0 fL    Platelets 319 140 - 450 10*3/mm3    Neutrophil % 87.7 (H) 42.7 - 76.0 %    Lymphocyte % 7.6 (L) 19.6 - 45.3 %    Monocyte % 3.6 (L) 5.0 - 12.0 %    Eosinophil % 0.4 0.3 - 6.2 %    Basophil % 0.7 0.0 - 1.5 %    Neutrophils, Absolute 9.40 (H) 1.70 - 7.00 10*3/mm3    Lymphocytes, Absolute 0.80 0.70 - 3.10 10*3/mm3    Monocytes, Absolute 0.40 0.10 - 0.90 10*3/mm3    Eosinophils, Absolute 0.00 0.00 - 0.40 10*3/mm3    Basophils, Absolute 0.10 0.00 - 0.20 10*3/mm3    nRBC 0.0 0.0 - 0.2 /100 WBC   Green Top (Gel)   Result Value Ref Range    Extra Tube Hold for add-ons.      Ct Abdomen Pelvis Without Contrast    Result Date: 11/17/2020  1. No acute abnormality the abdomen or pelvis, specifically negative for obstructing ureteral calculus. 2. Prostatomegaly. 3. Colonic diverticulosis without diverticulitis. 4. Additional chronic findings above.  Electronically Signed By-Gagandeep Blum MD On:11/17/2020 2:24 PM This report was finalized on [id number] by  Gagandeep Blum MD.                                         MDM   Patient had the above evaluation.  Results were discussed with the patient.  Work-up has been fairly unremarkable.  CT abdomen and pelvis showed no obvious source of patient's pain.  Urinalysis was normal.  White blood cell count was normal.  CMP was unremarkable.  We discussed possibility of  musculoskeletal pain.  He will be given a prescription for Medrol Dosepak.  He states he already takes a muscle relaxer that was prescribed by pain management.  He is stable for discharge.      Final diagnoses:   Left flank pain            Nigle Erazo MD  11/17/20 0185

## 2020-12-11 PROCEDURE — 87070 CULTURE OTHR SPECIMN AEROBIC: CPT | Performed by: FAMILY MEDICINE

## 2020-12-11 PROCEDURE — 87205 SMEAR GRAM STAIN: CPT | Performed by: FAMILY MEDICINE

## 2020-12-15 ENCOUNTER — OFFICE VISIT (OUTPATIENT)
Dept: FAMILY MEDICINE CLINIC | Facility: CLINIC | Age: 75
End: 2020-12-15

## 2020-12-15 VITALS
BODY MASS INDEX: 33.75 KG/M2 | HEART RATE: 76 BPM | OXYGEN SATURATION: 95 % | WEIGHT: 222 LBS | DIASTOLIC BLOOD PRESSURE: 81 MMHG | SYSTOLIC BLOOD PRESSURE: 155 MMHG | TEMPERATURE: 97.5 F

## 2020-12-15 DIAGNOSIS — I10 ESSENTIAL HYPERTENSION: ICD-10-CM

## 2020-12-15 DIAGNOSIS — R10.9 FLANK PAIN: ICD-10-CM

## 2020-12-15 DIAGNOSIS — L02.213 CUTANEOUS ABSCESS OF CHEST WALL: Primary | ICD-10-CM

## 2020-12-15 PROCEDURE — 99214 OFFICE O/P EST MOD 30 MIN: CPT | Performed by: FAMILY MEDICINE

## 2020-12-21 ENCOUNTER — TELEPHONE (OUTPATIENT)
Dept: FAMILY MEDICINE CLINIC | Facility: CLINIC | Age: 75
End: 2020-12-21

## 2020-12-21 DIAGNOSIS — L02.213 CUTANEOUS ABSCESS OF CHEST WALL: Primary | ICD-10-CM

## 2021-01-08 ENCOUNTER — DOCUMENTATION (OUTPATIENT)
Dept: PHYSICAL THERAPY | Facility: CLINIC | Age: 76
End: 2021-01-08

## 2021-01-08 NOTE — PROGRESS NOTES
Discharge Summary  Discharge Summary from Physical Therapy Report      Date of Initial PT visit: 10/22/20  Number of Visits Seen: 8     Goals  Plan Goals: SHORT TERM GOALS:  Time for Goal Achievement: 4 weeks   1.  Patient to be compliant with HEP- MET  2.  Pt to report increased ease to drive and exercise with less pain.- MET  3.  Increased cervical and thoracic segmental mobility to allow for increased ease with driving and ADL's- MET.  4.  Pt. to be independent with CT stabilization exercises to allow for improved posture while in clinic with fatiguing exercises.- MET    LONG TERM GOALS: Time for Goal Achievement: 8 weeks  1.  Pt to score < 10% perceived disability on Neck Index- not assessed   2.  Pain level < 3/10 at worse with all activities- not met  3.  Increased cervical AROM to WFL to allow for driving and household tasks without restrictions.- not met  4.  Pt able to  drive, lift and daily activities without complaints of weakness or pain limiting function.- not met    Goals: Partially Met    Discharge Plan: Continue with current home exercise program as instructed, Pt did not return after his last appt and did not schedule more appts.       Date of Discharge 1/8/21        Taryn Pugh, PT, DPT  Physical Therapist

## 2021-01-12 ENCOUNTER — OFFICE VISIT (OUTPATIENT)
Dept: SURGERY | Facility: CLINIC | Age: 76
End: 2021-01-12

## 2021-01-12 VITALS
OXYGEN SATURATION: 97 % | HEIGHT: 68 IN | WEIGHT: 226 LBS | SYSTOLIC BLOOD PRESSURE: 120 MMHG | HEART RATE: 81 BPM | DIASTOLIC BLOOD PRESSURE: 72 MMHG | BODY MASS INDEX: 34.25 KG/M2 | TEMPERATURE: 97.3 F

## 2021-01-12 DIAGNOSIS — L72.3 INFECTED SEBACEOUS CYST: Primary | ICD-10-CM

## 2021-01-12 DIAGNOSIS — L08.9 INFECTED SEBACEOUS CYST: Primary | ICD-10-CM

## 2021-01-12 PROBLEM — I87.2 VENOUS INSUFFICIENCY OF LEG: Status: ACTIVE | Noted: 2020-11-12

## 2021-01-12 PROBLEM — I83.90 VARICOSE VEINS OF LOWER EXTREMITY: Status: ACTIVE | Noted: 2020-11-02

## 2021-01-12 PROCEDURE — 99202 OFFICE O/P NEW SF 15 MIN: CPT | Performed by: SURGERY

## 2021-01-12 RX ORDER — LANCETS 33 GAUGE
EACH MISCELLANEOUS
COMMUNITY
End: 2021-05-26

## 2021-01-12 NOTE — PROGRESS NOTES
Subjective   Heri Vasquez is a 75 y.o. male.     History of present illness  Mr Vasquez is seen in the office today at the request of his primary care provider Dr. Cueva for what was an infected sebaceous cyst of the chest wall below the right breast.  This was drained at the emergent care clinic several weeks ago.  He just finished his antibiotics couple weeks ago as well.  He states that is continuing to shrink in size.  He states that there still is some discomfort if he rubs it.  There is no further drainage.    Past Medical History:   Diagnosis Date   • Arthritis     Dr Mosqueda   • Coronary heart disease 01/2016    single vessel disease, nl stress test (copied from [facility])   • Coronary heart disease 10/31/2017    cath 10/31/17 - Low % Blockages- N o Intervention   (copied from [facility])   • Diverticulosis    • GERD (gastroesophageal reflux disease)    • Hearing loss    • Hyperlipemia    • Hyperlipidemia    • Low back pain    • SIDDHARTHA treated with BiPAP    • Pain management     injections Lumbar spine X2 with Muprhys Pain management @ [facility] (copied from [facility])   • Physical therapy evaluation, initial     @ [facility] in [place] 6 weeks x3 per week, [facility] (copied from [facility])   • Rectal bleed 08/2012    hemorrhoids   • Retinal hemorrhage of right eye 05/2014   • Sleep apnea    • Type 2 diabetes mellitus (CMS/HCC)    • Uses brace        Past Surgical History:   Procedure Laterality Date   • BELPHAROPTOSIS REPAIR  12/11/2017     Dr. trent (copied from [facility])   • BROW LIFT  12/11/2017    Dr. Trent at [facility]   • CARDIAC CATHETERIZATION  03/2012    at [facility]- single vessel disease. (copied from [facility])   • CARDIAC CATHETERIZATION  10/13/2017    no intervention - Low % Blockages.   • CATARACT EXTRACTION  12/11/2017    brow lift and eye lid repair   • COLON SURGERY  06/2014    colonscopy   • COLONOSCOPY N/A 7/12/2019    Procedure: COLONOSCOPY with polypectomy x1;  Surgeon: Graham Byrd MD;   Location: [facility] ENDOSCOPY;  Service: Gastroenterology   • HERNIA REPAIR  11/2008    umbilical hernia repair   • JOINT REPLACEMENT     • KNEE ARTHROSCOPY Right 11/2014    Dr. Mosqueda   • LASIK      lasik and Cataract Extraction, sep 11 and right Sept. 25th, 2014- [facility]. (copied from [facility])   • TOTAL KNEE ARTHROPLASTY Left 11/2009    Dr. Mosqueda (copied from [facility])       Outpatient Encounter Medications as of 1/12/2021   Medication Sig Dispense Refill   • acetaminophen (TYLENOL) 500 MG tablet Take 500 mg by mouth Every 6 (Six) Hours As Needed for Mild Pain .     • aspirin 325 MG tablet Take 325 mg by mouth Every Evening.     • docusate sodium (COLACE) 50 MG capsule Take  by mouth 3 (Three) Times a Day As Needed for Constipation.     • gabapentin (Neurontin) 300 MG capsule Take 1 capsule by mouth Every Night. 90 capsule 1   • hydrocortisone 2.5 % cream HYDROCORTISONE 2.5 % CREA     • isosorbide mononitrate (IMDUR) 60 MG 24 hr tablet Take 1.5 tablets by mouth Daily. 135 tablet 3   • Lancets (ONETOUCH DELICA PLUS [id number]) misc 1 each by Other route Daily.     • lisinopril (PRINIVIL,ZESTRIL) 10 MG tablet TAKE 1 TABLET BY MOUTH  DAILY 90 tablet 3   • metFORMIN (GLUCOPHAGE) 500 MG tablet TAKE 1 TABLET BY MOUTH   TWICE DAILY WITH A MEAL 180 tablet 3   • Multiple Vitamins-Minerals (MULTIVITAMIN WITH MINERALS) tablet tablet Take 1 tablet by mouth Daily.     • naproxen (NAPROSYN) 500 MG tablet TAKE 1 TABLET BY MOUTH 2  TIMES A DAY AS NEEDED FOR  MODERATE PAIN 180 tablet 3   • nitroglycerin (NITROSTAT) 0.4 MG SL tablet   4   • OneTouch Delica Lancets 33G misc OneTouch Delica Plus Lancet 33 gauge   USE 1 TO CHECK GLUCOSE ONCE DAILY     • OneTouch Ultra test strip Use to test blood sugar once daily.  E11.9 100 each 3   • pantoprazole (PROTONIX) 40 MG EC tablet TAKE 1 TABLET BY MOUTH  DAILY 90 tablet 3   • simvastatin (ZOCOR) 40 MG tablet TAKE 1 TABLET BY MOUTH  DAILY AT BEDTIME 90 tablet 3   • TiZANidine (ZANAFLEX)  2 MG capsule Take 1 capsule by mouth At Night As Needed for Muscle Spasms. 30 capsule 3     No facility-administered encounter medications on file as of 2021.        No Known Allergies    Family History   Problem Relation Age of Onset   • Heart disease Mother         CHF   • Hypertension Mother    • Heart failure Mother    • Stroke Maternal Grandfather    • Diabetes Other    • Diabetes Other    • Stroke Other        Social History     Socioeconomic History   • Marital status:      Spouse name: Shani   • Number of children: Not on file   • Years of education: Not on file   • Highest education level: Not on file   Occupational History   • Occupation: retired    Tobacco Use   • Smoking status: Former Smoker     Quit date:      Years since quittin.0   • Smokeless tobacco: Former User   Substance and Sexual Activity   • Alcohol use: No   • Drug use: No   • Sexual activity: Defer       The following portions of the patient's history were reviewed and updated as appropriate: allergies, current medications, past family history, past medical history, past social history, past surgical history and problem list.    Objective   Complete review of systems is done and unremarkable with exception the chief complaint    School exam shows a pleasant obese 75-year-old male.  HEENT is negative.  Heart regular.  Lungs are clear.  Abdomen soft nontender without mass.  Extremities show equal range of motion in the upper and lower extremities.  He has symmetrical strength in usage.  Neuro shows no obvious focal deficit.    Right chest wall below the breast is examined.  There is an oblong area of induration.  About 1.5 x 1 cm.  There is no drainage.    Impression: Recent infected sebaceous cyst drained    Plan: I have suggested that he watch the area closely over the next 2 to 4 weeks.  If it does not completely go away then he is to call and we would schedule him for time to excise the  residual    Assessment/Plan   There are no diagnoses linked to this encounter.               Martínez Betancur, DO  1/12/2021  10:05 EST

## 2021-01-27 DIAGNOSIS — E11.9 TYPE 2 DIABETES MELLITUS WITHOUT COMPLICATION, WITHOUT LONG-TERM CURRENT USE OF INSULIN (HCC): ICD-10-CM

## 2021-01-27 RX ORDER — GABAPENTIN 300 MG/1
CAPSULE ORAL
Qty: 90 CAPSULE | Refills: 3 | Status: SHIPPED | OUTPATIENT
Start: 2021-01-27 | End: 2022-02-18

## 2021-02-15 ENCOUNTER — OFFICE VISIT (OUTPATIENT)
Dept: FAMILY MEDICINE CLINIC | Facility: CLINIC | Age: 76
End: 2021-02-15

## 2021-02-15 VITALS
TEMPERATURE: 96.9 F | HEART RATE: 81 BPM | SYSTOLIC BLOOD PRESSURE: 110 MMHG | BODY MASS INDEX: 34.67 KG/M2 | OXYGEN SATURATION: 95 % | WEIGHT: 228 LBS | DIASTOLIC BLOOD PRESSURE: 63 MMHG

## 2021-02-15 DIAGNOSIS — E11.9 TYPE 2 DIABETES MELLITUS WITHOUT COMPLICATION, WITHOUT LONG-TERM CURRENT USE OF INSULIN (HCC): ICD-10-CM

## 2021-02-15 DIAGNOSIS — R04.0 EPISTAXIS: ICD-10-CM

## 2021-02-15 DIAGNOSIS — G56.21 NEURITIS OF RIGHT ULNAR NERVE: Primary | ICD-10-CM

## 2021-02-15 PROCEDURE — 99214 OFFICE O/P EST MOD 30 MIN: CPT | Performed by: FAMILY MEDICINE

## 2021-02-15 RX ORDER — BLOOD-GLUCOSE METER
EACH MISCELLANEOUS
Qty: 1 EACH | Refills: 0 | Status: SHIPPED | OUTPATIENT
Start: 2021-02-15 | End: 2021-05-26

## 2021-02-15 RX ORDER — ISOSORBIDE MONONITRATE 60 MG/1
TABLET, EXTENDED RELEASE ORAL
Qty: 135 TABLET | Refills: 3 | Status: SHIPPED | OUTPATIENT
Start: 2021-02-15 | End: 2021-12-30

## 2021-02-15 NOTE — PROGRESS NOTES
Chief Complaint  Diabetes Mellitus (f/u)    Subjective          Heri Vasquez presents to [facility]  He thinks his glucometer needs to be replaced.  He gets variable readings when he checks his blood sugars.  His glucometer is approximately 6-7 years old.     Diabetes Mellitus  This is a chronic problem. The current episode started more than 1 year ago. The problem has been waxing and waning. Nothing aggravates the symptoms.   Nose Bleed   The bleeding has been from both nares. This is a new problem. The current episode started 1 to 4 weeks ago. The problem occurs every several days. The problem has been waxing and waning. The bleeding is associated with dry air (CPAP device). He has tried nothing for the symptoms.   Arm Pain   The incident occurred more than 1 week ago. There was no injury mechanism. The pain is present in the right wrist, right hand and right forearm. The quality of the pain is described as aching and burning. The pain has been constant since the incident. Nothing aggravates the symptoms. Treatments tried: PT. The treatment provided mild relief.       Review of Systems   HENT: Positive for nosebleeds.      Objective   Vital Signs:   /63 (BP Location: Left arm, Patient Position: Sitting, Cuff Size: Adult)   Pulse 81   Temp 96.9 °F (36.1 °C)   Wt 103 kg (228 lb)   SpO2 95%   BMI 34.67 kg/m²     Physical Exam  Constitutional:       General: He is not in acute distress.     Appearance: He is well-developed.   HENT:      Head: Normocephalic.   Eyes:      General: Lids are normal.      Conjunctiva/sclera: Conjunctivae normal.   Neck:      Musculoskeletal: Normal range of motion.      Thyroid: No thyroid mass or thyromegaly.      Trachea: Trachea normal.   Cardiovascular:      Rate and Rhythm: Normal rate and regular rhythm.      Heart sounds: Normal heart sounds.   Pulmonary:      Effort: Pulmonary effort is normal.      Breath sounds: Normal breath sounds.    Abdominal:      Palpations: Abdomen is soft.   Lymphadenopathy:      Cervical: No cervical adenopathy.   Skin:     General: Skin is warm and dry.   Neurological:      Mental Status: He is alert and oriented to person, place, and time.   Psychiatric:         Attention and Perception: He is attentive.         Mood and Affect: Mood normal.         Speech: Speech normal.         Behavior: Behavior normal.        Result Review :                 Assessment and Plan    Diagnoses and all orders for this visit:    1. Neuritis of right ulnar nerve (Primary)  Assessment & Plan:  EMG as ordered.    Orders:  -     EMG & Nerve Conduction Test; Future    2. Type 2 diabetes mellitus without complication, without long-term current use of insulin (CMS/[facility])  Assessment & Plan:  Diabetes is unchanged.   Continue current treatment regimen.  Diabetes will be reassessed in 3 months.  New glucometer as ordered.    Orders:  -     Blood Glucose Monitoring Suppl (ONE TOUCH ULTRA 2) w/Device kit; Use to test blood sugar once daily. E11.9 May use any brand that his insurance prefers.  Dispense: 1 each; Refill: 0    3. Epistaxis  Assessment & Plan:  He is using distilled water in his CPAP device.  He will add daytime nasal saline spray.        Follow Up   No follow-ups on file.  Patient was given instructions and counseling regarding his condition or for health maintenance advice. Please see specific information pulled into the AVS if appropriate.

## 2021-02-15 NOTE — ASSESSMENT & PLAN NOTE
Diabetes is unchanged.   Continue current treatment regimen.  Diabetes will be reassessed in 3 months.  New glucometer as ordered.

## 2021-03-12 ENCOUNTER — HOSPITAL ENCOUNTER (OUTPATIENT)
Dept: NEUROLOGY | Facility: HOSPITAL | Age: 76
Discharge: HOME | End: 2021-03-12
Admitting: FAMILY MEDICINE

## 2021-03-12 DIAGNOSIS — G56.21 NEURITIS OF RIGHT ULNAR NERVE: ICD-10-CM

## 2021-03-12 PROCEDURE — 95910 NRV CNDJ TEST 7-8 STUDIES: CPT | Performed by: PSYCHIATRY & NEUROLOGY

## 2021-03-12 PROCEDURE — 95886 MUSC TEST DONE W/N TEST COMP: CPT | Performed by: PSYCHIATRY & NEUROLOGY

## 2021-03-12 PROCEDURE — 95886 MUSC TEST DONE W/N TEST COMP: CPT

## 2021-03-12 PROCEDURE — 95910 NRV CNDJ TEST 7-8 STUDIES: CPT

## 2021-03-16 DIAGNOSIS — G56.21 NEURITIS OF RIGHT ULNAR NERVE: Primary | ICD-10-CM

## 2021-04-01 ENCOUNTER — HOSPITAL ENCOUNTER (EMERGENCY)
Facility: HOSPITAL | Age: 76
Discharge: HOME | End: 2021-04-01
Attending: EMERGENCY MEDICINE | Admitting: EMERGENCY MEDICINE

## 2021-04-01 ENCOUNTER — APPOINTMENT (OUTPATIENT)
Dept: GENERAL RADIOLOGY | Facility: HOSPITAL | Age: 76
End: 2021-04-01

## 2021-04-01 VITALS
DIASTOLIC BLOOD PRESSURE: 78 MMHG | WEIGHT: 224.87 LBS | HEIGHT: 68 IN | RESPIRATION RATE: 18 BRPM | OXYGEN SATURATION: 93 % | HEART RATE: 78 BPM | TEMPERATURE: 97.5 F | SYSTOLIC BLOOD PRESSURE: 128 MMHG | BODY MASS INDEX: 34.08 KG/M2

## 2021-04-01 DIAGNOSIS — M54.9 UPPER BACK PAIN ON RIGHT SIDE: Primary | ICD-10-CM

## 2021-04-01 LAB
ANION GAP SERPL CALCULATED.3IONS-SCNC: 10 MMOL/L (ref 5–15)
BASOPHILS # BLD AUTO: 0.1 10*3/MM3 (ref 0–0.2)
BASOPHILS NFR BLD AUTO: 1.3 % (ref 0–1.5)
BUN SERPL-MCNC: 21 MG/DL (ref 8–23)
BUN/CREAT SERPL: 23.9 (ref 7–25)
CALCIUM SPEC-SCNC: 8.9 MG/DL (ref 8.6–10.5)
CHLORIDE SERPL-SCNC: 104 MMOL/L (ref 98–107)
CO2 SERPL-SCNC: 26 MMOL/L (ref 22–29)
CREAT SERPL-MCNC: 0.88 MG/DL (ref 0.76–1.27)
D DIMER PPP FEU-MCNC: 0.29 MG/L (FEU) (ref 0–0.59)
DEPRECATED RDW RBC AUTO: 42.9 FL (ref 37–54)
EOSINOPHIL # BLD AUTO: 0.4 10*3/MM3 (ref 0–0.4)
EOSINOPHIL NFR BLD AUTO: 4.6 % (ref 0.3–6.2)
ERYTHROCYTE [DISTWIDTH] IN BLOOD BY AUTOMATED COUNT: 14 % (ref 12.3–15.4)
GFR SERPL CREATININE-BSD FRML MDRD: 84 ML/MIN/1.73
GLUCOSE SERPL-MCNC: 118 MG/DL (ref 65–99)
HCT VFR BLD AUTO: 42.8 % (ref 37.5–51)
HGB BLD-MCNC: 14.8 G/DL (ref 13–17.7)
HOLD SPECIMEN: NORMAL
LYMPHOCYTES # BLD AUTO: 1.7 10*3/MM3 (ref 0.7–3.1)
LYMPHOCYTES NFR BLD AUTO: 21.2 % (ref 19.6–45.3)
MCH RBC QN AUTO: 29.8 PG (ref 26.6–33)
MCHC RBC AUTO-ENTMCNC: 34.5 G/DL (ref 31.5–35.7)
MCV RBC AUTO: 86.5 FL (ref 79–97)
MONOCYTES # BLD AUTO: 0.8 10*3/MM3 (ref 0.1–0.9)
MONOCYTES NFR BLD AUTO: 9.9 % (ref 5–12)
NEUTROPHILS NFR BLD AUTO: 5.2 10*3/MM3 (ref 1.7–7)
NEUTROPHILS NFR BLD AUTO: 63 % (ref 42.7–76)
NRBC BLD AUTO-RTO: 0 /100 WBC (ref 0–0.2)
PLATELET # BLD AUTO: 278 10*3/MM3 (ref 140–450)
PMV BLD AUTO: 7.6 FL (ref 6–12)
POTASSIUM SERPL-SCNC: 4.5 MMOL/L (ref 3.5–5.2)
RBC # BLD AUTO: 4.95 10*6/MM3 (ref 4.14–5.8)
SODIUM SERPL-SCNC: 140 MMOL/L (ref 136–145)
WBC # BLD AUTO: 8.2 10*3/MM3 (ref 3.4–10.8)

## 2021-04-01 PROCEDURE — 80048 BASIC METABOLIC PNL TOTAL CA: CPT | Performed by: EMERGENCY MEDICINE

## 2021-04-01 PROCEDURE — 85379 FIBRIN DEGRADATION QUANT: CPT | Performed by: EMERGENCY MEDICINE

## 2021-04-01 PROCEDURE — 99283 EMERGENCY DEPT VISIT LOW MDM: CPT

## 2021-04-01 PROCEDURE — 71045 X-RAY EXAM CHEST 1 VIEW: CPT

## 2021-04-01 PROCEDURE — 85025 COMPLETE CBC W/AUTO DIFF WBC: CPT | Performed by: EMERGENCY MEDICINE

## 2021-04-01 RX ORDER — METHYLPREDNISOLONE 4 MG/1
TABLET ORAL
Qty: 21 TABLET | Refills: 0 | Status: SHIPPED | OUTPATIENT
Start: 2021-04-01 | End: 2021-05-26

## 2021-04-01 RX ORDER — SODIUM CHLORIDE 0.9 % (FLUSH) 0.9 %
10 SYRINGE (ML) INJECTION AS NEEDED
Status: DISCONTINUED | OUTPATIENT
Start: 2021-04-01 | End: 2021-04-01 | Stop reason: HOSPADM

## 2021-04-02 NOTE — ED PROVIDER NOTES
Subjective   Chief complaint: Back pain    75-year-old male presents with right upper back pain.  Patient states he has had this pain for 2 to 3 weeks.  The pain sometimes wraps around to the right anterior chest.  He denies any other associated symptoms.  He denies any injury to the area.  He has had no cough or shortness of breath.  Denies any nausea, vomiting, diarrhea.  He has had no abdominal pain.  He denies any dysuria or hematuria.  He has not noticed any rash.  He denies any alleviating or exacerbating factors.      History provided by:  Patient      Review of Systems   Constitutional: Negative for fever.   HENT: Negative for congestion and sore throat.    Eyes: Negative for redness.   Respiratory: Negative for cough and shortness of breath.    Cardiovascular: Negative for chest pain.   Gastrointestinal: Negative for abdominal pain, diarrhea and vomiting.   Genitourinary: Negative for dysuria.   Musculoskeletal: Positive for back pain.   Skin: Negative for rash.   Neurological: Negative for dizziness and headaches.   Psychiatric/Behavioral: Negative for confusion.       Past Medical History:   Diagnosis Date   • Arthritis     Dr Mosqueda   • Coronary heart disease 01/2016    single vessel disease, nl stress test (copied from [facility])   • Coronary heart disease 10/31/2017    cath 10/31/17 - Low % Blockages- N o Intervention   (copied from [employer])   • Diverticulosis    • GERD (gastroesophageal reflux disease)    • Hearing loss    • Hyperlipemia    • Hyperlipidemia    • Low back pain    • SIDDHARTHA treated with BiPAP    • Pain management     injections Lumbar spine X2 with Muprhys Pain management @ [facility] (copied from [facility])   • Physical therapy evaluation, initial     @ [facility] in [place] 6 weeks x3 per week, [facility] (copied from [facility])   • Rectal bleed 08/2012    hemorrhoids   • Retinal hemorrhage of right eye 05/2014   • Sleep apnea    • Type 2 diabetes mellitus (CMS/[facility])    • Uses brace   "      No Known Allergies    Past Surgical History:   Procedure Laterality Date   • BELPHAROPTOSIS REPAIR  2017     Dr. grimes (copied from [facility])   • BROW LIFT  2017    Dr. Grimes at [facility]   • CARDIAC CATHETERIZATION  2012    at [facility]- single vessel disease. (copied from [facility])   • CARDIAC CATHETERIZATION  10/13/2017    no intervention - Low % Blockages.   • CATARACT EXTRACTION  2017    brow lift and eye lid repair   • COLON SURGERY  2014    colonscopy   • COLONOSCOPY N/A 2019    Procedure: COLONOSCOPY with polypectomy x1;  Surgeon: Graham Byrd MD;  Location: [facility] ENDOSCOPY;  Service: Gastroenterology   • HERNIA REPAIR  2008    umbilical hernia repair   • JOINT REPLACEMENT     • KNEE ARTHROSCOPY Right 2014    Dr. Mosqueda   • LASIK      lasik and Cataract Extraction, sep 11 and right 2014- [facility]. (copied from [facility])   • TOTAL KNEE ARTHROPLASTY Left 2009    Dr. Mosqueda (copied from [facility])       Family History   Problem Relation Age of Onset   • Heart disease Mother         CHF   • Hypertension Mother    • Heart failure Mother    • Stroke Maternal Grandfather    • Diabetes Other    • Diabetes Other    • Stroke Other        Social History     Socioeconomic History   • Marital status:      Spouse name: Shani   • Number of children: Not on file   • Years of education: Not on file   • Highest education level: Not on file   Tobacco Use   • Smoking status: Former Smoker     Quit date:      Years since quittin.2   • Smokeless tobacco: Former User   Substance and Sexual Activity   • Alcohol use: No   • Drug use: No   • Sexual activity: Defer       /65   Pulse 81   Temp 97.4 °F (36.3 °C) (Oral)   Resp 19   Ht 172.7 cm (68\")   Wt 102 kg (224 lb 13.9 oz)   SpO2 94%   BMI 34.19 kg/m²       Objective   Physical Exam  Vitals and nursing note reviewed.   Constitutional:       Appearance: Normal appearance. He is well-developed. "   HENT:      Head: Normocephalic and atraumatic.   Eyes:      Extraocular Movements: Extraocular movements intact.      Pupils: Pupils are equal, round, and reactive to light.   Cardiovascular:      Rate and Rhythm: Normal rate and regular rhythm.      Heart sounds: Normal heart sounds.   Pulmonary:      Effort: Pulmonary effort is normal. No respiratory distress.      Breath sounds: Normal breath sounds.   Abdominal:      General: Bowel sounds are normal.      Palpations: Abdomen is soft.      Tenderness: There is no abdominal tenderness.   Musculoskeletal:      Comments: There is tenderness to palpation to the right upper back in the area of the right scapula and tracking around the right side of the chest wall into the right anterior chest.  There is no visible injury.  There is no rash.  There is no crepitus or subcutaneous air.   Skin:     General: Skin is warm and dry.   Neurological:      General: No focal deficit present.      Mental Status: He is alert and oriented to person, place, and time.         Procedures           ED Course      Results for orders placed or performed during the hospital encounter of 04/01/21   Basic Metabolic Panel    Specimen: Blood   Result Value Ref Range    Glucose 118 (H) 65 - 99 mg/dL    BUN 21 8 - 23 mg/dL    Creatinine 0.88 0.76 - 1.27 mg/dL    Sodium 140 136 - 145 mmol/L    Potassium 4.5 3.5 - 5.2 mmol/L    Chloride 104 98 - 107 mmol/L    CO2 26.0 22.0 - 29.0 mmol/L    Calcium 8.9 8.6 - 10.5 mg/dL    eGFR Non African Amer 84 >60 mL/min/1.73    BUN/Creatinine Ratio 23.9 7.0 - 25.0    Anion Gap 10.0 5.0 - 15.0 mmol/L   D-dimer, Quantitative    Specimen: Blood   Result Value Ref Range    D-Dimer, Quantitative 0.29 0.00 - 0.59 mg/L (FEU)   CBC Auto Differential    Specimen: Blood   Result Value Ref Range    WBC 8.20 3.40 - 10.80 10*3/mm3    RBC 4.95 4.14 - 5.80 10*6/mm3    Hemoglobin 14.8 13.0 - 17.7 g/dL    Hematocrit 42.8 37.5 - 51.0 %    MCV 86.5 79.0 - 97.0 fL    MCH 29.8  26.6 - 33.0 pg    MCHC 34.5 31.5 - 35.7 g/dL    RDW 14.0 12.3 - 15.4 %    RDW-SD 42.9 37.0 - 54.0 fl    MPV 7.6 6.0 - 12.0 fL    Platelets 278 140 - 450 10*3/mm3    Neutrophil % 63.0 42.7 - 76.0 %    Lymphocyte % 21.2 19.6 - 45.3 %    Monocyte % 9.9 5.0 - 12.0 %    Eosinophil % 4.6 0.3 - 6.2 %    Basophil % 1.3 0.0 - 1.5 %    Neutrophils, Absolute 5.20 1.70 - 7.00 10*3/mm3    Lymphocytes, Absolute 1.70 0.70 - 3.10 10*3/mm3    Monocytes, Absolute 0.80 0.10 - 0.90 10*3/mm3    Eosinophils, Absolute 0.40 0.00 - 0.40 10*3/mm3    Basophils, Absolute 0.10 0.00 - 0.20 10*3/mm3    nRBC 0.0 0.0 - 0.2 /100 WBC     XR Chest 1 View    Result Date: 4/1/2021  1. Suboptimal lung inflation. Appearance of lung bases is likely due to bronchovascular crowding.  Electronically Signed By-Selina Colin MD On:4/1/2021 8:25 PM This report was finalized on [id number] by  Selina Colin MD.                                         MDM   Patient had the above evaluation.  Results were discussed with the patient.  Patient remains well-appearing in the emergency room.  Chest x-ray shows no obvious abnormality.  White blood cell count is normal.  BMP is essentially normal.  D-dimer is normal.  Pain seems to be musculoskeletal in nature.  Patient will be given a Medrol Dosepak.  He also states he has muscle relaxers at home that he will use.      Final diagnoses:   Upper back pain on right side       ED Disposition  ED Disposition     ED Disposition Condition Comment    Discharge Stable           Olesya Cueva MD  [facility] [id number] [phone]    Call in 2 days           Medication List      New Prescriptions    methylPREDNISolone 4 MG dose pack  Commonly known as: MEDROL  Take as directed on package instructions.           Where to Get Your Medications      These medications were sent to [facility] [address] [place] [address] - [address] [place] [address] - [phone] [facility] [phone]   [address] [facility]  [place] [address]    Phone: [phone]   · methylPREDNISolone 4 MG dose pack          Nigel Erazo MD  04/01/21 [id number]

## 2021-04-13 ENCOUNTER — OFFICE VISIT (OUTPATIENT)
Dept: FAMILY MEDICINE CLINIC | Facility: CLINIC | Age: 76
End: 2021-04-13

## 2021-04-13 ENCOUNTER — LAB (OUTPATIENT)
Dept: FAMILY MEDICINE CLINIC | Facility: CLINIC | Age: 76
End: 2021-04-13

## 2021-04-13 VITALS
SYSTOLIC BLOOD PRESSURE: 137 MMHG | WEIGHT: 224 LBS | DIASTOLIC BLOOD PRESSURE: 70 MMHG | HEART RATE: 87 BPM | TEMPERATURE: 97.3 F | OXYGEN SATURATION: 97 % | BODY MASS INDEX: 34.06 KG/M2

## 2021-04-13 DIAGNOSIS — R41.3 MEMORY CHANGES: ICD-10-CM

## 2021-04-13 DIAGNOSIS — M54.6 ACUTE RIGHT-SIDED THORACIC BACK PAIN: Primary | ICD-10-CM

## 2021-04-13 DIAGNOSIS — E11.9 TYPE 2 DIABETES MELLITUS WITHOUT COMPLICATION, WITHOUT LONG-TERM CURRENT USE OF INSULIN (HCC): ICD-10-CM

## 2021-04-13 LAB — HBA1C MFR BLD: 6.1 % (ref 3.5–5.6)

## 2021-04-13 PROCEDURE — 83036 HEMOGLOBIN GLYCOSYLATED A1C: CPT | Performed by: FAMILY MEDICINE

## 2021-04-13 PROCEDURE — 99214 OFFICE O/P EST MOD 30 MIN: CPT | Performed by: FAMILY MEDICINE

## 2021-04-13 PROCEDURE — 36415 COLL VENOUS BLD VENIPUNCTURE: CPT | Performed by: FAMILY MEDICINE

## 2021-04-13 RX ORDER — CYCLOBENZAPRINE HCL 10 MG
10 TABLET ORAL NIGHTLY PRN
Qty: 30 TABLET | Refills: 0 | Status: SHIPPED | OUTPATIENT
Start: 2021-04-13 | End: 2021-05-17

## 2021-04-13 RX ORDER — BETAMETHASONE DIPROPIONATE 0.5 MG/G
1 LOTION TOPICAL 2 TIMES DAILY PRN
COMMUNITY
Start: 2021-03-07 | End: 2021-11-30

## 2021-04-13 RX ORDER — DONEPEZIL HYDROCHLORIDE 5 MG/1
5 TABLET, FILM COATED ORAL NIGHTLY
Qty: 90 TABLET | Refills: 1 | Status: SHIPPED | OUTPATIENT
Start: 2021-04-13 | End: 2021-06-03 | Stop reason: SDUPTHER

## 2021-05-16 DIAGNOSIS — M54.6 ACUTE RIGHT-SIDED THORACIC BACK PAIN: ICD-10-CM

## 2021-05-17 RX ORDER — CYCLOBENZAPRINE HCL 10 MG
10 TABLET ORAL NIGHTLY PRN
Qty: 30 TABLET | Refills: 0 | Status: SHIPPED | OUTPATIENT
Start: 2021-05-17 | End: 2021-06-16

## 2021-05-26 ENCOUNTER — HOSPITAL ENCOUNTER (INPATIENT)
Facility: HOSPITAL | Age: 76
LOS: 1 days | Discharge: HOME | End: 2021-05-28
Attending: EMERGENCY MEDICINE | Admitting: HOSPITALIST

## 2021-05-26 ENCOUNTER — APPOINTMENT (OUTPATIENT)
Dept: GENERAL RADIOLOGY | Facility: HOSPITAL | Age: 76
End: 2021-05-26

## 2021-05-26 DIAGNOSIS — I20.8 STABLE ANGINA PECTORIS (HCC): ICD-10-CM

## 2021-05-26 DIAGNOSIS — R07.9 CHEST PAIN, UNSPECIFIED TYPE: Primary | ICD-10-CM

## 2021-05-26 DIAGNOSIS — R06.00 DYSPNEA, UNSPECIFIED TYPE: ICD-10-CM

## 2021-05-26 DIAGNOSIS — R53.83 FATIGUE, UNSPECIFIED TYPE: ICD-10-CM

## 2021-05-26 LAB
ALBUMIN SERPL-MCNC: 4.1 G/DL (ref 3.5–5.2)
ALBUMIN/GLOB SERPL: 2.1 G/DL
ALP SERPL-CCNC: 72 U/L (ref 39–117)
ALT SERPL W P-5'-P-CCNC: 30 U/L (ref 1–41)
ANION GAP SERPL CALCULATED.3IONS-SCNC: 10 MMOL/L (ref 5–15)
APTT PPP: 26.2 SECONDS (ref 24–31)
AST SERPL-CCNC: 21 U/L (ref 1–40)
BASOPHILS # BLD AUTO: 0.1 10*3/MM3 (ref 0–0.2)
BASOPHILS NFR BLD AUTO: 1 % (ref 0–1.5)
BILIRUB SERPL-MCNC: 0.4 MG/DL (ref 0–1.2)
BUN SERPL-MCNC: 17 MG/DL (ref 8–23)
BUN/CREAT SERPL: 17 (ref 7–25)
CALCIUM SPEC-SCNC: 8.6 MG/DL (ref 8.6–10.5)
CHLORIDE SERPL-SCNC: 101 MMOL/L (ref 98–107)
CO2 SERPL-SCNC: 25 MMOL/L (ref 22–29)
CREAT SERPL-MCNC: 1 MG/DL (ref 0.76–1.27)
D DIMER PPP FEU-MCNC: 0.44 MG/L (FEU) (ref 0–0.59)
DEPRECATED RDW RBC AUTO: 42.4 FL (ref 37–54)
EOSINOPHIL # BLD AUTO: 0.3 10*3/MM3 (ref 0–0.4)
EOSINOPHIL NFR BLD AUTO: 3.1 % (ref 0.3–6.2)
ERYTHROCYTE [DISTWIDTH] IN BLOOD BY AUTOMATED COUNT: 14.4 % (ref 12.3–15.4)
GFR SERPL CREATININE-BSD FRML MDRD: 72 ML/MIN/1.73
GLOBULIN UR ELPH-MCNC: 2 GM/DL
GLUCOSE BLDC GLUCOMTR-MCNC: 121 MG/DL (ref 70–105)
GLUCOSE BLDC GLUCOMTR-MCNC: 157 MG/DL (ref 70–105)
GLUCOSE SERPL-MCNC: 83 MG/DL (ref 65–99)
HCT VFR BLD AUTO: 44 % (ref 37.5–51)
HGB BLD-MCNC: 14.8 G/DL (ref 13–17.7)
HOLD SPECIMEN: NORMAL
HOLD SPECIMEN: NORMAL
INR PPP: 1.06 (ref 0.93–1.1)
LYMPHOCYTES # BLD AUTO: 1.7 10*3/MM3 (ref 0.7–3.1)
LYMPHOCYTES NFR BLD AUTO: 18.8 % (ref 19.6–45.3)
MCH RBC QN AUTO: 28.8 PG (ref 26.6–33)
MCHC RBC AUTO-ENTMCNC: 33.6 G/DL (ref 31.5–35.7)
MCV RBC AUTO: 85.5 FL (ref 79–97)
MONOCYTES # BLD AUTO: 0.8 10*3/MM3 (ref 0.1–0.9)
MONOCYTES NFR BLD AUTO: 9.3 % (ref 5–12)
NEUTROPHILS NFR BLD AUTO: 6.1 10*3/MM3 (ref 1.7–7)
NEUTROPHILS NFR BLD AUTO: 67.8 % (ref 42.7–76)
NRBC BLD AUTO-RTO: 0 /100 WBC (ref 0–0.2)
NT-PROBNP SERPL-MCNC: 15.6 PG/ML (ref 0–1800)
PLATELET # BLD AUTO: 305 10*3/MM3 (ref 140–450)
PMV BLD AUTO: 8.2 FL (ref 6–12)
POTASSIUM SERPL-SCNC: 3.9 MMOL/L (ref 3.5–5.2)
PROT SERPL-MCNC: 6.1 G/DL (ref 6–8.5)
PROTHROMBIN TIME: 11.6 SECONDS (ref 9.6–11.7)
RBC # BLD AUTO: 5.14 10*6/MM3 (ref 4.14–5.8)
SARS-COV-2 RNA PNL SPEC NAA+PROBE: NOT DETECTED
SODIUM SERPL-SCNC: 136 MMOL/L (ref 136–145)
TROPONIN T SERPL-MCNC: <0.01 NG/ML (ref 0–0.03)
TROPONIN T SERPL-MCNC: <0.01 NG/ML (ref 0–0.03)
WBC # BLD AUTO: 9 10*3/MM3 (ref 3.4–10.8)
WHOLE BLOOD HOLD SPECIMEN: NORMAL

## 2021-05-26 PROCEDURE — 84484 ASSAY OF TROPONIN QUANT: CPT | Performed by: PHYSICIAN ASSISTANT

## 2021-05-26 PROCEDURE — 71045 X-RAY EXAM CHEST 1 VIEW: CPT

## 2021-05-26 PROCEDURE — 82962 GLUCOSE BLOOD TEST: CPT

## 2021-05-26 PROCEDURE — 80053 COMPREHEN METABOLIC PANEL: CPT | Performed by: EMERGENCY MEDICINE

## 2021-05-26 PROCEDURE — G0378 HOSPITAL OBSERVATION PER HR: HCPCS

## 2021-05-26 PROCEDURE — 85025 COMPLETE CBC W/AUTO DIFF WBC: CPT | Performed by: EMERGENCY MEDICINE

## 2021-05-26 PROCEDURE — 83880 ASSAY OF NATRIURETIC PEPTIDE: CPT | Performed by: EMERGENCY MEDICINE

## 2021-05-26 PROCEDURE — 84484 ASSAY OF TROPONIN QUANT: CPT | Performed by: EMERGENCY MEDICINE

## 2021-05-26 PROCEDURE — 99284 EMERGENCY DEPT VISIT MOD MDM: CPT

## 2021-05-26 PROCEDURE — 25010000002 ENOXAPARIN PER 10 MG: Performed by: PHYSICIAN ASSISTANT

## 2021-05-26 PROCEDURE — 85379 FIBRIN DEGRADATION QUANT: CPT | Performed by: EMERGENCY MEDICINE

## 2021-05-26 PROCEDURE — 93005 ELECTROCARDIOGRAM TRACING: CPT | Performed by: EMERGENCY MEDICINE

## 2021-05-26 PROCEDURE — 85610 PROTHROMBIN TIME: CPT | Performed by: EMERGENCY MEDICINE

## 2021-05-26 PROCEDURE — 85730 THROMBOPLASTIN TIME PARTIAL: CPT | Performed by: EMERGENCY MEDICINE

## 2021-05-26 PROCEDURE — 87635 SARS-COV-2 COVID-19 AMP PRB: CPT | Performed by: EMERGENCY MEDICINE

## 2021-05-26 RX ORDER — ALUMINA, MAGNESIA, AND SIMETHICONE 2400; 2400; 240 MG/30ML; MG/30ML; MG/30ML
15 SUSPENSION ORAL EVERY 6 HOURS PRN
Status: DISCONTINUED | OUTPATIENT
Start: 2021-05-26 | End: 2021-05-28 | Stop reason: HOSPADM

## 2021-05-26 RX ORDER — LISINOPRIL 5 MG/1
10 TABLET ORAL NIGHTLY
Status: DISCONTINUED | OUTPATIENT
Start: 2021-05-26 | End: 2021-05-28 | Stop reason: HOSPADM

## 2021-05-26 RX ORDER — PANTOPRAZOLE SODIUM 40 MG/1
40 TABLET, DELAYED RELEASE ORAL DAILY
Status: DISCONTINUED | OUTPATIENT
Start: 2021-05-27 | End: 2021-05-28 | Stop reason: HOSPADM

## 2021-05-26 RX ORDER — INSULIN LISPRO 100 [IU]/ML
0-9 INJECTION, SOLUTION INTRAVENOUS; SUBCUTANEOUS AS NEEDED
Status: DISCONTINUED | OUTPATIENT
Start: 2021-05-26 | End: 2021-05-28 | Stop reason: HOSPADM

## 2021-05-26 RX ORDER — ISOSORBIDE MONONITRATE 30 MG/1
90 TABLET, EXTENDED RELEASE ORAL DAILY
Status: DISCONTINUED | OUTPATIENT
Start: 2021-05-27 | End: 2021-05-28 | Stop reason: HOSPADM

## 2021-05-26 RX ORDER — DEXTROSE MONOHYDRATE 25 G/50ML
25 INJECTION, SOLUTION INTRAVENOUS
Status: DISCONTINUED | OUTPATIENT
Start: 2021-05-26 | End: 2021-05-28 | Stop reason: HOSPADM

## 2021-05-26 RX ORDER — ONDANSETRON 2 MG/ML
4 INJECTION INTRAMUSCULAR; INTRAVENOUS EVERY 6 HOURS PRN
Status: DISCONTINUED | OUTPATIENT
Start: 2021-05-26 | End: 2021-05-27 | Stop reason: SDUPTHER

## 2021-05-26 RX ORDER — DONEPEZIL HYDROCHLORIDE 5 MG/1
5 TABLET, FILM COATED ORAL NIGHTLY
Status: DISCONTINUED | OUTPATIENT
Start: 2021-05-26 | End: 2021-05-28 | Stop reason: HOSPADM

## 2021-05-26 RX ORDER — CYCLOBENZAPRINE HCL 10 MG
10 TABLET ORAL NIGHTLY PRN
Status: DISCONTINUED | OUTPATIENT
Start: 2021-05-26 | End: 2021-05-28 | Stop reason: HOSPADM

## 2021-05-26 RX ORDER — CHOLECALCIFEROL (VITAMIN D3) 125 MCG
5 CAPSULE ORAL NIGHTLY PRN
Status: DISCONTINUED | OUTPATIENT
Start: 2021-05-26 | End: 2021-05-28 | Stop reason: HOSPADM

## 2021-05-26 RX ORDER — SODIUM CHLORIDE 0.9 % (FLUSH) 0.9 %
10 SYRINGE (ML) INJECTION EVERY 12 HOURS SCHEDULED
Status: DISCONTINUED | OUTPATIENT
Start: 2021-05-26 | End: 2021-05-28 | Stop reason: HOSPADM

## 2021-05-26 RX ORDER — MULTIPLE VITAMINS W/ MINERALS TAB 9MG-400MCG
1 TAB ORAL DAILY
Status: DISCONTINUED | OUTPATIENT
Start: 2021-05-27 | End: 2021-05-28 | Stop reason: HOSPADM

## 2021-05-26 RX ORDER — NAPROXEN 500 MG/1
500 TABLET ORAL 2 TIMES DAILY PRN
COMMUNITY
End: 2021-06-02

## 2021-05-26 RX ORDER — ATORVASTATIN CALCIUM 20 MG/1
20 TABLET, FILM COATED ORAL NIGHTLY
Refills: 3 | Status: DISCONTINUED | OUTPATIENT
Start: 2021-05-26 | End: 2021-05-28 | Stop reason: HOSPADM

## 2021-05-26 RX ORDER — GABAPENTIN 300 MG/1
300 CAPSULE ORAL NIGHTLY
Status: DISCONTINUED | OUTPATIENT
Start: 2021-05-26 | End: 2021-05-28 | Stop reason: HOSPADM

## 2021-05-26 RX ORDER — LISINOPRIL 10 MG/1
10 TABLET ORAL NIGHTLY
COMMUNITY
End: 2021-06-01

## 2021-05-26 RX ORDER — NICOTINE POLACRILEX 4 MG
15 LOZENGE BUCCAL
Status: DISCONTINUED | OUTPATIENT
Start: 2021-05-26 | End: 2021-05-28 | Stop reason: HOSPADM

## 2021-05-26 RX ORDER — ASPIRIN 325 MG
325 TABLET ORAL EVERY EVENING
Status: DISCONTINUED | OUTPATIENT
Start: 2021-05-26 | End: 2021-05-28 | Stop reason: HOSPADM

## 2021-05-26 RX ORDER — SODIUM CHLORIDE 0.9 % (FLUSH) 0.9 %
10 SYRINGE (ML) INJECTION AS NEEDED
Status: DISCONTINUED | OUTPATIENT
Start: 2021-05-26 | End: 2021-05-28 | Stop reason: HOSPADM

## 2021-05-26 RX ORDER — ONDANSETRON 4 MG/1
4 TABLET, FILM COATED ORAL EVERY 6 HOURS PRN
Status: DISCONTINUED | OUTPATIENT
Start: 2021-05-26 | End: 2021-05-27 | Stop reason: SDUPTHER

## 2021-05-26 RX ORDER — INSULIN LISPRO 100 [IU]/ML
0-9 INJECTION, SOLUTION INTRAVENOUS; SUBCUTANEOUS
Status: DISCONTINUED | OUTPATIENT
Start: 2021-05-26 | End: 2021-05-28 | Stop reason: HOSPADM

## 2021-05-26 RX ADMIN — ENOXAPARIN SODIUM 40 MG: 40 INJECTION SUBCUTANEOUS at 21:06

## 2021-05-26 RX ADMIN — DONEPEZIL HYDROCHLORIDE 5 MG: 5 TABLET, FILM COATED ORAL at 21:06

## 2021-05-26 RX ADMIN — Medication 10 ML: at 21:05

## 2021-05-26 RX ADMIN — ASPIRIN 325 MG ORAL TABLET 325 MG: 325 PILL ORAL at 18:39

## 2021-05-26 RX ADMIN — ATORVASTATIN CALCIUM 20 MG: 20 TABLET, FILM COATED ORAL at 21:06

## 2021-05-26 RX ADMIN — GABAPENTIN 300 MG: 300 CAPSULE ORAL at 21:06

## 2021-05-26 RX ADMIN — LISINOPRIL 10 MG: 5 TABLET ORAL at 21:06

## 2021-05-27 ENCOUNTER — APPOINTMENT (OUTPATIENT)
Dept: NUCLEAR MEDICINE | Facility: HOSPITAL | Age: 76
End: 2021-05-27

## 2021-05-27 ENCOUNTER — HOSPITAL ENCOUNTER (OUTPATIENT)
Facility: HOSPITAL | Age: 76
Setting detail: HOSPITAL OUTPATIENT SURGERY
End: 2021-05-27
Attending: INTERNAL MEDICINE | Admitting: INTERNAL MEDICINE

## 2021-05-27 DIAGNOSIS — I20.8 STABLE ANGINA PECTORIS (HCC): ICD-10-CM

## 2021-05-27 DIAGNOSIS — R07.9 CHEST PAIN, UNSPECIFIED TYPE: ICD-10-CM

## 2021-05-27 PROBLEM — I20.89 STABLE ANGINA PECTORIS: Status: ACTIVE | Noted: 2021-05-27

## 2021-05-27 PROBLEM — E66.9 OBESITY (BMI 30-39.9): Chronic | Status: ACTIVE | Noted: 2021-05-27

## 2021-05-27 PROBLEM — N18.30 CKD (CHRONIC KIDNEY DISEASE) STAGE 3, GFR 30-59 ML/MIN (HCC): Chronic | Status: ACTIVE | Noted: 2021-05-27

## 2021-05-27 LAB
ANION GAP SERPL CALCULATED.3IONS-SCNC: 12 MMOL/L (ref 5–15)
BASOPHILS # BLD AUTO: 0.1 10*3/MM3 (ref 0–0.2)
BASOPHILS NFR BLD AUTO: 1.3 % (ref 0–1.5)
BH CV REST NUCLEAR ISOTOPE DOSE: 7.6 MCI
BH CV STRESS BP STAGE 1: NORMAL
BH CV STRESS BP STAGE 2: NORMAL
BH CV STRESS COMMENTS STAGE 1: NORMAL
BH CV STRESS COMMENTS STAGE 2: NORMAL
BH CV STRESS DOSE REGADENOSON STAGE 1: 0.4
BH CV STRESS DURATION MIN STAGE 1: 0
BH CV STRESS DURATION MIN STAGE 2: 4
BH CV STRESS DURATION SEC STAGE 1: 10
BH CV STRESS DURATION SEC STAGE 2: 0
BH CV STRESS HR STAGE 1: 82
BH CV STRESS HR STAGE 2: 71
BH CV STRESS NUCLEAR ISOTOPE DOSE: 21.9 MCI
BH CV STRESS PROTOCOL 1: NORMAL
BH CV STRESS RECOVERY BP: NORMAL MMHG
BH CV STRESS RECOVERY HR: 71 BPM
BH CV STRESS STAGE 1: 1
BH CV STRESS STAGE 2: 2
BUN SERPL-MCNC: 16 MG/DL (ref 8–23)
BUN/CREAT SERPL: 17.8 (ref 7–25)
CALCIUM SPEC-SCNC: 8.6 MG/DL (ref 8.6–10.5)
CHLORIDE SERPL-SCNC: 108 MMOL/L (ref 98–107)
CHOLEST SERPL-MCNC: 135 MG/DL (ref 0–200)
CO2 SERPL-SCNC: 21 MMOL/L (ref 22–29)
CREAT SERPL-MCNC: 0.9 MG/DL (ref 0.76–1.27)
DEPRECATED RDW RBC AUTO: 42.9 FL (ref 37–54)
EOSINOPHIL # BLD AUTO: 0.3 10*3/MM3 (ref 0–0.4)
EOSINOPHIL NFR BLD AUTO: 4.1 % (ref 0.3–6.2)
ERYTHROCYTE [DISTWIDTH] IN BLOOD BY AUTOMATED COUNT: 14.6 % (ref 12.3–15.4)
GFR SERPL CREATININE-BSD FRML MDRD: 82 ML/MIN/1.73
GLUCOSE BLDC GLUCOMTR-MCNC: 107 MG/DL (ref 70–105)
GLUCOSE BLDC GLUCOMTR-MCNC: 108 MG/DL (ref 70–105)
GLUCOSE BLDC GLUCOMTR-MCNC: 111 MG/DL (ref 70–105)
GLUCOSE BLDC GLUCOMTR-MCNC: 94 MG/DL (ref 70–105)
GLUCOSE SERPL-MCNC: 105 MG/DL (ref 65–99)
HCT VFR BLD AUTO: 44.9 % (ref 37.5–51)
HDLC SERPL-MCNC: 31 MG/DL (ref 40–60)
HGB BLD-MCNC: 15.1 G/DL (ref 13–17.7)
LDLC SERPL CALC-MCNC: 62 MG/DL (ref 0–100)
LDLC/HDLC SERPL: 1.66 {RATIO}
LV EF NUC BP: 60 %
LYMPHOCYTES # BLD AUTO: 1.7 10*3/MM3 (ref 0.7–3.1)
LYMPHOCYTES NFR BLD AUTO: 23.5 % (ref 19.6–45.3)
MAGNESIUM SERPL-MCNC: 2.1 MG/DL (ref 1.6–2.4)
MAXIMAL PREDICTED HEART RATE: 142 BPM
MCH RBC QN AUTO: 28.4 PG (ref 26.6–33)
MCHC RBC AUTO-ENTMCNC: 33.6 G/DL (ref 31.5–35.7)
MCV RBC AUTO: 84.6 FL (ref 79–97)
MONOCYTES # BLD AUTO: 0.7 10*3/MM3 (ref 0.1–0.9)
MONOCYTES NFR BLD AUTO: 8.8 % (ref 5–12)
NEUTROPHILS NFR BLD AUTO: 4.6 10*3/MM3 (ref 1.7–7)
NEUTROPHILS NFR BLD AUTO: 62.3 % (ref 42.7–76)
NRBC BLD AUTO-RTO: 0.2 /100 WBC (ref 0–0.2)
PERCENT MAX PREDICTED HR: 57.75 %
PLATELET # BLD AUTO: 276 10*3/MM3 (ref 140–450)
PMV BLD AUTO: 7.8 FL (ref 6–12)
POTASSIUM SERPL-SCNC: 4.3 MMOL/L (ref 3.5–5.2)
RBC # BLD AUTO: 5.31 10*6/MM3 (ref 4.14–5.8)
SODIUM SERPL-SCNC: 141 MMOL/L (ref 136–145)
STRESS BASELINE BP: NORMAL MMHG
STRESS BASELINE HR: 67 BPM
STRESS PERCENT HR: 68 %
STRESS POST PEAK BP: NORMAL MMHG
STRESS POST PEAK HR: 82 BPM
STRESS TARGET HR: 121 BPM
TRIGL SERPL-MCNC: 263 MG/DL (ref 0–150)
VLDLC SERPL-MCNC: 42 MG/DL (ref 5–40)
WBC # BLD AUTO: 7.4 10*3/MM3 (ref 3.4–10.8)

## 2021-05-27 PROCEDURE — 82962 GLUCOSE BLOOD TEST: CPT

## 2021-05-27 PROCEDURE — 0 TECHNETIUM SESTAMIBI: Performed by: EMERGENCY MEDICINE

## 2021-05-27 PROCEDURE — A9500 TC99M SESTAMIBI: HCPCS | Performed by: EMERGENCY MEDICINE

## 2021-05-27 PROCEDURE — 85025 COMPLETE CBC W/AUTO DIFF WBC: CPT | Performed by: PHYSICIAN ASSISTANT

## 2021-05-27 PROCEDURE — 78452 HT MUSCLE IMAGE SPECT MULT: CPT

## 2021-05-27 PROCEDURE — 80061 LIPID PANEL: CPT | Performed by: PHYSICIAN ASSISTANT

## 2021-05-27 PROCEDURE — 80048 BASIC METABOLIC PNL TOTAL CA: CPT | Performed by: PHYSICIAN ASSISTANT

## 2021-05-27 PROCEDURE — 25010000002 ENOXAPARIN PER 10 MG: Performed by: HOSPITALIST

## 2021-05-27 PROCEDURE — 99222 1ST HOSP IP/OBS MODERATE 55: CPT | Performed by: INTERNAL MEDICINE

## 2021-05-27 PROCEDURE — 93017 CV STRESS TEST TRACING ONLY: CPT

## 2021-05-27 PROCEDURE — 93016 CV STRESS TEST SUPVJ ONLY: CPT | Performed by: NURSE PRACTITIONER

## 2021-05-27 PROCEDURE — 25010000002 REGADENOSON 0.4 MG/5ML SOLUTION: Performed by: EMERGENCY MEDICINE

## 2021-05-27 PROCEDURE — 83735 ASSAY OF MAGNESIUM: CPT | Performed by: PHYSICIAN ASSISTANT

## 2021-05-27 PROCEDURE — 99222 1ST HOSP IP/OBS MODERATE 55: CPT | Performed by: HOSPITALIST

## 2021-05-27 PROCEDURE — 78452 HT MUSCLE IMAGE SPECT MULT: CPT | Performed by: INTERNAL MEDICINE

## 2021-05-27 PROCEDURE — 25010000002 ENOXAPARIN PER 10 MG: Performed by: PHYSICIAN ASSISTANT

## 2021-05-27 PROCEDURE — 93018 CV STRESS TEST I&R ONLY: CPT | Performed by: INTERNAL MEDICINE

## 2021-05-27 RX ORDER — AMOXICILLIN 250 MG
2 CAPSULE ORAL 2 TIMES DAILY
Status: DISCONTINUED | OUTPATIENT
Start: 2021-05-27 | End: 2021-05-28 | Stop reason: HOSPADM

## 2021-05-27 RX ORDER — NITROGLYCERIN 0.4 MG/1
0.4 TABLET SUBLINGUAL
Status: DISCONTINUED | OUTPATIENT
Start: 2021-05-27 | End: 2021-05-28 | Stop reason: HOSPADM

## 2021-05-27 RX ORDER — BISACODYL 10 MG
10 SUPPOSITORY, RECTAL RECTAL DAILY PRN
Status: DISCONTINUED | OUTPATIENT
Start: 2021-05-27 | End: 2021-05-28 | Stop reason: HOSPADM

## 2021-05-27 RX ORDER — SODIUM CHLORIDE 0.9 % (FLUSH) 0.9 %
3 SYRINGE (ML) INJECTION EVERY 12 HOURS SCHEDULED
Status: DISCONTINUED | OUTPATIENT
Start: 2021-05-27 | End: 2021-05-28 | Stop reason: HOSPADM

## 2021-05-27 RX ORDER — ONDANSETRON 4 MG/1
4 TABLET, FILM COATED ORAL EVERY 6 HOURS PRN
Status: DISCONTINUED | OUTPATIENT
Start: 2021-05-27 | End: 2021-05-28 | Stop reason: HOSPADM

## 2021-05-27 RX ORDER — POLYETHYLENE GLYCOL 3350 17 G/17G
17 POWDER, FOR SOLUTION ORAL DAILY PRN
Status: DISCONTINUED | OUTPATIENT
Start: 2021-05-27 | End: 2021-05-28 | Stop reason: HOSPADM

## 2021-05-27 RX ORDER — ONDANSETRON 2 MG/ML
4 INJECTION INTRAMUSCULAR; INTRAVENOUS EVERY 6 HOURS PRN
Status: DISCONTINUED | OUTPATIENT
Start: 2021-05-27 | End: 2021-05-28 | Stop reason: HOSPADM

## 2021-05-27 RX ORDER — BISACODYL 5 MG/1
5 TABLET, DELAYED RELEASE ORAL DAILY PRN
Status: DISCONTINUED | OUTPATIENT
Start: 2021-05-27 | End: 2021-05-28 | Stop reason: HOSPADM

## 2021-05-27 RX ORDER — SODIUM CHLORIDE 0.9 % (FLUSH) 0.9 %
3-10 SYRINGE (ML) INJECTION AS NEEDED
Status: DISCONTINUED | OUTPATIENT
Start: 2021-05-27 | End: 2021-05-28 | Stop reason: HOSPADM

## 2021-05-27 RX ORDER — ACETAMINOPHEN 500 MG
1000 TABLET ORAL 3 TIMES DAILY
Status: DISCONTINUED | OUTPATIENT
Start: 2021-05-27 | End: 2021-05-28 | Stop reason: HOSPADM

## 2021-05-27 RX ADMIN — TECHNETIUM TC 99M SESTAMIBI 1 DOSE: 1 INJECTION INTRAVENOUS at 06:45

## 2021-05-27 RX ADMIN — ASPIRIN 325 MG ORAL TABLET 325 MG: 325 PILL ORAL at 17:39

## 2021-05-27 RX ADMIN — Medication 3 ML: at 20:57

## 2021-05-27 RX ADMIN — TECHNETIUM TC 99M SESTAMIBI 1 DOSE: 1 INJECTION INTRAVENOUS at 08:19

## 2021-05-27 RX ADMIN — DONEPEZIL HYDROCHLORIDE 5 MG: 5 TABLET, FILM COATED ORAL at 20:56

## 2021-05-27 RX ADMIN — GABAPENTIN 300 MG: 300 CAPSULE ORAL at 20:56

## 2021-05-27 RX ADMIN — PANTOPRAZOLE SODIUM 40 MG: 40 TABLET, DELAYED RELEASE ORAL at 09:35

## 2021-05-27 RX ADMIN — ENOXAPARIN SODIUM 40 MG: 40 INJECTION SUBCUTANEOUS at 16:47

## 2021-05-27 RX ADMIN — Medication 10 ML: at 20:56

## 2021-05-27 RX ADMIN — Medication 1 TABLET: at 09:35

## 2021-05-27 RX ADMIN — ATORVASTATIN CALCIUM 20 MG: 20 TABLET, FILM COATED ORAL at 20:56

## 2021-05-27 RX ADMIN — ACETAMINOPHEN 1000 MG: 500 TABLET, FILM COATED ORAL at 20:56

## 2021-05-27 RX ADMIN — Medication 10 ML: at 09:35

## 2021-05-27 RX ADMIN — CYCLOBENZAPRINE HYDROCHLORIDE 10 MG: 10 TABLET, FILM COATED ORAL at 20:55

## 2021-05-27 RX ADMIN — LISINOPRIL 10 MG: 5 TABLET ORAL at 20:56

## 2021-05-27 RX ADMIN — REGADENOSON 0.4 MG: 0.08 INJECTION, SOLUTION INTRAVENOUS at 08:19

## 2021-05-27 RX ADMIN — ISOSORBIDE MONONITRATE 90 MG: 60 TABLET, EXTENDED RELEASE ORAL at 09:35

## 2021-05-28 ENCOUNTER — READMISSION MANAGEMENT (OUTPATIENT)
Dept: CALL CENTER | Facility: HOSPITAL | Age: 76
End: 2021-05-28

## 2021-05-28 VITALS
BODY MASS INDEX: 32.21 KG/M2 | RESPIRATION RATE: 11 BRPM | SYSTOLIC BLOOD PRESSURE: 143 MMHG | HEIGHT: 68 IN | OXYGEN SATURATION: 98 % | TEMPERATURE: 96.9 F | WEIGHT: 212.52 LBS | DIASTOLIC BLOOD PRESSURE: 72 MMHG | HEART RATE: 67 BPM

## 2021-05-28 PROBLEM — R07.9 CHEST PAIN: Status: RESOLVED | Noted: 2021-05-26 | Resolved: 2021-05-28

## 2021-05-28 PROBLEM — R06.00 DYSPNEA: Status: RESOLVED | Noted: 2019-07-17 | Resolved: 2021-05-28

## 2021-05-28 LAB
ANION GAP SERPL CALCULATED.3IONS-SCNC: 11 MMOL/L (ref 5–15)
BASOPHILS # BLD AUTO: 0.1 10*3/MM3 (ref 0–0.2)
BASOPHILS NFR BLD AUTO: 1.8 % (ref 0–1.5)
BUN SERPL-MCNC: 17 MG/DL (ref 8–23)
BUN/CREAT SERPL: 21 (ref 7–25)
CALCIUM SPEC-SCNC: 8.8 MG/DL (ref 8.6–10.5)
CHLORIDE SERPL-SCNC: 106 MMOL/L (ref 98–107)
CO2 SERPL-SCNC: 24 MMOL/L (ref 22–29)
CREAT SERPL-MCNC: 0.81 MG/DL (ref 0.76–1.27)
DEPRECATED RDW RBC AUTO: 43.3 FL (ref 37–54)
EOSINOPHIL # BLD AUTO: 0.3 10*3/MM3 (ref 0–0.4)
EOSINOPHIL NFR BLD AUTO: 4.6 % (ref 0.3–6.2)
ERYTHROCYTE [DISTWIDTH] IN BLOOD BY AUTOMATED COUNT: 14.8 % (ref 12.3–15.4)
GFR SERPL CREATININE-BSD FRML MDRD: 92 ML/MIN/1.73
GLUCOSE BLDC GLUCOMTR-MCNC: 102 MG/DL (ref 70–105)
GLUCOSE BLDC GLUCOMTR-MCNC: 106 MG/DL (ref 70–105)
GLUCOSE BLDC GLUCOMTR-MCNC: 144 MG/DL (ref 70–105)
GLUCOSE SERPL-MCNC: 145 MG/DL (ref 65–99)
HCT VFR BLD AUTO: 45.5 % (ref 37.5–51)
HGB BLD-MCNC: 15 G/DL (ref 13–17.7)
LYMPHOCYTES # BLD AUTO: 1.8 10*3/MM3 (ref 0.7–3.1)
LYMPHOCYTES NFR BLD AUTO: 23.9 % (ref 19.6–45.3)
MAGNESIUM SERPL-MCNC: 2.2 MG/DL (ref 1.6–2.4)
MCH RBC QN AUTO: 28.3 PG (ref 26.6–33)
MCHC RBC AUTO-ENTMCNC: 32.9 G/DL (ref 31.5–35.7)
MCV RBC AUTO: 86 FL (ref 79–97)
MONOCYTES # BLD AUTO: 0.6 10*3/MM3 (ref 0.1–0.9)
MONOCYTES NFR BLD AUTO: 8.5 % (ref 5–12)
NEUTROPHILS NFR BLD AUTO: 4.6 10*3/MM3 (ref 1.7–7)
NEUTROPHILS NFR BLD AUTO: 61.2 % (ref 42.7–76)
NRBC BLD AUTO-RTO: 0.2 /100 WBC (ref 0–0.2)
PLATELET # BLD AUTO: 265 10*3/MM3 (ref 140–450)
PMV BLD AUTO: 8.3 FL (ref 6–12)
POTASSIUM SERPL-SCNC: 3.9 MMOL/L (ref 3.5–5.2)
RBC # BLD AUTO: 5.29 10*6/MM3 (ref 4.14–5.8)
SODIUM SERPL-SCNC: 141 MMOL/L (ref 136–145)
WBC # BLD AUTO: 7.5 10*3/MM3 (ref 3.4–10.8)

## 2021-05-28 PROCEDURE — 25010000002 FENTANYL CITRATE (PF) 100 MCG/2ML SOLUTION: Performed by: INTERNAL MEDICINE

## 2021-05-28 PROCEDURE — 93458 L HRT ARTERY/VENTRICLE ANGIO: CPT | Performed by: INTERNAL MEDICINE

## 2021-05-28 PROCEDURE — 25010000002 MIDAZOLAM PER 1 MG: Performed by: INTERNAL MEDICINE

## 2021-05-28 PROCEDURE — 99232 SBSQ HOSP IP/OBS MODERATE 35: CPT | Performed by: INTERNAL MEDICINE

## 2021-05-28 PROCEDURE — 99238 HOSP IP/OBS DSCHRG MGMT 30/<: CPT | Performed by: HOSPITALIST

## 2021-05-28 PROCEDURE — 83735 ASSAY OF MAGNESIUM: CPT | Performed by: HOSPITALIST

## 2021-05-28 PROCEDURE — 82962 GLUCOSE BLOOD TEST: CPT

## 2021-05-28 PROCEDURE — 85025 COMPLETE CBC W/AUTO DIFF WBC: CPT | Performed by: HOSPITALIST

## 2021-05-28 PROCEDURE — 99152 MOD SED SAME PHYS/QHP 5/>YRS: CPT | Performed by: INTERNAL MEDICINE

## 2021-05-28 PROCEDURE — 0 IOPAMIDOL PER 1 ML: Performed by: INTERNAL MEDICINE

## 2021-05-28 PROCEDURE — 80048 BASIC METABOLIC PNL TOTAL CA: CPT | Performed by: HOSPITALIST

## 2021-05-28 PROCEDURE — B2111ZZ FLUOROSCOPY OF MULTIPLE CORONARY ARTERIES USING LOW OSMOLAR CONTRAST: ICD-10-PCS | Performed by: INTERNAL MEDICINE

## 2021-05-28 PROCEDURE — C1769 GUIDE WIRE: HCPCS | Performed by: INTERNAL MEDICINE

## 2021-05-28 PROCEDURE — C1894 INTRO/SHEATH, NON-LASER: HCPCS | Performed by: INTERNAL MEDICINE

## 2021-05-28 PROCEDURE — 4A023N7 MEASUREMENT OF CARDIAC SAMPLING AND PRESSURE, LEFT HEART, PERCUTANEOUS APPROACH: ICD-10-PCS | Performed by: INTERNAL MEDICINE

## 2021-05-28 RX ORDER — MIDAZOLAM HYDROCHLORIDE 1 MG/ML
INJECTION INTRAMUSCULAR; INTRAVENOUS AS NEEDED
Status: DISCONTINUED | OUTPATIENT
Start: 2021-05-28 | End: 2021-05-28 | Stop reason: HOSPADM

## 2021-05-28 RX ORDER — LIDOCAINE HYDROCHLORIDE 20 MG/ML
INJECTION, SOLUTION INFILTRATION; PERINEURAL AS NEEDED
Status: DISCONTINUED | OUTPATIENT
Start: 2021-05-28 | End: 2021-05-28 | Stop reason: HOSPADM

## 2021-05-28 RX ORDER — ONDANSETRON 4 MG/1
4 TABLET, FILM COATED ORAL EVERY 6 HOURS PRN
Status: DISCONTINUED | OUTPATIENT
Start: 2021-05-28 | End: 2021-05-28 | Stop reason: HOSPADM

## 2021-05-28 RX ORDER — FENTANYL CITRATE 50 UG/ML
INJECTION, SOLUTION INTRAMUSCULAR; INTRAVENOUS AS NEEDED
Status: DISCONTINUED | OUTPATIENT
Start: 2021-05-28 | End: 2021-05-28 | Stop reason: HOSPADM

## 2021-05-28 RX ORDER — SODIUM CHLORIDE 9 MG/ML
250 INJECTION, SOLUTION INTRAVENOUS ONCE AS NEEDED
Status: DISCONTINUED | OUTPATIENT
Start: 2021-05-28 | End: 2021-05-28 | Stop reason: HOSPADM

## 2021-05-28 RX ORDER — SODIUM CHLORIDE 9 MG/ML
INJECTION, SOLUTION INTRAVENOUS CONTINUOUS PRN
Status: COMPLETED | OUTPATIENT
Start: 2021-05-28 | End: 2021-05-28

## 2021-05-28 RX ORDER — ONDANSETRON 2 MG/ML
4 INJECTION INTRAMUSCULAR; INTRAVENOUS EVERY 6 HOURS PRN
Status: DISCONTINUED | OUTPATIENT
Start: 2021-05-28 | End: 2021-05-28 | Stop reason: HOSPADM

## 2021-05-28 RX ORDER — NITROGLYCERIN 5 MG/ML
INJECTION, SOLUTION INTRAVENOUS AS NEEDED
Status: DISCONTINUED | OUTPATIENT
Start: 2021-05-28 | End: 2021-05-28 | Stop reason: HOSPADM

## 2021-05-28 RX ORDER — ACETAMINOPHEN 325 MG/1
650 TABLET ORAL EVERY 4 HOURS PRN
Status: DISCONTINUED | OUTPATIENT
Start: 2021-05-28 | End: 2021-05-28 | Stop reason: HOSPADM

## 2021-05-29 NOTE — OUTREACH NOTE
Prep Survey      Responses   [facility] facility patient discharged from?  [facility]   Is LACE score < 7 ?  No   Emergency Room discharge w/ pulse ox?  No   Eligibility  TCM   Hospital  [facility]   Date of Admission  05/26/21   Date of Discharge  05/28/21   Discharge Disposition  Home or Self Care   Discharge diagnosis  chest pain, heart cath - medical management, DM   Does the patient have one of the following disease processes/diagnoses(primary or secondary)?  Other   Does the patient have Home health ordered?  No   Is there a DME ordered?  No   Prep survey completed?  Yes          Helene Tian RN

## 2021-05-30 LAB — QT INTERVAL: 379 MS

## 2021-06-01 ENCOUNTER — TRANSITIONAL CARE MANAGEMENT TELEPHONE ENCOUNTER (OUTPATIENT)
Dept: CALL CENTER | Facility: HOSPITAL | Age: 76
End: 2021-06-01

## 2021-06-01 RX ORDER — LISINOPRIL 10 MG/1
TABLET ORAL
Qty: 90 TABLET | Refills: 3 | Status: SHIPPED | OUTPATIENT
Start: 2021-06-01 | End: 2022-04-18

## 2021-06-01 RX ORDER — PANTOPRAZOLE SODIUM 40 MG/1
TABLET, DELAYED RELEASE ORAL
Qty: 90 TABLET | Refills: 3 | Status: SHIPPED | OUTPATIENT
Start: 2021-06-01 | End: 2022-05-15

## 2021-06-01 NOTE — CASE MANAGEMENT/SOCIAL WORK
Case Management Discharge Note                Selected Continued Care - Discharged on 5/28/2021 Admission date: 5/26/2021 - Discharge disposition: Home or Self Care                     Final Discharge Disposition Code: 01 - home or self-care

## 2021-06-01 NOTE — OUTREACH NOTE
Call Center TCM Note      Responses   [facility] patient discharged from?  [facility]   Does the patient have one of the following disease processes/diagnoses(primary or secondary)?  Other   TCM attempt successful?  No   Unsuccessful attempts  Attempt 1          Galilea Mcqueen RN    6/1/2021, 11:37 EDT

## 2021-06-01 NOTE — OUTREACH NOTE
Call Center TCM Note      Responses   [facility] patient discharged from?  [facility]   Does the patient have one of the following disease processes/diagnoses(primary or secondary)?  Other   TCM attempt successful?  Yes   Call start time  1443   Call end time  1447   Discharge diagnosis  chest pain, heart cath - medical management, DM   Meds reviewed with patient/caregiver?  Yes   Is the patient having any side effects they believe may be caused by any medication additions or changes?  No   Does the patient have all medications ordered at discharge?  Yes   Is the patient taking all medications as directed (includes completed medication regime)?  Yes   Comments regarding appointments  My Chart video visit with PCP Dr. Cueva 6/2/21 @ 4pm   Does the patient have a primary care provider?   Yes   Does the patient have an appointment with their PCP within 7 days of discharge?  Yes   Has the patient kept scheduled appointments due by today?  N/A   Has home health visited the patient within 72 hours of discharge?  N/A   Psychosocial issues?  No   Did the patient receive a copy of their discharge instructions?  Yes   Nursing interventions  Reviewed instructions with patient   What is the patient's perception of their health status since discharge?  Improving   Is the patient/caregiver able to teach back signs and symptoms related to disease process for when to call PCP?  Yes   Is the patient/caregiver able to teach back signs and symptoms related to disease process for when to call 911?  Yes   Is the patient/caregiver able to teach back the hierarchy of who to call/visit for symptoms/problems? PCP, Specialist, Home health nurse, Urgent Care, ED, 911  Yes   If the patient is a current smoker, are they able to teach back resources for cessation?  Not a smoker   TCM call completed?  Yes          Galilea Mcqueen RN    6/1/2021, 14:49 EDT

## 2021-06-02 ENCOUNTER — TELEMEDICINE (OUTPATIENT)
Dept: FAMILY MEDICINE CLINIC | Facility: CLINIC | Age: 76
End: 2021-06-02

## 2021-06-02 DIAGNOSIS — I20.8 STABLE ANGINA PECTORIS (HCC): ICD-10-CM

## 2021-06-02 DIAGNOSIS — I25.10 CHRONIC CORONARY ARTERY DISEASE: Primary | ICD-10-CM

## 2021-06-02 DIAGNOSIS — R42 LIGHTHEADEDNESS: ICD-10-CM

## 2021-06-02 PROCEDURE — 99495 TRANSJ CARE MGMT MOD F2F 14D: CPT | Performed by: FAMILY MEDICINE

## 2021-06-02 PROCEDURE — 1111F DSCHRG MED/CURRENT MED MERGE: CPT | Performed by: FAMILY MEDICINE

## 2021-06-02 RX ORDER — NAPROXEN 500 MG/1
TABLET ORAL
Qty: 180 TABLET | Refills: 3 | Status: SHIPPED | OUTPATIENT
Start: 2021-06-02 | End: 2022-05-23

## 2021-06-02 NOTE — ASSESSMENT & PLAN NOTE
Coronary artery disease is unchanged.  Dietary sodium restriction.  Weight loss.  Regular aerobic exercise.  Continue current medications. Simvastatin, NTG, ASA.  Cardiac status will be reassessed in 3 months.

## 2021-06-02 NOTE — PROGRESS NOTES
"Transitional Care Follow Up Visit  Subjective     Heri Vasquez is a 76 y.o. male who presents for a transitional care management visit.    Within 48 business hours after discharge our office contacted him via telephone to coordinate his care and needs.      I reviewed and discussed the details of that call along with the discharge summary, hospital problems, inpatient lab results, inpatient diagnostic studies, and consultation reports with Heri.     Current outpatient and discharge medications have been reconciled for the patient.  Reviewed by: Olesya Cueva MD      Date of TCM Phone Call 5/28/2021   H[facility]   Date of Admission 5/26/2021   Date of Discharge 5/28/2021   Discharge Disposition Home or Self Care     Risk for Readmission (LACE) Score: 11 (5/28/2021  6:01 AM)    You have chosen to receive care through a telehealth visit.  Do you consent to use a video/audio connection for your medical care today? Yes    History of Present Illness   Course During Hospital Stay:  He went to the ER at [facility] on 5/26/21 with shortness of breath and lightheadedness.  He was admitted had had labs, chest x-ray and a stress test.  The stress test was abnormal so he stayed and had a heart cath with Dr. Clark.  The heart cath showed : Mild to moderate obstructive coronary disease involving the LAD and right coronary artery, Distal LAD with segment of intramyocardial bridging, Normal LV systolic function, Normal wall motion, and Estimated LV ejection fraction of 60%.  He was discharged home in good condition.  Since being home he is feeling \"really good\".  He reports that his groin is bruised where the heart cath was performed but it is not swollen or painful.  He has been following instructions and is not doing any heavy lifting.  He has not had any further chest discomfort, shortness of breath or lightheadedness.      The following portions of the patient's history were reviewed and updated as appropriate: " allergies, current medications, past family history, past medical history, past social history, past surgical history and problem list.    Review of Systems    Objective   Physical Exam  Constitutional:       Appearance: Normal appearance.   Pulmonary:      Breath sounds: Normal breath sounds.   Neurological:      Mental Status: He is alert.   Psychiatric:         Mood and Affect: Mood normal.         Assessment/Plan   Diagnoses and all orders for this visit:    1. Chronic coronary artery disease (Primary)  Assessment & Plan:  Coronary artery disease is unchanged.  Dietary sodium restriction.  Weight loss.  Regular aerobic exercise.  Continue current medications. Simvastatin, NTG, ASA.  Cardiac status will be reassessed in 3 months.      2. Stable angina pectoris (CMS/HCC)    3. Lightheadedness  Assessment & Plan:  Resolved.

## 2021-06-03 DIAGNOSIS — R41.3 MEMORY CHANGES: ICD-10-CM

## 2021-06-04 RX ORDER — DONEPEZIL HYDROCHLORIDE 5 MG/1
5 TABLET, FILM COATED ORAL NIGHTLY
Qty: 90 TABLET | Refills: 1 | Status: SHIPPED | OUTPATIENT
Start: 2021-06-04 | End: 2021-11-23

## 2021-06-09 DIAGNOSIS — E11.9 TYPE 2 DIABETES MELLITUS WITHOUT COMPLICATION, WITHOUT LONG-TERM CURRENT USE OF INSULIN (HCC): ICD-10-CM

## 2021-06-09 RX ORDER — LANCETS 33 GAUGE
EACH MISCELLANEOUS
Qty: 100 EACH | Refills: 0 | Status: SHIPPED | OUTPATIENT
Start: 2021-06-09 | End: 2021-08-30

## 2021-06-16 DIAGNOSIS — M54.6 ACUTE RIGHT-SIDED THORACIC BACK PAIN: ICD-10-CM

## 2021-06-16 RX ORDER — CYCLOBENZAPRINE HCL 10 MG
TABLET ORAL
Qty: 30 TABLET | Refills: 0 | Status: SHIPPED | OUTPATIENT
Start: 2021-06-16 | End: 2021-07-09

## 2021-06-22 ENCOUNTER — OFFICE VISIT (OUTPATIENT)
Dept: CARDIOLOGY | Facility: CLINIC | Age: 76
End: 2021-06-22

## 2021-06-22 VITALS
OXYGEN SATURATION: 94 % | BODY MASS INDEX: 34.52 KG/M2 | DIASTOLIC BLOOD PRESSURE: 73 MMHG | HEART RATE: 82 BPM | WEIGHT: 227 LBS | SYSTOLIC BLOOD PRESSURE: 144 MMHG

## 2021-06-22 DIAGNOSIS — E78.2 MIXED HYPERLIPIDEMIA: ICD-10-CM

## 2021-06-22 DIAGNOSIS — I25.10 CHRONIC CORONARY ARTERY DISEASE: Primary | ICD-10-CM

## 2021-06-22 PROCEDURE — 99213 OFFICE O/P EST LOW 20 MIN: CPT | Performed by: NURSE PRACTITIONER

## 2021-06-22 NOTE — PROGRESS NOTES
[facility] CARDIOLOGY      REASON FOR FOLLOW-UP:  Follow-up hospitalization for chest pain  Follow-up heart catheterization          Chief Complaint   Patient presents with   • Coronary Artery Disease     Follow up    • Hyperlipidemia         Dear Anay, Olesya Bay MD        History of Present Illness   76-year-old male with known history of coronary artery disease-mild-mod per heart cath October 2017.  There was myocardial bridging in the distal portion of LAD, LM normal, distal LAD with 50-60% lesion and too small for intervention.  30-40% lesion noted RCA, LCx and ramus branches normal.  Patient was started on long-acting nitrate.  He has additional history of dyslipidemia, hypertension, fatty liver, obesity, dementia.  Patient presented to [facility] 5/26/2021 with complaint of shortness of breath that began the morning of admission.  He reported he had not felt well for the past 2 days prior with weakness and poor activity tolerance.  Patient stated he had some right-sided chest discomfort described as a tightness without radiation.  He denied any cough, fevers, nausea, orthopnea, PND or lower extremity edema.  He presented to the ER for further evaluation.  Patient ruled out for acute coronary syndrome with negative serial cardiac enzymes.  EKG shows normal sinus rhythm with left axis.  He then will underwent pharmacological Myoview that showed moderate sized, severe intensity mostly fixed perfusion defect in the inferior and inferior septal wall with no evidence of reversible ischemia.  EF 60%.  He ultimately underwent heart catheterization that demonstrated mild to moderate obstructive CAD of the LAD and RCA with distal LAD segment intramyocardial bridging, EF 60%.  He presents today in follow-up from that admission.    Today, the patient reports that he feels well and has had no further chest discomfort.  He denies any shortness of breath, dyspnea with exertion,  dizziness or lightheadedness.  No lower extremity edema, orthopnea or PND.  Blood pressure in the office today is 144/73.  Patient needs refill for sublingual nitroglycerin.  Results of diagnostics were reviewed with the patient today.  Questions were answered.      ASSESSMENT:  Coronary artery disease-nonocclusive  Dyslipidemia  Essential hypertension  Steatosis  Obesity with BMI 34.52    PLAN:  Continue aspirin, statin, ACE  I will refill sublingual nitroglycerin  Follow-up        The following portions of the patient's history were reviewed and updated as appropriate: allergies, current medications, past family history, past medical history, past social history, past surgical history and problem list.    REVIEW OF SYSTEMS:    Review of Systems   All other systems reviewed and are negative.      Vitals:    06/22/21 1429   BP: 144/73   Pulse: 82   SpO2: 94%         PHYSICAL EXAM:    General: Alert, cooperative, no distress, appears stated age  Head:  Normocephalic, atraumatic, mucous membranes moist  Eyes:  Conjunctiva/corneas clear, EOM's intact     Neck:  Supple,  no JVD or bruit     Lungs: Clear to auscultation bilaterally, no wheezes rhonchi rales are noted  Chest wall: No tenderness  Musculoskeletal:   Ambulates freely without assistance  Heart::  Regular rate and rhythm, S1 and S2 normal, no murmur, rub or gallop  Abdomen: Soft, non-tender, nondistended, bowel sounds active, no abdominal bruit  Extremities: No cyanosis, clubbing, or edema   Pulses: 2+ and symmetric all extremities  Skin:  No rashes or lesions  Neuro/psych: A&O x3. CN II through XII are grossly intact with appropriate affect        Past Medical History:   Diagnosis Date   • Arthritis     Dr Mosqueda   • Coronary heart disease 01/2016    single vessel disease, nl stress test (copied from [facility])   • Coronary heart disease 10/31/2017    cath 10/31/17 - Low % Blockages- N o Intervention   (copied from [facility])   • Diverticulosis    • GERD  (gastroesophageal reflux disease)    • Hearing loss    • Hyperlipemia    • Hyperlipidemia    • Low back pain    • SIDDHARTHA treated with BiPAP    • Pain management     injections Lumbar spine X2 with [facility] management @ [facility] (copied from [facility])   • Physical therapy evaluation, initial     @ [facility] in [place] 6 weeks x3 per week, [facility] (copied from [facility])   • Rectal bleed 08/2012    hemorrhoids   • Retinal hemorrhage of right eye 05/2014   • Sleep apnea    • Type 2 diabetes mellitus (CMS/HCC)    • Uses brace        Past Surgical History:   Procedure Laterality Date   • BELPHAROPTOSIS REPAIR  12/11/2017     Dr. grimes (copied from [facility])   • BROW LIFT  12/11/2017    Dr. Grimes at [facility]   • CARDIAC CATHETERIZATION  03/2012    at [place]- single vessel disease. (copied from [facility])   • CARDIAC CATHETERIZATION  10/13/2017    no intervention - Low % Blockages.   • CARDIAC CATHETERIZATION Left 5/28/2021    Procedure: LEFT HEART CATH with possible PCI;  Surgeon: Leslie Clark MD;  Location: [facility] CATH INVASIVE LOCATION;  Service: Cardiovascular;  Laterality: Left;  Local and IV sedation   • CATARACT EXTRACTION  12/11/2017    brow lift and eye lid repair   • COLON SURGERY  06/2014    colonscopy   • COLONOSCOPY N/A 7/12/2019    Procedure: COLONOSCOPY with polypectomy x1;  Surgeon: Graham Byrd MD;  Location: [facility] ENDOSCOPY;  Service: Gastroenterology   • HERNIA REPAIR  11/2008    umbilical hernia repair   • JOINT REPLACEMENT     • KNEE ARTHROSCOPY Right 11/2014    Dr. Mosqueda   • LASIK      lasik and Cataract Extraction, sep 11 and right Sept. 25th, 2014- [facility]. (copied from [facility])   • TOTAL KNEE ARTHROPLASTY Left 11/2009    Dr. Mosqueda (copied from [facility])         Current Outpatient Medications:   •  acetaminophen (TYLENOL) 500 MG tablet, Take 500 mg by mouth Every 6 (Six) Hours As Needed for Mild Pain ., Disp: , Rfl:   •  aspirin 325 MG tablet, Take 325 mg by mouth Every  Evening., Disp: , Rfl:   •  betamethasone dipropionate (DIPROLENE) 0.05 % lotion, Apply 1 application topically to the appropriate area as directed 2 (Two) Times a Day As Needed., Disp: , Rfl:   •  cyclobenzaprine (FLEXERIL) 10 MG tablet, TAKE 1 TABLET BY MOUTH AT NIGHT AS NEEDED FOR MUSCLE SPASM, Disp: 30 tablet, Rfl: 0  •  docusate sodium (COLACE) 50 MG capsule, Take 50 mg by mouth 3 (Three) Times a Day As Needed for Constipation., Disp: , Rfl:   •  donepezil (Aricept) 5 MG tablet, Take 1 tablet by mouth Every Night., Disp: 90 tablet, Rfl: 1  •  gabapentin (NEURONTIN) 300 MG capsule, TAKE 1 CAPSULE BY MOUTH AT  NIGHT, Disp: 90 capsule, Rfl: 3  •  hydrocortisone 2.5 % cream, Apply 1 application topically to the appropriate area as directed 2 (Two) Times a Day As Needed., Disp: , Rfl:   •  isosorbide mononitrate (IMDUR) 60 MG 24 hr tablet, TAKE 1 AND 1/2 TABLETS BY  MOUTH DAILY, Disp: 135 tablet, Rfl: 3  •  Lancets (OneTouch Delica Plus [id number]) misc, USE 1  TO CHECK GLUCOSE ONCE DAILY, Disp: 100 each, Rfl: 0  •  lisinopril (PRINIVIL,ZESTRIL) 10 MG tablet, TAKE 1 TABLET BY MOUTH  DAILY, Disp: 90 tablet, Rfl: 3  •  metFORMIN (GLUCOPHAGE) 500 MG tablet, Take 1 tablet by mouth 2 (Two) Times a Day With Meals., Disp: 180 tablet, Rfl: 3  •  Multiple Vitamins-Minerals (MULTIVITAMIN WITH MINERALS) tablet tablet, Take 1 tablet by mouth Daily., Disp: , Rfl:   •  naproxen (NAPROSYN) 500 MG tablet, TAKE 1 TABLET BY MOUTH  TWICE DAILY AS NEEDED FOR  MODERATE PAIN, Disp: 180 tablet, Rfl: 3  •  nitroglycerin (NITROSTAT) 0.4 MG SL tablet, Place 0.4 mg under the tongue Every 5 (Five) Minutes As Needed., Disp: , Rfl: 4  •  pantoprazole (PROTONIX) 40 MG EC tablet, TAKE 1 TABLET BY MOUTH  DAILY, Disp: 90 tablet, Rfl: 3  •  simvastatin (ZOCOR) 40 MG tablet, TAKE 1 TABLET BY MOUTH  DAILY AT BEDTIME, Disp: 90 tablet, Rfl: 3    No Known Allergies    Family History   Problem Relation Age of Onset   • Heart disease Mother         CHF   •  "Hypertension Mother    • Heart failure Mother    • Stroke Maternal Grandfather    • Diabetes Other    • Diabetes Other    • Stroke Other        Social History     Tobacco Use   • Smoking status: Former Smoker     Quit date:      Years since quittin.   • Smokeless tobacco: Former User   Substance Use Topics   • Alcohol use: No           Current Electrocardiogram:  Procedures        EMR Dragon/Transcription:   \"Dictated utilizing Dragon dictation\".       "

## 2021-06-24 RX ORDER — NITROGLYCERIN 0.4 MG/1
0.4 TABLET SUBLINGUAL
Qty: 30 TABLET | Refills: 4 | Status: SHIPPED | OUTPATIENT
Start: 2021-06-24

## 2021-07-09 DIAGNOSIS — M54.6 ACUTE RIGHT-SIDED THORACIC BACK PAIN: ICD-10-CM

## 2021-07-09 RX ORDER — CYCLOBENZAPRINE HCL 10 MG
TABLET ORAL
Qty: 30 TABLET | Refills: 0 | Status: SHIPPED | OUTPATIENT
Start: 2021-07-09 | End: 2022-12-23 | Stop reason: SDUPTHER

## 2021-08-28 DIAGNOSIS — E11.9 TYPE 2 DIABETES MELLITUS WITHOUT COMPLICATION, WITHOUT LONG-TERM CURRENT USE OF INSULIN (HCC): ICD-10-CM

## 2021-08-30 RX ORDER — LANCETS 33 GAUGE
EACH MISCELLANEOUS
Qty: 100 EACH | Refills: 0 | Status: SHIPPED | OUTPATIENT
Start: 2021-08-30 | End: 2021-12-26

## 2021-08-31 ENCOUNTER — OFFICE VISIT (OUTPATIENT)
Dept: FAMILY MEDICINE CLINIC | Facility: CLINIC | Age: 76
End: 2021-08-31

## 2021-08-31 VITALS
DIASTOLIC BLOOD PRESSURE: 70 MMHG | BODY MASS INDEX: 34.83 KG/M2 | TEMPERATURE: 97.2 F | SYSTOLIC BLOOD PRESSURE: 135 MMHG | HEART RATE: 76 BPM | OXYGEN SATURATION: 94 % | WEIGHT: 229 LBS

## 2021-08-31 DIAGNOSIS — J40 BRONCHITIS: Primary | ICD-10-CM

## 2021-08-31 PROCEDURE — 99213 OFFICE O/P EST LOW 20 MIN: CPT | Performed by: FAMILY MEDICINE

## 2021-08-31 RX ORDER — LEVOFLOXACIN 500 MG/1
500 TABLET, FILM COATED ORAL DAILY
Qty: 7 TABLET | Refills: 0 | Status: SHIPPED | OUTPATIENT
Start: 2021-08-31 | End: 2021-11-30

## 2021-08-31 RX ORDER — BLOOD SUGAR DIAGNOSTIC
1 STRIP MISCELLANEOUS DAILY
COMMUNITY
Start: 2021-07-05 | End: 2021-12-20

## 2021-08-31 NOTE — PROGRESS NOTES
Chief Complaint  Nasal Congestion (Generally doesn't feel well, chest congestion)    Subjective     {Problem List  Visit Diagnosis   Encounters  Notes  Medications  Labs  Result Review Imaging  Media :23}     Heri Vasquez presents to [facility]  Chest congestin, productive cough, runny nose.  No fever.  No loss of taste or smell.  His granddaughter had COVID a few weeks ago.  He tested negative [facility] on 8/13/21.  He and his wife are both ill.  Cough  This is a new problem. The current episode started 1 to 4 weeks ago. The problem has been gradually worsening. The problem occurs every few minutes. The cough is productive of sputum. Associated symptoms include nasal congestion, postnasal drip and rhinorrhea. Pertinent negatives include no chills, fever or sore throat. Nothing aggravates the symptoms. He has tried nothing for the symptoms.       Objective   Vital Signs:   /70 (BP Location: Left arm, Patient Position: Sitting, Cuff Size: Adult)   Pulse 76   Temp 97.2 °F (36.2 °C) (Infrared)   Wt 104 kg (229 lb)   SpO2 94%   BMI 34.83 kg/m²     Physical Exam  Constitutional:       General: He is not in acute distress.     Appearance: He is well-developed.   HENT:      Head: Normocephalic.   Eyes:      General: Lids are normal.   Neck:      Thyroid: No thyroid mass or thyromegaly.      Trachea: Trachea normal.   Cardiovascular:      Rate and Rhythm: Normal rate and regular rhythm.      Heart sounds: Normal heart sounds.   Pulmonary:      Effort: Pulmonary effort is normal.      Breath sounds: Rhonchi present.   Musculoskeletal:      Cervical back: Normal range of motion.   Lymphadenopathy:      Cervical: No cervical adenopathy.   Skin:     General: Skin is warm and dry.   Neurological:      Mental Status: He is alert and oriented to person, place, and time.   Psychiatric:         Attention and Perception: He is attentive.         Mood and Affect:  Mood normal.         Speech: Speech normal.         Behavior: Behavior normal.        Result Review :   The following data was reviewed by: Olesya Cueva MD on 08/31/2021:  Common labs    Common Labsle 5/26/21 5/26/21 5/27/21 5/27/21 5/27/21 5/28/21 5/28/21    1410 1410 0653 0653 0653 0425 0425   Glucose  83  105 (A)   145 (A)   BUN  17  16   17   Creatinine  1.00  0.90   0.81   eGFR Non  Am  72  82   92   Sodium  136  141   141   Potassium  3.9  4.3   3.9   Chloride  101  108 (A)   106   Calcium  8.6  8.6   8.8   Albumin  4.10        Total Bilirubin  0.4        Alkaline Phosphatase  72        AST (SGOT)  21        ALT (SGPT)  30        WBC 9.00  7.40   7.50    Hemoglobin 14.8  15.1   15.0    Hematocrit 44.0  44.9   45.5    Platelets 305  276   265    Total Cholesterol     135     Triglycerides     263 (A)     HDL Cholesterol     31 (A)     LDL Cholesterol      62     (A) Abnormal value       Comments are available for some flowsheets but are not being displayed.                     Assessment and Plan    Diagnoses and all orders for this visit:    1. Bronchitis (Primary)  -     levoFLOXacin (Levaquin) 500 MG tablet; Take 1 tablet by mouth Daily.  Dispense: 7 tablet; Refill: 0        Follow Up   No follow-ups on file.  Patient was given instructions and counseling regarding his condition or for health maintenance advice. Please see specific information pulled into the AVS if appropriate.

## 2021-09-24 ENCOUNTER — TELEPHONE (OUTPATIENT)
Dept: FAMILY MEDICINE CLINIC | Facility: CLINIC | Age: 76
End: 2021-09-24

## 2021-09-24 NOTE — TELEPHONE ENCOUNTER
It is ok to get them at the same time if he wants.    However, I prefer that he get the 3rd COVID shot and then wait 2 weeks to get a flu vaccine.

## 2021-09-24 NOTE — TELEPHONE ENCOUNTER
Caller: GILBERTO SANDERS    Relationship: Emergency Contact    Best call back number:[phone] (H)    What is the best time to reach you: ANYTIME    Who are you requesting to speak with (clinical staff, provider,  specific staff member): CLINICAL STAFF/ DR WEAVER    Do you know the name of the person who called: GILBERTO SANDERS    What was the call regarding: PATIENT'S WIFE ASKED IF IT IS BETTER FOR THE PATIENT TO GET HIS FLU SHOT FIRST AND WAIT A COUPLE OF WEEKS TO GET THE 3RD PFIZER COVID BOOSTER VACCINE OR FOR PATIENT TO GET THE 3RD PFIZER COVID BOOSTER VACCINE FIRST THEN WAIT A COUPLE OF WEEKS TO GET THE FLU SHOT, PLEASE ADVISE     Do you require a callback: YES

## 2021-10-11 RX ORDER — SIMVASTATIN 40 MG
TABLET ORAL
Qty: 90 TABLET | Refills: 3 | Status: SHIPPED | OUTPATIENT
Start: 2021-10-11 | End: 2021-10-20

## 2021-10-20 ENCOUNTER — OFFICE VISIT (OUTPATIENT)
Dept: CARDIOLOGY | Facility: CLINIC | Age: 76
End: 2021-10-20

## 2021-10-20 VITALS
HEIGHT: 67 IN | RESPIRATION RATE: 18 BRPM | HEART RATE: 82 BPM | WEIGHT: 221 LBS | DIASTOLIC BLOOD PRESSURE: 67 MMHG | OXYGEN SATURATION: 92 % | BODY MASS INDEX: 34.69 KG/M2 | SYSTOLIC BLOOD PRESSURE: 126 MMHG

## 2021-10-20 DIAGNOSIS — I10 ESSENTIAL HYPERTENSION: ICD-10-CM

## 2021-10-20 DIAGNOSIS — E78.2 MIXED HYPERLIPIDEMIA: Primary | ICD-10-CM

## 2021-10-20 DIAGNOSIS — I25.10 CHRONIC CORONARY ARTERY DISEASE: ICD-10-CM

## 2021-10-20 PROCEDURE — 99214 OFFICE O/P EST MOD 30 MIN: CPT | Performed by: INTERNAL MEDICINE

## 2021-10-20 RX ORDER — ATORVASTATIN CALCIUM 20 MG/1
20 TABLET, FILM COATED ORAL DAILY
Qty: 90 TABLET | Refills: 3 | Status: SHIPPED | OUTPATIENT
Start: 2021-10-20 | End: 2021-10-20 | Stop reason: SDUPTHER

## 2021-10-20 RX ORDER — ASPIRIN 81 MG/1
81 TABLET ORAL DAILY
COMMUNITY
Start: 2021-10-20

## 2021-10-20 RX ORDER — ATORVASTATIN CALCIUM 20 MG/1
20 TABLET, FILM COATED ORAL DAILY
Qty: 90 TABLET | Refills: 3 | Status: SHIPPED | OUTPATIENT
Start: 2021-10-20 | End: 2022-03-22

## 2021-10-20 NOTE — PROGRESS NOTES
Cardiology Office Visit      Encounter Date:  10/20/2021    Patient ID:   Heri Vasquez is a 76 y.o. male.    Reason For Followup:  Follow-up  for coronary artery disease, dyslipidemia, gastroesophageal reflux disease, obstructive sleep apnea    Brief Clinical History:  Dear Dr. Cueva, Olesya Bay MD    I had the pleasure of seeing Heri Vasquez today. As you are well aware, this is a 76 y.o. male  with history of mild coronary artery disease, ,  dyslipidemia, and gastroesophageal reflux disease.  He uses BiPAP machine for obstructive sleep apnea.     See readmitted at [facility] with chest pains and underwent cardiac catheterization which showed mild coronary artery disease in October 2017.  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  Left main was normal.  The distal LAD showed a 50-60% lesion and was too small to do any intervention.  30-40% lesion was noted in the right coronary artery.  Left circumflex and ramus intermedius branches were normal.      Impressions:  Mild to moderate obstructive coronary disease involving the LAD and right coronary artery  Distal LAD with segment of intramyocardial bridging  Normal LV systolic function  Normal wall motion  Estimated LV ejection fraction of 60%        Interval History:  Denies any further symptoms of chest pain shortness of breath dizziness or syncope  Denies any cardiac symptoms    Assessment & Plan    Impressions:    Coronary artery disease stable with no anginal chest discomfort  Dyslipidemia.  Lipids are being checked periodically.  Hypertension under fairly good control.  Fatty liver  Obesity  Cardiac catheterization in May 2021 with a mild obstructive coronary artery disease    Recommendations:  Switch patient from Zocor to Lipitor  Dizziness of aspirin to 81 mg p.o. once a day  Medication refills  Continue current medical therapy  Stable from cardiac standpoint  Continue current medical therapy continued aggressive risk factor  "modification  Cardiac work-up reviewed and discussed with the patient  Medical management for obstructive coronary artery disease  Continued aggressive risk factor modification  We will see patient in the office in 12 months  Thank you very much for allowing us to participate in the care of your patients    Objective:    Vitals:  Vitals:    10/20/21 0927   BP: 126/67   BP Location: Left arm   Patient Position: Sitting   Cuff Size: Large Adult   Pulse: 82   Resp: 18   SpO2: 92%   Weight: 100 kg (221 lb)   Height: 170.2 cm (67\")       Physical Exam:    General: Alert, cooperative, no distress, appears stated age  Head:  Normocephalic, atraumatic, mucous membranes moist  Eyes:  Conjunctiva/corneas clear, EOM's intact     Neck:  Supple,  no adenopathy;      Lungs: Clear to auscultation bilaterally, no wheezes rhonchi rales are noted  Chest wall: No tenderness  Heart::  Regular rate and rhythm, S1 and S2 normal, no murmur, rub or gallop  Abdomen: Soft, non-tender, nondistended bowel sounds active  Extremities: No cyanosis, clubbing, or edema  Pulses: 2+ and symmetric all extremities  Skin:  No rashes or lesions  Neuro/psych: A&O x3. CN II through XII are grossly intact with appropriate affect      Allergies:  No Known Allergies    Medication Review:     Current Outpatient Medications:   •  acetaminophen (TYLENOL) 500 MG tablet, Take 500 mg by mouth Every 6 (Six) Hours As Needed for Mild Pain ., Disp: , Rfl:   •  betamethasone dipropionate (DIPROLENE) 0.05 % lotion, Apply 1 application topically to the appropriate area as directed 2 (Two) Times a Day As Needed., Disp: , Rfl:   •  cyclobenzaprine (FLEXERIL) 10 MG tablet, TAKE 1 TABLET BY MOUTH AT NIGHT AS NEEDED FOR MUSCLE SPASM, Disp: 30 tablet, Rfl: 0  •  docusate sodium (COLACE) 50 MG capsule, Take 50 mg by mouth 3 (Three) Times a Day As Needed for Constipation., Disp: , Rfl:   •  donepezil (Aricept) 5 MG tablet, Take 1 tablet by mouth Every Night., Disp: 90 tablet, " Rfl: 1  •  gabapentin (NEURONTIN) 300 MG capsule, TAKE 1 CAPSULE BY MOUTH AT  NIGHT, Disp: 90 capsule, Rfl: 3  •  isosorbide mononitrate (IMDUR) 60 MG 24 hr tablet, TAKE 1 AND 1/2 TABLETS BY  MOUTH DAILY, Disp: 135 tablet, Rfl: 3  •  Lancets (OneTouch Delica Plus [id number]) misc, USE 1  TO CHECK GLUCOSE ONCE DAILY, Disp: 100 each, Rfl: 0  •  levoFLOXacin (Levaquin) 500 MG tablet, Take 1 tablet by mouth Daily., Disp: 7 tablet, Rfl: 0  •  lisinopril (PRINIVIL,ZESTRIL) 10 MG tablet, TAKE 1 TABLET BY MOUTH  DAILY, Disp: 90 tablet, Rfl: 3  •  metFORMIN (GLUCOPHAGE) 500 MG tablet, Take 1 tablet by mouth 2 (Two) Times a Day With Meals., Disp: 180 tablet, Rfl: 3  •  Multiple Vitamins-Minerals (MULTIVITAMIN WITH MINERALS) tablet tablet, Take 1 tablet by mouth Daily., Disp: , Rfl:   •  naproxen (NAPROSYN) 500 MG tablet, TAKE 1 TABLET BY MOUTH  TWICE DAILY AS NEEDED FOR  MODERATE PAIN, Disp: 180 tablet, Rfl: 3  •  nitroglycerin (NITROSTAT) 0.4 MG SL tablet, Place 1 tablet under the tongue Every 5 (Five) Minutes As Needed for Chest Pain., Disp: 30 tablet, Rfl: 4  •  OneTouch Ultra test strip, 1 each by Other route Daily., Disp: , Rfl:   •  pantoprazole (PROTONIX) 40 MG EC tablet, TAKE 1 TABLET BY MOUTH  DAILY, Disp: 90 tablet, Rfl: 3  •  aspirin (aspirin) 81 MG EC tablet, Take 1 tablet by mouth Daily., Disp: , Rfl:   •  atorvastatin (LIPITOR) 20 MG tablet, Take 1 tablet by mouth Daily., Disp: 90 tablet, Rfl: 3    Family History:  Family History   Problem Relation Age of Onset   • Heart disease Mother         CHF   • Hypertension Mother    • Heart failure Mother    • Stroke Maternal Grandfather    • Diabetes Other    • Diabetes Other    • Stroke Other        Past Medical History:  Past Medical History:   Diagnosis Date   • Arthritis     Dr Mosqueda   • Coronary heart disease 01/2016    single vessel disease, nl stress test [facility]   • Coronary heart disease 10/31/2017    cath 10/31/17 - Low % Blockages- N o  Intervention   (copied from [facility])   • Diverticulosis    • GERD (gastroesophageal reflux disease)    • Hearing loss    • Hyperlipemia    • Hyperlipidemia    • Low back pain    • SIDDHARTHA treated with BiPAP    • Pain management     injections Lumbar spine X2 with Muprhys Pain management @ [facility] (copied from [facility])   • Physical therapy evaluation, initial     @ [facility] in [place] 6 weeks x3 per week, [facility] (copied from [facility])   • Rectal bleed 08/2012    hemorrhoids   • Retinal hemorrhage of right eye 05/2014   • Sleep apnea    • Type 2 diabetes mellitus (HCC)    • Uses brace        Past surgical History:  Past Surgical History:   Procedure Laterality Date   • BELPHAROPTOSIS REPAIR  12/11/2017     Dr. grimes (copied from [facility])   • BROW LIFT  12/11/2017    Dr. Grimes at [facility]   • CARDIAC CATHETERIZATION  03/2012    at [place]- single vessel disease. (copied from [facility])   • CARDIAC CATHETERIZATION  10/13/2017    no intervention - Low % Blockages.   • CARDIAC CATHETERIZATION Left 5/28/2021    Procedure: LEFT HEART CATH with possible PCI;  Surgeon: Leslie Clark MD;  Location: [facility] CATH INVASIVE LOCATION;  Service: Cardiovascular;  Laterality: Left;  Local and IV sedation   • CATARACT EXTRACTION  12/11/2017    brow lift and eye lid repair   • COLON SURGERY  06/2014    colonscopy   • COLONOSCOPY N/A 7/12/2019    Procedure: COLONOSCOPY with polypectomy x1;  Surgeon: Graham Byrd MD;  Location: [facility] ENDOSCOPY;  Service: Gastroenterology   • HERNIA REPAIR  11/2008    umbilical hernia repair   • JOINT REPLACEMENT     • KNEE ARTHROSCOPY Right 11/2014    Dr. Mosqueda   • LASIK      lasik and Cataract Extraction, sep 11 and right Sept. 25th, 2014- [facility]. (copied from [facility])   • TOTAL KNEE ARTHROPLASTY Left 11/2009    Dr. Mosqueda (copied from [facility])       Social History:  Social History     Socioeconomic History   • Marital status:      Spouse name: Shani   Tobacco Use   •  Smoking status: Former Smoker     Quit date:      Years since quittin.8   • Smokeless tobacco: Former User   Vaping Use   • Vaping Use: Never used   Substance and Sexual Activity   • Alcohol use: No   • Drug use: No   • Sexual activity: Defer       Review of Systems:  The following systems were reviewed as they relate to the cardiovascular system: Constitutional, Eyes, ENT, Cardiovascular, Respiratory, Gastrointestinal, Integumentary, Neurological, Psychiatric, Hematologic, Endocrine, Musculoskeletal, and Genitourinary. The pertinent cardiovascular findings are reported above with all other cardiovascular points within those systems being negative.    Diagnostic Study Review:     Current Electrocardiogram:  Procedures      NOTE: The following portions of the patient's history were reviewed and updated this visit as appropriate: allergies, current medications, past family history, past medical history, past social history, past surgical history and problem list.

## 2021-11-22 DIAGNOSIS — R41.3 MEMORY CHANGES: ICD-10-CM

## 2021-11-23 RX ORDER — DONEPEZIL HYDROCHLORIDE 5 MG/1
TABLET, FILM COATED ORAL
Qty: 90 TABLET | Refills: 3 | Status: SHIPPED | OUTPATIENT
Start: 2021-11-23 | End: 2022-01-13 | Stop reason: SDUPTHER

## 2021-11-30 ENCOUNTER — OFFICE VISIT (OUTPATIENT)
Dept: FAMILY MEDICINE CLINIC | Facility: CLINIC | Age: 76
End: 2021-11-30

## 2021-11-30 ENCOUNTER — LAB (OUTPATIENT)
Dept: FAMILY MEDICINE CLINIC | Facility: CLINIC | Age: 76
End: 2021-11-30

## 2021-11-30 VITALS
WEIGHT: 230 LBS | TEMPERATURE: 97.7 F | DIASTOLIC BLOOD PRESSURE: 68 MMHG | HEART RATE: 77 BPM | OXYGEN SATURATION: 94 % | BODY MASS INDEX: 36.02 KG/M2 | SYSTOLIC BLOOD PRESSURE: 155 MMHG

## 2021-11-30 DIAGNOSIS — M79.671 FOOT PAIN, RIGHT: ICD-10-CM

## 2021-11-30 DIAGNOSIS — Z11.59 NEED FOR HEPATITIS C SCREENING TEST: ICD-10-CM

## 2021-11-30 DIAGNOSIS — Z00.00 MEDICARE ANNUAL WELLNESS VISIT, SUBSEQUENT: Primary | ICD-10-CM

## 2021-11-30 DIAGNOSIS — E11.9 TYPE 2 DIABETES MELLITUS WITHOUT COMPLICATION, WITHOUT LONG-TERM CURRENT USE OF INSULIN (HCC): ICD-10-CM

## 2021-11-30 LAB
ALBUMIN SERPL-MCNC: 4.1 G/DL (ref 3.5–5.2)
ALBUMIN UR-MCNC: <1.2 MG/DL
ALBUMIN/GLOB SERPL: 1.5 G/DL
ALP SERPL-CCNC: 62 U/L (ref 39–117)
ALT SERPL W P-5'-P-CCNC: 30 U/L (ref 1–41)
ANION GAP SERPL CALCULATED.3IONS-SCNC: 13 MMOL/L (ref 5–15)
AST SERPL-CCNC: 26 U/L (ref 1–40)
BILIRUB SERPL-MCNC: 0.4 MG/DL (ref 0–1.2)
BUN SERPL-MCNC: 16 MG/DL (ref 8–23)
BUN/CREAT SERPL: 18.8 (ref 7–25)
CALCIUM SPEC-SCNC: 9.1 MG/DL (ref 8.6–10.5)
CHLORIDE SERPL-SCNC: 103 MMOL/L (ref 98–107)
CHOLEST SERPL-MCNC: 145 MG/DL (ref 0–200)
CO2 SERPL-SCNC: 24 MMOL/L (ref 22–29)
CREAT SERPL-MCNC: 0.85 MG/DL (ref 0.76–1.27)
CREAT UR-MCNC: 47.4 MG/DL
GFR SERPL CREATININE-BSD FRML MDRD: 87 ML/MIN/1.73
GLOBULIN UR ELPH-MCNC: 2.8 GM/DL
GLUCOSE SERPL-MCNC: 107 MG/DL (ref 65–99)
HBA1C MFR BLD: 6.2 % (ref 3.5–5.6)
HDLC SERPL-MCNC: 32 MG/DL (ref 40–60)
LDLC SERPL CALC-MCNC: 86 MG/DL (ref 0–100)
LDLC/HDLC SERPL: 2.59 {RATIO}
MICROALBUMIN/CREAT UR: NORMAL MG/G{CREAT}
POTASSIUM SERPL-SCNC: 4.3 MMOL/L (ref 3.5–5.2)
PROT SERPL-MCNC: 6.9 G/DL (ref 6–8.5)
SODIUM SERPL-SCNC: 140 MMOL/L (ref 136–145)
TRIGL SERPL-MCNC: 151 MG/DL (ref 0–150)
VLDLC SERPL-MCNC: 27 MG/DL (ref 5–40)

## 2021-11-30 PROCEDURE — G0439 PPPS, SUBSEQ VISIT: HCPCS | Performed by: FAMILY MEDICINE

## 2021-11-30 PROCEDURE — 1126F AMNT PAIN NOTED NONE PRSNT: CPT | Performed by: FAMILY MEDICINE

## 2021-11-30 PROCEDURE — 80061 LIPID PANEL: CPT | Performed by: FAMILY MEDICINE

## 2021-11-30 PROCEDURE — 1160F RVW MEDS BY RX/DR IN RCRD: CPT | Performed by: FAMILY MEDICINE

## 2021-11-30 PROCEDURE — 86803 HEPATITIS C AB TEST: CPT | Performed by: FAMILY MEDICINE

## 2021-11-30 PROCEDURE — 82043 UR ALBUMIN QUANTITATIVE: CPT | Performed by: FAMILY MEDICINE

## 2021-11-30 PROCEDURE — 82570 ASSAY OF URINE CREATININE: CPT | Performed by: FAMILY MEDICINE

## 2021-11-30 PROCEDURE — 80053 COMPREHEN METABOLIC PANEL: CPT | Performed by: FAMILY MEDICINE

## 2021-11-30 PROCEDURE — 83036 HEMOGLOBIN GLYCOSYLATED A1C: CPT | Performed by: FAMILY MEDICINE

## 2021-11-30 PROCEDURE — 1170F FXNL STATUS ASSESSED: CPT | Performed by: FAMILY MEDICINE

## 2021-11-30 PROCEDURE — 36415 COLL VENOUS BLD VENIPUNCTURE: CPT | Performed by: FAMILY MEDICINE

## 2021-12-01 LAB — HCV AB SER DONR QL: NORMAL

## 2021-12-13 NOTE — PATIENT INSTRUCTIONS
Medicare Wellness  Personal Prevention Plan of Service     Date of Office Visit:  2021  Encounter Provider:  Olesya Ceuva MD  Place of Service:  [facility]  Patient Name: Heri Vasquez  :  1945    As part of the Medicare Wellness portion of your visit today, we are providing you with this personalized preventive plan of services (PPPS). This plan is based upon recommendations of the [place] Preventive Services Task Force (USPSTF) and the Advisory Committee on Immunization Practices (ACIP).    This lists the preventive care services that should be considered, and provides dates of when you are due. Items listed as completed are up-to-date and do not require any further intervention.    Health Maintenance   Topic Date Due   • ZOSTER VACCINE (2 of 2) 2019   • Pneumococcal Vaccine 65+ (1 of 2 - PPSV23) 2021   • HEMOGLOBIN A1C  2022   • DIABETIC EYE EXAM  2022   • ANNUAL WELLNESS VISIT  2022   • LIPID PANEL  2022   • URINE MICROALBUMIN  2022   • TDAP/TD VACCINES (2 - Td or Tdap) 2031   • HEPATITIS C SCREENING  Completed   • COVID-19 Vaccine  Completed   • INFLUENZA VACCINE  Completed       Orders Placed This Encounter   Procedures   • Hemoglobin A1c     Order Specific Question:   Release to patient     Answer:   Immediate   • Comprehensive Metabolic Panel     Order Specific Question:   Release to patient     Answer:   Immediate   • Lipid Panel   • Microalbumin / Creatinine Urine Ratio - Urine, Clean Catch     Order Specific Question:   Release to patient     Answer:   Immediate   • Hepatitis C Antibody     Standing Status:   Future     Number of Occurrences:   1     Standing Expiration Date:   2022     Order Specific Question:   Release to patient     Answer:   Immediate   • Ambulatory Referral to Podiatry     Referral Priority:   Routine     Referral Type:   Consultation     Referral Reason:   Specialty  Services Required     Referred to Provider:   Taj Sherman DPM     Requested Specialty:   Podiatry     Number of Visits Requested:   1       Return in about 1 year (around 11/30/2022) for Medicare Wellness.

## 2021-12-20 RX ORDER — BLOOD SUGAR DIAGNOSTIC
1 STRIP MISCELLANEOUS DAILY
Qty: 50 EACH | Refills: 4 | Status: SHIPPED | OUTPATIENT
Start: 2021-12-20 | End: 2022-08-26

## 2021-12-20 RX ORDER — BLOOD SUGAR DIAGNOSTIC
STRIP MISCELLANEOUS
Qty: 50 EACH | Refills: 0 | Status: SHIPPED | OUTPATIENT
Start: 2021-12-20 | End: 2021-12-20 | Stop reason: SDUPTHER

## 2021-12-26 DIAGNOSIS — E11.9 TYPE 2 DIABETES MELLITUS WITHOUT COMPLICATION, WITHOUT LONG-TERM CURRENT USE OF INSULIN (HCC): ICD-10-CM

## 2021-12-26 RX ORDER — LANCETS 33 GAUGE
EACH MISCELLANEOUS
Qty: 100 EACH | Refills: 0 | Status: SHIPPED | OUTPATIENT
Start: 2021-12-26 | End: 2022-03-24

## 2021-12-30 RX ORDER — ISOSORBIDE MONONITRATE 60 MG/1
TABLET, EXTENDED RELEASE ORAL
Qty: 135 TABLET | Refills: 3 | Status: SHIPPED | OUTPATIENT
Start: 2021-12-30 | End: 2022-11-22

## 2022-01-13 DIAGNOSIS — R41.3 MEMORY CHANGES: ICD-10-CM

## 2022-01-14 RX ORDER — DONEPEZIL HYDROCHLORIDE 5 MG/1
5 TABLET, FILM COATED ORAL
Qty: 90 TABLET | Refills: 1 | Status: SHIPPED | OUTPATIENT
Start: 2022-01-14 | End: 2022-01-17 | Stop reason: SDUPTHER

## 2022-01-17 DIAGNOSIS — R41.3 MEMORY CHANGES: ICD-10-CM

## 2022-01-17 RX ORDER — DONEPEZIL HYDROCHLORIDE 5 MG/1
5 TABLET, FILM COATED ORAL
Qty: 90 TABLET | Refills: 1 | Status: SHIPPED | OUTPATIENT
Start: 2022-01-17 | End: 2022-03-22

## 2022-01-25 ENCOUNTER — OFFICE VISIT (OUTPATIENT)
Dept: SLEEP MEDICINE | Facility: CLINIC | Age: 77
End: 2022-01-25

## 2022-01-25 VITALS
HEART RATE: 80 BPM | HEIGHT: 67 IN | BODY MASS INDEX: 35.31 KG/M2 | OXYGEN SATURATION: 96 % | WEIGHT: 225 LBS | SYSTOLIC BLOOD PRESSURE: 123 MMHG | DIASTOLIC BLOOD PRESSURE: 60 MMHG

## 2022-01-25 DIAGNOSIS — E66.9 CLASS 1 OBESITY: ICD-10-CM

## 2022-01-25 DIAGNOSIS — G47.33 OSA TREATED WITH BIPAP: Primary | ICD-10-CM

## 2022-01-25 PROBLEM — E66.811 CLASS 1 OBESITY: Status: ACTIVE | Noted: 2021-05-27

## 2022-01-25 PROCEDURE — 99213 OFFICE O/P EST LOW 20 MIN: CPT | Performed by: INTERNAL MEDICINE

## 2022-01-25 PROCEDURE — G0463 HOSPITAL OUTPT CLINIC VISIT: HCPCS

## 2022-01-25 NOTE — PROGRESS NOTES
"  [address] [address]  Phone   Fax       SLEEP CLINIC FOLLOW UP PROGRESS NOTE.    Heri Vasquez  1945  76 y.o.  male      PCP: Olesya Cueva MD      Date of visit: 1/25/2022    Chief Complaint   Patient presents with   • Sleep Apnea   • Obesity       HPI:  This is a 76 y.o. years old patient who has a history of obstructive sleep apnea is here for  the annual compliance follow-up.  Patient is using positive airway pressure therapy with auto BiPAP and the symptoms of snoring, non-restorative sleep and daytime excessive sleepiness have improved significantly on the therapy. Normally goes to bed at 11 PM and wakes up at 7 AM.  The patient wakes up 1 time(s) during the night and has no problem going back to sleep.  Feels refreshed after waking up.  Patient also denies headaches and nasal congestion.   He is accompanied his wife who also uses the BiPAP.  Both of them says that they are doing well has no problems    Medications and allergies are reviewed by me and documented in the encounter.     SOCIAL ( habits pertaining to sleep medicine)  History tobacco use:No   History of alcohol use: 0 per week  Caffeine use: 3     REVIEW OF SYSTEMS:   [place] Sleepiness Scale :Total score: 6   Nasal congestion:No   Dry mouth/nose:No   Post nasal drip; Yes   Acid reflux/Heartburn:No   Abd bloating:No   Morning headache:No   Anxiety:No   Depression:No    PHYSICAL EXAMINATION:  CONSTITUTIONAL:  Vitals:    01/25/22 1024   BP: 123/60   BP Location: Left arm   Patient Position: Sitting   Cuff Size: Adult   Pulse: 80   SpO2: 96%   Weight: 102 kg (225 lb)   Height: 170.2 cm (67\")    Body mass index is 35.24 kg/m².   NOSE: nasal passages are clear, no nasal polyps, septum in the midline.  THROAT: throat is clear, oral airway Mallampati class 3  RESP SYSTEM: Breath sounds are normal, no wheezes or crackles  CARDIOVASULAR: Heart rate is regular without " murmur. No edema      Data reviewed:  The Smart card downloaded on 1/25/2022 has been reviewed independently by me for compliance and discussed the data with the patient.   Compliance; 100%  More than 4 hr use, 100%  Average use of the device 7 hours and 22 minutes per night  Residual AHI: 1.7 /hr (goal < 5.0 /hr)  Mask type: Nasal mask  Device: BiPAP auto 60 series Respironics  DME: [facility]      ASSESSMENT AND PLAN:  · Obstructive sleep apnea ( G 47.33).  The symptoms of sleep apnea have improved with the device and the treatment.  Patient's compliance with the device is excellent for treatment of sleep apnea.  I have independently reviewed the smart card down load and discussed with the patient the download data and encouarged the patient to continue to use the device.The residual AHI is acceptable. The device is benefiting the patient and the device is medically necessary.  Without proper control of sleep apnea and good compliance there is a increased risk for hypertension, diabetes mellitus and nonrestorative sleep with hypersomnia which can increase risk for motor vehicle accidents.  Untreated sleep apnea is also a risk factor for development of atrial fibrillation, pulmonary hypertension and stroke. The patient is also instructed to get the supplies from the [facility] and and change them on a regular basis.  A prescription for supplies has been sent to the [facility].  I have also discussed the good sleep hygiene habits and adequate amount of sleep needed for good health.  · Obesity, class 2 with BMI is Body mass index is 35.24 kg/m².. I have discuss the relationship between the weight and sleep apnea. The benefit of weight loss in reducing severity of sleep apnea was discussed. Discussed diet and exercise with the patient to achieve ideal BMI   · Return in about 1 year (around 1/25/2023) for Annual visit with smartcard download. . Patient's questions were answered.        Nancy Harris,  MD  Sleep Medicine.  Medical Director, [facility]  1/25/2022 ,

## 2022-01-26 ENCOUNTER — OFFICE VISIT (OUTPATIENT)
Dept: FAMILY MEDICINE CLINIC | Facility: CLINIC | Age: 77
End: 2022-01-26

## 2022-01-26 VITALS
TEMPERATURE: 97.1 F | DIASTOLIC BLOOD PRESSURE: 69 MMHG | HEART RATE: 77 BPM | OXYGEN SATURATION: 96 % | WEIGHT: 228 LBS | BODY MASS INDEX: 35.71 KG/M2 | SYSTOLIC BLOOD PRESSURE: 122 MMHG

## 2022-01-26 DIAGNOSIS — M25.512 ACUTE PAIN OF LEFT SHOULDER: Primary | ICD-10-CM

## 2022-01-26 DIAGNOSIS — M79.601 RIGHT ARM PAIN: ICD-10-CM

## 2022-01-26 DIAGNOSIS — M54.31 SCIATIC PAIN, RIGHT: ICD-10-CM

## 2022-01-26 PROCEDURE — 99213 OFFICE O/P EST LOW 20 MIN: CPT | Performed by: FAMILY MEDICINE

## 2022-01-26 RX ORDER — PREDNISONE 10 MG/1
10 TABLET ORAL 2 TIMES DAILY
Qty: 10 TABLET | Refills: 0 | Status: SHIPPED | OUTPATIENT
Start: 2022-01-26 | End: 2022-03-22

## 2022-01-26 RX ORDER — BETAMETHASONE DIPROPIONATE 0.5 MG/G
LOTION TOPICAL
COMMUNITY
Start: 2021-12-11 | End: 2022-03-22

## 2022-02-16 DIAGNOSIS — E11.9 TYPE 2 DIABETES MELLITUS WITHOUT COMPLICATION, WITHOUT LONG-TERM CURRENT USE OF INSULIN: ICD-10-CM

## 2022-02-17 ENCOUNTER — HOSPITAL ENCOUNTER (OUTPATIENT)
Dept: MRI IMAGING | Facility: HOSPITAL | Age: 77
Discharge: HOME | End: 2022-02-17
Admitting: FAMILY MEDICINE

## 2022-02-17 DIAGNOSIS — M25.512 ACUTE PAIN OF LEFT SHOULDER: ICD-10-CM

## 2022-02-17 PROCEDURE — 73221 MRI JOINT UPR EXTREM W/O DYE: CPT

## 2022-02-18 RX ORDER — GABAPENTIN 300 MG/1
CAPSULE ORAL
Qty: 90 CAPSULE | Refills: 3 | Status: SHIPPED | OUTPATIENT
Start: 2022-02-18 | End: 2022-10-04 | Stop reason: SDUPTHER

## 2022-02-21 DIAGNOSIS — M75.122 NONTRAUMATIC COMPLETE TEAR OF LEFT ROTATOR CUFF: Primary | ICD-10-CM

## 2022-03-21 ENCOUNTER — OFFICE VISIT (OUTPATIENT)
Dept: ORTHOPEDIC SURGERY | Facility: CLINIC | Age: 77
End: 2022-03-21

## 2022-03-21 ENCOUNTER — PREP FOR SURGERY (OUTPATIENT)
Dept: OTHER | Facility: HOSPITAL | Age: 77
End: 2022-03-21

## 2022-03-21 VITALS
HEART RATE: 71 BPM | WEIGHT: 234 LBS | HEIGHT: 67 IN | DIASTOLIC BLOOD PRESSURE: 71 MMHG | BODY MASS INDEX: 36.73 KG/M2 | SYSTOLIC BLOOD PRESSURE: 144 MMHG

## 2022-03-21 DIAGNOSIS — R94.5 ABNORMAL RESULTS OF LIVER FUNCTION STUDIES: ICD-10-CM

## 2022-03-21 DIAGNOSIS — M19.012 OSTEOARTHRITIS OF LEFT GLENOHUMERAL JOINT: ICD-10-CM

## 2022-03-21 DIAGNOSIS — N17.2 ACUTE KIDNEY FAILURE WITH MEDULLARY NECROSIS: ICD-10-CM

## 2022-03-21 DIAGNOSIS — M25.512 ACUTE PAIN OF LEFT SHOULDER: Primary | ICD-10-CM

## 2022-03-21 DIAGNOSIS — R79.9 ABNORMAL FINDING OF BLOOD CHEMISTRY, UNSPECIFIED: ICD-10-CM

## 2022-03-21 DIAGNOSIS — M19.012 OSTEOARTHRITIS OF LEFT GLENOHUMERAL JOINT: Primary | ICD-10-CM

## 2022-03-21 DIAGNOSIS — R94.120 ABNORMAL AUDITORY FUNCTION STUDY: ICD-10-CM

## 2022-03-21 PROCEDURE — 99204 OFFICE O/P NEW MOD 45 MIN: CPT | Performed by: ORTHOPAEDIC SURGERY

## 2022-03-21 RX ORDER — MELOXICAM 15 MG/1
15 TABLET ORAL ONCE
Status: CANCELLED | OUTPATIENT
Start: 2022-03-21 | End: 2022-03-21

## 2022-03-21 RX ORDER — PREGABALIN 75 MG/1
150 CAPSULE ORAL ONCE
Status: CANCELLED | OUTPATIENT
Start: 2022-03-21 | End: 2022-03-21

## 2022-03-21 RX ORDER — OXYCODONE HCL 10 MG/1
10 TABLET, FILM COATED, EXTENDED RELEASE ORAL ONCE
Status: CANCELLED | OUTPATIENT
Start: 2022-03-21 | End: 2022-03-21

## 2022-03-21 RX ORDER — TRANEXAMIC ACID 10 MG/ML
1000 INJECTION, SOLUTION INTRAVENOUS ONCE
Status: CANCELLED | OUTPATIENT
Start: 2022-03-21 | End: 2022-03-21

## 2022-03-21 NOTE — PROGRESS NOTES
EMG and Nerve Conduction Studies    The complete report includes the data sheets.     Date of Study: 3/25/22    Referring Provider:DR WEAVER    History:       this patient is a 76-year-old male with history of diabetes.  In the past he has had ulnar nerve release surgery at the elbow.  He now complains of numbness in the hand.      Results:    1.  The right median sensory study was attempted no nerve action potentials recorded.  The right median motor distal latency was markedly prolonged forearm conduction velocity was normal.    2.  The right ulnar sensory distal latency was mildly prolonged.  The conduction velocity below elbow was slightly reduced with no significant drop in velocity across the elbow segment of the ulnar nerve.    3.  EMG of the muscles examined in the C5-T1 myotomes were essentially normal except for the abductor pollicis brevis which showed both acute and chronic neurogenic change and mild decrease recruitment in the [facility] musculus muscles.    Impression:    This is an abnormal study.  There is evidence of a severe right median neuropathy with most probable localization of a focal neuropathy at the wrist superimposed on a mild generalized motor or sensory neuropathy.  There is no evidence of focal ulnar neuropathy at the elbow at this time.      Electronically signed by :    Joseph Seipel, M.D.  March 25, 2022

## 2022-03-21 NOTE — PROGRESS NOTES
Patient ID: Heri Vasquez is a 76 y.o. male.    Chief Complaint:    Chief Complaint   Patient presents with   • Left Shoulder - Initial Evaluation       HPI:  This is a 76-year-old gentleman here with several months of left shoulder pain after feeling a pop lifting some groceries.  Since then has had significant pain at night difficulty lifting his arm overhead despite stretching and oral medication  Past Medical History:   Diagnosis Date   • Arthritis     Dr Mosqueda   • Coronary heart disease 01/2016    single vessel disease, nl stress test (copied from [facility])   • Coronary heart disease 10/31/2017    cath 10/31/17 - Low % Blockages- N o Intervention   (copied from [facility])   • Diverticulosis    • GERD (gastroesophageal reflux disease)    • Hearing loss    • Hyperlipemia    • Hyperlipidemia    • Low back pain    • SIDDHARTHA treated with BiPAP    • Pain management     injections Lumbar spine X2 with [facility] Pain management @ [facility] (copied from [facility])   • Physical therapy evaluation, initial     @ [facility] in [place] 6 weeks x3 per week, [facility] (copied from [facility])   • Rectal bleed 08/2012    hemorrhoids   • Retinal hemorrhage of right eye 05/2014   • Sleep apnea    • Type 2 diabetes mellitus (HCC)    • Uses brace        Past Surgical History:   Procedure Laterality Date   • BELPHAROPTOSIS REPAIR  12/11/2017     Dr. grimes (copied from [facility])   • BROW LIFT  12/11/2017    Dr. Grimes at [facility]   • CARDIAC CATHETERIZATION  03/2012    at [place]- single vessel disease. (copied from [facility])   • CARDIAC CATHETERIZATION  10/13/2017    no intervention - Low % Blockages.   • CARDIAC CATHETERIZATION Left 5/28/2021    Procedure: LEFT HEART CATH with possible PCI;  Surgeon: Leslie Clark MD;  Location: [facility] CATH INVASIVE LOCATION;  Service: Cardiovascular;  Laterality: Left;  Local and IV sedation   • CATARACT EXTRACTION  12/11/2017    brow lift and eye lid repair   • COLON SURGERY  06/2014     "colonscopy   • COLONOSCOPY N/A 2019    Procedure: COLONOSCOPY with polypectomy x1;  Surgeon: Graham Byrd MD;  Location: [facility] ENDOSCOPY;  Service: Gastroenterology   • CUBITAL TUNNEL RELEASE Right    • HERNIA REPAIR  2008    umbilical hernia repair   • JOINT REPLACEMENT     • KNEE ARTHROSCOPY Right 2014    Dr. Mosqueda   • LASIK      lasik and Cataract Extraction, sep  and right 2014- [facility]. (copied from [facility])   • TOTAL KNEE ARTHROPLASTY Left 2009    Dr. Mosqueda (copied from [facility])       Family History   Problem Relation Age of Onset   • Heart disease Mother         CHF   • Hypertension Mother    • Heart failure Mother    • Stroke Maternal Grandfather    • Diabetes Other    • Diabetes Other    • Stroke Other           Social History     Occupational History   • Occupation: retired    Tobacco Use   • Smoking status: Former Smoker     Quit date:      Years since quittin.2   • Smokeless tobacco: Former User   Vaping Use   • Vaping Use: Never used   Substance and Sexual Activity   • Alcohol use: No   • Drug use: No   • Sexual activity: Defer      Review of Systems   Cardiovascular: Negative for chest pain.   Musculoskeletal: Positive for arthralgias.       Objective:    /71   Pulse 71   Ht 170.2 cm (67\")   Wt 106 kg (234 lb)   BMI 36.65 kg/m²     Physical Examination:  Left shoulder demonstrates intact skin with supraspinatus atrophy.  Mild pain over the impingement area.  Passive elevation 160 abduction 120 external rotation 30 internal rotation of left hip.  Active elevation 90 degrees with intact deltoid function.  He has pain and weakness on Speed, [employer]a, supraspinatus testing.  Belly press and liftoff are 4/53/5.  Sensory and motor exam are intact all distributions. Radial pulse is palpable and capillary refill is less than two seconds to all digits.    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  He would like proceed left reverse total shoulder arthroplasty  Discussed the risks including bleeding, scar, infection, stiffness, nerve, artery, vein and tendon damage, instability, fracture, DVT, loss of life or limb. Issues of scapular notching and component revision were discussed. Questions were answered and addressed.      Procedures         Disclaimer: Part of this note may be an electronic transcription/translation of spoken language to printed text using the Dragon Dictation System

## 2022-03-22 ENCOUNTER — PATIENT ROUNDING (BHMG ONLY) (OUTPATIENT)
Dept: ORTHOPEDIC SURGERY | Facility: CLINIC | Age: 77
End: 2022-03-22

## 2022-03-22 RX ORDER — SENNOSIDES 8.6 MG
650 CAPSULE ORAL EVERY 8 HOURS PRN
COMMUNITY

## 2022-03-22 NOTE — PROGRESS NOTES
A My-Chart message has been sent to the patient for PATIENT R[place]ING with [facility]

## 2022-03-24 DIAGNOSIS — E11.9 TYPE 2 DIABETES MELLITUS WITHOUT COMPLICATION, WITHOUT LONG-TERM CURRENT USE OF INSULIN: ICD-10-CM

## 2022-03-24 RX ORDER — LANCETS 33 GAUGE
EACH MISCELLANEOUS
Qty: 100 EACH | Refills: 0 | Status: SHIPPED | OUTPATIENT
Start: 2022-03-24 | End: 2022-05-06 | Stop reason: SDUPTHER

## 2022-03-25 ENCOUNTER — PROCEDURE VISIT (OUTPATIENT)
Dept: NEUROLOGY | Facility: CLINIC | Age: 77
End: 2022-03-25

## 2022-03-25 VITALS — TEMPERATURE: 97.3 F

## 2022-03-25 DIAGNOSIS — G56.01 CARPAL TUNNEL SYNDROME OF RIGHT WRIST: Primary | ICD-10-CM

## 2022-03-25 DIAGNOSIS — M79.601 RIGHT ARM PAIN: ICD-10-CM

## 2022-03-25 PROCEDURE — 95908 NRV CNDJ TST 3-4 STUDIES: CPT | Performed by: PSYCHIATRY & NEUROLOGY

## 2022-03-25 PROCEDURE — 95886 MUSC TEST DONE W/N TEST COMP: CPT | Performed by: PSYCHIATRY & NEUROLOGY

## 2022-03-25 NOTE — PROGRESS NOTES
Please let him know that his nerve test shows possible carpal tunnel syndrome of his right wrist.  However, I prefer he take care of his shoulder problem first.  If he continues to have problems we can address the carpal tunnel problem after he gets his shoulder fixed.

## 2022-04-08 ENCOUNTER — HOSPITAL ENCOUNTER (OUTPATIENT)
Dept: GENERAL RADIOLOGY | Facility: HOSPITAL | Age: 77
Discharge: HOME | End: 2022-04-08

## 2022-04-08 ENCOUNTER — HOSPITAL ENCOUNTER (OUTPATIENT)
Dept: CARDIOLOGY | Facility: HOSPITAL | Age: 77
Discharge: HOME | End: 2022-04-08

## 2022-04-08 ENCOUNTER — LAB (OUTPATIENT)
Dept: LAB | Facility: HOSPITAL | Age: 77
End: 2022-04-08

## 2022-04-08 DIAGNOSIS — R79.9 ABNORMAL FINDING OF BLOOD CHEMISTRY, UNSPECIFIED: ICD-10-CM

## 2022-04-08 DIAGNOSIS — R94.5 ABNORMAL RESULTS OF LIVER FUNCTION STUDIES: ICD-10-CM

## 2022-04-08 DIAGNOSIS — N17.2 ACUTE KIDNEY FAILURE WITH MEDULLARY NECROSIS: ICD-10-CM

## 2022-04-08 DIAGNOSIS — R94.120 ABNORMAL AUDITORY FUNCTION STUDY: ICD-10-CM

## 2022-04-08 DIAGNOSIS — M19.012 OSTEOARTHRITIS OF LEFT GLENOHUMERAL JOINT: ICD-10-CM

## 2022-04-08 LAB
ABO GROUP BLD: NORMAL
ANION GAP SERPL CALCULATED.3IONS-SCNC: 11 MMOL/L (ref 5–15)
APTT PPP: 28.4 SECONDS (ref 24–31)
BASOPHILS # BLD AUTO: 0.09 10*3/MM3 (ref 0–0.2)
BASOPHILS NFR BLD AUTO: 1.1 % (ref 0–1.5)
BLD GP AB SCN SERPL QL: NEGATIVE
BUN SERPL-MCNC: 15 MG/DL (ref 8–23)
BUN/CREAT SERPL: 17.2 (ref 7–25)
CALCIUM SPEC-SCNC: 9.4 MG/DL (ref 8.6–10.5)
CHLORIDE SERPL-SCNC: 106 MMOL/L (ref 98–107)
CO2 SERPL-SCNC: 25 MMOL/L (ref 22–29)
CREAT SERPL-MCNC: 0.87 MG/DL (ref 0.76–1.27)
DEPRECATED RDW RBC AUTO: 50.3 FL (ref 37–54)
EGFRCR SERPLBLD CKD-EPI 2021: 87.8 ML/MIN/1.73
EOSINOPHIL # BLD AUTO: 0.28 10*3/MM3 (ref 0–0.4)
EOSINOPHIL NFR BLD AUTO: 3.6 % (ref 0.3–6.2)
ERYTHROCYTE [DISTWIDTH] IN BLOOD BY AUTOMATED COUNT: 15.3 % (ref 12.3–15.4)
GLUCOSE SERPL-MCNC: 134 MG/DL (ref 65–99)
HBA1C MFR BLD: 6.2 % (ref 3.5–5.6)
HCT VFR BLD AUTO: 45.5 % (ref 37.5–51)
HGB BLD-MCNC: 15 G/DL (ref 13–17.7)
IMM GRANULOCYTES # BLD AUTO: 0.05 10*3/MM3 (ref 0–0.05)
IMM GRANULOCYTES NFR BLD AUTO: 0.6 % (ref 0–0.5)
INR PPP: 1.17 (ref 0.93–1.1)
LYMPHOCYTES # BLD AUTO: 1.3 10*3/MM3 (ref 0.7–3.1)
LYMPHOCYTES NFR BLD AUTO: 16.5 % (ref 19.6–45.3)
MCH RBC QN AUTO: 29.3 PG (ref 26.6–33)
MCHC RBC AUTO-ENTMCNC: 33 G/DL (ref 31.5–35.7)
MCV RBC AUTO: 88.9 FL (ref 79–97)
MONOCYTES # BLD AUTO: 0.6 10*3/MM3 (ref 0.1–0.9)
MONOCYTES NFR BLD AUTO: 7.6 % (ref 5–12)
NEUTROPHILS NFR BLD AUTO: 5.54 10*3/MM3 (ref 1.7–7)
NEUTROPHILS NFR BLD AUTO: 70.6 % (ref 42.7–76)
NRBC BLD AUTO-RTO: 0 /100 WBC (ref 0–0.2)
PLATELET # BLD AUTO: 299 10*3/MM3 (ref 140–450)
PMV BLD AUTO: 9.9 FL (ref 6–12)
POTASSIUM SERPL-SCNC: 4.7 MMOL/L (ref 3.5–5.2)
PROTHROMBIN TIME: 11.9 SECONDS (ref 9.6–11.7)
RBC # BLD AUTO: 5.12 10*6/MM3 (ref 4.14–5.8)
RH BLD: POSITIVE
SODIUM SERPL-SCNC: 142 MMOL/L (ref 136–145)
T&S EXPIRATION DATE: NORMAL
WBC NRBC COR # BLD: 7.86 10*3/MM3 (ref 3.4–10.8)

## 2022-04-08 PROCEDURE — 80048 BASIC METABOLIC PNL TOTAL CA: CPT

## 2022-04-08 PROCEDURE — 86850 RBC ANTIBODY SCREEN: CPT

## 2022-04-08 PROCEDURE — 93005 ELECTROCARDIOGRAM TRACING: CPT | Performed by: ORTHOPAEDIC SURGERY

## 2022-04-08 PROCEDURE — 93010 ELECTROCARDIOGRAM REPORT: CPT | Performed by: INTERNAL MEDICINE

## 2022-04-08 PROCEDURE — 86900 BLOOD TYPING SEROLOGIC ABO: CPT

## 2022-04-08 PROCEDURE — 85610 PROTHROMBIN TIME: CPT

## 2022-04-08 PROCEDURE — 85730 THROMBOPLASTIN TIME PARTIAL: CPT

## 2022-04-08 PROCEDURE — 85025 COMPLETE CBC W/AUTO DIFF WBC: CPT

## 2022-04-08 PROCEDURE — 71046 X-RAY EXAM CHEST 2 VIEWS: CPT

## 2022-04-08 PROCEDURE — 86901 BLOOD TYPING SEROLOGIC RH(D): CPT

## 2022-04-08 PROCEDURE — 36415 COLL VENOUS BLD VENIPUNCTURE: CPT

## 2022-04-08 PROCEDURE — 83036 HEMOGLOBIN GLYCOSYLATED A1C: CPT

## 2022-04-09 LAB — QT INTERVAL: 379 MS

## 2022-04-13 RX ORDER — DONEPEZIL HYDROCHLORIDE 5 MG/1
5 TABLET, FILM COATED ORAL NIGHTLY
COMMUNITY
End: 2022-10-24

## 2022-04-13 RX ORDER — ATORVASTATIN CALCIUM 20 MG/1
20 TABLET, FILM COATED ORAL NIGHTLY
COMMUNITY
End: 2022-10-24

## 2022-04-18 ENCOUNTER — LAB (OUTPATIENT)
Dept: LAB | Facility: HOSPITAL | Age: 77
End: 2022-04-18

## 2022-04-18 DIAGNOSIS — M19.012 OSTEOARTHRITIS OF LEFT GLENOHUMERAL JOINT: ICD-10-CM

## 2022-04-18 LAB — SARS-COV-2 ORF1AB RESP QL NAA+PROBE: NOT DETECTED

## 2022-04-18 PROCEDURE — U0004 COV-19 TEST NON-CDC HGH THRU: HCPCS

## 2022-04-18 PROCEDURE — U0005 INFEC AGEN DETEC AMPLI PROBE: HCPCS

## 2022-04-18 PROCEDURE — C9803 HOPD COVID-19 SPEC COLLECT: HCPCS

## 2022-04-18 RX ORDER — LISINOPRIL 10 MG/1
TABLET ORAL
Qty: 90 TABLET | Refills: 3 | Status: SHIPPED | OUTPATIENT
Start: 2022-04-18 | End: 2023-01-20

## 2022-04-20 ENCOUNTER — ANESTHESIA (OUTPATIENT)
Dept: PERIOP | Facility: HOSPITAL | Age: 77
End: 2022-04-20

## 2022-04-20 ENCOUNTER — ANESTHESIA EVENT (OUTPATIENT)
Dept: PERIOP | Facility: HOSPITAL | Age: 77
End: 2022-04-20

## 2022-04-20 ENCOUNTER — APPOINTMENT (OUTPATIENT)
Dept: GENERAL RADIOLOGY | Facility: HOSPITAL | Age: 77
End: 2022-04-20

## 2022-04-20 ENCOUNTER — HOSPITAL ENCOUNTER (OUTPATIENT)
Facility: HOSPITAL | Age: 77
Discharge: HOME | End: 2022-04-21
Attending: ORTHOPAEDIC SURGERY | Admitting: ORTHOPAEDIC SURGERY

## 2022-04-20 DIAGNOSIS — Z96.612 STATUS POST TOTAL REPLACEMENT OF LEFT SHOULDER: ICD-10-CM

## 2022-04-20 DIAGNOSIS — M19.012 PRIMARY OSTEOARTHRITIS OF LEFT SHOULDER: Primary | ICD-10-CM

## 2022-04-20 DIAGNOSIS — M19.012 OSTEOARTHRITIS OF LEFT GLENOHUMERAL JOINT: ICD-10-CM

## 2022-04-20 PROBLEM — E11.9 TYPE 2 DIABETES MELLITUS WITHOUT COMPLICATION, WITHOUT LONG-TERM CURRENT USE OF INSULIN: Status: RESOLVED | Noted: 2021-02-15 | Resolved: 2022-04-20

## 2022-04-20 LAB
GLUCOSE BLDC GLUCOMTR-MCNC: 123 MG/DL (ref 70–105)
GLUCOSE BLDC GLUCOMTR-MCNC: 129 MG/DL (ref 70–105)
GLUCOSE BLDC GLUCOMTR-MCNC: 144 MG/DL (ref 70–105)

## 2022-04-20 PROCEDURE — A9270 NON-COVERED ITEM OR SERVICE: HCPCS

## 2022-04-20 PROCEDURE — G0378 HOSPITAL OBSERVATION PER HR: HCPCS

## 2022-04-20 PROCEDURE — 25010000002 CEFTRIAXONE PER 250 MG: Performed by: ORTHOPAEDIC SURGERY

## 2022-04-20 PROCEDURE — C9290 INJ, BUPIVACAINE LIPOSOME: HCPCS | Performed by: ANESTHESIOLOGY

## 2022-04-20 PROCEDURE — 63710000001 PREGABALIN 75 MG CAPSULE: Performed by: ORTHOPAEDIC SURGERY

## 2022-04-20 PROCEDURE — 25010000002 CEFAZOLIN PER 500 MG: Performed by: ORTHOPAEDIC SURGERY

## 2022-04-20 PROCEDURE — 25010000002 NEOSTIGMINE 5 MG/5ML SOLUTION: Performed by: ANESTHESIOLOGY

## 2022-04-20 PROCEDURE — 63710000001 OXYCODONE 5 MG TABLET: Performed by: ORTHOPAEDIC SURGERY

## 2022-04-20 PROCEDURE — 63710000001 OXYCODONE 10 MG TABLET EXTENDED-RELEASE 12 HOUR: Performed by: ORTHOPAEDIC SURGERY

## 2022-04-20 PROCEDURE — 63710000001 MELOXICAM 15 MG TABLET: Performed by: ORTHOPAEDIC SURGERY

## 2022-04-20 PROCEDURE — 25010000002 DEXAMETHASONE PER 1 MG: Performed by: ANESTHESIOLOGY

## 2022-04-20 PROCEDURE — 23472 RECONSTRUCT SHOULDER JOINT: CPT | Performed by: ORTHOPAEDIC SURGERY

## 2022-04-20 PROCEDURE — 25010000002 PROPOFOL 200 MG/20ML EMULSION: Performed by: ANESTHESIOLOGY

## 2022-04-20 PROCEDURE — 82962 GLUCOSE BLOOD TEST: CPT

## 2022-04-20 PROCEDURE — 76942 ECHO GUIDE FOR BIOPSY: CPT | Performed by: ORTHOPAEDIC SURGERY

## 2022-04-20 PROCEDURE — 25010000002 MIDAZOLAM PER 1 MG: Performed by: ANESTHESIOLOGY

## 2022-04-20 PROCEDURE — 63710000001 HYDROCODONE-ACETAMINOPHEN 7.5-325 MG TABLET: Performed by: ANESTHESIOLOGY

## 2022-04-20 PROCEDURE — 25010000002 VANCOMYCIN 1 G RECONSTITUTED SOLUTION 1 EACH VIAL: Performed by: ORTHOPAEDIC SURGERY

## 2022-04-20 PROCEDURE — A9270 NON-COVERED ITEM OR SERVICE: HCPCS | Performed by: ORTHOPAEDIC SURGERY

## 2022-04-20 PROCEDURE — 25010000002 HYDROMORPHONE 1 MG/ML SOLUTION: Performed by: ANESTHESIOLOGY

## 2022-04-20 PROCEDURE — 25010000002 FENTANYL CITRATE (PF) 100 MCG/2ML SOLUTION: Performed by: ANESTHESIOLOGY

## 2022-04-20 PROCEDURE — C1776 JOINT DEVICE (IMPLANTABLE): HCPCS | Performed by: ORTHOPAEDIC SURGERY

## 2022-04-20 PROCEDURE — 97166 OT EVAL MOD COMPLEX 45 MIN: CPT

## 2022-04-20 PROCEDURE — C1713 ANCHOR/SCREW BN/BN,TIS/BN: HCPCS | Performed by: ORTHOPAEDIC SURGERY

## 2022-04-20 PROCEDURE — 63710000001 BENZOYL PEROXIDE 10 % LIQUID

## 2022-04-20 PROCEDURE — 0 BUPIVACAINE LIPOSOME 1.3 % SUSPENSION: Performed by: ANESTHESIOLOGY

## 2022-04-20 PROCEDURE — 73030 X-RAY EXAM OF SHOULDER: CPT

## 2022-04-20 PROCEDURE — 99214 OFFICE O/P EST MOD 30 MIN: CPT | Performed by: INTERNAL MEDICINE

## 2022-04-20 PROCEDURE — 25010000002 PROPOFOL 1000 MG/100ML EMULSION: Performed by: ANESTHESIOLOGY

## 2022-04-20 PROCEDURE — A9270 NON-COVERED ITEM OR SERVICE: HCPCS | Performed by: ANESTHESIOLOGY

## 2022-04-20 PROCEDURE — 63710000001 ASPIRIN 325 MG TABLET: Performed by: ORTHOPAEDIC SURGERY

## 2022-04-20 PROCEDURE — 25010000002 ONDANSETRON PER 1 MG: Performed by: ANESTHESIOLOGY

## 2022-04-20 DEVICE — BASEPLT GLEN TRABECULARMETAL REV 15MM: Type: IMPLANTABLE DEVICE | Site: SHOULDER | Status: FUNCTIONAL

## 2022-04-20 DEVICE — INVERSE/REVERSE SCREW SYSTEM, 4.5-30
Type: IMPLANTABLE DEVICE | Site: SHOULDER | Status: FUNCTIONAL
Brand: INVERSE/REVERSE

## 2022-04-20 DEVICE — STEM HUM/SHLDR TRABECULARMETAL REV 14X130MM: Type: IMPLANTABLE DEVICE | Site: SHOULDER | Status: FUNCTIONAL

## 2022-04-20 DEVICE — SUT NONABS MAXBRAID/PE NMBR2 HC5 38IN BLU 900334: Type: IMPLANTABLE DEVICE | Site: SHOULDER | Status: FUNCTIONAL

## 2022-04-20 DEVICE — LINER HUM/SHLDR TRABECULARMETAL REV POLY 60DEG 40MM PLS6: Type: IMPLANTABLE DEVICE | Site: SHOULDER | Status: FUNCTIONAL

## 2022-04-20 DEVICE — GLENSPHR TRABECULARMETAL REV 40MM: Type: IMPLANTABLE DEVICE | Site: SHOULDER | Status: FUNCTIONAL

## 2022-04-20 DEVICE — TOTL SHLDER REV: Type: IMPLANTABLE DEVICE | Site: SHOULDER | Status: FUNCTIONAL

## 2022-04-20 DEVICE — INVERSE/REVERSE SCREW SYSTEM, 4.5-36
Type: IMPLANTABLE DEVICE | Site: SHOULDER | Status: FUNCTIONAL
Brand: INVERSE/REVERSE

## 2022-04-20 RX ORDER — OXYCODONE HCL 10 MG/1
10 TABLET, FILM COATED, EXTENDED RELEASE ORAL EVERY 12 HOURS SCHEDULED
Status: DISCONTINUED | OUTPATIENT
Start: 2022-04-20 | End: 2022-04-20

## 2022-04-20 RX ORDER — SODIUM CHLORIDE, SODIUM LACTATE, POTASSIUM CHLORIDE, CALCIUM CHLORIDE 600; 310; 30; 20 MG/100ML; MG/100ML; MG/100ML; MG/100ML
1000 INJECTION, SOLUTION INTRAVENOUS CONTINUOUS
Status: DISCONTINUED | OUTPATIENT
Start: 2022-04-20 | End: 2022-04-21 | Stop reason: HOSPADM

## 2022-04-20 RX ORDER — PROMETHAZINE HYDROCHLORIDE 12.5 MG/1
12.5 TABLET ORAL EVERY 6 HOURS PRN
Qty: 21 TABLET | Refills: 1 | Status: SHIPPED | OUTPATIENT
Start: 2022-04-20

## 2022-04-20 RX ORDER — LIDOCAINE HYDROCHLORIDE 10 MG/ML
INJECTION, SOLUTION EPIDURAL; INFILTRATION; INTRACAUDAL; PERINEURAL AS NEEDED
Status: DISCONTINUED | OUTPATIENT
Start: 2022-04-20 | End: 2022-04-20 | Stop reason: SURG

## 2022-04-20 RX ORDER — PROMETHAZINE HYDROCHLORIDE 25 MG/1
25 TABLET ORAL ONCE AS NEEDED
Status: DISCONTINUED | OUTPATIENT
Start: 2022-04-20 | End: 2022-04-20 | Stop reason: HOSPADM

## 2022-04-20 RX ORDER — GABAPENTIN 300 MG/1
300 CAPSULE ORAL NIGHTLY
Status: DISCONTINUED | OUTPATIENT
Start: 2022-04-20 | End: 2022-04-21 | Stop reason: HOSPADM

## 2022-04-20 RX ORDER — DONEPEZIL HYDROCHLORIDE 5 MG/1
5 TABLET, FILM COATED ORAL NIGHTLY
Status: DISCONTINUED | OUTPATIENT
Start: 2022-04-20 | End: 2022-04-21 | Stop reason: HOSPADM

## 2022-04-20 RX ORDER — SODIUM CHLORIDE 9 MG/ML
75 INJECTION, SOLUTION INTRAVENOUS CONTINUOUS
Status: DISCONTINUED | OUTPATIENT
Start: 2022-04-20 | End: 2022-04-21 | Stop reason: HOSPADM

## 2022-04-20 RX ORDER — FENTANYL CITRATE 50 UG/ML
INJECTION, SOLUTION INTRAMUSCULAR; INTRAVENOUS AS NEEDED
Status: DISCONTINUED | OUTPATIENT
Start: 2022-04-20 | End: 2022-04-20 | Stop reason: SURG

## 2022-04-20 RX ORDER — ACETAMINOPHEN 650 MG/1
650 SUPPOSITORY RECTAL EVERY 4 HOURS PRN
Status: DISCONTINUED | OUTPATIENT
Start: 2022-04-20 | End: 2022-04-21 | Stop reason: HOSPADM

## 2022-04-20 RX ORDER — ASPIRIN 325 MG
325 TABLET ORAL EVERY 12 HOURS SCHEDULED
Status: DISCONTINUED | OUTPATIENT
Start: 2022-04-20 | End: 2022-04-21 | Stop reason: HOSPADM

## 2022-04-20 RX ORDER — ONDANSETRON 2 MG/ML
4 INJECTION INTRAMUSCULAR; INTRAVENOUS EVERY 6 HOURS PRN
Status: DISCONTINUED | OUTPATIENT
Start: 2022-04-20 | End: 2022-04-21 | Stop reason: HOSPADM

## 2022-04-20 RX ORDER — IPRATROPIUM BROMIDE AND ALBUTEROL SULFATE 2.5; .5 MG/3ML; MG/3ML
3 SOLUTION RESPIRATORY (INHALATION) ONCE AS NEEDED
Status: DISCONTINUED | OUTPATIENT
Start: 2022-04-20 | End: 2022-04-20 | Stop reason: HOSPADM

## 2022-04-20 RX ORDER — MEPERIDINE HYDROCHLORIDE 25 MG/ML
12.5 INJECTION INTRAMUSCULAR; INTRAVENOUS; SUBCUTANEOUS
Status: DISCONTINUED | OUTPATIENT
Start: 2022-04-20 | End: 2022-04-20 | Stop reason: HOSPADM

## 2022-04-20 RX ORDER — HYDROCODONE BITARTRATE AND ACETAMINOPHEN 5; 325 MG/1; MG/1
1 TABLET ORAL EVERY 6 HOURS PRN
Qty: 20 TABLET | Refills: 0 | OUTPATIENT
Start: 2022-04-20 | End: 2022-07-13

## 2022-04-20 RX ORDER — FLUMAZENIL 0.1 MG/ML
0.2 INJECTION INTRAVENOUS AS NEEDED
Status: DISCONTINUED | OUTPATIENT
Start: 2022-04-20 | End: 2022-04-20 | Stop reason: HOSPADM

## 2022-04-20 RX ORDER — DROPERIDOL 2.5 MG/ML
2.5 INJECTION, SOLUTION INTRAMUSCULAR; INTRAVENOUS ONCE AS NEEDED
Status: DISCONTINUED | OUTPATIENT
Start: 2022-04-20 | End: 2022-04-20 | Stop reason: HOSPADM

## 2022-04-20 RX ORDER — CELECOXIB 200 MG/1
200 CAPSULE ORAL ONCE
Status: DISCONTINUED | OUTPATIENT
Start: 2022-04-20 | End: 2022-04-20

## 2022-04-20 RX ORDER — DEXAMETHASONE SODIUM PHOSPHATE 4 MG/ML
INJECTION, SOLUTION INTRA-ARTICULAR; INTRALESIONAL; INTRAMUSCULAR; INTRAVENOUS; SOFT TISSUE
Status: COMPLETED | OUTPATIENT
Start: 2022-04-20 | End: 2022-04-20

## 2022-04-20 RX ORDER — TRAMADOL HYDROCHLORIDE 50 MG/1
50 TABLET ORAL EVERY 6 HOURS PRN
Status: DISCONTINUED | OUTPATIENT
Start: 2022-04-20 | End: 2022-04-21 | Stop reason: HOSPADM

## 2022-04-20 RX ORDER — DEXAMETHASONE SODIUM PHOSPHATE 4 MG/ML
INJECTION, SOLUTION INTRA-ARTICULAR; INTRALESIONAL; INTRAMUSCULAR; INTRAVENOUS; SOFT TISSUE AS NEEDED
Status: DISCONTINUED | OUTPATIENT
Start: 2022-04-20 | End: 2022-04-20 | Stop reason: SURG

## 2022-04-20 RX ORDER — ONDANSETRON 2 MG/ML
4 INJECTION INTRAMUSCULAR; INTRAVENOUS ONCE AS NEEDED
Status: DISCONTINUED | OUTPATIENT
Start: 2022-04-20 | End: 2022-04-20 | Stop reason: HOSPADM

## 2022-04-20 RX ORDER — ONDANSETRON 2 MG/ML
INJECTION INTRAMUSCULAR; INTRAVENOUS AS NEEDED
Status: DISCONTINUED | OUTPATIENT
Start: 2022-04-20 | End: 2022-04-20 | Stop reason: SURG

## 2022-04-20 RX ORDER — HYDRALAZINE HYDROCHLORIDE 20 MG/ML
5 INJECTION INTRAMUSCULAR; INTRAVENOUS
Status: DISCONTINUED | OUTPATIENT
Start: 2022-04-20 | End: 2022-04-20 | Stop reason: HOSPADM

## 2022-04-20 RX ORDER — CYCLOBENZAPRINE HCL 10 MG
10 TABLET ORAL NIGHTLY PRN
Status: DISCONTINUED | OUTPATIENT
Start: 2022-04-20 | End: 2022-04-21 | Stop reason: HOSPADM

## 2022-04-20 RX ORDER — DROPERIDOL 2.5 MG/ML
1.25 INJECTION, SOLUTION INTRAMUSCULAR; INTRAVENOUS ONCE AS NEEDED
Status: DISCONTINUED | OUTPATIENT
Start: 2022-04-20 | End: 2022-04-20 | Stop reason: HOSPADM

## 2022-04-20 RX ORDER — MELOXICAM 15 MG/1
15 TABLET ORAL DAILY
Status: DISCONTINUED | OUTPATIENT
Start: 2022-04-21 | End: 2022-04-21 | Stop reason: HOSPADM

## 2022-04-20 RX ORDER — BISACODYL 10 MG
10 SUPPOSITORY, RECTAL RECTAL DAILY PRN
Status: DISCONTINUED | OUTPATIENT
Start: 2022-04-20 | End: 2022-04-21 | Stop reason: HOSPADM

## 2022-04-20 RX ORDER — ROCURONIUM BROMIDE 10 MG/ML
INJECTION, SOLUTION INTRAVENOUS AS NEEDED
Status: DISCONTINUED | OUTPATIENT
Start: 2022-04-20 | End: 2022-04-20 | Stop reason: SURG

## 2022-04-20 RX ORDER — OXYCODONE HYDROCHLORIDE 5 MG/1
10 TABLET ORAL EVERY 4 HOURS PRN
Status: DISCONTINUED | OUTPATIENT
Start: 2022-04-20 | End: 2022-04-21 | Stop reason: HOSPADM

## 2022-04-20 RX ORDER — PANTOPRAZOLE SODIUM 40 MG/1
40 TABLET, DELAYED RELEASE ORAL DAILY
Status: DISCONTINUED | OUTPATIENT
Start: 2022-04-21 | End: 2022-04-21 | Stop reason: HOSPADM

## 2022-04-20 RX ORDER — MORPHINE SULFATE 4 MG/ML
2 INJECTION, SOLUTION INTRAMUSCULAR; INTRAVENOUS EVERY 4 HOURS PRN
Status: DISCONTINUED | OUTPATIENT
Start: 2022-04-20 | End: 2022-04-21 | Stop reason: HOSPADM

## 2022-04-20 RX ORDER — ONDANSETRON 4 MG/1
4 TABLET, FILM COATED ORAL EVERY 6 HOURS PRN
Status: DISCONTINUED | OUTPATIENT
Start: 2022-04-20 | End: 2022-04-21 | Stop reason: HOSPADM

## 2022-04-20 RX ORDER — LABETALOL HYDROCHLORIDE 5 MG/ML
5 INJECTION, SOLUTION INTRAVENOUS
Status: DISCONTINUED | OUTPATIENT
Start: 2022-04-20 | End: 2022-04-20 | Stop reason: HOSPADM

## 2022-04-20 RX ORDER — LIDOCAINE HYDROCHLORIDE 10 MG/ML
0.5 INJECTION, SOLUTION INFILTRATION; PERINEURAL ONCE AS NEEDED
Status: DISCONTINUED | OUTPATIENT
Start: 2022-04-20 | End: 2022-04-20 | Stop reason: HOSPADM

## 2022-04-20 RX ORDER — BUPIVACAINE HYDROCHLORIDE 5 MG/ML
INJECTION, SOLUTION EPIDURAL; INTRACAUDAL
Status: COMPLETED | OUTPATIENT
Start: 2022-04-20 | End: 2022-04-20

## 2022-04-20 RX ORDER — NALOXONE HCL 0.4 MG/ML
0.4 VIAL (ML) INJECTION
Status: DISCONTINUED | OUTPATIENT
Start: 2022-04-20 | End: 2022-04-21 | Stop reason: HOSPADM

## 2022-04-20 RX ORDER — SODIUM CHLORIDE 0.9 % (FLUSH) 0.9 %
10 SYRINGE (ML) INJECTION AS NEEDED
Status: DISCONTINUED | OUTPATIENT
Start: 2022-04-20 | End: 2022-04-20 | Stop reason: HOSPADM

## 2022-04-20 RX ORDER — HYDROCODONE BITARTRATE AND ACETAMINOPHEN 7.5; 325 MG/1; MG/1
1 TABLET ORAL ONCE AS NEEDED
Status: COMPLETED | OUTPATIENT
Start: 2022-04-20 | End: 2022-04-20

## 2022-04-20 RX ORDER — PROPOFOL 10 MG/ML
INJECTION, EMULSION INTRAVENOUS CONTINUOUS PRN
Status: DISCONTINUED | OUTPATIENT
Start: 2022-04-20 | End: 2022-04-20 | Stop reason: SURG

## 2022-04-20 RX ORDER — TRANEXAMIC ACID 10 MG/ML
INJECTION, SOLUTION INTRAVENOUS AS NEEDED
Status: DISCONTINUED | OUTPATIENT
Start: 2022-04-20 | End: 2022-04-20 | Stop reason: SURG

## 2022-04-20 RX ORDER — OXYCODONE HCL 10 MG/1
10 TABLET, FILM COATED, EXTENDED RELEASE ORAL ONCE
Status: COMPLETED | OUTPATIENT
Start: 2022-04-20 | End: 2022-04-20

## 2022-04-20 RX ORDER — MELOXICAM 15 MG/1
15 TABLET ORAL ONCE
Status: COMPLETED | OUTPATIENT
Start: 2022-04-20 | End: 2022-04-20

## 2022-04-20 RX ORDER — PREGABALIN 75 MG/1
150 CAPSULE ORAL ONCE
Status: COMPLETED | OUTPATIENT
Start: 2022-04-20 | End: 2022-04-20

## 2022-04-20 RX ORDER — OXYCODONE HYDROCHLORIDE 5 MG/1
10 TABLET ORAL EVERY 4 HOURS PRN
Status: DISCONTINUED | OUTPATIENT
Start: 2022-04-20 | End: 2022-04-20 | Stop reason: HOSPADM

## 2022-04-20 RX ORDER — PHENYLEPHRINE HCL IN 0.9% NACL 1 MG/10 ML
SYRINGE (ML) INTRAVENOUS AS NEEDED
Status: DISCONTINUED | OUTPATIENT
Start: 2022-04-20 | End: 2022-04-20 | Stop reason: SURG

## 2022-04-20 RX ORDER — NALOXONE HCL 0.4 MG/ML
0.4 VIAL (ML) INJECTION AS NEEDED
Status: DISCONTINUED | OUTPATIENT
Start: 2022-04-20 | End: 2022-04-20 | Stop reason: HOSPADM

## 2022-04-20 RX ORDER — DIPHENHYDRAMINE HYDROCHLORIDE 50 MG/ML
25 INJECTION INTRAMUSCULAR; INTRAVENOUS NIGHTLY PRN
Status: DISCONTINUED | OUTPATIENT
Start: 2022-04-20 | End: 2022-04-21 | Stop reason: HOSPADM

## 2022-04-20 RX ORDER — TRANEXAMIC ACID 10 MG/ML
1000 INJECTION, SOLUTION INTRAVENOUS ONCE
Status: COMPLETED | OUTPATIENT
Start: 2022-04-20 | End: 2022-04-20

## 2022-04-20 RX ORDER — ATORVASTATIN CALCIUM 20 MG/1
20 TABLET, FILM COATED ORAL NIGHTLY
Status: DISCONTINUED | OUTPATIENT
Start: 2022-04-20 | End: 2022-04-21 | Stop reason: HOSPADM

## 2022-04-20 RX ORDER — ACETAMINOPHEN 500 MG
1000 TABLET ORAL ONCE
Status: DISCONTINUED | OUTPATIENT
Start: 2022-04-20 | End: 2022-04-20

## 2022-04-20 RX ORDER — NEOSTIGMINE METHYLSULFATE 5 MG/5 ML
SYRINGE (ML) INTRAVENOUS AS NEEDED
Status: DISCONTINUED | OUTPATIENT
Start: 2022-04-20 | End: 2022-04-20 | Stop reason: SURG

## 2022-04-20 RX ORDER — PROPOFOL 10 MG/ML
INJECTION, EMULSION INTRAVENOUS AS NEEDED
Status: DISCONTINUED | OUTPATIENT
Start: 2022-04-20 | End: 2022-04-20 | Stop reason: SURG

## 2022-04-20 RX ORDER — LISINOPRIL 5 MG/1
10 TABLET ORAL DAILY
Status: DISCONTINUED | OUTPATIENT
Start: 2022-04-21 | End: 2022-04-21 | Stop reason: HOSPADM

## 2022-04-20 RX ORDER — DIPHENHYDRAMINE HCL 25 MG
25 CAPSULE ORAL EVERY 6 HOURS PRN
Status: DISCONTINUED | OUTPATIENT
Start: 2022-04-20 | End: 2022-04-21 | Stop reason: HOSPADM

## 2022-04-20 RX ORDER — DIPHENHYDRAMINE HYDROCHLORIDE 50 MG/ML
12.5 INJECTION INTRAMUSCULAR; INTRAVENOUS
Status: DISCONTINUED | OUTPATIENT
Start: 2022-04-20 | End: 2022-04-20 | Stop reason: HOSPADM

## 2022-04-20 RX ORDER — ACETAMINOPHEN 325 MG/1
650 TABLET ORAL EVERY 4 HOURS PRN
Status: DISCONTINUED | OUTPATIENT
Start: 2022-04-20 | End: 2022-04-21 | Stop reason: HOSPADM

## 2022-04-20 RX ORDER — MIDAZOLAM HYDROCHLORIDE 1 MG/ML
INJECTION INTRAMUSCULAR; INTRAVENOUS
Status: COMPLETED | OUTPATIENT
Start: 2022-04-20 | End: 2022-04-20

## 2022-04-20 RX ORDER — GLYCOPYRROLATE 1 MG/5 ML
SYRINGE (ML) INTRAVENOUS AS NEEDED
Status: DISCONTINUED | OUTPATIENT
Start: 2022-04-20 | End: 2022-04-20 | Stop reason: SURG

## 2022-04-20 RX ORDER — DIPHENHYDRAMINE HCL 25 MG
25 CAPSULE ORAL NIGHTLY PRN
Status: DISCONTINUED | OUTPATIENT
Start: 2022-04-20 | End: 2022-04-21 | Stop reason: HOSPADM

## 2022-04-20 RX ADMIN — PROPOFOL 20 MG: 10 INJECTION, EMULSION INTRAVENOUS at 09:02

## 2022-04-20 RX ADMIN — PROPOFOL 20 MG: 10 INJECTION, EMULSION INTRAVENOUS at 09:06

## 2022-04-20 RX ADMIN — SODIUM CHLORIDE 75 ML/HR: 9 INJECTION, SOLUTION INTRAVENOUS at 16:52

## 2022-04-20 RX ADMIN — FENTANYL CITRATE 50 MCG: 50 INJECTION, SOLUTION INTRAMUSCULAR; INTRAVENOUS at 08:12

## 2022-04-20 RX ADMIN — DEXAMETHASONE SODIUM PHOSPHATE 4 MG: 4 INJECTION, SOLUTION INTRAMUSCULAR; INTRAVENOUS at 07:29

## 2022-04-20 RX ADMIN — ASPIRIN 325 MG ORAL TABLET 325 MG: 325 PILL ORAL at 21:51

## 2022-04-20 RX ADMIN — TRANEXAMIC ACID 1000 MG: 10 INJECTION, SOLUTION INTRAVENOUS at 07:35

## 2022-04-20 RX ADMIN — MIDAZOLAM 2 MG: 1 INJECTION INTRAMUSCULAR; INTRAVENOUS at 07:29

## 2022-04-20 RX ADMIN — ONDANSETRON 4 MG: 2 INJECTION INTRAMUSCULAR; INTRAVENOUS at 08:55

## 2022-04-20 RX ADMIN — CEFAZOLIN 2 G: 2 INJECTION, POWDER, FOR SOLUTION INTRAMUSCULAR; INTRAVENOUS at 07:50

## 2022-04-20 RX ADMIN — Medication 100 MCG: at 08:41

## 2022-04-20 RX ADMIN — PREGABALIN 150 MG: 75 CAPSULE ORAL at 06:30

## 2022-04-20 RX ADMIN — CEFAZOLIN 2 G: 2 INJECTION, POWDER, FOR SOLUTION INTRAMUSCULAR; INTRAVENOUS at 16:51

## 2022-04-20 RX ADMIN — ROCURONIUM BROMIDE 50 MG: 10 SOLUTION INTRAVENOUS at 07:39

## 2022-04-20 RX ADMIN — LIDOCAINE HYDROCHLORIDE 50 MG: 10 INJECTION, SOLUTION EPIDURAL; INFILTRATION; INTRACAUDAL at 07:39

## 2022-04-20 RX ADMIN — BUPIVACAINE 10 ML: 13.3 INJECTION, SUSPENSION, LIPOSOMAL INFILTRATION at 07:29

## 2022-04-20 RX ADMIN — Medication 100 MCG: at 08:35

## 2022-04-20 RX ADMIN — PROPOFOL 20 MG: 10 INJECTION, EMULSION INTRAVENOUS at 09:04

## 2022-04-20 RX ADMIN — HYDROCODONE BITARTRATE AND ACETAMINOPHEN 1 TABLET: 7.5; 325 TABLET ORAL at 12:25

## 2022-04-20 RX ADMIN — HYDROMORPHONE HYDROCHLORIDE 0.5 MG: 1 INJECTION, SOLUTION INTRAMUSCULAR; INTRAVENOUS; SUBCUTANEOUS at 12:25

## 2022-04-20 RX ADMIN — Medication 100 MCG: at 08:58

## 2022-04-20 RX ADMIN — SODIUM CHLORIDE, SODIUM LACTATE, POTASSIUM CHLORIDE, AND CALCIUM CHLORIDE: .6; .31; .03; .02 INJECTION, SOLUTION INTRAVENOUS at 07:33

## 2022-04-20 RX ADMIN — PROPOFOL 100 MCG/KG/MIN: 10 INJECTION, EMULSION INTRAVENOUS at 07:46

## 2022-04-20 RX ADMIN — Medication 100 MCG: at 08:49

## 2022-04-20 RX ADMIN — OXYCODONE 10 MG: 5 TABLET ORAL at 16:51

## 2022-04-20 RX ADMIN — OXYCODONE HYDROCHLORIDE 10 MG: 10 TABLET, FILM COATED, EXTENDED RELEASE ORAL at 06:31

## 2022-04-20 RX ADMIN — PROPOFOL 150 MG: 10 INJECTION, EMULSION INTRAVENOUS at 07:39

## 2022-04-20 RX ADMIN — MELOXICAM 15 MG: 15 TABLET ORAL at 06:31

## 2022-04-20 RX ADMIN — Medication 5 MG: at 09:10

## 2022-04-20 RX ADMIN — Medication 0.8 MG: at 09:10

## 2022-04-20 RX ADMIN — DEXAMETHASONE SODIUM PHOSPHATE 4 MG: 4 INJECTION, SOLUTION INTRA-ARTICULAR; INTRALESIONAL; INTRAMUSCULAR; INTRAVENOUS; SOFT TISSUE at 07:55

## 2022-04-20 RX ADMIN — PROPOFOL 20 MG: 10 INJECTION, EMULSION INTRAVENOUS at 09:10

## 2022-04-20 RX ADMIN — BUPIVACAINE HYDROCHLORIDE 10 ML: 5 INJECTION, SOLUTION EPIDURAL; INTRACAUDAL at 07:29

## 2022-04-20 RX ADMIN — TRANEXAMIC ACID 1000 MG: 10 INJECTION, SOLUTION INTRAVENOUS at 08:57

## 2022-04-20 NOTE — OP NOTE
TOTAL SHOULDER REVERSE ARTHROPLASTY  Procedure Report    Patient Name:  Heri Vasquez  YOB: 1945    Date of Surgery:  4/20/2022     Indications: This is a 77 y.o. male with left shoulder pain.  Imaging demonstrated degenerative joint disease.  Treatment options were discussed.  They desired to proceed with reverse shoulder arthroplasty after discussing the risks including bleeding, scarring, infection, stiffness, nerve damage, tendon damage, artery damage, continued  pain, DVT, loss of life or limb, fracture, dislocation, and a need for further surgery.      Pre-op Diagnosis:   Osteoarthritis of left glenohumeral joint [M19.012]       Post-op Diagnosis:    Post-Op Diagnosis Codes:     * Osteoarthritis of left glenohumeral joint [M19.012]    Procedure/CPT® Codes: 54505    Procedure(s):  TOTAL SHOULDER REVERSE ARTHROPLASTY    Staff: Renea Ayala    was responsible for performing the following activities: Retraction, Suction, Irrigation, Suturing, Closing and Placing Dressing and their skilled assistance was necessary for the success of this case.       Anesthesia: General with Block    Estimated Blood Loss: 200ml    Implants:    Implant Name Type Inv. Item Serial No.  Lot No. LRB No. Used Action   SUT NONABS MAXBRAID/PE NMBR2 HC5 38IN ROMAN [id number] - [id number] Implant SUT NONABS MAXBRAID/PE NMBR2 HC5 38IN ROMAN [id number]  [employer] [id number] Left 3 Implanted   STEM HUM/SHLDR TRABECULARMETAL REV [id number] - [id number] Implant STEM HUM/SHLDR TRABECULARMETAL REV [id number]  [employer] [id number] Left 1 Implanted   GLENSPHR TRABECULARMETAL REV 40MM - [id number] Implant GLENSPHR TRABECULARMETAL REV 40MM  [employer] [id number] Left 1 Implanted   BASEPLT ALEXANDRE TRABECULARMETAL REV 15MM - [id number] Implant BASEPLT ALEXANDRE TRABECULARMETAL REV 15MM  [employer] [id number] Left 1 Implanted   SCRW CANC INV/REV 4.5X30MM - [id number] Implant SCRW CANC INV/REV 4.5X30MM  [employer] [id number] Left 1 Implanted    SCRW CAN INV/REV 4.5X36MM - [id number] Implant SCRW CAN INV/REV 4.5X36MM  [employer] [id number] Left 1 Implanted   LINER HUM/SHLDR TRABECULARMETAL REV POLY 60DEG 40MM PLS6 - [id number] Implant LINER HUM/SHLDR TRABECULARMETAL REV POLY 60DEG 40MM PLS6  [employer] [id number] Left 1 Implanted       IVF: see anesthesia      Complications: None    Specimens:none    Description of Procedure: The patient's operative site was marked in the  preoperative holding area.  They received regional anesthesia and were brought to  the operating room and placed supine on a well-padded operating room table.  General anesthesia was administered.  Antibiotics were dosed.  A timeout was  taken, confirming the correct operative site and procedure.  They were placed in  the beach chair position.  The left shoulder was prepped and draped in the  standard surgical fashion.  SCDs were placed. A deltopectoral incision was marked and injected with local anesthetic.  The skin was opened.  Flaps were raised.  The interval was opened and the cephalic vein was protected.  Adhesions were released from the deltoid.  The circumflex vessels were identified and ligated with suture and cautery.  The rotator interval was opened and a retractor was placed.  The subscapularis was absent and capsule was  Tenotomized and the capsule was released down to the 6 o'clock position on the  humeral head protecting the axillary nerve.  A Fukuda retractor was placed and an inferior and anterior capsular release was performed palpating and protecting the axillary  nerve with my finger at all times.  The shoulder was dislocated.  The biceps  was tenodesed to the upper biceps groove and circumferential osteophytes  were removed to identify the native head-neck junction.  Reaming was started  behind the biceps groove up to a size 14.  The cut was made in 20  degrees of retroversion and the final two reamers were used to prepare the  proximal humerus.  A trial was  placed.  The glenoid was exposed after placing retractors.  The soft tissue was removed circumferentially.  The center point was marked and the glenoid was prepared in standard fashion.  The base plate was placed with good press-fit.  Two screws were placed with good purchase.  The locking caps were  placed.  The glenosphere was placed with good purchase.  The shoulder was  dislocated and the trial was removed from the canal and irrigated.  The true  stem was placed with good press-fit and trialing demonstrated +6 thickness to offer the best restoration of joint stability, muscle tension and range of motion.  The true polyethylene was placed with similar range of motion and stability.  A 3-minute Betadine wash performed.  The wound was closed in layers with absorbable suture and skin glue.  A sterile dressing and sling were applied.  They were awakened and taken to the recovery room.  There were no complications.  I was present for all portions.  All counts were correct.   Good capillary refill was noted to the hand.      Dany Knapp MD     Date: 4/20/2022  Time: 09:00 EDT

## 2022-04-20 NOTE — ANESTHESIA PROCEDURE NOTES
Peripheral Block      Patient reassessed immediately prior to procedure    Start time: 4/20/2022 7:20 AM  Stop time: 4/20/2022 7:30 AM  Reason for block: at surgeon's request and post-op pain management  Performed by  Anesthesiologist: Mark Smith MD  Assisted by: Fredo Avalos RN  Preanesthetic Checklist  Completed: patient identified, IV checked, site marked, risks and benefits discussed, surgical consent, monitors and equipment checked, pre-op evaluation and timeout performed  Prep:  Pt Position: supine  Prep: ChloraPrep  Patient monitoring: blood pressure monitoring, continuous pulse oximetry and EKG  Procedure    Sedation: yes  Performed under: local infiltration  Guidance:ultrasound guided    ULTRASOUND INTERPRETATION. Using ultrasound guidance a gauge needle was placed in close proximity to the brachial plexus nerve, at which point, under ultrasound guidance anesthetic was injected in the area of the nerve and spread of the anesthesia was seen on ultrasound in close proximity thereto.  There were no abnormalities seen on ultrasound; a digital image was taken; and the patient tolerated the procedure with no complications. Images:still images obtained, printed/placed on chart    Laterality:left  Block Type:interscalene  Injection Technique:single-shot  Needle Type:echogenic  Needle Gauge:20 G  Resistance on Injection: none  Sedation medications used: midazolam (VERSED) injection, 2 mg  Medications Used: dexamethasone (DECADRON) injection, 4 mg  bupivacaine liposome (EXPAREL) injection 1.3%, 10 mL  bupivacaine PF (MARCAINE) injection 0.5%, 10 mL  Med administered at 4/20/2022 7:29 AM      Post Assessment  Injection Assessment: negative aspiration for heme, no paresthesia on injection and incremental injection  Patient Tolerance:comfortable throughout block  Complications:no

## 2022-04-20 NOTE — ANESTHESIA POSTPROCEDURE EVALUATION
Patient: Heri Vasquez    Procedure Summary     Date: 04/20/22 Room / Location: [facility] OR [address] [place] [address] MAIN OR    Anesthesia Start: 0733 Anesthesia Stop: 0927    Procedure: TOTAL SHOULDER REVERSE ARTHROPLASTY (Left Shoulder) Diagnosis:       Osteoarthritis of left glenohumeral joint      (Osteoarthritis of left glenohumeral joint [M19.012])    Surgeons: Dany Knapp MD Provider: Mark Smith MD    Anesthesia Type: general with block ASA Status: 3          Anesthesia Type: general with block    Vitals  Vitals Value Taken Time   /67 04/20/22 1200   Temp 99.3 °F (37.4 °C) 04/20/22 1200   Pulse 77 04/20/22 1236   Resp 20 04/20/22 1200   SpO2 94 % 04/20/22 1236   Vitals shown include unvalidated device data.        Post Anesthesia Care and Evaluation    Patient location during evaluation: PACU  Patient participation: complete - patient participated  Level of consciousness: awake  Pain scale: See nurse's notes for pain score.  Pain management: adequate  Airway patency: patent  Anesthetic complications: No anesthetic complications  PONV Status: none  Cardiovascular status: acceptable  Respiratory status: acceptable  Hydration status: acceptable    Comments: Patient seen and examined postoperatively; vital signs stable; SpO2 greater than or equal to 90%; cardiopulmonary status stable; nausea/vomiting adequately controlled; pain adequately controlled; no apparent anesthesia complications; patient discharged from anesthesia care when discharge criteria were met

## 2022-04-20 NOTE — ANESTHESIA PROCEDURE NOTES
Airway  Urgency: elective    Date/Time: 4/20/2022 7:41 AM  Airway not difficult    General Information and Staff    Patient location during procedure: OR  Anesthesiologist: Mark Smith MD  CRNA: Carlin Valdivia CAA    Indications and Patient Condition  Indications for airway management: airway protection    Preoxygenated: yes  MILS not maintained throughout  Mask difficulty assessment: 2 - vent by mask + OA or adjuvant +/- NMBA    Final Airway Details  Final airway type: endotracheal airway      Successful airway: ETT  Cuffed: yes   Successful intubation technique: direct laryngoscopy  Endotracheal tube insertion site: oral  Blade: Hallie  Blade size: 4  ETT size (mm): 7.5  Cormack-Lehane Classification: grade I - full view of glottis  Placement verified by: chest auscultation, capnometry and palpation of cuff   Measured from: lips  ETT/EBT  to lips (cm): 23  Number of attempts at approach: 1  Assessment: lips, teeth, and gum same as pre-op and atraumatic intubation

## 2022-04-20 NOTE — ANESTHESIA PREPROCEDURE EVALUATION
Anesthesia Evaluation     Patient summary reviewed   NPO Solid Status: > 8 hours  NPO Liquid Status: > 8 hours           Airway   Mallampati: II  TM distance: >3 FB  Neck ROM: full  No difficulty expected  Dental - normal exam     Pulmonary - normal exam   (+) sleep apnea on CPAP,   Cardiovascular - normal exam    (+) hypertension, CAD, angina, hyperlipidemia,       Neuro/Psych  GI/Hepatic/Renal/Endo    (+) obesity,  GERD, GI bleeding , liver disease, diabetes mellitus,     Musculoskeletal     Abdominal  - normal exam    Bowel sounds: normal.   Substance History      OB/GYN          Other   arthritis,      ROS/Med Hx Other: No baseline SOA.  Disc interscalene RB in full.  consents                  Anesthesia Plan    ASA 3     general with block   total IV anesthesia  intravenous induction     Anesthetic plan, all risks, benefits, and alternatives have been provided, discussed and informed consent has been obtained with: patient.        CODE STATUS:

## 2022-04-20 NOTE — PLAN OF CARE
Goal Outcome Evaluation:  Plan of Care Reviewed With: patient, spouse        Progress: improving  Outcome Evaluation: Pt seen POD 0 Lt reverse TSA after damage was sustained lifting heavy grocery bags at an odd angle and he presented for imaging which revealed significant DJD. Pt has diffuse OA in many joints. Pt spouse was in distress and was sent to the ER for evaluation but pt was cheerful and reported similar incident at his last admission for knee surgery. Pt states he is confident his spouse will be cleared of major health crisis and be able to assist him at home. Also there is an adult dtr at home 4 days a week. Pt tolerated his OT evaluation & initial HEP well but would benefit from [facility] OT, PT, RN at d/c.

## 2022-04-20 NOTE — THERAPY EVALUATION
Patient Name: Heri Vasquez  : 1945    MRN: [id number]                              Today's Date: 2022       Admit Date: 2022    Visit Dx:     ICD-10-CM ICD-9-CM   1. Osteoarthritis of left glenohumeral joint  M19.012 715.91     Patient Active Problem List   Diagnosis   • Varicose veins of right lower extremity with pain   • Nasal congestion   • Actinic keratosis   • Arthritis   • Bradycardia   • Chronic coronary artery disease   • Degeneration of intervertebral disc of lumbar region   • Dependent edema   • Diabetic peripheral neuropathy (HCC)   • Encounter for general adult medical examination without abnormal findings   • Fatigue   • Gastroesophageal reflux disease   • Hay fever   • Hearing loss   • Hyperlipidemia   • Knee pain   • Lightheadedness   • Strain of lumbar region   • Type 2 diabetes mellitus with unspecified complications (HCC)   • Medicare annual wellness visit, subsequent   • Screening for prostate cancer   • Sciatic nerve pain, right   • Hip pain, right   • RUQ abdominal pain   • Gallbladder polyp   • Fatty liver   • Morbidly obese (HCC)   • Left-sided low back pain with left-sided sciatica   • Chronic right shoulder pain   • Mass of joint of right shoulder   • Cervical radiculopathy   • Cervical radiculitis   • Essential hypertension   • Venous insufficiency of leg   • Varicose veins of lower extremity   • Infected sebaceous cyst   • Type 2 diabetes mellitus without complication, without long-term current use of insulin (HCC)   • Neuritis of right ulnar nerve   • Epistaxis   • Acute right-sided thoracic back pain   • Memory changes   • Class 1 obesity   • Stable angina pectoris (HCC)   • Bronchitis   • SIDDHARTHA treated with BiPAP   • Sciatic pain, right   • Right arm pain   • Acute pain of left shoulder   • DJD of left shoulder     Past Medical History:   Diagnosis Date   • Arthritis     Dr Mosqueda   • Coronary heart disease 2016    single vessel disease, nl stress test (copied from  [employer])   • Coronary heart disease 10/31/2017    cath 10/31/17 - Low % Blockages- N o Intervention   (copied from [email or web address])   • Diverticulosis    • GERD (gastroesophageal reflux disease)    • Hearing loss    • Hyperlipemia    • Hyperlipidemia    • Hypertension    • Low back pain    • SIDDHARTHA treated with BiPAP    • Pain management     injections Lumbar spine X2 with [facility] management @ [facility] (copied from [email or web address])   • Physical therapy evaluation, initial     @ [facility] in [place] 6 weeks x3 per week, Kalen leal (copied from [email or web address])   • Rectal bleed 08/2012    hemorrhoids   • Retinal hemorrhage of right eye 05/2014   • Sleep apnea     bipap   • Type 2 diabetes mellitus (HCC)    • Uses brace      Past Surgical History:   Procedure Laterality Date   • BELPHAROPTOSIS REPAIR  12/11/2017     Dr. grimes (copied from [email or web address])   • BROW LIFT  12/11/2017    Dr. Grimes at [facility]   • CARDIAC CATHETERIZATION  03/2012    at [facility]- single vessel disease. (copied from [email or web address])   • CARDIAC CATHETERIZATION  10/13/2017    no intervention - Low % Blockages.   • CARDIAC CATHETERIZATION Left 5/28/2021    Procedure: LEFT HEART CATH with possible PCI;  Surgeon: Leslie Clark MD;  Location: [facility] CATH INVASIVE LOCATION;  Service: Cardiovascular;  Laterality: Left;  Local and IV sedation   • CATARACT EXTRACTION  12/11/2017    brow lift and eye lid repair   • COLON SURGERY  06/2014    colonscopy   • COLONOSCOPY N/A 7/12/2019    Procedure: COLONOSCOPY with polypectomy x1;  Surgeon: Graham Byrd MD;  Location: [facility] ENDOSCOPY;  Service: Gastroenterology   • CUBITAL TUNNEL RELEASE Right    • HERNIA REPAIR  11/2008    umbilical hernia repair   • JOINT REPLACEMENT     • KNEE ARTHROSCOPY Right 11/2014    Dr. Mosqueda   • LASIK      lasik and Cataract Extraction, sep 11 and right Sept. 25th, 2014- [facility]. (copied from [email or web address])   • TOTAL KNEE ARTHROPLASTY Left 11/2009    Dr. Mosqueda (copied from [email or web address])       General Information     Row Name 04/20/22 1446          General Information    Prior Level of Function independent:;driving;ADL's;all household mobility;home management  -     Existing Precautions/Restrictions shoulder  -     Barriers to Rehab none identified  -     Row Name 04/20/22 1446          Living Environment    People in Home spouse;child(griselda), adult  spouse uses cane & was sent to the ER when OT found her in distress w/ chest pain & anxiety. Pt has Hx panic attackes, indigestion per pt. Dtr works Sun-Wed 10-hr shifts.  -     Row Name 04/20/22 1446          Cognition    Orientation Status (Cognition) oriented x 4  -     Row Name 04/20/22 1446          Safety Issues, Functional Mobility    Safety Issues Affecting Function (Mobility) problem-solving;safety precaution awareness  -     Impairments Affecting Function (Mobility) endurance/activity tolerance;strength;grasp;pain;range of motion (ROM);sensation/sensory awareness  -           User Key  (r) = Recorded By, (t) = Taken By, (c) = Cosigned By    Initials Name Provider Type     Maritza Harvey OT Occupational Therapist                 Mobility/ADL's     Row Name 04/20/22 1450          Bed Mobility    Bed Mobility supine-sit  -     Supine-Sit [place] (Bed Mobility) minimum assist (75% patient effort)  -     Bed Mobility, Safety Issues decreased use of arms for pushing/pulling  -     Row Name 04/20/22 1450          Transfers    Transfers sit-stand transfer;stand-sit transfer  -     Sit-Stand [place] (Transfers) standby assist  -     Stand-Sit [place] (Transfers) standby assist  -     Row Name 04/20/22 1450          Functional Mobility    Functional Mobility- Ind. Level supervision required  -     Row Name 04/20/22 1450          Activities of Daily Living    BADL Assessment/Intervention upper body dressing;feeding  -     Row Name 04/20/22 1450          Upper Body Dressing Assessment/Training    [place] Level  (Upper Body Dressing) doff;don;front opening garment;other (see comments);maximum assist (25% patient effort)  -     Row Name 04/20/22 1450          Self-Feeding Assessment/Training    [place] Level (Feeding) feeding skills;set up  -     Position (Self-Feeding) sitting up in bed  -           User Key  (r) = Recorded By, (t) = Taken By, (c) = Cosigned By    Initials Name Provider Type     Maritza Harvey OT Occupational Therapist               Obj/Interventions     [facility] Name 04/20/22 1451          Sensory Assessment (Somatosensory)    Sensory Assessment minimal numbness in thumb remains.  -[facility] Name 04/20/22 1451          Vision Assessment/Intervention    Visual Impairment/Limitations corrective lenses full-time  -[facility] Name 04/20/22 1451          Range of Motion Comprehensive    Comment, General Range of Motion Lt shld restricted. Rt elbow extension limited < 25% from OA.  -[facility] Name 04/20/22 1451          Strength Comprehensive (MMT)    Comment, General Manual Muscle Testing (MMT) Assessment RUE 5/5; Lt , wrist 3-/5, Lt elbow 2-/5  -[facility] Name 04/20/22 1451          Shoulder (Therapeutic Exercise)    Shoulder (Therapeutic Exercise) pendulum exercises  Pt instructed to dangle arm for bathing, dressing, & exercise 2X/day  -[facility] Name 04/20/22 1451          Elbow/Forearm (Therapeutic Exercise)    Elbow/Forearm (Therapeutic Exercise) AAROM (active assistive range of motion)  -     Elbow/Forearm AAROM (Therapeutic Exercise) flexion;extension;10 repetitions  -[facility] Name 04/20/22 1451          Motor Skills    Therapeutic Exercise shoulder;elbow/forearm;wrist;hand  -[facility] Name 04/20/22 1451          Balance    Balance Assessment sitting static balance;sitting dynamic balance;standing static balance;standing dynamic balance  -     Static Sitting Balance independent  -     Dynamic Sitting Balance contact guard  -     Position, Sitting Balance sitting edge of bed   -MH     Static Standing Balance independent  -MH     Dynamic Standing Balance modified independence  wide ALYCIA  -           User Key  (r) = Recorded By, (t) = Taken By, (c) = Cosigned By    Initials Name Provider Type    Maritza Heaton, OT Occupational Therapist               Goals/Plan     Row Name 04/20/22 1508          Bathing Goal 1 (OT)    Activity/Device (Bathing Goal 1, OT) bathing skills, all  -MH     [place] Level/Cues Needed (Bathing Goal 1, OT) minimum assist (75% or more patient effort)  -     Time Frame (Bathing Goal 1, OT) 2 weeks  -MH     Row Name 04/20/22 1507          Dressing Goal 1 (OT)    Activity/Device (Dressing Goal 1, OT) dressing skills, all  -MH     [place]/Cues Needed (Dressing Goal 1, OT) minimum assist (75% or more patient effort)  -     Time Frame (Dressing Goal 1, OT) 2 weeks  -MH     Row Name 04/20/22 1507          Grooming Goal 1 (OT)    Activity/Device (Grooming Goal 1, OT) grooming skills, all  -MH     [place] (Grooming Goal 1, OT) modified independence  -MH     Time Frame (Grooming Goal 1, OT) 2 weeks  -MH     Row Name 04/20/22 1503          Therapy Assessment/Plan (OT)    Planned Therapy Interventions (OT) activity tolerance training;adaptive equipment training;BADL retraining;functional balance retraining;occupation/activity based interventions;patient/caregiver education/training;transfer/mobility retraining;ROM/therapeutic exercise  -           User Key  (r) = Recorded By, (t) = Taken By, (c) = Cosigned By    Initials Name Provider Type    Maritza Heaton OT Occupational Therapist               Clinical Impression     Row Name 04/20/22 1503          Pain Assessment    Pretreatment Pain Rating 0/10 - no pain  -     Posttreatment Pain Rating 0/10 - no pain  -MH     Row Name 04/20/22 1501          Plan of Care Review    Plan of Care Reviewed With patient;spouse  -     Progress improving  -     Outcome Evaluation Pt seen POD 0 Lt reverse TSA after  damage was sustained lifting heavy grosery bags at an odd angle and he presented for imaging which revealed significant DJD. Pt has diffuse OA in many joints. Pt spouse was in distress and was sent to the ER for evaluation but pt was cheerful and reported similar incident at his last admission for knee surgery. Pt states he is confident his spouse will be cleared of major health crisis and be able to assist him at home. Also there is an adult dtr aat home 4 days a week. Pt tolerated his OT evaluation & initial HEP well but would benefit from [facility] OT, PT, RN at d/c.  -     Row Name 04/20/22 1507          Therapy Assessment/Plan (OT)    Rehab Potential (OT) good, to achieve stated therapy goals  -     Criteria for Skilled Therapeutic Interventions Met (OT) skilled treatment is necessary  -     Therapy Frequency (OT) daily  -     Predicted Duration of Therapy Intervention (OT) until d/c  -     Row Name 04/20/22 1508          Therapy Plan Review/Discharge Plan (OT)    Anticipated Discharge Disposition (OT) home with home health  -     Row Name 04/20/22 1500          Vital Signs    O2 Delivery Pre Treatment room air  -     Pre Patient Position Supine  -     Intra Patient Position Standing  -     Post Patient Position Sitting  -     Row Name 04/20/22 1507          Positioning and Restraints    Pre-Treatment Position in bed  -     Post Treatment Position bed  -MH     In Bed notified nsg;call light within reach;encouraged to call for assist;sitting EOB  RN approves EOB  -           User Key  (r) = Recorded By, (t) = Taken By, (c) = Cosigned By    Initials Name Provider Type     Maritza Harvey, OT Occupational Therapist               Outcome Measures     Row Name 04/20/22 4968          How much help from another is currently needed...    Putting on and taking off regular lower body clothing? 2  -MH     Bathing (including washing, rinsing, and drying) 2  -MH     Toileting (which includes using toilet  bed pan or urinal) 3  -     Putting on and taking off regular upper body clothing 2  -     Taking care of personal grooming (such as brushing teeth) 3  -     Eating meals 3  -     AM-PAC 6 Clicks Score (OT) 15  -     Row Name 04/20/22 1508          Functional Assessment    Outcome Measure Options AM-PAC 6 Clicks Daily Activity (OT)  -           User Key  (r) = Recorded By, (t) = Taken By, (c) = Cosigned By    Initials Name Provider Type     Maritza Harvey OT Occupational Therapist                Occupational Therapy Education                 Title: PT OT SLP Therapies (Done)     Topic: Occupational Therapy (Done)     Point: ADL training (Done)     Description:   Instruct learner(s) on proper safety adaptation and remediation techniques during self care or transfers.   Instruct in proper use of assistive devices.              Learning Progress Summary           Patient Acceptance, E,TB,D,H, VU,DU,NR by  at 4/20/2022 1509                   Point: Home exercise program (Done)     Description:   Instruct learner(s) on appropriate technique for monitoring, assisting and/or progressing therapeutic exercises/activities.              Learning Progress Summary           Patient Acceptance, E,TB,D,H, VU,DU,NR by  at 4/20/2022 1509                   Point: Precautions (Done)     Description:   Instruct learner(s) on prescribed precautions during self-care and functional transfers.              Learning Progress Summary           Patient Acceptance, E,TB,D,H, VU,DU,NR by  at 4/20/2022 1509                   Point: Body mechanics (Done)     Description:   Instruct learner(s) on proper positioning and spine alignment during self-care, functional mobility activities and/or exercises.              Learning Progress Summary           Patient Acceptance, E,TB,D,H, VU,DU,NR by  at 4/20/2022 1509                               User Key     Initials Effective Dates Name Provider Type [facility] 06/16/21 -   Maritza Harvey OT Occupational Therapist OT              OT Recommendation and Plan  Planned Therapy Interventions (OT): activity tolerance training, adaptive equipment training, BADL retraining, functional balance retraining, occupation/activity based interventions, patient/caregiver education/training, transfer/mobility retraining, ROM/therapeutic exercise  Therapy Frequency (OT): daily  Plan of Care Review  Plan of Care Reviewed With: patient, spouse  Progress: improving  Outcome Evaluation: Pt seen POD 0 Lt reverse TSA after damage was sustained lifting heavy grosery bags at an odd angle and he presented for imaging which revealed significant DJD. Pt has diffuse OA in many joints. Pt spouse was in distress and was sent to the ER for evaluation but pt was cheerful and reported similar incident at his last admission for knee surgery. Pt states he is confident his spouse will be cleared of major health crisis and be able to assist him at home. Also there is an adult dtr aat home 4 days a week. Pt tolerated his OT evaluation & initial HEP well but would benefit from [facility] OT, PT, RN at d/c.     Time Calculation:    Time Calculation- OT     Row Name 04/20/22 1510             Time Calculation-     OT Start Time 1345  -      OT Stop Time 1405  -      OT Time Calculation (min) 20 min  -      Total Timed Code Minutes- OT 0 minute(s)  -      OT Received On 04/20/22  -      OT - Next Appointment 04/21/22  -      OT Goal Re-Cert Due Date 05/04/22  -            User Key  (r) = Recorded By, (t) = Taken By, (c) = Cosigned By    Initials Name Provider Type     Maritza Harvey OT Occupational Therapist              Therapy Charges for Today     Code Description Service Date Service Provider Modifiers Qty    [id number]  OT EVAL MOD COMPLEXITY 4 4/20/2022 Maritza Harvey OT GO 1               Maritza Harvey OT  4/20/2022

## 2022-04-21 ENCOUNTER — HOME HEALTH ADMISSION (OUTPATIENT)
Dept: HOME HEALTH SERVICES | Facility: HOME HEALTHCARE | Age: 77
End: 2022-04-21

## 2022-04-21 ENCOUNTER — READMISSION MANAGEMENT (OUTPATIENT)
Dept: CALL CENTER | Facility: HOSPITAL | Age: 77
End: 2022-04-21

## 2022-04-21 VITALS
RESPIRATION RATE: 17 BRPM | SYSTOLIC BLOOD PRESSURE: 153 MMHG | OXYGEN SATURATION: 95 % | DIASTOLIC BLOOD PRESSURE: 66 MMHG | WEIGHT: 228.4 LBS | BODY MASS INDEX: 34.62 KG/M2 | HEIGHT: 68 IN | HEART RATE: 85 BPM | TEMPERATURE: 98.2 F

## 2022-04-21 LAB
ANION GAP SERPL CALCULATED.3IONS-SCNC: 13 MMOL/L (ref 5–15)
BUN SERPL-MCNC: 10 MG/DL (ref 8–23)
BUN/CREAT SERPL: 12.3 (ref 7–25)
CALCIUM SPEC-SCNC: 8.8 MG/DL (ref 8.6–10.5)
CHLORIDE SERPL-SCNC: 107 MMOL/L (ref 98–107)
CO2 SERPL-SCNC: 22 MMOL/L (ref 22–29)
CREAT SERPL-MCNC: 0.81 MG/DL (ref 0.76–1.27)
EGFRCR SERPLBLD CKD-EPI 2021: 89.7 ML/MIN/1.73
GLUCOSE BLDC GLUCOMTR-MCNC: 111 MG/DL (ref 70–105)
GLUCOSE SERPL-MCNC: 126 MG/DL (ref 65–99)
HCT VFR BLD AUTO: 42.2 % (ref 37.5–51)
HGB BLD-MCNC: 13.9 G/DL (ref 13–17.7)
POTASSIUM SERPL-SCNC: 3.8 MMOL/L (ref 3.5–5.2)
SODIUM SERPL-SCNC: 142 MMOL/L (ref 136–145)

## 2022-04-21 PROCEDURE — 63710000001 METFORMIN 500 MG TABLET: Performed by: ORTHOPAEDIC SURGERY

## 2022-04-21 PROCEDURE — A9270 NON-COVERED ITEM OR SERVICE: HCPCS | Performed by: ORTHOPAEDIC SURGERY

## 2022-04-21 PROCEDURE — 63710000001 PANTOPRAZOLE 40 MG TABLET DELAYED-RELEASE: Performed by: ORTHOPAEDIC SURGERY

## 2022-04-21 PROCEDURE — 25010000002 CEFAZOLIN PER 500 MG: Performed by: ORTHOPAEDIC SURGERY

## 2022-04-21 PROCEDURE — 97110 THERAPEUTIC EXERCISES: CPT

## 2022-04-21 PROCEDURE — 63710000001 OXYCODONE 5 MG TABLET: Performed by: ORTHOPAEDIC SURGERY

## 2022-04-21 PROCEDURE — 63710000001 ATORVASTATIN 20 MG TABLET: Performed by: ORTHOPAEDIC SURGERY

## 2022-04-21 PROCEDURE — 99024 POSTOP FOLLOW-UP VISIT: CPT | Performed by: INTERNAL MEDICINE

## 2022-04-21 PROCEDURE — 85018 HEMOGLOBIN: CPT | Performed by: ORTHOPAEDIC SURGERY

## 2022-04-21 PROCEDURE — 82962 GLUCOSE BLOOD TEST: CPT

## 2022-04-21 PROCEDURE — 85014 HEMATOCRIT: CPT | Performed by: ORTHOPAEDIC SURGERY

## 2022-04-21 PROCEDURE — 63710000001 ISOSORBIDE MONONITRATE 60 MG TABLET SUSTAINED-RELEASE 24 HOUR: Performed by: ORTHOPAEDIC SURGERY

## 2022-04-21 PROCEDURE — 97535 SELF CARE MNGMENT TRAINING: CPT

## 2022-04-21 PROCEDURE — 63710000001 GABAPENTIN 300 MG CAPSULE: Performed by: ORTHOPAEDIC SURGERY

## 2022-04-21 PROCEDURE — G0378 HOSPITAL OBSERVATION PER HR: HCPCS

## 2022-04-21 PROCEDURE — 80048 BASIC METABOLIC PNL TOTAL CA: CPT | Performed by: ORTHOPAEDIC SURGERY

## 2022-04-21 PROCEDURE — 97162 PT EVAL MOD COMPLEX 30 MIN: CPT

## 2022-04-21 PROCEDURE — 63710000001 ISOSORBIDE MONONITRATE 30 MG TABLET SUSTAINED-RELEASE 24 HOUR: Performed by: ORTHOPAEDIC SURGERY

## 2022-04-21 PROCEDURE — 63710000001 DONEPEZIL 5 MG TABLET: Performed by: ORTHOPAEDIC SURGERY

## 2022-04-21 RX ADMIN — DONEPEZIL HYDROCHLORIDE 5 MG: 5 TABLET, FILM COATED ORAL at 00:08

## 2022-04-21 RX ADMIN — PANTOPRAZOLE SODIUM 40 MG: 40 TABLET, DELAYED RELEASE ORAL at 08:31

## 2022-04-21 RX ADMIN — OXYCODONE 10 MG: 5 TABLET ORAL at 12:01

## 2022-04-21 RX ADMIN — CEFAZOLIN 2 G: 2 INJECTION, POWDER, FOR SOLUTION INTRAMUSCULAR; INTRAVENOUS at 08:31

## 2022-04-21 RX ADMIN — ATORVASTATIN CALCIUM 20 MG: 20 TABLET, FILM COATED ORAL at 00:08

## 2022-04-21 RX ADMIN — METFORMIN HYDROCHLORIDE 500 MG: 500 TABLET ORAL at 08:32

## 2022-04-21 RX ADMIN — CEFAZOLIN 2 G: 2 INJECTION, POWDER, FOR SOLUTION INTRAMUSCULAR; INTRAVENOUS at 00:07

## 2022-04-21 RX ADMIN — GABAPENTIN 300 MG: 300 CAPSULE ORAL at 00:08

## 2022-04-21 RX ADMIN — OXYCODONE 10 MG: 5 TABLET ORAL at 01:16

## 2022-04-21 RX ADMIN — ISOSORBIDE MONONITRATE 90 MG: 60 TABLET, EXTENDED RELEASE ORAL at 08:31

## 2022-04-21 NOTE — CASE MANAGEMENT/SOCIAL WORK
Discharge Planning Assessment   [facility]     Patient Name: Heri Vasquez  MRN: [id number]  Today's Date: 4/21/2022    Admit Date: 4/20/2022     Discharge Needs Assessment     Row Name 04/21/22 1048       Living Environment    People in Home spouse    Current Living Arrangements home    Primary Care Provided by self    Able to Return to Prior Arrangements yes       Resource/Environmental Concerns    Resource/Environmental Concerns none    Transportation Concerns none       Transition Planning    Patient/Family Anticipates Transition to home with family    Patient/Family Anticipated Services at Transition home health care    Transportation Anticipated family or friend will provide       Discharge Needs Assessment    Readmission Within the Last 30 Days no previous admission in last 30 days    Equipment Currently Used at Home none    Concerns to be Addressed discharge planning    Anticipated Changes Related to Illness none    Equipment Needed After Discharge none               Discharge Plan     Row Name 04/21/22 1043       Plan    Plan Home with  HH (ordered and accepted)    Provided Post Acute Provider List? Yes    Post Acute Provider List Home Health    Provided Post Acute Provider Quality & Resource List? Yes    Post Acute Provider Quality and Resource List Home Health    Delivered To Patient    Method of Delivery In person    Patient/Family in Agreement with Plan yes    Plan Comments Met with patient at bedside lives at home with wife who will provide transportation at discharge. IADL's. PCP and Pharmacy verified, able to afford medicaitons. [facility] selected [facility], per Selina patient accepted, order is in place.              Continued Care and Services - Admitted Since 4/20/2022     Home Medical Care Coordination complete.    Service Provider Request Status Selected Services Address Phone Fax Patient Preferred    [facility]   [facility] [address] [facility] [place] [address] [phone]  [phone] --              Expected Discharge Date and Time     Expected Discharge Date Expected Discharge Time    Apr 21, 2022          Demographic Summary     Row Name 04/21/22 1048       General Information    Arrived From emergency department    Required Notices Provided Observation Status Notice    Referral Source admission list    Reason for Consult discharge planning    Preferred Language English               Functional Status     Row Name 04/21/22 1048       Functional Status    Usual Activity Tolerance good    Current Activity Tolerance good       Functional Status, IADL    Medications independent    Meal Preparation independent    Housekeeping independent    Laundry independent    Shopping independent       Mental Status    General Appearance WDL WDL              Met with patient at bedside wearing mask and goggles, Spent less than 15 minutes in room at greater than 6 feet distance.              Erinn Amezquita RN

## 2022-04-21 NOTE — CASE MANAGEMENT/SOCIAL WORK
Case Management Discharge Note      Final Note: Home with [facility]    Provided Post Acute Provider List?: Yes  Post Acute Provider List: Home Health  Provided Post Acute Provider Quality & Resource List?: Yes  Post Acute Provider Quality and Resource List: Home Health  Delivered To: Patient  Method of Delivery: In person        Home Medical Care Coordination complete.    Service Provider Selected Services Address Phone Fax Patient Preferred    [facility]  Home Health Services [address] [facility] [address] [phone] --                  Transportation Services  Private: Car    Final Discharge Disposition Code: 06 - home with home health care

## 2022-04-21 NOTE — DISCHARGE PLACEMENT REQUEST
"Tanya Vasquez (77 y.o. Male)             Date of Birth   1945    Social Security Number       Address   [address] [place] [facility]    Home Phone   [phone]    MRN   [id number]       [facility]    Marital Status                               Admission Date   4/20/22    Admission Type   Elective    Admitting Provider   Dany Knapp MD    Attending Provider   Dany Knapp MD    Department, Room/Bed   [facility] SURGICAL INPATIENT, 4127/1       Discharge Date       Discharge Disposition   Home or Self Care    Discharge Destination                               Attending Provider: Dany Knapp MD    Allergies: No Known Allergies    Isolation: None   Infection: None   Code Status: CPR   Advance Care Planning Activity    Ht: 172.7 cm (68\")   Wt: 104 kg (228 lb 6.4 oz)    Admission Cmt: None   Principal Problem: DJD of left shoulder [M19.012]                 Active Insurance as of 4/20/2022     Primary Coverage     Payor Plan Insurance Group Employer/Plan Group    MEDICARE MEDICARE A & B      Payor Plan Address Payor Plan Phone Number Payor Plan Fax Number Effective Dates    [address] [phone]  4/1/2008 - None Entered    [facility] [id number]       Subscriber Name Subscriber Birth Date Member ID       TANYA VAQSUEZ 1945 [id number]           Secondary Coverage     Payor Plan Insurance Group Employer/Plan Group    [employer] OPTIONS      Payor Plan Address Payor Plan Phone Number Payor Plan Fax Number Effective Dates    [facility] [phone]  1/1/2019 - None Entered    [address]       [facility] [address]       Subscriber Name Subscriber Birth Date Member ID       TANYA VASQUEZ 1945 [id number]                 Emergency Contacts      (Rel.) Home Phone Work Phone Mobile Phone    GILBERTO VASQUEZ (Spouse) [phone] -- [phone]              "

## 2022-04-21 NOTE — THERAPY TREATMENT NOTE
Subjective: Pt agreeable to therapeutic plan of care.  Cognition: oriented to Person, Place and Time    Objective:     Bed Mobility: N/A or Not attempted.  Functional Transfers: SBA  Functional Ambulation: SBA    Upper Body Dressing: Mod-A  ADL Position: supported sitting and supported standing      Lower Body Dressing: Mod-A  ADL Position: supported sitting and supported standing      Pain: 0 VAS  Education: Provided education on importance of mobility and skilled verbal / tactile cueing throughout intervention.     Assessment: Heri Vasquez presents with ADL impairments below baseline abilities which indicate the need for continued skilled intervention while inpatient. Tolerating session today without incident. Will continue to follow and progress as tolerated.     Plan/Recommendations:   Pt would benefit from Home with Home Health at discharge from facility.   Pt desires Home with Home Health at discharge. Pt cooperative; agreeable to therapeutic recommendations and plan of care.     Modified Bette: N/A = No pre-op stroke/TIA    Post-Tx Position: Up in Chair  PPE: gloves, surgical mask, eyewear protection    Subjective: Heri Vasquez is s/p TSA, anticipating discharge this date.     Pain level: 0/10 VAS p/o shoulder    Objective: Pt states completing TSA HEP yesterday after therapy services without difficulty. OT reviewed HEP consisting of pendulums, AROM of elbow/wrist and hand this date. Pt demos good understanding.     Pt dons/doffs abductor sling with ModA.  Completes UB dressing with hemiplegic techniques with ModA, and LB dressing with ModA.      Pt completes PROM shoulder flexion 0 - 110 this date.    Education: Post-op shoulder precautions, self-care techniques and HEP. Handout provided in total joint handbook.     Assessment: Patient demonstrates good progression toward stated goals. Also with good understanding of HEP and shoulder precautions. Anticipate discharge home with family assistance this  date.     Plan: Home Health OT/PT

## 2022-04-21 NOTE — DISCHARGE SUMMARY
Date of Discharge:  4/21/2022    Discharge Diagnosis: Left shoulder degenerative joint disease    Presenting Problem/History of Present Illness  Active Hospital Problems    Diagnosis  POA   • **DJD of left shoulder [M19.012]  Unknown   • Class 1 obesity [E66.9]  Yes   • Essential hypertension [I10]  Yes   • Hyperlipidemia [E78.5]  Yes   • Arthritis [M19.90]  Yes   • Diabetic peripheral neuropathy (HCC) [E11.42]  Yes   • Degeneration of intervertebral disc of lumbar region [M51.36]  Yes   • Type 2 diabetes mellitus with unspecified complications (HCC) [E11.8]  Yes   • Chronic coronary artery disease [I25.10]  Yes   • Gastroesophageal reflux disease [K21.9]  Yes      Resolved Hospital Problems    Diagnosis Date Resolved POA   • Type 2 diabetes mellitus without complication, without long-term current use of insulin (HCC) [E11.9] 04/20/2022 Yes        Hospital Course  Patient is a 77 y.o. male presented with left shoulder degenerative joint disease.  They underwent shoulder arthroplasty during admission and postop day 1 were tolerating diet and oral medication and pain was controlled    Procedures Performed: Left reverse total shoulder      Consults:   Consults     Date and Time Order Name Status Description    4/20/2022  1:15 PM Inpatient Consult to Hospitalist      4/20/2022  1:15 PM Inpatient Hospitalist Consult Completed             Condition on Discharge:  Improved    Vital Signs  Vitals:    04/20/22 1643 04/20/22 2100 04/21/22 0100 04/21/22 0500   BP: 117/59 123/59 129/62 148/64   BP Location: Right arm      Patient Position: Lying      Pulse: 62 67 66 70   Resp: 16 18 18 18   Temp: 98.1 °F (36.7 °C) 98 °F (36.7 °C) 97.7 °F (36.5 °C) 98.2 °F (36.8 °C)   TempSrc: Oral Oral Oral Oral   SpO2: 92% 94% 93% 93%   Weight:       Height:           Physical Exam:  Dry dressing, Sensory and motor exam are intact all distributions. Radial pulse is palpable and capillary refill is less than two seconds to all  digits       Discharge Disposition  Home    Discharge Medications     Discharge Medications      New Medications      Instructions Start Date   HYDROcodone-acetaminophen 5-325 MG per tablet  Commonly known as: NORCO   1 tablet, Oral, Every 6 Hours PRN      promethazine 12.5 MG tablet  Commonly known as: PHENERGAN   12.5 mg, Oral, Every 6 Hours PRN         Changes to Medications      Instructions Start Date   cyclobenzaprine 10 MG tablet  Commonly known as: FLEXERIL  What changed:   · when to take this  · reasons to take this   TAKE 1 TABLET BY MOUTH AT NIGHT AS NEEDED FOR MUSCLE SPASM      naproxen 500 MG tablet  Commonly known as: NAPROSYN  What changed: when to take this   TAKE 1 TABLET BY MOUTH  TWICE DAILY AS NEEDED FOR  MODERATE PAIN      pantoprazole 40 MG EC tablet  Commonly known as: PROTONIX  What changed: when to take this   TAKE 1 TABLET BY MOUTH  DAILY         Continue These Medications      Instructions Start Date   acetaminophen 650 MG 8 hr tablet  Commonly known as: TYLENOL   650 mg, Oral, Every 8 Hours PRN      aspirin 81 MG EC tablet   81 mg, Oral, Daily, LD 4/13      atorvastatin 20 MG tablet  Commonly known as: LIPITOR   20 mg, Oral, Nightly      betamethasone valerate 0.1 % ointment  Commonly known as: VALISONE   1 application, Topical, 2 Times Daily      calcium citrate-vitamin d 315-250 MG-UNIT tablet tablet  Commonly known as: CALCITRATE   Oral, Daily      donepezil 5 MG tablet  Commonly known as: ARICEPT   5 mg, Oral, Nightly      gabapentin 300 MG capsule  Commonly known as: NEURONTIN   TAKE 1 CAPSULE BY MOUTH AT  NIGHT      hydrocortisone 2.5 % cream   1 application, Topical, 2 Times Daily      isosorbide mononitrate 60 MG 24 hr tablet  Commonly known as: IMDUR   TAKE 1 AND 1/2 TABLETS BY  MOUTH DAILY      lisinopril 10 MG tablet  Commonly known as: PRINIVIL,ZESTRIL   TAKE 1 TABLET BY MOUTH  DAILY      metFORMIN 500 MG tablet  Commonly known as: GLUCOPHAGE   TAKE 1 TABLET BY MOUTH  TWICE  DAILY WITH A MEAL      multivitamin with minerals tablet tablet   1 tablet, Oral, Daily      nitroglycerin 0.4 MG SL tablet  Commonly known as: NITROSTAT   0.4 mg, Sublingual, Every 5 Minutes PRN      OneTouch Delica Plus [id number] misc   USE 1  TO CHECK GLUCOSE ONCE DAILY      OneTouch Ultra test strip  Generic drug: glucose blood   1 each, Other, Daily, E11.9               Discharge instructions:  Continue sling.  Keep dressing on.  Okay to shower and perform home exercises.  Continue polar care.    Follow-up Appointments  Future Appointments   Date Time Provider Department Center   5/5/2022  1:00 PM Dany Knapp MD [facility]   10/20/2022 10:00 AM Leslie Clark MD [facility]   1/24/2023  9:45 AM aNncy Harris MD [facility]              Dany Knapp MD  04/21/22  08:20 EDT      Disclaimer: Part of this note may be an electronic transcription/translation of spoken language to printed text using the Dragon Dictation System

## 2022-04-21 NOTE — PLAN OF CARE
Goal Outcome Evaluation:      Pt. Demonstrates progress w/ LUE TSA HEP this date 10 reps x 1 set shoulder PROM w/ shoulder flexion completed approx 110* and abduction 75*, ER completed to neutral. Pt. Completes pendulums from standing w/ mod Vcs to correct pt. Technique. Pt. And spouse educated on hemidress tech donning/doffing sling, ice, and shirt w/ mod A. Pt. Will require HH therapy at d/c to advance TSA protocol.

## 2022-04-21 NOTE — PROGRESS NOTES
Spoke with patient to verify demographics and explain services. Pt is agreeable    Dr. Knapp will be the following md    PCP: Dr. Cueva  Pharmacy: [facility] in [place]    ACO patient

## 2022-04-21 NOTE — THERAPY EVALUATION
Patient Name: Heri Vasquez  : 1945    MRN: [id number]                              Today's Date: 2022       Admit Date: 2022    Visit Dx:     ICD-10-CM ICD-9-CM   1. Primary osteoarthritis of left shoulder  M19.012 715.11   2. Osteoarthritis of left glenohumeral joint  M19.012 715.91   3. Status post total replacement of left shoulder  Z96.612 V43.61     Patient Active Problem List   Diagnosis   • Varicose veins of right lower extremity with pain   • Nasal congestion   • Actinic keratosis   • Arthritis   • Bradycardia   • Chronic coronary artery disease   • Degeneration of intervertebral disc of lumbar region   • Dependent edema   • Diabetic peripheral neuropathy (HCC)   • Encounter for general adult medical examination without abnormal findings   • Fatigue   • Gastroesophageal reflux disease   • Hay fever   • Hearing loss   • Hyperlipidemia   • Knee pain   • Lightheadedness   • Strain of lumbar region   • Type 2 diabetes mellitus with unspecified complications (HCC)   • Screening for prostate cancer   • Sciatic nerve pain, right   • Hip pain, right   • RUQ abdominal pain   • Gallbladder polyp   • Fatty liver   • Left-sided low back pain with left-sided sciatica   • Chronic right shoulder pain   • Mass of joint of right shoulder   • Cervical radiculopathy   • Cervical radiculitis   • Essential hypertension   • Venous insufficiency of leg   • Varicose veins of lower extremity   • Infected sebaceous cyst   • Neuritis of right ulnar nerve   • Epistaxis   • Acute right-sided thoracic back pain   • Memory changes   • Class 1 obesity   • Stable angina pectoris (HCC)   • Bronchitis   • SIDDHARTHA treated with BiPAP   • Sciatic pain, right   • Right arm pain   • Acute pain of left shoulder   • DJD of left shoulder     Past Medical History:   Diagnosis Date   • Arthritis     Dr Mosqueda   • Coronary heart disease 2016    single vessel disease, nl stress test (copied from [facility])   • Coronary heart disease  10/31/2017    cath 10/31/17 - Low % Blockages- N o Intervention   (copied from [facility])   • Diverticulosis    • GERD (gastroesophageal reflux disease)    • Hearing loss    • Hyperlipemia    • Hyperlipidemia    • Hypertension    • Low back pain    • SIDDHARTHA treated with BiPAP    • Pain management     injections Lumbar spine X2 with [facility] @ [facility] (copied from [facility])   • Physical therapy evaluation, initial     @ [facility] in [place] 6 weeks x3 per week, Kalen leal (copied from [facility])   • Rectal bleed 08/2012    hemorrhoids   • Retinal hemorrhage of right eye 05/2014   • Sleep apnea     bipap   • Type 2 diabetes mellitus (HCC)    • Uses brace      Past Surgical History:   Procedure Laterality Date   • BELPHAROPTOSIS REPAIR  12/11/2017     Dr. grimes (copied from [facility])   • BROW LIFT  12/11/2017    Dr. Grimes at [facility]   • CARDIAC CATHETERIZATION  03/2012    at [facility]- single vessel disease. (copied from [facility])   • CARDIAC CATHETERIZATION  10/13/2017    no intervention - Low % Blockages.   • CARDIAC CATHETERIZATION Left 5/28/2021    Procedure: LEFT HEART CATH with possible PCI;  Surgeon: Leslie Clark MD;  Location: [facility] CATH INVASIVE LOCATION;  Service: Cardiovascular;  Laterality: Left;  Local and IV sedation   • CATARACT EXTRACTION  12/11/2017    brow lift and eye lid repair   • COLON SURGERY  06/2014    colonscopy   • COLONOSCOPY N/A 7/12/2019    Procedure: COLONOSCOPY with polypectomy x1;  Surgeon: Graham Byrd MD;  Location: [facility] ENDOSCOPY;  Service: Gastroenterology   • CUBITAL TUNNEL RELEASE Right    • HERNIA REPAIR  11/2008    umbilical hernia repair   • JOINT REPLACEMENT     • KNEE ARTHROSCOPY Right 11/2014    Dr. Mosqueda   • LASIK      lasik and Cataract Extraction, sep 11 and right Sept. 25th, 2014- [facility]. (copied from [facility])   • TOTAL KNEE ARTHROPLASTY Left 11/2009    Dr. Mosqueda (copied from [facility])      General Information     Row Name 04/21/22  1045          Physical Therapy Time and Intention    Document Type evaluation  -BR     Mode of Treatment physical therapy  -BR     Row Name 04/21/22 1045          General Information    Existing Precautions/Restrictions shoulder  -BR     Row Name 04/21/22 1045          Living Environment    People in Home spouse  -BR     Row Name 04/21/22 1045          Home Main Entrance    Stair Railings, Main Entrance none  -BR     Row Name 04/21/22 1045          Cognition    Orientation Status (Cognition) oriented x 4  -BR     Row Name 04/21/22 1045          Safety Issues, Functional Mobility    Impairments Affecting Function (Mobility) endurance/activity tolerance;strength;grasp;pain;range of motion (ROM);sensation/sensory awareness  -BR           User Key  (r) = Recorded By, (t) = Taken By, (c) = Cosigned By    Initials Name Provider Type    BR Tiffanie Taylor PT Physical Therapist               Mobility     Row Name 04/21/22 1301          Bed Mobility    Supine-Sit [place] (Bed Mobility) not tested  -BR     Row Name 04/21/22 1301          Sit-Stand Transfer    Sit-Stand [place] (Transfers) standby assist  -BR     Row Name 04/21/22 1301          Gait/Stairs (Locomotion)    [place] Level (Gait) supervision  -BR     Distance in Feet (Gait) 300  -BR           User Key  (r) = Recorded By, (t) = Taken By, (c) = Cosigned By    Initials Name Provider Type    Tiffanie Shaffer PT Physical Therapist               Obj/Interventions     Row Name 04/21/22 1302          Range of Motion Comprehensive    Comment, General Range of Motion grossly WFL BLE  -BR     Row Name 04/21/22 1302          Strength Comprehensive (MMT)    Comment, General Manual Muscle Testing (MMT) Assessment grossly 4/5 BLE  -BR     Row Name 04/21/22 1302          Balance    Balance Assessment standing static balance  -BR     Static Sitting Balance standby assist  -BR     Row Name 04/21/22 1302          Sensory Assessment (Somatosensory)    Sensory  Assessment (Somatosensory) LE sensation intact  -BR           User Key  (r) = Recorded By, (t) = Taken By, (c) = Cosigned By    Initials Name Provider Type    Tiffanie Shaffer, PT Physical Therapist               Goals/Plan     Row Name 04/21/22 1300          Bed Mobility Goal 1 (PT)    Activity/Assistive Device (Bed Mobility Goal 1, PT) bed mobility activities, all  -BR     [place] Level/Cues Needed (Bed Mobility Goal 1, PT) independent  -BR     Time Frame (Bed Mobility Goal 1, PT) 2 weeks  -BR     Row Name 04/21/22 1305          Transfer Goal 1 (PT)    Activity/Assistive Device (Transfer Goal 1, PT) transfers, all  -BR     [place] Level/Cues Needed (Transfer Goal 1, PT) independent  -BR     Time Frame (Transfer Goal 1, PT) 2 weeks  -BR     Row Name 04/21/22 130          Gait Training Goal 1 (PT)    Activity/Assistive Device (Gait Training Goal 1, PT) gait (walking locomotion)  -BR     [place] Level (Gait Training Goal 1, PT) independent  -BR     Time Frame (Gait Training Goal 1, PT) 2 weeks  -BR     Row Name 04/21/22 1301          Therapy Assessment/Plan (PT)    Planned Therapy Interventions (PT) bed mobility training;transfer training;strengthening;patient/family education;gait training  -BR           User Key  (r) = Recorded By, (t) = Taken By, (c) = Cosigned By    Initials Name Provider Type    Tiffanie Shaffer, PT Physical Therapist               Clinical Impression     Row Name 04/21/22 130          Pain    Pretreatment Pain Rating 0/10 - no pain  -BR     Posttreatment Pain Rating 0/10 - no pain  -BR     Row Name 04/21/22 1306          Plan of Care Review    Outcome Evaluation Pt presents as a 76 y/o M s/p Left TSA. Procedure was well tolerated.  Pt is safe with gait without AD and transfers requiring only SBA of 1.  Home Health services  is recommendation.  -BR     Row Name 04/21/22 1300          Therapy Assessment/Plan (PT)    Criteria for Skilled Interventions Met (PT) yes  -BR      Therapy Frequency (PT) 3 times/wk  -BR     Predicted Duration of Therapy Intervention (PT) until D/C  -BR     Row Name 04/21/22 1303          Positioning and Restraints    Pre-Treatment Position sitting in chair/recliner  -BR     Post Treatment Position chair  -BR     In Chair encouraged to call for assist;call light within reach;with family/caregiver  -BR           User Key  (r) = Recorded By, (t) = Taken By, (c) = Cosigned By    Initials Name Provider Type    BR Tiffanie Taylor PT Physical Therapist               Outcome Measures    No documentation.                              Physical Therapy Education                 Title: PT OT SLP Therapies (Done)     Topic: Physical Therapy (Done)     Point: Mobility training (Done)     Learning Progress Summary           Patient Acceptance, E, VU by BR at 4/21/2022 1306                   Point: Home exercise program (Done)     Learning Progress Summary           Patient Acceptance, E, VU by BR at 4/21/2022 1306                   Point: Body mechanics (Done)     Learning Progress Summary           Patient Acceptance, E, VU by BR at 4/21/2022 1306                   Point: Precautions (Done)     Learning Progress Summary           Patient Acceptance, E, VU by BR at 4/21/2022 1306                               User Key     Initials Effective Dates Name Provider Type Discipline    BR 02/01/22 -  Tiffanie Taylor PT Physical Therapist PT              PT Recommendation and Plan  Planned Therapy Interventions (PT): bed mobility training, transfer training, strengthening, patient/family education, gait training  Outcome Evaluation: Pt presents as a 78 y/o M s/p Left TSA. Procedure was well tolerated.  Pt is safe with gait without AD and transfers requiring only SBA of 1.  Home Health services  is recommendation.     Time Calculation:    PT Charges     Row Name 04/21/22 1307             Time Calculation    Start Time 1041  -BR      Stop Time 1104  -BR      Time Calculation  (min) 23 min  -BR      PT Received On 04/21/22  -BR      PT - Next Appointment 04/23/22  -BR      PT Goal Re-Cert Due Date 05/05/22  -BR              Time Calculation- PT    Total Timed Code Minutes- PT 0 minute(s)  -BR            User Key  (r) = Recorded By, (t) = Taken By, (c) = Cosigned By    Initials Name Provider Type    BR Tiffanie Taylor, PT Physical Therapist              Therapy Charges for Today     Code Description Service Date Service Provider Modifiers Qty    78233677842 HC PT EVAL MOD COMPLEXITY 2 4/21/2022 Tiffanie Taylor, PT GP 1          PT G-Codes  Outcome Measure Options: AM-PAC 6 Clicks Daily Activity (OT)  AM-PAC 6 Clicks Score (OT): 15    Tiffanie Taylor, PT  4/21/2022

## 2022-04-21 NOTE — PLAN OF CARE
Goal Outcome Evaluation:   Pt presents as a 76 y/o M s/p Left TSA. Procedure was well tolerated.  Pt is safe with gait without AD and transfers requiring only SBA of 1.  Home Health services  is recommendation.

## 2022-04-21 NOTE — PLAN OF CARE
Goal Outcome Evaluation:         Patient doing well with minimal pain. Patient has been up and in the chair this morning. Patient is to be discharged home with home health. Follow up with Dr. Knapp has been made. Patient has call light within reach and is able to make needs known.

## 2022-04-21 NOTE — PROGRESS NOTES
[facility] Daily Progress Note    Patient Name: Heri Vasquez  : 1945  MRN: [id number]  Primary Care Physician:  Olesya Cueva MD  Date of admission: 2022      Subjective      Chief Complaint: Left shoulder pain  History of Present Illness: Heri Vasquez is a 77 y.o. obese white male with multiple medical problems, past medical history significant for CAD, hyperlipidemia, hypertension, sleep apnea, chronic pain, diabetes type 2, presented with several months of left shoulder pain after feeling a pop lifting after lifting some groceries.  Since then has had significant pain at night difficulty lifting his arm overhead despite stretching and oral medication, she was admitted to the orthopedic floor for total shoulder reverse arthroplasty were asked to see patient for medical management       22 patient seen and examined.  No acute distress, doing better today, anxious to go home.  Shoulder pain well controlled.  Discussed with RN.    Review of Systems   Constitutional: Negative.   HENT: Negative.    Eyes: Negative.    Cardiovascular: Negative.    Respiratory: Negative.    Endocrine: Negative.    Hematologic/Lymphatic: Negative.    Skin: Negative.    Musculoskeletal: Positive for joint pain and myalgias.   Gastrointestinal: Negative.    Genitourinary: Negative.    Neurological: Negative.    Psychiatric/Behavioral: Negative.    Allergic/Immunologic: Negative.    All other systems reviewed and are negative.    Objective      Vitals:   Temp:  [97.3 °F (36.3 °C)-99.3 °F (37.4 °C)] 98.2 °F (36.8 °C)  Heart Rate:  [61-89] 85  Resp:  [13-20] 17  BP: (105-153)/(58-67) 153/66  Flow (L/min):  [2-4] 2    Physical Exam general Appearance:    Alert, cooperative, in no acute distress  Head:    Normocephalic, without obvious abnormality, atraumatic  Eyes:            Lids and lashes normal, conjunctivae and sclerae normal, no   icterus, no pallor, corneas clear,  PERRLA  Neck:   No adenopathy, supple, trachea midline, no thyromegaly, no   carotid bruit, no JVD  Back:     No kyphosis present, no scoliosis present, no skin lesions  Lungs:     Clear to auscultation,respirations regular, even and                  unlabored   Heart:    Regular rhythm and normal rate, normal S1 and S2, no           Chest Wall:    No abnormalities observed  Abdomen:     Normal bowel sounds, no masses, no organomegaly, soft        non-tender, non-distended, no guarding, no rebound                tenderness  Extremities:   Moves all extremities well, no edema, no cyanosis, no             Redness, left upper extremity immobilized  Skin:   No bleeding, bruising or rash  Neurologic:   Cranial nerves 2 - 12 grossly intact, sensation intact, DTR       present and equal bilaterally        Result Review    Result Review:  I have personally reviewed the results from the time of this admission to 4/21/2022 09:28 EDT and agree with these findings:  []  Laboratory  []  Microbiology  []  Radiology  []  EKG/Telemetry   []  Cardiology/Vascular   []  Pathology  []  Old records  []  Other:  Most notable findings include:  CMP:      Lab 04/21/22  0232   SODIUM 142   POTASSIUM 3.8   CHLORIDE 107   CO2 22.0   ANION GAP 13.0   BUN 10   CREATININE 0.81   EGFR 89.7   GLUCOSE 126*   CALCIUM 8.8     CBC:      Lab 04/21/22  0232   HEMOGLOBIN 13.9   HEMATOCRIT 42.2       Wounds (last 24 hours)     LDA Wound     Row Name 04/21/22 0328 04/20/22 2013 04/20/22 1240       Wound 04/20/22 0805 Left axilla Incision    Wound - Properties Group Placement Date: 04/20/22  -SK Placement Time: 0805 -SK Side: Left  -SK Location: axilla  -SK Primary Wound Type: Incision  -SK    Dressing Appearance intact;dry  -LR intact;dry  -LR --    Closure ABRAN  -LR ABRAN  -LR ABRAN  -HS    Drainage Amount -- -- none  -HS    Retired Wound - Properties Group Placement Date: 04/20/22  -SK Placement Time: 0805 -SK Side: Left  -SK Location: axilla  -SK Primary  Wound Type: Incision  -SK    Retired Wound - Properties Group Date first assessed: 04/20/22  -SK Time first assessed: 0805 -SK Side: Left  -SK Location: axilla  -SK Primary Wound Type: Incision  -SK    Row Name 04/20/22 1200 04/20/22 1133 04/20/22 1016       Wound 04/20/22 0805 Left axilla Incision    Wound - Properties Group Placement Date: 04/20/22 -SK Placement Time: 0805 -SK Side: Left  -SK Location: axilla  -SK Primary Wound Type: Incision  -SK    Dressing Appearance -- dry;intact;no drainage  -AH dry;intact;no drainage  -AH    Closure ABRAN  -AH ABRAN  -AH ABRAN  -AH    Retired Wound - Properties Group Placement Date: 04/20/22  -SK Placement Time: 0805 -SK Side: Left  -SK Location: axilla  -SK Primary Wound Type: Incision  -SK    Retired Wound - Properties Group Date first assessed: 04/20/22  -SK Time first assessed: 0805 -SK Side: Left  -SK Location: axilla  -SK Primary Wound Type: Incision  -SK          User Key  (r) = Recorded By, (t) = Taken By, (c) = Cosigned By    Initials Name Provider Type     Giselle Bravo, RN Registered Nurse    Hannah Sevilla RN Registered Nurse    Mary Vitale RN Registered Nurse    Magalie Cummings RN Registered Nurse                  Assessment/Plan      Brief Patient Summary:  Heri Vasquez is a 77 y.o. male who       aspirin, 325 mg, Oral, Q12H  atorvastatin, 20 mg, Oral, Nightly  donepezil, 5 mg, Oral, Nightly  gabapentin, 300 mg, Oral, Nightly  isosorbide mononitrate, 90 mg, Oral, Daily  lisinopril, 10 mg, Oral, Daily  meloxicam, 15 mg, Oral, Daily  metFORMIN, 500 mg, Oral, BID With Meals  pantoprazole, 40 mg, Oral, Daily       lactated ringers, 1,000 mL, Last Rate: Stopped (04/20/22 1240)  sodium chloride, 75 mL/hr, Last Rate: Stopped (04/20/22 2020)         Active Hospital Problems:  Active Hospital Problems    Diagnosis    • **DJD of left shoulder    • Class 1 obesity    • Essential hypertension    • Hyperlipidemia    • Arthritis    • Diabetic peripheral  neuropathy (HCC)    • Degeneration of intervertebral disc of lumbar region    • Type 2 diabetes mellitus with unspecified complications (HCC)    • Chronic coronary artery disease    • Gastroesophageal reflux disease       Diagnosis     • **DJD of left shoulder  -TOTAL SHOULDER REVERSE ARTHROPLASTY  4/2022  Pain management  PT OT        • Class 1 obesity-advise lifestyle modification and weight loss     • Essential hypertension-continue lisinopril  -Blood pressure stable, monitor     • Hyperlipidemia  -Continue statins-Lipitor 20 mg daily     • Arthritis-hold naproxen, Lortab as needed     • Diabetic peripheral neuropathy (HCC)  -neurontin     • Degeneration of intervertebral disc of lumbar region     • Type 2 diabetes mellitus with unspecified complications (HCC)  Regular insulin sliding scale, diabetic diet, hold metformin,     • Chronic coronary artery disease  -Continue aspirin atorvastatin and lisinopril     • Gastroesophageal reflux disease-Protonix        Okay to discharge home      DVT prophylaxis:  Mechanical DVT prophylaxis orders are present.    CODE STATUS:    Code Status (Patient has no pulse and is not breathing): CPR (Attempt to Resuscitate)  Medical Interventions (Patient has pulse or is breathing): Full      Disposition:  I expect patient to be discharged home.    This patient has been examined wearing appropriate Personal Protective Equipment and discussed with rn. 04/21/22      Electronically signed by Ramiro Alicea MD, 04/21/22, 09:28 EDT.  [facility] Hospitalist Team

## 2022-04-22 ENCOUNTER — TRANSITIONAL CARE MANAGEMENT TELEPHONE ENCOUNTER (OUTPATIENT)
Dept: CALL CENTER | Facility: HOSPITAL | Age: 77
End: 2022-04-22

## 2022-04-22 ENCOUNTER — HOME CARE VISIT (OUTPATIENT)
Dept: HOME HEALTH SERVICES | Facility: HOME HEALTHCARE | Age: 77
End: 2022-04-22

## 2022-04-22 PROCEDURE — G0151 HHCP-SERV OF PT,EA 15 MIN: HCPCS

## 2022-04-22 NOTE — OUTREACH NOTE
Prep Survey    Flowsheet Row Responses   [facility] patient discharged from? [facility]   Is LACE score < 7 ? No   Emergency Room discharge w/ pulse ox? No   Eligibility [facility]   Date of Admission 04/20/22   Date of Discharge 04/21/22   Discharge Disposition Home-Health Care Sv   Discharge diagnosis DJD of left shoulder s/p TOTAL SHOULDER REVERSE ARTHROPLASTY   Does the patient have one of the following disease processes/diagnoses(primary or secondary)? Total Joint Replacement   Does the patient have Home health ordered? Yes   What is the [facility] health agency?  [facility]    Is there a DME ordered? No   Prep survey completed? Yes          CONNIE GREEN - Registered Nurse

## 2022-04-22 NOTE — OUTREACH NOTE
Call Center TCM Note    Flowsheet Row Responses   [facility] patient discharged from? [facility]   Does the patient have one of the following disease processes/diagnoses(primary or secondary)? Total Joint Replacement   Joint surgery performed? Shoulder  [Left]   TCM attempt successful? Yes   Call start time 0911   Call end time 0919   Discharge diagnosis DJD of left shoulder s/p TOTAL SHOULDER REVERSE ARTHROPLASTY   Person spoke with today (if not patient) and relationship Patient   Does the patient have all medications related to this admission filled (includes all antibiotics, pain medications, etc.) Yes   Is the patient taking all medications as directed (includes completed medication regime)? Yes   Is the patient able to teach back alternate methods of pain control? Ice, Reposition, Correct alignment, Short, frequent activity, Shoulder-elevate above heart/ keep in sling as advised   Does the patient have a follow up appointment with their surgeon? Yes  [5/2/22]   Has the patient kept scheduled appointments due by today? N/A   Comments [facility] fu appt on 5/3/22 at 1:15 PM   What is the Home health agency?  [facility]    Has home health visited the patient within 72 hours of discharge? Yes   Home health comments  visit today, 4/22/22   Psychosocial issues? No   Has the patient began therapy sessions (either in the home or as an out patient)? No   If the patient has started attending therapy, what post op day did they begin to attend (either in home or as an out patient)?   4/22/22 will start today   Does the patient have a wound vac in place? N/A   Has the patient fallen since discharge? No   Did the patient receive a copy of their discharge instructions? Yes   Nursing interventions Reviewed instructions with patient   What is the patient's perception of their functional status since discharge? Improving   Is the patient able to teach back signs and symptoms of infection? Increased swelling or redness around  incision (not associated with surgical edema), Incisional drainage, Severe discomfort or pain, Blisters around incision, Temp >100.4 for 24h or longer, Shortness of breath or chest pain   Is the patient able to teach back how to prevent infection? Check incision daily, No tub baths, hot tub or swimming, Shower only as directed by surgeon   Is the patient able to teach back signs and symptoms of DVT? Shortness of breath or chest pain   If the patient is a current smoker, are they able to teach back resources for cessation? Not a smoker   Is the patient/caregiver able to teach back the hierarchy of who to call/visit for symptoms/problems? PCP, Specialist, Home health nurse, Urgent Care, ED, 911 Yes   TCM call completed? Yes   Wrap up additional comments Pt states he is doing ok, and denies fever, or drainage, warmth at surgical site. Pt shares he is taking Norco for pain managment. PT visit today. Pt verified PCP hospital fu appt on 5/3/22, post-op appt on 5/2/22. No questions/concerns.          Janie Shen RN    4/22/2022, 09:22 EDT

## 2022-04-23 VITALS
SYSTOLIC BLOOD PRESSURE: 126 MMHG | WEIGHT: 225 LBS | BODY MASS INDEX: 34.1 KG/M2 | HEART RATE: 84 BPM | TEMPERATURE: 97.7 F | OXYGEN SATURATION: 95 % | RESPIRATION RATE: 18 BRPM | DIASTOLIC BLOOD PRESSURE: 60 MMHG | HEIGHT: 68 IN

## 2022-04-23 NOTE — HOME HEALTH
"Mr. Heri Vasquez is a 77-year old patient who underwent lt reverse total shoulder replacement following diagnosis of lt shoulder osteoarthritis with persistent pain, increasing loss of active range of motion, declining ADLs/IADLs skills and failed conservative measures. Patient lives in one-story home with spouse and daughter who are very supportive and assisting as needed. Mr. Vasquez was independent and indoor/outdoor ambulation without assistive device pre lt total reverse shoulder replacement.    Presently, he presents with 2/5 and 3/5 gross strength in proximal and distal muscles of lt ue respectively with no overhead reaching per Dr. Knapp protocols. Functionally, he needs supervision for bed mobility, able to perform sit to stand with sba/cga and cues for correct  without compromising NWB order on lt le. Also able to ambulate about 60ft indoor with sba, occasional cga, lt arm sling in place and verbal/visual cues for continous steps to normalize gait pattern. Patient has standing order for OT eval to address ADLs/IADLs deficits including ROM of lt per MD protocols. He would benefit from skilled PT intervention for transfer and gait training.    Focus of care: Lt reverse total shoulder replacement to regain ADLs/IADLs and safe functional mobility.    Patient's personal goal: \"To be able to use my left arm to close normal as possible\"    PMH: OA lt shoulder, Diabetes Type 2. Obesity. Hyperlipidemia"

## 2022-04-25 ENCOUNTER — HOME CARE VISIT (OUTPATIENT)
Dept: HOME HEALTH SERVICES | Facility: HOME HEALTHCARE | Age: 77
End: 2022-04-25

## 2022-04-25 VITALS — OXYGEN SATURATION: 96 % | HEART RATE: 85 BPM

## 2022-04-25 PROCEDURE — G0152 HHCP-SERV OF OT,EA 15 MIN: HCPCS

## 2022-04-26 ENCOUNTER — HOME CARE VISIT (OUTPATIENT)
Dept: HOME HEALTH SERVICES | Facility: HOME HEALTHCARE | Age: 77
End: 2022-04-26

## 2022-04-27 NOTE — HOME HEALTH
Pt is a 77 y.o male who lives in a 1 story home with spouse and dtr.  Pt recently hospitalized secondary to L rTSR      PLOF pt independent with all self care      Currently pt independent with feeding grooming and toileting.  Pt requires MOD assist with bathing and dressing.  Pts wife is currently maintaining the home        Skilled OT for ther ex and adl training.  Pt and therapist in agreement with goals and poc.      Pt denies any falls or med changes    Next visit ther ex and adl training

## 2022-04-28 ENCOUNTER — READMISSION MANAGEMENT (OUTPATIENT)
Dept: CALL CENTER | Facility: HOSPITAL | Age: 77
End: 2022-04-28

## 2022-04-28 ENCOUNTER — HOME CARE VISIT (OUTPATIENT)
Dept: HOME HEALTH SERVICES | Facility: HOME HEALTHCARE | Age: 77
End: 2022-04-28

## 2022-04-28 VITALS
SYSTOLIC BLOOD PRESSURE: 136 MMHG | TEMPERATURE: 98 F | OXYGEN SATURATION: 95 % | HEART RATE: 78 BPM | DIASTOLIC BLOOD PRESSURE: 64 MMHG

## 2022-04-28 PROCEDURE — G0158 HHC OT ASSISTANT EA 15: HCPCS

## 2022-04-28 NOTE — HOME HEALTH
The patient greeted [facility] at the door,initial visit and rapport established. The patient tolerates skilled OT session and is motivated to improve. The patient denies any new issues or concerns and is motivated to improve and continue living safely and I'ly in his own home and achieve an increase strength and ROM to PLOF. The patient denies any falls, minimal pain that is controlled. Next [facility] visit for continued shoulder rehab s/p LUE shoulder arthroplasty and pt voiced understanding and agreement.

## 2022-04-28 NOTE — OUTREACH NOTE
Total Joint Week 2 Survey    Flowsheet Row Responses   [facility] patient discharged from? [facility]   Does the patient have one of the following disease processes/diagnoses(primary or secondary)? Total Joint Replacement   Joint surgery performed? Shoulder   Week 2 attempt successful? Yes   Call start time 1052   Call end time 1058   Has the patient been back in either the hospital or Emergency Department since discharge? No   Discharge diagnosis DJD of left shoulder s/p TOTAL SHOULDER REVERSE ARTHROPLASTY   Is patient permission given to speak with other caregiver? Yes   List who call center can speak with Shani ,wife   Is the patient taking all medications as directed (includes completed medication regime)? Yes   Has the patient kept scheduled appointments due by today? Yes   What is the Home health agency?  [facility]   Has home health visited the patient within 72 hours of discharge? Yes   Has the patient began therapy sessions (either in the home or as an out patient)? Yes   Has the patient fallen since discharge? No   What is the patient's perception of their functional status since discharge? Improving   Week 2 call completed? Yes   Wrap up additional comments Pain rated 3 today. Asked if pain medication controls his pain well, he states not really.  Worst has been a 10/10 on 4/25 this week.  Advised to speak with surgeon about pain control if he feels if out of control more than it should be.  Afebrile, incision has no redness, drainage still has small amout of swelling as expected.  PT working with him at home with ROM exercises.            HARJIT GEE - Registered Nurse

## 2022-04-29 ENCOUNTER — HOME CARE VISIT (OUTPATIENT)
Dept: HOME HEALTH SERVICES | Facility: HOME HEALTHCARE | Age: 77
End: 2022-04-29

## 2022-04-29 PROCEDURE — G0151 HHCP-SERV OF PT,EA 15 MIN: HCPCS

## 2022-04-29 PROCEDURE — G0180 MD CERTIFICATION HHA PATIENT: HCPCS | Performed by: ORTHOPAEDIC SURGERY

## 2022-05-01 VITALS
TEMPERATURE: 98 F | DIASTOLIC BLOOD PRESSURE: 64 MMHG | HEART RATE: 75 BPM | OXYGEN SATURATION: 96 % | SYSTOLIC BLOOD PRESSURE: 128 MMHG | RESPIRATION RATE: 18 BRPM

## 2022-05-02 ENCOUNTER — HOME CARE VISIT (OUTPATIENT)
Dept: HOME HEALTH SERVICES | Facility: HOME HEALTHCARE | Age: 77
End: 2022-05-02

## 2022-05-02 ENCOUNTER — OFFICE VISIT (OUTPATIENT)
Dept: ORTHOPEDIC SURGERY | Facility: CLINIC | Age: 77
End: 2022-05-02

## 2022-05-02 VITALS
SYSTOLIC BLOOD PRESSURE: 124 MMHG | TEMPERATURE: 98 F | DIASTOLIC BLOOD PRESSURE: 76 MMHG | HEART RATE: 105 BPM | OXYGEN SATURATION: 94 %

## 2022-05-02 VITALS
DIASTOLIC BLOOD PRESSURE: 63 MMHG | HEIGHT: 68 IN | HEART RATE: 89 BPM | WEIGHT: 225 LBS | SYSTOLIC BLOOD PRESSURE: 124 MMHG | BODY MASS INDEX: 34.1 KG/M2

## 2022-05-02 DIAGNOSIS — M19.012 OSTEOARTHRITIS OF LEFT GLENOHUMERAL JOINT: Primary | ICD-10-CM

## 2022-05-02 DIAGNOSIS — Z47.89 ORTHOPEDIC AFTERCARE: ICD-10-CM

## 2022-05-02 PROCEDURE — 99024 POSTOP FOLLOW-UP VISIT: CPT | Performed by: ORTHOPAEDIC SURGERY

## 2022-05-02 PROCEDURE — G0158 HHC OT ASSISTANT EA 15: HCPCS

## 2022-05-02 RX ORDER — OXYCODONE HYDROCHLORIDE AND ACETAMINOPHEN 5; 325 MG/1; MG/1
1 TABLET ORAL EVERY 6 HOURS PRN
Qty: 28 TABLET | Refills: 0 | OUTPATIENT
Start: 2022-05-02 | End: 2022-07-13

## 2022-05-02 NOTE — HOME HEALTH
Patient is independent and compliant with illustrated home exercise program. Transfer/gait goals met and patient agreed with PT discharge and would continue OT services to lt shoulder as planned.

## 2022-05-02 NOTE — HOME HEALTH
The patient and spouse greeted [facility] at the door, Pt went to [facility] for ortho 2 week s/p arthroplasty f/u and everyting is doing well. The patient was fully engaged in skilled OT session and is motivated to improve to maximize safety and I living at home. The patient denies any falls, no med changes or adverse reactions, denies pain upon approach. Next [facility] visit for continued ther ex for LUE shoulder rehab s/p arthroplasty.

## 2022-05-02 NOTE — PATIENT INSTRUCTIONS
Shoulder Arthroplasty: Post- Operative Visit Objectives    Review the operative findings, procedures and photos.  Make sure medications are effective and not causing problems.  Pain: Oxycodone or hydrocodone is for pain. You may take 1 tablet every 6 hours as necessary.  Some patients don’t require the use of these…in those circumstances just use Tylenol extra-strength 1 or 2 tablets every 4-6 hours.   NSAIDs. For pain and inflammation.  You can take an over the counter anti-inflammatory of choice such as Aleve, Ibuprofen, Motrin or Advil during this time.  If you have had any problems stop taking these medicines and please tell us!  Wound Care:  Today we will remove your dressings.  There may be some residual glue on your incision.  It is now ok to shower without covering it. Please avoid submerging the incision for another two weeks.  Continue ice pack as needed.  Exercises and Physical Therapy   Continue your physical therapy and daily home exercises focusing especially on overhead motion.  Continue the sling and remove for activities such as showering and bringing you hand to your face or hair, no motion behind your back for the next 4 weeks.  Some shoulder replacements with a rotator cuff repair will have more restrictions and we will go over those.   Follow Up appointments Schedule Follow up visits as directed usually in 4 weeks..  Notes  Make sure you have all necessary notes and documentation for school or work.  Issues: Remember our goal is to make this process smooth and easy if there is any thing you need please ask us or call back [phone]

## 2022-05-02 NOTE — PROGRESS NOTES
"     Patient ID: Heri Vasquez is a 77 y.o. male.  4/20/22 left reverse total shoulder  Pain moderate    Objective:    /63   Pulse 89   Ht 172.7 cm (68\")   Wt 102 kg (225 lb)   BMI 34.21 kg/m²     Physical Examination:    Incision well approximated no sign of infection.  Sensory and motor exam are intact all distributions. Radial pulse is palpable and capillary refill is less than two seconds to all digits.    Imaging:  left Shoulder X-Ray  Indication: postop total shoulder  AP Y and Lateral views  Findings: Reverse total shoulder position  no bony lesion  Soft tissues normal  not examined joint spaces  Hardware appropriately positioned yes      no prior studies available for comparison.    Assessment:  Doing well after shoulder arthroplasty    Plan:  Continue shoulder rehab change pain medication see me tomorrow    Procedures  "

## 2022-05-05 ENCOUNTER — HOME CARE VISIT (OUTPATIENT)
Dept: HOME HEALTH SERVICES | Facility: HOME HEALTHCARE | Age: 77
End: 2022-05-05

## 2022-05-05 PROCEDURE — G0158 HHC OT ASSISTANT EA 15: HCPCS

## 2022-05-06 ENCOUNTER — OFFICE VISIT (OUTPATIENT)
Dept: FAMILY MEDICINE CLINIC | Facility: CLINIC | Age: 77
End: 2022-05-06

## 2022-05-06 VITALS
SYSTOLIC BLOOD PRESSURE: 124 MMHG | DIASTOLIC BLOOD PRESSURE: 68 MMHG | HEART RATE: 81 BPM | OXYGEN SATURATION: 97 % | TEMPERATURE: 97.5 F

## 2022-05-06 VITALS
HEART RATE: 85 BPM | BODY MASS INDEX: 35.43 KG/M2 | TEMPERATURE: 97.8 F | OXYGEN SATURATION: 94 % | DIASTOLIC BLOOD PRESSURE: 70 MMHG | WEIGHT: 233 LBS | SYSTOLIC BLOOD PRESSURE: 103 MMHG

## 2022-05-06 DIAGNOSIS — E11.9 TYPE 2 DIABETES MELLITUS WITHOUT COMPLICATION, WITHOUT LONG-TERM CURRENT USE OF INSULIN: ICD-10-CM

## 2022-05-06 DIAGNOSIS — M19.012 PRIMARY OSTEOARTHRITIS OF LEFT SHOULDER: Primary | ICD-10-CM

## 2022-05-06 PROCEDURE — 99213 OFFICE O/P EST LOW 20 MIN: CPT | Performed by: FAMILY MEDICINE

## 2022-05-06 RX ORDER — LANCETS 33 GAUGE
1 EACH MISCELLANEOUS DAILY
Qty: 100 EACH | Refills: 3 | Status: SHIPPED | OUTPATIENT
Start: 2022-05-06 | End: 2022-07-26 | Stop reason: SDUPTHER

## 2022-05-06 NOTE — HOME HEALTH
The patient greeted [facility] at the door, the patient was wearing shoulder abduction sling as appropriate. Pt went to [facility] on Monday and states everything is well and to continue therapy per protocol. The patient was fully engaged in skilled OT session and is motivated to improve and continue living safely and I'ly in his own home. Next [facility] visit for continued ther ex for shoulder rehab.

## 2022-05-06 NOTE — PROGRESS NOTES
Chief Complaint  Follow-up (Left arm reverse shoulder replacement f/u 04-20)    Subjective          Heri Vasquez presents to [facility]  He had reverse left shoulder replacement done on 4/20/22 with Dr. Knapp.  He has started PT and is still having some pain but it is improving.     Shoulder Injury   The left shoulder is affected. The incident occurred more than 1 week ago. There was no injury mechanism. The quality of the pain is described as aching. The pain does not radiate. The pain is moderate. Pertinent negatives include no numbness or tingling. The symptoms are aggravated by movement.   Diabetes  He presents for his follow-up diabetic visit. He has type 2 diabetes mellitus. His disease course has been stable. There are no hypoglycemic associated symptoms. There are no diabetic associated symptoms. There are no hypoglycemic complications. Symptoms are stable. There are no diabetic complications.       Objective   Vital Signs:  /70 (BP Location: Right arm, Patient Position: Sitting, Cuff Size: Adult)   Pulse 85   Temp 97.8 °F (36.6 °C) (Infrared)   Wt 106 kg (233 lb)   SpO2 94%   BMI 35.43 kg/m²           Physical Exam  Vitals and nursing note reviewed.   Constitutional:       General: He is not in acute distress.     Appearance: He is well-developed.   HENT:      Head: Normocephalic.   Eyes:      General: Lids are normal.   Neck:      Thyroid: No thyroid mass or thyromegaly.      Trachea: Trachea normal.   Cardiovascular:      Rate and Rhythm: Normal rate and regular rhythm.      Heart sounds: Normal heart sounds.   Pulmonary:      Effort: Pulmonary effort is normal.      Breath sounds: Normal breath sounds.   Musculoskeletal:      Left shoulder: Tenderness present. Decreased range of motion.      Cervical back: Normal range of motion.   Lymphadenopathy:      Cervical: No cervical adenopathy.   Skin:     General: Skin is warm and dry.   Neurological:      Mental  Status: He is alert and oriented to person, place, and time.   Psychiatric:         Attention and Perception: He is attentive.         Mood and Affect: Mood normal.         Speech: Speech normal.         Behavior: Behavior normal.        Result Review :   The following data was reviewed by: Olesya Cueva MD on 05/06/2022:  Common labs    Common Labsle 11/30/21 11/30/21 11/30/21 11/30/21 4/8/22 4/8/22 4/8/22 4/21/22 4/21/22    1027 1027 1027 1050 0922 0922 0922 0232 0232   Glucose   107 (A)    134 (A)  126 (A)   BUN   16    15  10   Creatinine   0.85    0.87  0.81   eGFR Non African Am   87         Sodium   140    142  142   Potassium   4.3    4.7  3.8   Chloride   103    106  107   Calcium   9.1    9.4  8.8   Albumin   4.10         Total Bilirubin   0.4         Alkaline Phosphatase   62         AST (SGOT)   26         ALT (SGPT)   30         WBC      7.86      Hemoglobin      15.0  13.9    Hematocrit      45.5  42.2    Platelets      299      Total Cholesterol  145          Triglycerides  151 (A)          HDL Cholesterol  32 (A)          LDL Cholesterol   86          Hemoglobin A1C 6.2 (A)    6.2 (A)       Microalbumin, Urine    <1.2        (A) Abnormal value       Comments are available for some flowsheets but are not being displayed.                     Assessment and Plan    Diagnoses and all orders for this visit:    1. Primary osteoarthritis of left shoulder (Primary)  Assessment & Plan:  He is doing PT at home for now and will go to outpatient PT soon.        2. Type 2 diabetes mellitus without complication, without long-term current use of insulin (HCC)  -     Lancets (OneTouch Delica Plus [id number]) misc; 1 each by Other route Daily.  Dispense: 100 each; Refill: 3           Follow Up   No follow-ups on file.  Patient was given instructions and counseling regarding his condition or for health maintenance advice. Please see specific information pulled into the AVS if appropriate.

## 2022-05-09 ENCOUNTER — HOME CARE VISIT (OUTPATIENT)
Dept: HOME HEALTH SERVICES | Facility: HOME HEALTHCARE | Age: 77
End: 2022-05-09

## 2022-05-09 VITALS
HEART RATE: 81 BPM | OXYGEN SATURATION: 96 % | TEMPERATURE: 97.8 F | SYSTOLIC BLOOD PRESSURE: 140 MMHG | DIASTOLIC BLOOD PRESSURE: 78 MMHG

## 2022-05-09 PROCEDURE — G0158 HHC OT ASSISTANT EA 15: HCPCS

## 2022-05-09 NOTE — HOME HEALTH
The patient greeted [facility] at the door, he did have ice pack on the shoulder and states overall pain at 2/10 at start of session. The patient denies any additional pain, no falls and no changes or issues in medication. The patient was fully engaged in skilled OT session and is motivated to improve and continue to living safely and I'ly in home environment.

## 2022-05-13 ENCOUNTER — HOME CARE VISIT (OUTPATIENT)
Dept: HOME HEALTH SERVICES | Facility: HOME HEALTHCARE | Age: 77
End: 2022-05-13

## 2022-05-13 PROCEDURE — G0152 HHCP-SERV OF OT,EA 15 MIN: HCPCS

## 2022-05-14 VITALS — OXYGEN SATURATION: 97 % | SYSTOLIC BLOOD PRESSURE: 120 MMHG | HEART RATE: 76 BPM | DIASTOLIC BLOOD PRESSURE: 80 MMHG

## 2022-05-15 RX ORDER — PANTOPRAZOLE SODIUM 40 MG/1
TABLET, DELAYED RELEASE ORAL
Qty: 90 TABLET | Refills: 3 | Status: SHIPPED | OUTPATIENT
Start: 2022-05-15 | End: 2023-03-14

## 2022-05-15 NOTE — HOME HEALTH
Pt's daughter let OT in house. Pt greeted OT walking form kitchen to front door not wearing sling.  Pt's granddaughter and wife present.  Pt reports shoulder gettgin better, sometimes i move it wrong and it hurts.

## 2022-05-16 ENCOUNTER — HOME CARE VISIT (OUTPATIENT)
Dept: HOME HEALTH SERVICES | Facility: HOME HEALTHCARE | Age: 77
End: 2022-05-16

## 2022-05-16 PROCEDURE — G0158 HHC OT ASSISTANT EA 15: HCPCS

## 2022-05-17 ENCOUNTER — READMISSION MANAGEMENT (OUTPATIENT)
Dept: CALL CENTER | Facility: HOSPITAL | Age: 77
End: 2022-05-17

## 2022-05-17 ENCOUNTER — HOME CARE VISIT (OUTPATIENT)
Dept: HOME HEALTH SERVICES | Facility: HOME HEALTHCARE | Age: 77
End: 2022-05-17

## 2022-05-17 VITALS
SYSTOLIC BLOOD PRESSURE: 124 MMHG | DIASTOLIC BLOOD PRESSURE: 70 MMHG | OXYGEN SATURATION: 97 % | HEART RATE: 87 BPM | TEMPERATURE: 98.2 F

## 2022-05-17 NOTE — HOME HEALTH
"The patient greeted [facility] at the door, spouse was present. He states he was \"in a little discomfort, hard to get comfortable last night\" however tolerates skilled OT session well. Pt is aware of OT d/c at next visit and voiced understanding and agreement. He denies any new medication changes, no adverse reactions and no falls."

## 2022-05-17 NOTE — OUTREACH NOTE
Total Joint Month 1 Survey    Flowsheet Row Responses   [facility] operated by [facility] patient discharged from? [facility]   Does the patient have one of the following disease processes/diagnoses(primary or secondary)? Total Joint Replacement   Joint surgery performed? Shoulder   Month 1 attempt successful? Yes   Call start time 0912   Call end time 0917   Has the patient been back in either the hospital or Emergency Department since discharge? No   Discharge diagnosis DJD of left shoulder s/p TOTAL SHOULDER REVERSE ARTHROPLASTY   Person spoke with today (if not patient) and relationship wife and PT   Is the patient taking all medications as directed (includes completed medication regime)? Yes   Has the patient kept scheduled appointments due by today? Yes   Comments Ortho appt in [place]   Is the patient still receiving Home Health Services? Yes   Is the patient still attending therapy sessions(either in the home or as an outpatient)? Yes   Has the patient fallen since discharge? No   Comments In home therapy    What is the patient's perception of their functional status since discharge? Improving   Is the patient able to teach back signs and symptoms of infection? Severe discomfort or pain, Increased swelling or redness around incision (not associated with surgical edema), Incisional drainage   If the patient has signs and symptoms of infection, list patient actions taken States wound healing well   Is the patient/caregiver able to teach back the hierarchy of who to call/visit for symptoms/problems? PCP, Specialist, Home health nurse, Urgent Care, ED, 911 Yes   Month 1 call completed? Yes   Wrap up additional comments Pt reports he is doing very well. He reports pain is under control and that he does not have much pain. Therapy is still coming to the home at this time. No needs.           SHAJI GEE - Registered Nurse

## 2022-05-19 ENCOUNTER — TELEPHONE (OUTPATIENT)
Dept: ORTHOPEDIC SURGERY | Facility: CLINIC | Age: 77
End: 2022-05-19

## 2022-05-19 ENCOUNTER — HOME CARE VISIT (OUTPATIENT)
Dept: HOME HEALTH SERVICES | Facility: HOME HEALTHCARE | Age: 77
End: 2022-05-19

## 2022-05-19 VITALS — SYSTOLIC BLOOD PRESSURE: 130 MMHG | OXYGEN SATURATION: 96 % | DIASTOLIC BLOOD PRESSURE: 60 MMHG | HEART RATE: 100 BPM

## 2022-05-19 DIAGNOSIS — Z96.612 STATUS POST REVERSE TOTAL ARTHROPLASTY OF LEFT SHOULDER: Primary | ICD-10-CM

## 2022-05-19 PROCEDURE — G0152 HHCP-SERV OF OT,EA 15 MIN: HCPCS

## 2022-05-19 NOTE — TELEPHONE ENCOUNTER
CRISTOPHER RAMIREZ WITH [facility] [phone] CALLED IN REQUESTING THAT DR ESPINO ORDER PATIENT OUTPATIENT PT. PATIENT IS WANTING TO GO TO [facility] OUTPATIENT LOCATED ON [facility] IN [place]. REFERRAL CAN BE ENTERED INTO Epic. PLEASE ADVISE.

## 2022-05-22 NOTE — HOME HEALTH
Pt discharged from home health services this date as pt has met goals and will transition to OP therapy week of 5.22.22      Pt denies any falls or med changes

## 2022-05-23 ENCOUNTER — TREATMENT (OUTPATIENT)
Dept: PHYSICAL THERAPY | Facility: CLINIC | Age: 77
End: 2022-05-23

## 2022-05-23 DIAGNOSIS — Z96.612 S/P REVERSE TOTAL SHOULDER ARTHROPLASTY, LEFT: Primary | ICD-10-CM

## 2022-05-23 PROCEDURE — 97161 PT EVAL LOW COMPLEX 20 MIN: CPT | Performed by: PHYSICAL THERAPIST

## 2022-05-23 RX ORDER — NAPROXEN 500 MG/1
TABLET ORAL
Qty: 180 TABLET | Refills: 3 | Status: SHIPPED | OUTPATIENT
Start: 2022-05-23 | End: 2023-03-14

## 2022-05-23 NOTE — PROGRESS NOTES
"  Physical Therapy Initial Evaluation and Plan of Care      Patient: Heri Vasquez   : 1945  Diagnosis/ICD-10 Code:  S/P reverse total shoulder arthroplasty, left [Z96.612]  Referring practitioner: Dany Knapp, *  Date of Initial Visit: 2022  Today's Date: 2022  Patient seen for 1 sessions         Visit Diagnoses:     ICD-10-CM ICD-9-CM   1. S/P reverse total shoulder arthroplasty, left  Z96.612 V43.61       Subjective Questionnaire: QuickDASH: 81.82    Subjective Evaluation    History of Present Illness  Date of surgery: 2022    Subjective comment: Pt is 76 y/o R hand dominant M who presents to therapy s/p L reverse TSA on 22. Pt states he was lifting groceries over a chair onto table and \"something snapped.\" He reports \"the hurt that was there before\" surgery \"is not there anymore.\" He participated in approx. 2 weeks of  PT.  Patient Occupation: retired Pain  Current pain ratin  At best pain ratin  At worst pain ratin  Location: L ant shoulder  Quality: dull ache    Hand dominance: right    Treatments  Previous treatment: physical therapy and home therapy  Discharged from (in last 30 days): home health care  Patient Goals  Patient goals for therapy: independence with ADLs/IADLs  Patient goal: \"to get back to normal\"         Objective          Postural Observations    Additional Postural Observation Details  Seated: forward head, B rounded shoulders, L shoulder lower, increased thoracic kyphosis    Active Range of Motion   Cervical/Thoracic Spine   Normal active range of motion  Left Shoulder   Flexion: 133 degrees   Abduction: 115 degrees   External rotation 45°: 25 degrees   Internal rotation BTB: Active internal rotation behind the back: at stomach.     Right Shoulder   Flexion: 152 degrees   Abduction: 145 degrees   External rotation 0°: 71 degrees   Internal rotation BTB: sacrum     Strength/Myotome Testing     Left Shoulder     Planes of Motion   Flexion: 3- " "  Extension: 3-   Abduction: 3-   External rotation at 0°: 3-   Internal rotation at 0°: 3-     Right Shoulder     Planes of Motion   Flexion: 5   Extension: 5   Abduction: 5   External rotation at 0°: 5   Internal rotation at 0°: 5     Left Elbow   Flexion: 4+  Extension: 4+    Right Elbow   Flexion: 5  Extension: 5    Left Wrist/Hand   Wrist extension: 5  Wrist flexion: 5    Right Wrist/Hand   Wrist extension: 5  Wrist flexion: 5        Patient Education: Educated pt on HEP and POC.    Assessment & Plan     Assessment  Impairments: abnormal or restricted ROM, activity intolerance, impaired physical strength, lacks appropriate home exercise program and pain with function  Functional Limitations: carrying objects, lifting, sleeping, pulling, pushing, uncomfortable because of pain, reaching behind back and reaching overhead  Assessment details: Pt is 77 y.o. R hand dominant male who presents to therapy s/p L reverse TSA on 4/20/22. Pt states he was lifting groceries over a chair onto table and \"something snapped.\" He participated in approx. 2 weeks of  PT after surgery. PMH significant for DM and arthritis. QuickDASH score 81.82. Deficits observed in posture, L shoulder AROM, and LUE strength. Pt requires skilled therapy addressing deficits in order to return to PLOF with decreased symptoms. Educated pt on HEP and POC, pt verbalized understanding.    Prognosis: good    Goals  Plan Goals: STG 3 Weeks:    Pt will demonstrate understanding of initial HEP without need for cueing.    Pt will rate pain 1/10 or less for ease with movement.    Pt will demonstrate increased L shoulder AROM in flex to 140 deg or greater and abduction to 120 deg or greater for ease with overhead activity.    LTG 12 Weeks:    Pt will be independent with HEP for return to PLOF with decreased symptoms.    Pt will demonstrate increased L shoulder AROM in ext rot to 60 deg or greater and int rot to 70 deg or greater for improvement with dressing " and bathing.    Pt will demonstrate increased strength of L shoulder to 4+/5 or greater in all planes for improvement in lifting and carrying.    Plan  Therapy options: will be seen for skilled therapy services  Planned modality interventions: cryotherapy, dry needling, ultrasound, TENS and thermotherapy (hydrocollator packs)  Planned therapy interventions: manual therapy, flexibility, functional ROM exercises, home exercise program, joint mobilization, neuromuscular re-education, postural training, soft tissue mobilization, spinal/joint mobilization, strengthening, stretching and therapeutic activities  Frequency: 2x week  Duration in weeks: 12  Treatment plan discussed with: patient        History # of Personal Factors and/or Comorbidities: MODERATE (1-2)  Examination of Body System(s): # of elements: LOW (1-2)  Clinical Presentation: STABLE   Clinical Decision Making: LOW       Timed:         Manual Therapy:         mins  13483;     Therapeutic Exercise:    5     mins  61035;     Neuromuscular Alejandro:        mins  91759;    Therapeutic Activity:          mins  13932;     Gait Training:           mins  66949;     Ultrasound:          mins  91781;    Ionto                                   mins   95853  Self Care                            mins   61407      Un-Timed:  Electrical Stimulation:         mins  02370 ( );  Traction          mins 82196  Low Eval     20     Mins  97721  Mod Eval          Mins  96184  High Eval                            Mins  88099        Timed Treatment:   5   mins   Total Treatment:     30   mins      PT SIGNATURE: Nila Kellogg PT   Physical Therapist  [place] License #: [id number]  [place] License #: [id number]    DATE TREATMENT INITIATED: 5/23/2022    Initial Certification  Certification Period: 5/23/2022 thru 8/20/2022  I certify that the therapy services are furnished while this patient is under my care.  The services outlined above are required by this patient, and will be  reviewed every 90 days.      Physician Signature: _________________________  PHYSICIAN: Dany Knapp MD   NPI: [id number]                                             DATE: _____________________________________    Please sign and return via fax to [phone]. Thank you, [facility].

## 2022-05-27 ENCOUNTER — TREATMENT (OUTPATIENT)
Dept: PHYSICAL THERAPY | Facility: CLINIC | Age: 77
End: 2022-05-27

## 2022-05-27 DIAGNOSIS — Z96.612 S/P REVERSE TOTAL SHOULDER ARTHROPLASTY, LEFT: Primary | ICD-10-CM

## 2022-05-27 PROCEDURE — 97530 THERAPEUTIC ACTIVITIES: CPT | Performed by: PHYSICAL THERAPIST

## 2022-05-27 PROCEDURE — 97140 MANUAL THERAPY 1/> REGIONS: CPT | Performed by: PHYSICAL THERAPIST

## 2022-05-27 PROCEDURE — 97110 THERAPEUTIC EXERCISES: CPT | Performed by: PHYSICAL THERAPIST

## 2022-05-27 NOTE — PROGRESS NOTES
Physical Therapy Daily Progress Note    Patient: Heri Vasquez  : 1945  Referring Practitioner: Dany Knapp, *  Date of Initial Visit: Type: THERAPY  Noted: 2022  Today's Date: 2022  Patient seen for 2 sessions    Visit Diagnoses:     ICD-10-CM ICD-9-CM   1. S/P reverse total shoulder arthroplasty, left  Z96.612 V43.61       Subjective   Heri Vasquez reports that the shoulder is doing pretty well, he has some itching around the incision. Pain is well controlled, was about 2/10 when he woke up but did the exercises and it calmed down.  Pain Ratin    Objective     See Exercise, Manual, and Modality Logs for complete treatment.     Patient Education: exercise cueing and rationale; treatment rationale    Assessment & Plan     Assessment    Assessment details: Patient had excellent tolerance to manual passive stretching and added exercises for ROM at today's visit. Demonstrated improvement in pain and PROM with use of pulleys. PT to continue progressing per patient tolerance and protocol.        Progress per Plan of Care and Progress strengthening /stabilization /functional activity          Timed:         Manual Therapy:    17     mins  37774;     Therapeutic Exercise:    13     mins  25004;     Neuromuscular Alejandro:        mins  06989;    Therapeutic Activity:     9     mins  42033;     Gait Training:           mins  07582;     Ultrasound:          mins  10838;    Ionto                                   mins   67735  Self Care                            mins   31569  Canalith Repos                   mins  13047    Un-Timed:  Electrical Stimulation:         mins  48262 ( );  Dry Needling          mins   Traction          mins 54625    Timed Treatment:   39   mins   Total Treatment:     39   mins      Zhanna Franco PT  Physical Therapist  [place] License # [id number]

## 2022-05-31 ENCOUNTER — TELEPHONE (OUTPATIENT)
Dept: PHYSICAL THERAPY | Facility: CLINIC | Age: 77
End: 2022-05-31

## 2022-05-31 NOTE — TELEPHONE ENCOUNTER
Pt returned call stating he was not taking the offered appt for tomorrow but wants to keep appts kb for next week.

## 2022-06-02 ENCOUNTER — OFFICE VISIT (OUTPATIENT)
Dept: ORTHOPEDIC SURGERY | Facility: CLINIC | Age: 77
End: 2022-06-02

## 2022-06-02 VITALS
HEART RATE: 80 BPM | SYSTOLIC BLOOD PRESSURE: 116 MMHG | WEIGHT: 225 LBS | DIASTOLIC BLOOD PRESSURE: 69 MMHG | BODY MASS INDEX: 34.1 KG/M2 | HEIGHT: 68 IN | OXYGEN SATURATION: 94 %

## 2022-06-02 DIAGNOSIS — Z47.89 ORTHOPEDIC AFTERCARE: Primary | ICD-10-CM

## 2022-06-02 PROCEDURE — 99024 POSTOP FOLLOW-UP VISIT: CPT | Performed by: PHYSICIAN ASSISTANT

## 2022-06-02 NOTE — PROGRESS NOTES
"     Patient ID: Heri Vasquez is a 77 y.o. male.  4/20/2022 left reverse total shoulder    Reports sore/ache pain with gentle ROM.  Otherwise this pain is well tolerated.  Patient is advancing in physical therapy and reports performing daily exercises at home.    Objective:    /69   Pulse 80   Ht 172.7 cm (68\")   Wt 102 kg (225 lb)   SpO2 94%   BMI 34.21 kg/m²     Physical Examination:  Incision well healed. PROM forward Flexion 95, abduction 90, internal rotation 55      Imaging:  None to review    Assessment:    Diagnoses and all orders for this visit:    1. Orthopedic aftercare (Primary)         Plan:  Discontinue sling; restrictions discussed; Continue with physical therapy; Follow up in 4 months with Dr. Knapp    "

## 2022-06-08 ENCOUNTER — TREATMENT (OUTPATIENT)
Dept: PHYSICAL THERAPY | Facility: CLINIC | Age: 77
End: 2022-06-08

## 2022-06-08 DIAGNOSIS — Z96.612 S/P REVERSE TOTAL SHOULDER ARTHROPLASTY, LEFT: Primary | ICD-10-CM

## 2022-06-08 PROCEDURE — 97140 MANUAL THERAPY 1/> REGIONS: CPT | Performed by: PHYSICAL THERAPIST

## 2022-06-08 PROCEDURE — 97110 THERAPEUTIC EXERCISES: CPT | Performed by: PHYSICAL THERAPIST

## 2022-06-08 NOTE — PROGRESS NOTES
"Physical Therapy Daily Progress Note    Patient: Heri Vasquez  : 1945  Referring practitioner: Dany Knapp, *  Date of Initial Visit: Type: THERAPY  Noted: 2022  Today's Date: 2022  Patient seen for 3 sessions      Visit Diagnoses:    ICD-10-CM ICD-9-CM   1. S/P reverse total shoulder arthroplasty, left  Z96.612 V43.61       VISIT#: 3    Subjective   Heri Vasquez reports he is doing well today, reports low pain. Pt returned to MD last week. Per pt, MD was \"pleased\" and discontinued sling but needs to remain cautious reaching behind his back.    Pain Rating (0-10): 1    Objective     See Exercise, Manual, and Modality Logs for complete treatment.       Assessment/Plan     Pt presents to therapy today without sling, which was discontinued by MD last week. He demonstrates improving PROM and ability to complete exercises. Gradual increase in flex and abd PROM from initial manual stretching to final manual stretching. Reported no pain with exercise or manual therapy.    Progress per Plan of Care         Timed:         Manual Therapy:    10     mins  84780;     Therapeutic Exercise:    20     mins  95040;     Neuromuscular Alejandro:        mins  59573;    Therapeutic Activity:          mins  00240;     Gait Training:           mins  84502;     Ultrasound:          mins  47918;    Ionto                                   mins   05435  Self Care                            mins   78410    Un-Timed:  Electrical Stimulation:         mins  87611 ( );  Traction          mins 04321  Re-Eval                               mins  35854    Timed Treatment:   30   mins   Total Treatment:     30   mins        Nila Kellogg, PT  Physical Therapist  [place] License #: [id number]  [place] License #: [id number]                       "

## 2022-06-10 ENCOUNTER — TREATMENT (OUTPATIENT)
Dept: PHYSICAL THERAPY | Facility: CLINIC | Age: 77
End: 2022-06-10

## 2022-06-10 DIAGNOSIS — M54.2 CERVICAL PAIN: ICD-10-CM

## 2022-06-10 DIAGNOSIS — M50.30 DDD (DEGENERATIVE DISC DISEASE), CERVICAL: ICD-10-CM

## 2022-06-10 DIAGNOSIS — Z96.612 S/P REVERSE TOTAL SHOULDER ARTHROPLASTY, LEFT: Primary | ICD-10-CM

## 2022-06-10 PROCEDURE — 97140 MANUAL THERAPY 1/> REGIONS: CPT | Performed by: PHYSICAL THERAPIST

## 2022-06-10 PROCEDURE — 97110 THERAPEUTIC EXERCISES: CPT | Performed by: PHYSICAL THERAPIST

## 2022-06-10 PROCEDURE — 97530 THERAPEUTIC ACTIVITIES: CPT | Performed by: PHYSICAL THERAPIST

## 2022-06-10 NOTE — PROGRESS NOTES
Physical Therapy Daily Progress Note    Patient: Heri Vasquez  : 1945  Referring Practitioner: Dany Knapp, *  Date of Initial Visit: Type: THERAPY  Noted: 2022  Today's Date: 6/10/2022  Patient seen for 4 sessions    Visit Diagnoses:     ICD-10-CM ICD-9-CM   1. S/P reverse total shoulder arthroplasty, left  Z96.612 V43.61   2. Cervical pain  M54.2 723.1   3. DDD (degenerative disc disease), cervical  M50.30 722.4       Subjective   Heri Vasquze reports that he feels he stretched a little too far at the last therapy session, he has been more sore.  Pain Ratin    Objective     See Exercise, Manual, and Modality Logs for complete treatment.     Patient Education: easing into activity and stretching    Assessment & Plan     Assessment    Assessment details: The patient continues to demonstrate improvements in passive ROM, able to tolerate 160 degrees of flexion and 95 degrees of abduction. Did demonstrate tenderness along the L supraspinatus which responded well to MFR.        Progress per Plan of Care and Progress strengthening /stabilization /functional activity          Timed:         Manual Therapy:    12     mins  22345;     Therapeutic Exercise:    12     mins  19762;     Neuromuscular Alejandro:        mins  23174;    Therapeutic Activity:     14     mins  33939;     Gait Training:           mins  83799;     Ultrasound:          mins  75583;    Ionto                                   mins   00115  Self Care                            mins   28867  Canalith Repos                   mins  16320    Un-Timed:  Electrical Stimulation:         mins  92168 (MC );  Dry Needling          mins   Traction          mins 24933    Timed Treatment:   38   mins   Total Treatment:     38   mins      Zhanna Franco PT  Physical Therapist  [place] License # [id number]

## 2022-06-13 ENCOUNTER — TREATMENT (OUTPATIENT)
Dept: PHYSICAL THERAPY | Facility: CLINIC | Age: 77
End: 2022-06-13

## 2022-06-13 DIAGNOSIS — Z96.612 S/P REVERSE TOTAL SHOULDER ARTHROPLASTY, LEFT: Primary | ICD-10-CM

## 2022-06-13 PROCEDURE — 97140 MANUAL THERAPY 1/> REGIONS: CPT | Performed by: PHYSICAL THERAPIST

## 2022-06-13 PROCEDURE — 97110 THERAPEUTIC EXERCISES: CPT | Performed by: PHYSICAL THERAPIST

## 2022-06-13 NOTE — PROGRESS NOTES
Physical Therapy Daily Progress Note    Patient: Heri Vasquez  : 1945  Referring practitioner: Dany Knapp, *  Date of Initial Visit: Type: THERAPY  Noted: 2022  Today's Date: 2022  Patient seen for 5 sessions      Visit Diagnoses:    ICD-10-CM ICD-9-CM   1. S/P reverse total shoulder arthroplasty, left  Z96.612 V43.61       VISIT#: 5    Subjective   Heri Vasquez reports he is doing well today and his shoulder feels good.    Pain Rating (0-10): 0    Objective     See Exercise, Manual, and Modality Logs for complete treatment.     Patient Education: Exercise performance.    Assessment/Plan     Pt exhibits improving PROM and AAROM in flex and abd. He reports no pain today. Plan to progress per pt's tolerance and protocol.    Progress per Plan of Care         Timed:         Manual Therapy:    15     mins  43716;     Therapeutic Exercise:    25    mins  86823;     Neuromuscular Alejandro:        mins  00711;    Therapeutic Activity:          mins  05787;     Gait Training:           mins  82913;     Ultrasound:          mins  26929;    Ionto                                   mins   06344  Self Care                            mins   44674    Un-Timed:  Electrical Stimulation:         mins  58864 (MC );  Traction          mins 82804  Re-Eval                               mins  72047    Timed Treatment:   40   mins   Total Treatment:     40   mins        Nila Kellogg PT  Physical Therapist  [place] License #: [id number]  [place] License #: [id number]

## 2022-06-16 ENCOUNTER — READMISSION MANAGEMENT (OUTPATIENT)
Dept: CALL CENTER | Facility: HOSPITAL | Age: 77
End: 2022-06-16

## 2022-06-16 ENCOUNTER — TREATMENT (OUTPATIENT)
Dept: PHYSICAL THERAPY | Facility: CLINIC | Age: 77
End: 2022-06-16

## 2022-06-16 DIAGNOSIS — Z96.612 S/P REVERSE TOTAL SHOULDER ARTHROPLASTY, LEFT: Primary | ICD-10-CM

## 2022-06-16 DIAGNOSIS — M54.2 CERVICAL PAIN: ICD-10-CM

## 2022-06-16 PROCEDURE — 97140 MANUAL THERAPY 1/> REGIONS: CPT | Performed by: PHYSICAL THERAPIST

## 2022-06-16 PROCEDURE — 97110 THERAPEUTIC EXERCISES: CPT | Performed by: PHYSICAL THERAPIST

## 2022-06-16 PROCEDURE — 97530 THERAPEUTIC ACTIVITIES: CPT | Performed by: PHYSICAL THERAPIST

## 2022-06-16 NOTE — OUTREACH NOTE
Total Joint Month 2 Survey    Flowsheet Row Responses   [facility] patient discharged from? [facility]   Does the patient have one of the following disease processes/diagnoses(primary or secondary)? Total Joint Replacement   Joint surgery performed? Shoulder   Month 2 attempt successful? Yes   Call start time 0909   Unsuccessful attempts Attempt 1   Call end time 0912   Has the patient been back in either the hospital or Emergency Department since discharge? No   Person spoke with today (if not patient) and relationship Shani-spouse   Is the patient taking all medications as directed (includes completed medication regime)? Yes   Has the patient kept scheduled appointments due by today? Yes   Is the patient still receiving Home Health Services? No   Is the patient still attending therapy sessions(either in the home or as an outpatient)? Yes   Has the patient fallen since discharge? No   What is the patient's perception of their functional status since discharge? Improving   Is the patient able to teach back signs and symptoms of infection? Incisional drainage, Increased swelling or redness around incision (not associated with surgical edema)   Is the patient/caregiver able to teach back the hierarchy of who to call/visit for symptoms/problems? PCP, Specialist, Home health nurse, Urgent Care, ED, 911 Yes   Month 2 Call Completed? Yes          ISRAEL ARMSTRONG - Registered Nurse

## 2022-06-16 NOTE — PROGRESS NOTES
Physical Therapy Daily Progress Note    Patient: Heri Vasquez  : 1945  Referring Practitioner: Dany Knapp, *  Date of Initial Visit: Type: THERAPY  Noted: 2022  Today's Date: 2022  Patient seen for 6 sessions    Visit Diagnoses:     ICD-10-CM ICD-9-CM   1. S/P reverse total shoulder arthroplasty, left  Z96.612 V43.61   2. 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 Right now his pain has decreased.  Pain Ratin    Objective     See Exercise, Manual, and Modality Logs for complete treatment.     Patient Education: combined motions movements, exercise rationale    Assessment & Plan     Assessment    Assessment details: The patient tolerated manual therapy and passive stretching well today with no increase in symptoms. Initiated AAROM with isometric holds which patient tolerated well.        Progress per Plan of Care and Progress strengthening /stabilization /functional activity          Timed:         Manual Therapy:    15     mins  85189;     Therapeutic Exercise:    15     mins  81087;     Neuromuscular Alejandro:        mins  99639;    Therapeutic Activity:     8     mins  94535;     Gait Training:           mins  89335;     Ultrasound:          mins  93385;    Ionto                                   mins   31810  Self Care                            mins   82438  Canalith Repos                   mins  38470    Un-Timed:  Electrical Stimulation:         mins  71209 ( );  Dry Needling          mins   Traction          mins 86008    Timed Treatment:   38   mins   Total Treatment:     38   mins      Zhanna Franco PT  Physical Therapist  [place] License # [id number]

## 2022-06-20 ENCOUNTER — TREATMENT (OUTPATIENT)
Dept: PHYSICAL THERAPY | Facility: CLINIC | Age: 77
End: 2022-06-20

## 2022-06-20 DIAGNOSIS — Z96.612 S/P REVERSE TOTAL SHOULDER ARTHROPLASTY, LEFT: Primary | ICD-10-CM

## 2022-06-20 PROCEDURE — 97140 MANUAL THERAPY 1/> REGIONS: CPT | Performed by: PHYSICAL THERAPIST

## 2022-06-20 PROCEDURE — 97110 THERAPEUTIC EXERCISES: CPT | Performed by: PHYSICAL THERAPIST

## 2022-06-20 NOTE — PROGRESS NOTES
Physical Therapy Daily Progress Note    Patient: Heri Vasquez  : 1945  Referring practitioner: Dany Knapp, *  Date of Initial Visit: Type: THERAPY  Noted: 2022  Today's Date: 2022  Patient seen for 7 sessions      Visit Diagnoses:    ICD-10-CM ICD-9-CM   1. S/P reverse total shoulder arthroplasty, left  Z96.612 V43.61       VISIT#: 7    Subjective   Heri Vasquez reports he is feeling pretty good today and is not having any pain.    Pain Rating (0-10): 0    Objective     See Exercise, Manual, and Modality Logs for complete treatment.     Patient Education: New exercises.    Assessment/Plan     Progressed ther ex with strengthening of L shoulder ext rot and int rot vs TB. Pt demonstrated improving L shoulder PROM in flex, abd, and ext rot after manual therapy and reported decreasing discomfort at end ranges.    Progress per Plan of Care         Timed:         Manual Therapy:    10     mins  35105;     Therapeutic Exercise:    20     mins  11193;     Neuromuscular Alejandro:        mins  77781;    Therapeutic Activity:          mins  81416;     Gait Training:           mins  24534;     Ultrasound:          mins  68637;    Ionto                                   mins   70309  Self Care                            mins   38453    Un-Timed:  Electrical Stimulation:         mins  27899 ( );  Traction          mins 90281  Re-Eval                               mins  38069    Timed Treatment:   30   mins   Total Treatment:     30   mins        Nila Kellogg PT  Physical Therapist  [place] License #: [id number]  [place] License #: [id number]

## 2022-06-27 ENCOUNTER — TREATMENT (OUTPATIENT)
Dept: PHYSICAL THERAPY | Facility: CLINIC | Age: 77
End: 2022-06-27

## 2022-06-27 DIAGNOSIS — Z96.612 S/P REVERSE TOTAL SHOULDER ARTHROPLASTY, LEFT: Primary | ICD-10-CM

## 2022-06-27 PROCEDURE — 97530 THERAPEUTIC ACTIVITIES: CPT | Performed by: PHYSICAL THERAPIST

## 2022-06-27 PROCEDURE — 97110 THERAPEUTIC EXERCISES: CPT | Performed by: PHYSICAL THERAPIST

## 2022-06-27 NOTE — PROGRESS NOTES
"Re-Assessment / Re-Certification      Patient: Heri Vasquez   : 1945  Diagnosis/ICD-10 Code:  S/P reverse total shoulder arthroplasty, left [Z96.612]  Referring practitioner: Dany Knapp, *  Date of Initial Visit: Type: THERAPY  Noted: 2022  Today's Date: 2022  Patient seen for 8 sessions    Visit Diagnoses:      ICD-10-CM ICD-9-CM   1. S/P reverse total shoulder arthroplasty, left  Z96.612 V43.61       Subjective:     Heri Vasquez reports that he feels \"much better\" since beginning therapy. He states that he still has difficulty reaching behind him or reaching overhead. He reports that he had some pain earlier today but does not have any pain now. States that he does have some itching.    Subjective Questionnaire: QuickDASH: 11.36  Clinical Progress: improved  Home Program Compliance: Yes  Treatment has included: therapeutic exercise, manual therapy and therapeutic activity    Subjective   Objective          Active Range of Motion   Left Shoulder   Flexion: 155 degrees   Abduction: 168 degrees   External rotation 90°: 80 degrees   Internal rotation 90°: 62 degrees     Strength/Myotome Testing     Left Shoulder     Planes of Motion   Flexion: 4   Abduction: 4   External rotation at 0°: 4   Internal rotation at 0°: 4       Assessment & Plan     Assessment    Assessment details: Pt as attended 8 sessions of skilled therapy after undergoing L TSA on 22. He reports improvement in pain and states that he is doing \"much better\" but still has some difficulty with reaching overhead and behind his back. Pt with improved QuickDASH score to 11.36. Noted increased L shoulder AROM in flex, abd, ER, and IR as well as increased strength in L shoulder flex, abd, ER, and IR with no pain. Pt with limitations remaining in LUE strength and L shoulder IR AROM. He requires continued skilled therapy addressing remaining deficits in order to return to PLOF. Progressed HEP today with more focus on " strengthening.    Goals  Plan Goals: STG 3 Weeks:    Pt will demonstrate understanding of initial HEP without need for cueing. - MET    Pt will rate pain 1/10 or less for ease with movement. - MET    Pt will demonstrate increased L shoulder AROM in flex to 140 deg or greater and abduction to 120 deg or greater for ease with overhead activity. - MET    LTG 12 Weeks:    Pt will be independent with HEP for return to PLOF with decreased symptoms. - PARTIALLY MET    Pt will demonstrate increased L shoulder AROM in ext rot to 60 deg or greater and int rot to 70 deg or greater for improvement with dressing and bathing. - MET    Pt will demonstrate increased strength of L shoulder to 4+/5 or greater in all planes for improvement in lifting and carrying. - PARTIALLY MET      Progress toward previous goals: Partially Met        Recommendations: Continue as planned  Timeframe: 3 months  Prognosis to achieve goals: good        Timed:         Manual Therapy:         mins  47522;     Therapeutic Exercise:    25     mins  58004;     Neuromuscular Alejandro:        mins  51686;    Therapeutic Activity:     15     mins  46561;     Gait Training:           mins  05767;     Ultrasound:          mins  77699;    Ionto                                   mins   48822  Self Care                            mins   25987  Canalith Repos                  mins   18554    Un-Timed:  Electrical Stimulation:         mins  14948 ( );  Dry Needling          mins 80406/55742  Traction          mins 31384  Re-Eval                               mins  46890      Timed Treatment:   40   mins   Total Treatment:     40   mins       PT Signature: Nila Kellogg PT  [place] License #: [id number]  [place] License #: [id number]      Certification Period: 6/27/2022 thru 9/24/2022  I certify that the therapy services are furnished while this patient is under my care.  The services outlined above are required by this patient, and will be reviewed every 90 days.     Based upon review of the patient's progress and continued therapy plan, it is my medical opinion that Heri Vasquez should continue physical therapy treatment at [facility]  [address] [place] [address] [place] [address] [phone].

## 2022-06-30 ENCOUNTER — TREATMENT (OUTPATIENT)
Dept: PHYSICAL THERAPY | Facility: CLINIC | Age: 77
End: 2022-06-30

## 2022-06-30 DIAGNOSIS — Z96.612 S/P REVERSE TOTAL SHOULDER ARTHROPLASTY, LEFT: Primary | ICD-10-CM

## 2022-06-30 PROCEDURE — 97110 THERAPEUTIC EXERCISES: CPT | Performed by: PHYSICAL THERAPIST

## 2022-06-30 PROCEDURE — 97530 THERAPEUTIC ACTIVITIES: CPT | Performed by: PHYSICAL THERAPIST

## 2022-06-30 PROCEDURE — 97140 MANUAL THERAPY 1/> REGIONS: CPT | Performed by: PHYSICAL THERAPIST

## 2022-06-30 NOTE — PROGRESS NOTES
Physical Therapy Daily Treatment Note    Patient: Heri Vasquez  : 1945  Referring Practitioner: Dany Knapp, *  Date of Initial Visit: Type: THERAPY  Noted: 2022  Today's Date: 2022  Patient seen for 9 sessions    Visit Diagnoses:     ICD-10-CM ICD-9-CM   1. S/P reverse total shoulder arthroplasty, left  Z96.612 V43.61       Subjective   Heri Vasquez reports that his shoulder is more sore today, he might have overdone it a little yesterday. The pain comes and goes is around a 3/10.  Pain Rating: 3    Objective     See Exercise, Manual, and Modality Logs for complete treatment.     Patient Education: objective progress    Assessment & Plan     Assessment    Assessment details: Patient continues to demonstrate improved ROM and strength, demonstrated nearly full passive flexion with pulleys. Patient tolerated exercises well today, did note shaking and fatigue with resisted IR/ER.         Progress per Plan of Care and Progress strengthening /stabilization /functional activity          Timed:         Manual Therapy:    15     mins  03305;     Therapeutic Exercise:    15     mins  82022;     Neuromuscular Alejandro:        mins  43826;    Therapeutic Activity:     8     mins  07993;     Gait Training:           mins  18967;     Ultrasound:          mins  33788;    Ionto                                   mins   49316  Self Care                            mins   92786  Canalith Repos                   mins  20138    Un-Timed:  Electrical Stimulation:         mins  97471 ( );  Dry Needling          mins   Traction          mins 90592    Timed Treatment:   38   mins   Total Treatment:     38   mins      Zhanna Franco PT  Physical Therapist  [place] License # [id number]

## 2022-07-12 ENCOUNTER — TREATMENT (OUTPATIENT)
Dept: PHYSICAL THERAPY | Facility: CLINIC | Age: 77
End: 2022-07-12

## 2022-07-12 DIAGNOSIS — Z96.612 S/P REVERSE TOTAL SHOULDER ARTHROPLASTY, LEFT: Primary | ICD-10-CM

## 2022-07-12 PROCEDURE — 97140 MANUAL THERAPY 1/> REGIONS: CPT | Performed by: PHYSICAL THERAPIST

## 2022-07-12 PROCEDURE — 97530 THERAPEUTIC ACTIVITIES: CPT | Performed by: PHYSICAL THERAPIST

## 2022-07-12 PROCEDURE — 97110 THERAPEUTIC EXERCISES: CPT | Performed by: PHYSICAL THERAPIST

## 2022-07-12 NOTE — PROGRESS NOTES
Physical Therapy Daily Treatment Note    Patient: Heri Vasquez  : 1945  Referring practitioner: Dany Knapp, *  Date of Initial Visit: Type: THERAPY  Noted: 2022  Today's Date: 2022  Patient seen for 10 sessions      Visit Diagnoses:    ICD-10-CM ICD-9-CM   1. S/P reverse total shoulder arthroplasty, left  Z96.612 V43.61       VISIT#: 10    Subjective   Heri Vasquez reports his shoulder feels ok today and states he is not having pain.    Pain Rating (0-10): 0    Objective     See Exercise, Manual, and Modality Logs for complete treatment.     Patient Education: New exercises.    Assessment/Plan    Pt with improving L shoulder PROM and AROM in flex, abd, and ext rot. He reports some soreness/aching with stretching but denies sharp pains. Added L shoulder flex and abd AROM in supine/sidelying today.    Progress per Plan of Care         Timed:         Manual Therapy:    8     mins  98859;     Therapeutic Exercise:    15     mins  57201;     Neuromuscular Alejandro:        mins  44264;    Therapeutic Activity:     15     mins  12520;     Gait Training:           mins  11511;     Ultrasound:          mins  54520;    Ionto                                   mins   49092  Self Care                            mins   54426    Un-Timed:  Electrical Stimulation:         mins  69758 ( );  Traction          mins 01895  Re-Eval                               mins  28542    Timed Treatment:   38   mins   Total Treatment:     38   mins        Nila Kellogg, PT  Physical Therapist  [place] License #: [id number]  [place] License #: [id number]

## 2022-07-14 ENCOUNTER — TREATMENT (OUTPATIENT)
Dept: PHYSICAL THERAPY | Facility: CLINIC | Age: 77
End: 2022-07-14

## 2022-07-14 DIAGNOSIS — Z96.612 S/P REVERSE TOTAL SHOULDER ARTHROPLASTY, LEFT: Primary | ICD-10-CM

## 2022-07-14 PROCEDURE — 97530 THERAPEUTIC ACTIVITIES: CPT | Performed by: PHYSICAL THERAPIST

## 2022-07-14 PROCEDURE — 97110 THERAPEUTIC EXERCISES: CPT | Performed by: PHYSICAL THERAPIST

## 2022-07-14 PROCEDURE — 97140 MANUAL THERAPY 1/> REGIONS: CPT | Performed by: PHYSICAL THERAPIST

## 2022-07-14 NOTE — PROGRESS NOTES
Physical Therapy Daily Treatment Note    Patient: Heri Vasquez  : 1945  Referring Practitioner: Dany Knapp, *  Date of Initial Visit: Type: THERAPY  Noted: 2022  Today's Date: 2022  Patient seen for 11 sessions    Visit Diagnoses:     ICD-10-CM ICD-9-CM   1. S/P reverse total shoulder arthroplasty, left  Z96.612 V43.61       Subjective   Heri Vasquez reports that his shoulder continues to do really well, he reports that he did have a little pain when his great grandson was in his arms and decided to get down.  Pain Ratin    Objective     See Exercise, Manual, and Modality Logs for complete treatment.     Patient Education: objective progress    Assessment & Plan     Assessment    Assessment details: The patient continues to demonstrate improvements in active ROM and strength. He tolerated resisted exercises well today, did require verbal cueing to reduce compensation. PT to continue progressing per patient tolerance.        Progress per Plan of Care and Progress strengthening /stabilization /functional activity          Timed:         Manual Therapy:    12     mins  38467;     Therapeutic Exercise:    10     mins  69955;     Neuromuscular Alejandro:        mins  43090;    Therapeutic Activity:     16     mins  31825;     Gait Training:           mins  65761;     Ultrasound:          mins  24024;    Ionto                                   mins   35888  Self Care                            mins   00744  Canalith Repos                   mins  18665    Un-Timed:  Electrical Stimulation:         mins  78963 ( );  Dry Needling          mins   Traction          mins 62510    Timed Treatment:   38   mins   Total Treatment:     38   mins      Zhanna Franco, PT  Physical Therapist  [place] License # [id number]

## 2022-07-19 ENCOUNTER — READMISSION MANAGEMENT (OUTPATIENT)
Dept: CALL CENTER | Facility: HOSPITAL | Age: 77
End: 2022-07-19

## 2022-07-19 ENCOUNTER — TREATMENT (OUTPATIENT)
Dept: PHYSICAL THERAPY | Facility: CLINIC | Age: 77
End: 2022-07-19

## 2022-07-19 DIAGNOSIS — Z96.612 S/P REVERSE TOTAL SHOULDER ARTHROPLASTY, LEFT: Primary | ICD-10-CM

## 2022-07-19 PROCEDURE — 97110 THERAPEUTIC EXERCISES: CPT | Performed by: PHYSICAL THERAPIST

## 2022-07-19 PROCEDURE — 97530 THERAPEUTIC ACTIVITIES: CPT | Performed by: PHYSICAL THERAPIST

## 2022-07-19 NOTE — OUTREACH NOTE
Total Joint Month 3 Survey    Flowsheet Row Responses   [facility] patient discharged from? [facility]   Does the patient have one of the following disease processes/diagnoses(primary or secondary)? Total Joint Replacement   Joint surgery performed? Shoulder   Month 3 attempt successful? Yes   Call start time 1124   Call end time 1126   Has the patient been back in either the hospital or Emergency Department since discharge? No   Is the patient taking all medications as directed (includes completed medication regime)? Yes   Is the patient able to teach back alternate methods of pain control? Shoulder-elevate above heart/ keep in sling as advised   Has the patient kept scheduled appointments due by today? Yes   Is the patient still attending therapy sessions(either in the home or as an outpatient)? Yes   Has the patient fallen since discharge? No   Comments Working with outpt PT. Incision healed well. Denies pain or other issues.    What is the patient's perception of their functional status since discharge? Improving   Is the patient/caregiver able to teach back the hierarchy of who to call/visit for symptoms/problems? PCP, Specialist, Home health nurse, Urgent Care, ED, 911 Yes   Graduated Yes   Is the patient interested in additional calls from an ambulatory ?  NOTE:  applies to high risk patients requiring additional follow-up. No   Did the patient feel the follow up calls were helpful during their recovery period? Yes   Was the number of calls appropriate? Yes          NERY ALONZO - Registered Nurse

## 2022-07-19 NOTE — PROGRESS NOTES
"Physical Therapy Daily Treatment Note    Patient: Heri Vasquez  : 1945  Referring practitioner: Dany Knapp, *  Date of Initial Visit: Type: THERAPY  Noted: 2022  Today's Date: 2022  Patient seen for 12 sessions      Visit Diagnoses:    ICD-10-CM ICD-9-CM   1. S/P reverse total shoulder arthroplasty, left  Z96.612 V43.61       VISIT#: 12    Subjective   Heri Vasquez reports he is doing well and has no current pain.    Pain Rating (0-10): 0    Objective     See Exercise, Manual, and Modality Logs for complete treatment.     Patient Education: New exercises.    Assessment/Plan    Added multiple new strengthening exercises for RUPESHE today. Pt reported feeling \"good\" after therapy today. Plan to continue progressing for strength as pt's ability allows.    Progress per Plan of Care         Timed:         Manual Therapy:         mins  44924;     Therapeutic Exercise:    32     mins  64970;     Neuromuscular Alejandro:        mins  18362;    Therapeutic Activity:     10     mins  44731;     Gait Training:           mins  60638;     Ultrasound:          mins  70347;    Ionto                                   mins   20514  Self Care                            mins   01541    Un-Timed:  Electrical Stimulation:         mins  49002 (MC );  Traction          mins 78419  Re-Eval                               mins  66931    Timed Treatment:   42   mins   Total Treatment:     42   mins        Nila Kellogg, PT  Physical Therapist  [place] License #: [id number]  [place] License #: [id number]                       "

## 2022-07-21 ENCOUNTER — TREATMENT (OUTPATIENT)
Dept: PHYSICAL THERAPY | Facility: CLINIC | Age: 77
End: 2022-07-21

## 2022-07-21 DIAGNOSIS — Z96.612 S/P REVERSE TOTAL SHOULDER ARTHROPLASTY, LEFT: Primary | ICD-10-CM

## 2022-07-21 PROCEDURE — 97110 THERAPEUTIC EXERCISES: CPT | Performed by: PHYSICAL THERAPIST

## 2022-07-21 PROCEDURE — 97530 THERAPEUTIC ACTIVITIES: CPT | Performed by: PHYSICAL THERAPIST

## 2022-07-21 NOTE — PROGRESS NOTES
Physical Therapy Daily Treatment Note    Patient: Heri Vasquez  : 1945  Referring practitioner: Dany Knapp, *  Date of Initial Visit: Type: THERAPY  Noted: 2022  Today's Date: 2022  Patient seen for 13 sessions      Visit Diagnoses:    ICD-10-CM ICD-9-CM   1. S/P reverse total shoulder arthroplasty, left  Z96.612 V43.61       VISIT#: 13    Subjective   Heri Vasquez reports he is doing well and does not have any pain.    Pain Rating (0-10): 0    Objective     See Exercise, Manual, and Modality Logs for complete treatment.       Assessment/Plan    Progressed UE strengthening with increased resistance and/or increased reps of multiple exercises. Pt demonstrates improving strength and endurance but did require cueing to avoid compensation with flex and abd vs TB in standing.    Progress per Plan of Care         Timed:         Manual Therapy:         mins  52554;     Therapeutic Exercise:    30     mins  01448;     Neuromuscular Alejandro:        mins  62494;    Therapeutic Activity:     11     mins  81795;     Gait Training:           mins  19481;     Ultrasound:          mins  06726;    Ionto                                   mins   90686  Self Care                            mins   51541    Un-Timed:  Electrical Stimulation:         mins  94964 (MC );  Traction          mins 97830  Re-Eval                               mins  61464    Timed Treatment:   41   mins   Total Treatment:     41   mins        Nila Kellogg PT  Physical Therapist  [place] License #: [id number]  [place] License #: [id number]

## 2022-07-25 ENCOUNTER — TREATMENT (OUTPATIENT)
Dept: PHYSICAL THERAPY | Facility: CLINIC | Age: 77
End: 2022-07-25

## 2022-07-25 DIAGNOSIS — Z96.612 S/P REVERSE TOTAL SHOULDER ARTHROPLASTY, LEFT: Primary | ICD-10-CM

## 2022-07-25 PROCEDURE — 97110 THERAPEUTIC EXERCISES: CPT | Performed by: PHYSICAL THERAPIST

## 2022-07-25 PROCEDURE — 97530 THERAPEUTIC ACTIVITIES: CPT | Performed by: PHYSICAL THERAPIST

## 2022-07-25 PROCEDURE — 97140 MANUAL THERAPY 1/> REGIONS: CPT | Performed by: PHYSICAL THERAPIST

## 2022-07-25 NOTE — PROGRESS NOTES
Physical Therapy Daily Treatment Note    Patient: Heri Vasquez  : 1945  Referring Practitioner: Dany Knapp, *  Date of Initial Visit: Type: THERAPY  Noted: 2022  Today's Date: 2022  Patient seen for: 14 sessions      Visit Diagnoses:     ICD-10-CM ICD-9-CM   1. S/P reverse total shoulder arthroplasty, left  Z96.612 V43.61       Subjective   Heri Vasquez reports: he isn't really having much pain, it all depends on what he is doing.     Pain Level (0-10): 0    Objective     See Exercise, Manual, and Modality Logs for complete treatment.     Patient Education: continue with current plan.     Assessment & Plan     Assessment    Assessment details: Heri continues to have limitations in strength and ROM of L shoulder. Resumed manual techniques as pt reports he feels this is helpful. Continued with current therex, addition of resisted wall clocks and weight with active elevation in scaption. Shoulder hike noted with fatigue.       Progress strengthening /stabilization /functional activity           Timed:         Manual Therapy:    10     mins  24803;     Therapeutic Exercise:    15     mins  31066;     Neuromuscular Alejandro:        mins  04853;    Therapeutic Activity:     15     mins  01346;     Gait Training:           mins  64805;     Ultrasound:          mins  27116;    Ionto                                   mins   81048  Self Care                            mins   14614      Un-Timed:  Electrical Stimulation:         mins  00708 ( );  Traction          mins 63381      Timed Treatment:   40   mins   Total Treatment:     40   mins      Samantha Hernadez PTA  Physical Therapist Assistant  [place] License: [id number]

## 2022-07-26 ENCOUNTER — LAB (OUTPATIENT)
Dept: FAMILY MEDICINE CLINIC | Facility: CLINIC | Age: 77
End: 2022-07-26

## 2022-07-26 ENCOUNTER — OFFICE VISIT (OUTPATIENT)
Dept: FAMILY MEDICINE CLINIC | Facility: CLINIC | Age: 77
End: 2022-07-26

## 2022-07-26 VITALS
DIASTOLIC BLOOD PRESSURE: 71 MMHG | BODY MASS INDEX: 34.21 KG/M2 | TEMPERATURE: 97.8 F | SYSTOLIC BLOOD PRESSURE: 128 MMHG | OXYGEN SATURATION: 95 % | WEIGHT: 225 LBS | HEART RATE: 73 BPM

## 2022-07-26 DIAGNOSIS — E11.8 TYPE 2 DIABETES MELLITUS WITH UNSPECIFIED COMPLICATIONS: Primary | ICD-10-CM

## 2022-07-26 LAB
ALBUMIN SERPL-MCNC: 4.3 G/DL (ref 3.5–5.2)
ALBUMIN/GLOB SERPL: 1.7 G/DL
ALP SERPL-CCNC: 76 U/L (ref 39–117)
ALT SERPL W P-5'-P-CCNC: 21 U/L (ref 1–41)
ANION GAP SERPL CALCULATED.3IONS-SCNC: 11.9 MMOL/L (ref 5–15)
AST SERPL-CCNC: 20 U/L (ref 1–40)
BILIRUB SERPL-MCNC: 0.4 MG/DL (ref 0–1.2)
BUN SERPL-MCNC: 12 MG/DL (ref 8–23)
BUN/CREAT SERPL: 13.6 (ref 7–25)
CALCIUM SPEC-SCNC: 9.5 MG/DL (ref 8.6–10.5)
CHLORIDE SERPL-SCNC: 101 MMOL/L (ref 98–107)
CO2 SERPL-SCNC: 24.1 MMOL/L (ref 22–29)
CREAT SERPL-MCNC: 0.88 MG/DL (ref 0.76–1.27)
EGFRCR SERPLBLD CKD-EPI 2021: 87.5 ML/MIN/1.73
GLOBULIN UR ELPH-MCNC: 2.5 GM/DL
GLUCOSE SERPL-MCNC: 101 MG/DL (ref 65–99)
HBA1C MFR BLD: 6.1 % (ref 3.5–5.6)
POTASSIUM SERPL-SCNC: 4.6 MMOL/L (ref 3.5–5.2)
PROT SERPL-MCNC: 6.8 G/DL (ref 6–8.5)
SODIUM SERPL-SCNC: 137 MMOL/L (ref 136–145)

## 2022-07-26 PROCEDURE — 36415 COLL VENOUS BLD VENIPUNCTURE: CPT | Performed by: FAMILY MEDICINE

## 2022-07-26 PROCEDURE — 80053 COMPREHEN METABOLIC PANEL: CPT | Performed by: FAMILY MEDICINE

## 2022-07-26 PROCEDURE — 99213 OFFICE O/P EST LOW 20 MIN: CPT | Performed by: FAMILY MEDICINE

## 2022-07-26 PROCEDURE — 83036 HEMOGLOBIN GLYCOSYLATED A1C: CPT | Performed by: FAMILY MEDICINE

## 2022-07-26 RX ORDER — TRIAMCINOLONE ACETONIDE 0.25 MG/G
CREAM TOPICAL
COMMUNITY
Start: 2022-07-22

## 2022-07-26 RX ORDER — BETAMETHASONE DIPROPIONATE 0.5 MG/G
LOTION TOPICAL
COMMUNITY
Start: 2022-07-22

## 2022-07-26 RX ORDER — LANCETS 33 GAUGE
1 EACH MISCELLANEOUS DAILY
Qty: 100 EACH | Refills: 3 | Status: SHIPPED | OUTPATIENT
Start: 2022-07-26

## 2022-07-26 NOTE — PROGRESS NOTES
"Chief Complaint  Diabetes (F/u, ear infection f/u )    Subjective        Heri Vasquez presents to [facility]  He recently had an ear infection.  Treated with abx and is feeling better.      Diabetes  He presents for his follow-up diabetic visit. He has type 2 diabetes mellitus. His disease course has been stable. Pertinent negatives for diabetes include no blurred vision, no foot paresthesias, no polydipsia, no polyphagia, no polyuria, no visual change and no weight loss. Symptoms are stable. His weight is stable. He is following a diabetic diet. Meal planning includes avoidance of concentrated sweets. His overall blood glucose range is  mg/dl.       Objective   Vital Signs:  /71 (BP Location: Right arm, Patient Position: Sitting, Cuff Size: Large Adult)   Pulse 73   Temp 97.8 °F (36.6 °C) (Infrared)   Wt 102 kg (225 lb)   SpO2 95%   BMI 34.21 kg/m²   Estimated body mass index is 34.21 kg/m² as calculated from the following:    Height as of 7/13/22: 172.7 cm (68\").    Weight as of this encounter: 102 kg (225 lb).          Physical Exam  Vitals and nursing note reviewed.   Constitutional:       General: He is not in acute distress.     Appearance: He is well-developed.   HENT:      Head: Normocephalic.      Right Ear: Tympanic membrane normal.      Left Ear: Tympanic membrane normal.   Eyes:      General: Lids are normal.      Conjunctiva/sclera: Conjunctivae normal.   Neck:      Thyroid: No thyroid mass or thyromegaly.      Trachea: Trachea normal.   Cardiovascular:      Rate and Rhythm: Normal rate and regular rhythm.      Heart sounds: Normal heart sounds.   Pulmonary:      Effort: Pulmonary effort is normal.      Breath sounds: Normal breath sounds.   Musculoskeletal:      Cervical back: Normal range of motion.   Lymphadenopathy:      Cervical: No cervical adenopathy.   Skin:     General: Skin is warm and dry.   Neurological:      Mental Status: He is alert and " oriented to person, place, and time.   Psychiatric:         Attention and Perception: He is attentive.         Mood and Affect: Mood normal.         Speech: Speech normal.         Behavior: Behavior normal.        Result Review :  The following data was reviewed by: Olesya Cueva MD on 07/26/2022:  Common labs    Common Labsle 11/30/21 11/30/21 11/30/21 11/30/21 4/8/22 4/8/22 4/8/22 4/21/22 4/21/22    1027 1027 1027 1050 0922 0922 0922 0232 0232   Glucose   107 (A)    134 (A)  126 (A)   BUN   16    15  10   Creatinine   0.85    0.87  0.81   eGFR Non African Am   87         Sodium   140    142  142   Potassium   4.3    4.7  3.8   Chloride   103    106  107   Calcium   9.1    9.4  8.8   Albumin   4.10         Total Bilirubin   0.4         Alkaline Phosphatase   62         AST (SGOT)   26         ALT (SGPT)   30         WBC      7.86      Hemoglobin      15.0  13.9    Hematocrit      45.5  42.2    Platelets      299      Total Cholesterol  145          Triglycerides  151 (A)          HDL Cholesterol  32 (A)          LDL Cholesterol   86          Hemoglobin A1C 6.2 (A)    6.2 (A)       Microalbumin, Urine    <1.2        (A) Abnormal value       Comments are available for some flowsheets but are not being displayed.                     Assessment and Plan   Diagnoses and all orders for this visit:    1. Type 2 diabetes mellitus with unspecified complications (HCC) (Primary)  -     Comprehensive Metabolic Panel  -     Hemoglobin A1c  -     Lancets (OneTouch Delica Plus [id number]) misc; 1 each by Other route Daily.  Dispense: 100 each; Refill: 3             Follow Up   No follow-ups on file.  Patient was given instructions and counseling regarding his condition or for health maintenance advice. Please see specific information pulled into the AVS if appropriate.

## 2022-07-29 ENCOUNTER — TREATMENT (OUTPATIENT)
Dept: PHYSICAL THERAPY | Facility: CLINIC | Age: 77
End: 2022-07-29

## 2022-07-29 DIAGNOSIS — Z96.612 S/P REVERSE TOTAL SHOULDER ARTHROPLASTY, LEFT: Primary | ICD-10-CM

## 2022-07-29 PROCEDURE — 97140 MANUAL THERAPY 1/> REGIONS: CPT | Performed by: PHYSICAL THERAPIST

## 2022-07-29 PROCEDURE — 97530 THERAPEUTIC ACTIVITIES: CPT | Performed by: PHYSICAL THERAPIST

## 2022-07-29 PROCEDURE — 97110 THERAPEUTIC EXERCISES: CPT | Performed by: PHYSICAL THERAPIST

## 2022-07-29 NOTE — PROGRESS NOTES
Physical Therapy Daily Treatment Note/Discharge Summary    Patient: Heri Vasquez  : 1945  Referring Practitioner: Dany Knapp, *  Date of Initial Visit: Type: THERAPY  Noted: 2022  Today's Date: 2022  Patient seen for 15 sessions    Visit Diagnoses:     ICD-10-CM ICD-9-CM   1. S/P reverse total shoulder arthroplasty, left  Z96.612 V43.61       Subjective   Heri Vasquez reports that his shoulder is doing very well with no pain and he is able to do all activities without difficulty or pain.  Pain Ratin    Objective          Active Range of Motion   Left Shoulder   Flexion: 160 degrees   Abduction: 150 degrees     Strength/Myotome Testing     Left Shoulder   Normal muscle strength    Right Shoulder   Normal muscle strength        See Exercise, Manual, and Modality Logs for complete treatment.     Patient Education: HEP, discharge summary/status  [facility] Access Code: [email or web address]  Assessment & Plan     Assessment    Assessment details: The patient has made excellent progress in PT, making improvements in strength, ROM, joint mobility, and pain reduction. He is now able to perform all ADLs and IADLs with no increase in pain or difficulty. He is being discharged today due to meeting all therapy and personal goals and completion of current PT program. The patient was given HEP and education for continued self care.    Goals  Plan Goals: STG 3 Weeks:    Pt will demonstrate understanding of initial HEP without need for cueing. - MET    Pt will rate pain 1/10 or less for ease with movement. - MET    Pt will demonstrate increased L shoulder AROM in flex to 140 deg or greater and abduction to 120 deg or greater for ease with overhead activity. - MET    LTG 12 Weeks:    Pt will be independent with HEP for return to PLOF with decreased symptoms. -MET    Pt will demonstrate increased L shoulder AROM in ext rot to 60 deg or greater and int rot to 70 deg or greater for improvement with dressing and  bathing. - MET    Pt will demonstrate increased strength of L shoulder to 4+/5 or greater in all planes for improvement in lifting and carrying. - MET        Other: D/C with HEP          Timed:         Manual Therapy:    10     mins  32928;     Therapeutic Exercise:    18     mins  99207;     Neuromuscular Alejandro:        mins  46855;    Therapeutic Activity:     10     mins  48227;     Gait Training:           mins  43893;     Ultrasound:          mins  28253;    Ionto                                   mins   56926  Self Care                            mins   03976  Canalith Repos                   mins  64684    Un-Timed:  Electrical Stimulation:         mins  61518 ( );  Dry Needling          mins   Traction          mins 02882    Timed Treatment:   38   mins   Total Treatment:     38   mins      Zhanna Franco, PT  Physical Therapist  [place] License # [id number]

## 2022-08-26 RX ORDER — BLOOD SUGAR DIAGNOSTIC
1 STRIP MISCELLANEOUS DAILY
Qty: 50 EACH | Refills: 3 | Status: SHIPPED | OUTPATIENT
Start: 2022-08-26 | End: 2023-03-14 | Stop reason: SDUPTHER

## 2022-08-26 RX ORDER — BLOOD SUGAR DIAGNOSTIC
STRIP MISCELLANEOUS
Qty: 50 EACH | Refills: 3 | Status: SHIPPED | OUTPATIENT
Start: 2022-08-26 | End: 2022-08-26 | Stop reason: SDUPTHER

## 2022-10-03 ENCOUNTER — OFFICE VISIT (OUTPATIENT)
Dept: ORTHOPEDIC SURGERY | Facility: CLINIC | Age: 77
End: 2022-10-03

## 2022-10-03 VITALS — HEART RATE: 70 BPM | WEIGHT: 229.6 LBS | HEIGHT: 68 IN | BODY MASS INDEX: 34.8 KG/M2

## 2022-10-03 DIAGNOSIS — M19.012 OSTEOARTHRITIS OF LEFT GLENOHUMERAL JOINT: ICD-10-CM

## 2022-10-03 DIAGNOSIS — Z47.89 ORTHOPEDIC AFTERCARE: Primary | ICD-10-CM

## 2022-10-03 PROCEDURE — 99212 OFFICE O/P EST SF 10 MIN: CPT | Performed by: ORTHOPAEDIC SURGERY

## 2022-10-03 NOTE — PROGRESS NOTES
"     Patient ID: Heri Vasquez is a 77 y.o. male.  Left shoulder pain  4/20/22 left reverse total shoulder  Pain only when really sleeping on that side    Review of Systems:        Objective:    Pulse 70   Ht 172.7 cm (68\")   Wt 104 kg (229 lb 9.6 oz)   BMI 34.91 kg/m²     Physical Examination:  Left shoulder has no bony tenderness healed incision active elevation 170 abduction 140 external rotation 50 internal rotation L5       Imaging:   left Shoulder X-Ray  Indication: Postop total shoulder  AP Y and Lateral views  Findings: Reverse total shoulder in position  no bony lesion  Soft tissues normal  not examined joint spaces  Hardware appropriately positioned yes      yes prior studies available for comparison    Assessment:    Doing well after shoulder arthroplasty    Plan:   Activity as tolerated x-ray in 6      Procedures          Disclaimer: Part of this note may be an electronic transcription/translation of spoken language to printed text using the Dragon Dictation System  "

## 2022-10-04 ENCOUNTER — OFFICE VISIT (OUTPATIENT)
Dept: FAMILY MEDICINE CLINIC | Facility: CLINIC | Age: 77
End: 2022-10-04

## 2022-10-04 DIAGNOSIS — E11.42 TYPE 2 DIABETES MELLITUS WITH DIABETIC POLYNEUROPATHY, WITHOUT LONG-TERM CURRENT USE OF INSULIN: Primary | ICD-10-CM

## 2022-10-04 DIAGNOSIS — Z23 NEED FOR VACCINATION: ICD-10-CM

## 2022-10-04 DIAGNOSIS — K76.0 FATTY LIVER: ICD-10-CM

## 2022-10-04 PROCEDURE — 99214 OFFICE O/P EST MOD 30 MIN: CPT | Performed by: FAMILY MEDICINE

## 2022-10-04 PROCEDURE — G0008 ADMIN INFLUENZA VIRUS VAC: HCPCS | Performed by: FAMILY MEDICINE

## 2022-10-04 PROCEDURE — 90662 IIV NO PRSV INCREASED AG IM: CPT | Performed by: FAMILY MEDICINE

## 2022-10-04 RX ORDER — GABAPENTIN 300 MG/1
300 CAPSULE ORAL 2 TIMES DAILY
Qty: 180 CAPSULE | Refills: 3 | Status: SHIPPED | OUTPATIENT
Start: 2022-10-04

## 2022-10-04 NOTE — PROGRESS NOTES
"Chief Complaint  Edema (Rt ankle, rt foot pain x 2 week )    Subjective        Heri Vasquez presents to [facility]  History of Present Illness  Burning sensation on the bottom of right foot.  Right ankle swelling.  H/O fatty liver.  NO new symptoms.  No nausea or jaundice or abdominal pain.   Diabetes  He presents for his follow-up diabetic visit. He has type 2 diabetes mellitus. His disease course has been stable. There are no hypoglycemic associated symptoms. Pertinent negatives for diabetes include no blurred vision, no chest pain, no fatigue, no foot ulcerations, no polydipsia, no polyphagia, no polyuria and no visual change. There are no hypoglycemic complications. Symptoms are stable. Diabetic complications include peripheral neuropathy. Current diabetic treatment includes diet and oral agent (monotherapy). He is compliant with treatment most of the time. His weight is stable. He is following a diabetic diet. Meal planning includes avoidance of concentrated sweets. He participates in exercise intermittently. There is no change in his home blood glucose trend. Eye exam is current.       Objective   Vital Signs:  /78 (BP Location: Left arm, Patient Position: Sitting, Cuff Size: Large Adult)   Pulse 77   Temp 98.2 °F (36.8 °C) (Infrared)   Ht 172.7 cm (68\")   Wt 104 kg (229 lb)   SpO2 94%   BMI 34.82 kg/m²   Estimated body mass index is 34.82 kg/m² as calculated from the following:    Height as of this encounter: 172.7 cm (68\").    Weight as of this encounter: 104 kg (229 lb).    BMI is >= 30 and <35. (Class 1 Obesity). The following options were offered after discussion;: exercise counseling/recommendations      Physical Exam  Vitals and nursing note reviewed.   Constitutional:       General: He is not in acute distress.     Appearance: He is well-developed.   HENT:      Head: Normocephalic.   Eyes:      General: Lids are normal.      Conjunctiva/sclera: " Conjunctivae normal.   Neck:      Thyroid: No thyroid mass or thyromegaly.      Trachea: Trachea normal.   Cardiovascular:      Rate and Rhythm: Normal rate and regular rhythm.      Heart sounds: Normal heart sounds.   Pulmonary:      Effort: Pulmonary effort is normal.      Breath sounds: Normal breath sounds.   Abdominal:      Palpations: Abdomen is soft.   Musculoskeletal:      Cervical back: Normal range of motion.      Right lower leg: No edema.      Left lower leg: No edema.   Lymphadenopathy:      Cervical: No cervical adenopathy.   Skin:     General: Skin is warm and dry.   Neurological:      Mental Status: He is alert and oriented to person, place, and time.   Psychiatric:         Attention and Perception: He is attentive.         Mood and Affect: Mood normal.         Speech: Speech normal.         Behavior: Behavior normal.        Result Review :  The following data was reviewed by: Olesya Cueva MD on 10/04/2022:  Common labs    Common Labs 4/8/22 4/8/22 4/8/22 4/21/22 4/21/22 7/26/22 7/26/22    0922 0922 0922 0232 0232 1019 1019   Glucose   134 (A)  126 (A)  101 (A)   BUN   15  10  12   Creatinine   0.87  0.81  0.88   Sodium   142  142  137   Potassium   4.7  3.8  4.6   Chloride   106  107  101   Calcium   9.4  8.8  9.5   Albumin       4.30   Total Bilirubin       0.4   Alkaline Phosphatase       76   AST (SGOT)       20   ALT (SGPT)       21   WBC  7.86        Hemoglobin  15.0  13.9      Hematocrit  45.5  42.2      Platelets  299        Hemoglobin A1C 6.2 (A)     6.1 (A)    (A) Abnormal value       Comments are available for some flowsheets but are not being displayed.                     Assessment and Plan   Diagnoses and all orders for this visit:    1. Type 2 diabetes mellitus with diabetic polyneuropathy, without long-term current use of insulin (HCC) (Primary)  Assessment & Plan:  Diabetes is worsening.   Reminded to bring in blood sugar diary at next visit.  Regular aerobic  exercise.  Discussed foot care.  Diabetes will be reassessed in 3 months.    Orders:  -     gabapentin (NEURONTIN) 300 MG capsule; Take 1 capsule by mouth 2 (Two) Times a Day.  Dispense: 180 capsule; Refill: 3    2. Fatty liver  -     US Abdomen Limited; Future    3. Need for vaccination  -     Fluzone High-Dose 65+yrs ([id number])           Follow Up   No follow-ups on file.  Patient was given instructions and counseling regarding his condition or for health maintenance advice. Please see specific information pulled into the AVS if appropriate.

## 2022-10-12 ENCOUNTER — HOSPITAL ENCOUNTER (OUTPATIENT)
Dept: ULTRASOUND IMAGING | Facility: HOSPITAL | Age: 77
Discharge: HOME | End: 2022-10-12
Admitting: FAMILY MEDICINE

## 2022-10-12 DIAGNOSIS — K76.0 FATTY LIVER: ICD-10-CM

## 2022-10-12 PROCEDURE — 76705 ECHO EXAM OF ABDOMEN: CPT

## 2022-10-20 ENCOUNTER — OFFICE VISIT (OUTPATIENT)
Dept: CARDIOLOGY | Facility: CLINIC | Age: 77
End: 2022-10-20

## 2022-10-20 VITALS
OXYGEN SATURATION: 97 % | DIASTOLIC BLOOD PRESSURE: 71 MMHG | BODY MASS INDEX: 34.71 KG/M2 | RESPIRATION RATE: 18 BRPM | SYSTOLIC BLOOD PRESSURE: 142 MMHG | HEART RATE: 76 BPM | WEIGHT: 229 LBS | HEIGHT: 68 IN

## 2022-10-20 DIAGNOSIS — I20.8 STABLE ANGINA PECTORIS: ICD-10-CM

## 2022-10-20 DIAGNOSIS — R00.1 BRADYCARDIA: Primary | ICD-10-CM

## 2022-10-20 DIAGNOSIS — E78.2 MIXED HYPERLIPIDEMIA: ICD-10-CM

## 2022-10-20 DIAGNOSIS — I10 ESSENTIAL HYPERTENSION: ICD-10-CM

## 2022-10-20 DIAGNOSIS — I25.10 CHRONIC CORONARY ARTERY DISEASE: ICD-10-CM

## 2022-10-20 PROCEDURE — 99214 OFFICE O/P EST MOD 30 MIN: CPT | Performed by: INTERNAL MEDICINE

## 2022-10-20 PROCEDURE — 93000 ELECTROCARDIOGRAM COMPLETE: CPT | Performed by: INTERNAL MEDICINE

## 2022-10-20 NOTE — PROGRESS NOTES
Cardiology Office Visit      Encounter Date:  10/20/2022    Patient ID:   Heri Vasquez is a 77 y.o. male.      Reason For Followup:  Follow-up  for coronary artery disease, dyslipidemia, gastroesophageal reflux disease, obstructive sleep apnea    Brief Clinical History:  Dear Dr. Cueva, Olesya Bay MD    I had the pleasure of seeing Heri Vasquez today. As you are well aware, this is a 77 y.o. male  with history of mild coronary artery disease, ,  dyslipidemia, and gastroesophageal reflux disease.  He uses BiPAP machine for obstructive sleep apnea.     [facility] with chest pains and underwent cardiac catheterization which showed mild coronary artery disease in October 2017.  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  Left main was normal.  The distal LAD showed a 50-60% lesion and was too small to do any intervention.  30-40% lesion was noted in the right coronary artery.  Left circumflex and ramus intermedius branches were normal.      Impressions:  Mild to moderate obstructive coronary disease involving the LAD and right coronary artery  Distal LAD with segment of intramyocardial bridging  Normal LV systolic function  Normal wall motion  Estimated LV ejection fraction of 60%        Interval History:  Denies any further symptoms of chest pain shortness of breath dizziness or syncope  Denies any cardiac symptoms  Denies any new cardiac symptoms  Assessment & Plan    Impressions:    Coronary artery disease stable with no anginal chest discomfort  Dyslipidemia.  Lipids are being checked periodically.  Hypertension under fairly good control.  Fatty liver  Obesity  Cardiac catheterization in May 2021 with a mild obstructive coronary artery disease    Recommendations:  Continue aspirin 81 mg p.o. once a day  Lipitor 20 mg p.o. once a day lisinopril 10 mg p.o. once a day isosorbide 90 mg p.o. once a day  Continue current medical therapy  Stable from cardiac standpoint  Continue current  "medical therapy continued aggressive risk factor modification  Cardiac work-up reviewed and discussed with the patient  Medical management for obstructive coronary artery disease  Continued aggressive risk factor modification  We will see patient in the office in 12 months  Thank you very much for allowing us to participate in the care of your patients  Objective:    Vitals:  Vitals:    10/20/22 0953   BP: 142/71   BP Location: Left arm   Patient Position: Sitting   Cuff Size: Large Adult   Pulse: 76   Resp: 18   SpO2: 97%   Weight: 104 kg (229 lb)   Height: 172.7 cm (68\")       Physical Exam:    General: Alert, cooperative, no distress, appears stated age  Head:  Normocephalic, atraumatic, mucous membranes moist  Eyes:  Conjunctiva/corneas clear, EOM's intact     Neck:  Supple,  no adenopathy;      Lungs: Clear to auscultation bilaterally, no wheezes rhonchi rales are noted  Chest wall: No tenderness  Heart::  Regular rate and rhythm, S1 and S2 normal, no murmur, rub or gallop  Abdomen: Soft, non-tender, nondistended bowel sounds active  Extremities: No cyanosis, clubbing, or edema  Pulses: 2+ and symmetric all extremities  Skin:  No rashes or lesions  Neuro/psych: A&O x3. CN II through XII are grossly intact with appropriate affect      Allergies:  No Known Allergies    Medication Review:     Current Outpatient Medications:   •  acetaminophen (TYLENOL) 650 MG 8 hr tablet, Take 650 mg by mouth Every 8 (Eight) Hours As Needed for Mild Pain ., Disp: , Rfl:   •  atorvastatin (LIPITOR) 20 MG tablet, Take 20 mg by mouth Every Night., Disp: , Rfl:   •  betamethasone dipropionate (DIPROLENE) 0.05 % lotion, , Disp: , Rfl:   •  betamethasone valerate (VALISONE) 0.1 % ointment, Apply 1 application topically to the appropriate area as directed 2 (Two) Times a Day., Disp: , Rfl:   •  calcium citrate-vitamin d (CALCITRATE) 315-250 MG-UNIT tablet tablet, Take  by mouth Daily., Disp: , Rfl:   •  cyclobenzaprine (FLEXERIL) 10 MG " tablet, TAKE 1 TABLET BY MOUTH AT NIGHT AS NEEDED FOR MUSCLE SPASM (Patient taking differently: Take 1 tablet by mouth As Needed.), Disp: 30 tablet, Rfl: 0  •  donepezil (ARICEPT) 5 MG tablet, Take 5 mg by mouth Every Night., Disp: , Rfl:   •  gabapentin (NEURONTIN) 300 MG capsule, Take 1 capsule by mouth 2 (Two) Times a Day., Disp: 180 capsule, Rfl: 3  •  hydrocortisone 2.5 % cream, Apply 1 application topically to the appropriate area as directed 2 (Two) Times a Day., Disp: , Rfl:   •  isosorbide mononitrate (IMDUR) 60 MG 24 hr tablet, TAKE 1 AND 1/2 TABLETS BY  MOUTH DAILY (Patient taking differently: Take 1.5 tablets by mouth Daily.), Disp: 135 tablet, Rfl: 3  •  Lancets (OneTouch Delica Plus [id number]) misc, 1 each by Other route Daily., Disp: 100 each, Rfl: 3  •  lisinopril (PRINIVIL,ZESTRIL) 10 MG tablet, TAKE 1 TABLET BY MOUTH  DAILY, Disp: 90 tablet, Rfl: 3  •  metFORMIN (GLUCOPHAGE) 500 MG tablet, TAKE 1 TABLET BY MOUTH  TWICE DAILY WITH A MEAL (Patient taking differently: Take 1 tablet by mouth 2 (Two) Times a Day With Meals.), Disp: 180 tablet, Rfl: 3  •  Multiple Vitamins-Minerals (MULTIVITAMIN WITH MINERALS) tablet tablet, Take 1 tablet by mouth Daily., Disp: , Rfl:   •  naproxen (NAPROSYN) 500 MG tablet, TAKE 1 TABLET BY MOUTH  TWICE DAILY AS NEEDED FOR  MODERATE PAIN, Disp: 180 tablet, Rfl: 3  •  nitroglycerin (NITROSTAT) 0.4 MG SL tablet, Place 1 tablet under the tongue Every 5 (Five) Minutes As Needed for Chest Pain., Disp: 30 tablet, Rfl: 4  •  OneTouch Ultra test strip, 1 each by Other route Daily. Use as instructed E11.9, Disp: 50 each, Rfl: 3  •  pantoprazole (PROTONIX) 40 MG EC tablet, TAKE 1 TABLET BY MOUTH  DAILY, Disp: 90 tablet, Rfl: 3  •  promethazine (PHENERGAN) 12.5 MG tablet, Take 1 tablet by mouth Every 6 (Six) Hours As Needed for Nausea or Vomiting., Disp: 21 tablet, Rfl: 1  •  triamcinolone (KENALOG) 0.025 % cream, , Disp: , Rfl:   •  aspirin 81 MG EC tablet, Take 81 mg by mouth  Daily. LD 4/13, Disp: , Rfl:     Family History:  Family History   Problem Relation Age of Onset   • Heart disease Mother         CHF   • Hypertension Mother    • Heart failure Mother    • Stroke Maternal Grandfather    • Diabetes Other    • Diabetes Other    • Stroke Other        Past Medical History:  Past Medical History:   Diagnosis Date   • Arthritis     Dr Mosqueda   • Coronary heart disease 01/2016    single vessel disease, nl stress test (copied from [employer])   • Coronary heart disease 10/31/2017    cath 10/31/17 - Low % Blockages- N o Intervention   (copied from [employer])   • Diverticulosis    • Fatty liver    • GERD (gastroesophageal reflux disease)    • Hearing loss    • Hyperlipemia    • Hyperlipidemia    • Hypertension    • Low back pain    • SIDDHARTHA treated with BiPAP    • Pain management     injections Lumbar spine X2 with [facility] @ [facility] (copied from [employer])   • Physical therapy evaluation, initial     @ [facility] in [place] 6 weeks x3 per week, Kalen leal (copied from [employer])   • Rectal bleed 08/2012    hemorrhoids   • Retinal hemorrhage of right eye 05/2014   • Sleep apnea     bipap   • Type 2 diabetes mellitus (HCC)    • Uses brace        Past surgical History:  Past Surgical History:   Procedure Laterality Date   • BELPHAROPTOSIS REPAIR  12/11/2017     Dr. grimes (copied from [employer])   • BROW LIFT  12/11/2017    Dr. Grimes at [facility]   • CARDIAC CATHETERIZATION  03/2012    at [facility]- single vessel disease. (copied from [employer])   • CARDIAC CATHETERIZATION  10/13/2017    no intervention - Low % Blockages.   • CARDIAC CATHETERIZATION Left 05/28/2021    Procedure: LEFT HEART CATH with possible PCI;  Surgeon: Leslie Clark MD;  Location: [facility] INVASIVE LOCATION;  Service: Cardiovascular;  Laterality: Left;  Local and IV sedation   • CATARACT EXTRACTION  12/11/2017    brow lift and eye lid repair   • COLON SURGERY  06/2014    colonscopy   • COLONOSCOPY N/A  2019    Procedure: COLONOSCOPY with polypectomy x1;  Surgeon: Graham Byrd MD;  Location: [facility] ENDOSCOPY;  Service: Gastroenterology   • CUBITAL TUNNEL RELEASE Right    • HERNIA REPAIR  2008    umbilical hernia repair   • JOINT REPLACEMENT     • KNEE ARTHROSCOPY Right 2014    Dr. Mosqueda   • LASIK      lasik and Cataract Extraction, sep 11 and right 2014- Mart[facility]nez Perez. (copied from [facility])   • TOTAL KNEE ARTHROPLASTY Left 2009    Dr. Mosqueda (copied from [employer])   • TOTAL SHOULDER ARTHROPLASTY W/ DISTAL CLAVICLE EXCISION Left 2022    Procedure: TOTAL SHOULDER REVERSE ARTHROPLASTY;  Surgeon: Dany Knapp MD;  Location: [facility] MAIN OR;  Service: Orthopedics;  Laterality: Left;       Social History:  Social History     Socioeconomic History   • Marital status:      Spouse name: Shani   Tobacco Use   • Smoking status: Former     Packs/day: 1.50     Years: 30.00     Pack years: 45.00     Types: Cigarettes     Quit date: 2000     Years since quittin.8   • Smokeless tobacco: Former   Vaping Use   • Vaping Use: Never used   Substance and Sexual Activity   • Alcohol use: No   • Drug use: No   • Sexual activity: Not Currently     Partners: Female       Review of Systems:  The following systems were reviewed as they relate to the cardiovascular system: Constitutional, Eyes, ENT, Cardiovascular, Respiratory, Gastrointestinal, Integumentary, Neurological, Psychiatric, Hematologic, Endocrine, Musculoskeletal, and Genitourinary. The pertinent cardiovascular findings are reported above with all other cardiovascular points within those systems being negative.    Diagnostic Study Review:     Current Electrocardiogram:    ECG 12 Lead    Date/Time: 10/20/2022 10:53 AM  Performed by: Leslie Clark MD  Authorized by: Leslie Clark MD   Comparison: compared with previous ECG   Similar to previous ECG  Rhythm: sinus rhythm  Rate: normal  BPM:  76  Conduction: conduction normal  QRS axis: normal  Other findings: non-specific ST-T wave changes    Clinical impression: abnormal EKG              NOTE: The following portions of the patient's history were reviewed and updated this visit as appropriate: allergies, current medications, past family history, past medical history, past social history, past surgical history and problem list.

## 2022-10-23 VITALS
SYSTOLIC BLOOD PRESSURE: 151 MMHG | HEIGHT: 68 IN | HEART RATE: 77 BPM | TEMPERATURE: 98.2 F | BODY MASS INDEX: 34.71 KG/M2 | DIASTOLIC BLOOD PRESSURE: 78 MMHG | OXYGEN SATURATION: 94 % | WEIGHT: 229 LBS

## 2022-10-23 PROBLEM — Z23 NEED FOR VACCINATION: Status: ACTIVE | Noted: 2022-10-23

## 2022-10-23 NOTE — ASSESSMENT & PLAN NOTE
Diabetes is worsening.   Reminded to bring in blood sugar diary at next visit.  Regular aerobic exercise.  Discussed foot care.  Diabetes will be reassessed in 3 months.

## 2022-10-24 RX ORDER — DONEPEZIL HYDROCHLORIDE 5 MG/1
TABLET, FILM COATED ORAL
Qty: 90 TABLET | Refills: 3 | Status: SHIPPED | OUTPATIENT
Start: 2022-10-24

## 2022-10-24 RX ORDER — ATORVASTATIN CALCIUM 20 MG/1
TABLET, FILM COATED ORAL
Qty: 90 TABLET | Refills: 3 | Status: SHIPPED | OUTPATIENT
Start: 2022-10-24

## 2022-11-22 RX ORDER — ISOSORBIDE MONONITRATE 60 MG/1
TABLET, EXTENDED RELEASE ORAL
Qty: 135 TABLET | Refills: 3 | Status: SHIPPED | OUTPATIENT
Start: 2022-11-22

## 2022-12-21 ENCOUNTER — OFFICE VISIT (OUTPATIENT)
Dept: FAMILY MEDICINE CLINIC | Facility: CLINIC | Age: 77
End: 2022-12-21

## 2022-12-21 VITALS
HEIGHT: 68 IN | BODY MASS INDEX: 34.1 KG/M2 | SYSTOLIC BLOOD PRESSURE: 139 MMHG | WEIGHT: 225 LBS | TEMPERATURE: 98 F | HEART RATE: 80 BPM | OXYGEN SATURATION: 94 % | DIASTOLIC BLOOD PRESSURE: 68 MMHG

## 2022-12-21 DIAGNOSIS — M54.6 ACUTE RIGHT-SIDED THORACIC BACK PAIN: ICD-10-CM

## 2022-12-21 DIAGNOSIS — M62.838 NECK MUSCLE SPASM: Primary | ICD-10-CM

## 2022-12-21 DIAGNOSIS — R53.83 OTHER FATIGUE: ICD-10-CM

## 2022-12-21 PROCEDURE — 99213 OFFICE O/P EST LOW 20 MIN: CPT | Performed by: FAMILY MEDICINE

## 2022-12-21 NOTE — PROGRESS NOTES
"Chief Complaint  Neck Pain (Sharp pain- comes and goes x 1 year )    Subjective        Heri Vasquez presents to [facility]  History of Present Illness  He has had some pain on the left side of his neck and a \"bump\"  but it has resolved now.  It has been gone a few days now.  He has had this happen before but it was worse and lasted longer this time. He has been taking Tylenol as needed for pain that seems to have helped.  He has also been using a neck pillow.   Fatigue  This is a new problem. The current episode started more than 1 month ago. The problem occurs constantly. The problem has been unchanged. Associated symptoms include fatigue. Pertinent negatives include no chest pain, chills, congestion, coughing, fever or swollen glands.       Objective   Vital Signs:  /68 (BP Location: Left arm, Patient Position: Sitting)   Pulse 80   Temp 98 °F (36.7 °C) (Infrared)   Ht 172.7 cm (68\")   Wt 102 kg (225 lb)   SpO2 94%   BMI 34.21 kg/m²   Estimated body mass index is 34.21 kg/m² as calculated from the following:    Height as of this encounter: 172.7 cm (68\").    Weight as of this encounter: 102 kg (225 lb).    BMI is >= 30 and <35. (Class 1 Obesity). The following options were offered after discussion;: exercise counseling/recommendations      Physical Exam  Vitals and nursing note reviewed.   Constitutional:       General: He is not in acute distress.     Appearance: He is well-developed.   HENT:      Head: Normocephalic.   Eyes:      General: Lids are normal.      Conjunctiva/sclera: Conjunctivae normal.   Neck:      Thyroid: No thyroid mass or thyromegaly.      Trachea: Trachea normal.   Cardiovascular:      Rate and Rhythm: Normal rate and regular rhythm.      Heart sounds: Normal heart sounds.   Pulmonary:      Effort: Pulmonary effort is normal.      Breath sounds: Normal breath sounds.   Musculoskeletal:      Cervical back: Normal range of motion. Tenderness " present. Muscular tenderness present.   Lymphadenopathy:      Cervical: No cervical adenopathy.   Skin:     General: Skin is warm and dry.   Neurological:      Mental Status: He is alert and oriented to person, place, and time.   Psychiatric:         Attention and Perception: He is attentive.         Mood and Affect: Mood normal.         Speech: Speech normal.         Behavior: Behavior normal.        Result Review :  The following data was reviewed by: Olesya Cueva MD on 12/21/2022:  Common labs    Common Labs 4/8/22 4/8/22 4/8/22 4/21/22 4/21/22 7/26/22 7/26/22    0922 0922 0922 0232 0232 1019 1019   Glucose   134 (A)  126 (A)  101 (A)   BUN   15  10  12   Creatinine   0.87  0.81  0.88   Sodium   142  142  137   Potassium   4.7  3.8  4.6   Chloride   106  107  101   Calcium   9.4  8.8  9.5   Albumin       4.30   Total Bilirubin       0.4   Alkaline Phosphatase       76   AST (SGOT)       20   ALT (SGPT)       21   WBC  7.86        Hemoglobin  15.0  13.9      Hematocrit  45.5  42.2      Platelets  299        Hemoglobin A1C 6.2 (A)     6.1 (A)    (A) Abnormal value       Comments are available for some flowsheets but are not being displayed.                     Assessment and Plan   Diagnoses and all orders for this visit:    1. Neck muscle spasm (Primary)    2. Other fatigue  -     TSH    3. Acute right-sided thoracic back pain  -     cyclobenzaprine (FLEXERIL) 10 MG tablet; Take 1 tablet by mouth At Night As Needed for Muscle Spasms.  Dispense: 30 tablet; Refill: 0             Follow Up   No follow-ups on file.  Patient was given instructions and counseling regarding his condition or for health maintenance advice. Please see specific information pulled into the AVS if appropriate.       Answers for HPI/ROS submitted by the patient on 12/16/2022  What is the primary reason for your visit?: Other  Please describe your symptoms.: Neck pain  Have you had these symptoms before?: Yes  How long have you been having  these symptoms?: Greater than 2 weeks  Please list any medications you are currently taking for this condition.: Tynoal

## 2022-12-23 PROBLEM — M62.838 NECK MUSCLE SPASM: Status: ACTIVE | Noted: 2022-12-23

## 2022-12-23 RX ORDER — CYCLOBENZAPRINE HCL 10 MG
10 TABLET ORAL NIGHTLY PRN
Qty: 30 TABLET | Refills: 0
Start: 2022-12-23

## 2023-01-20 RX ORDER — LISINOPRIL 10 MG/1
TABLET ORAL
Qty: 90 TABLET | Refills: 3 | Status: SHIPPED | OUTPATIENT
Start: 2023-01-20

## 2023-01-24 ENCOUNTER — OFFICE VISIT (OUTPATIENT)
Dept: SLEEP MEDICINE | Facility: CLINIC | Age: 78
End: 2023-01-24
Payer: MEDICARE

## 2023-01-24 VITALS
HEART RATE: 78 BPM | WEIGHT: 220 LBS | BODY MASS INDEX: 33.34 KG/M2 | SYSTOLIC BLOOD PRESSURE: 150 MMHG | HEIGHT: 68 IN | OXYGEN SATURATION: 96 % | DIASTOLIC BLOOD PRESSURE: 66 MMHG

## 2023-01-24 DIAGNOSIS — G47.33 OSA TREATED WITH BIPAP: Primary | ICD-10-CM

## 2023-01-24 DIAGNOSIS — E66.9 CLASS 1 OBESITY: ICD-10-CM

## 2023-01-24 PROCEDURE — G0463 HOSPITAL OUTPT CLINIC VISIT: HCPCS

## 2023-01-24 PROCEDURE — 99213 OFFICE O/P EST LOW 20 MIN: CPT | Performed by: INTERNAL MEDICINE

## 2023-02-21 ENCOUNTER — LAB (OUTPATIENT)
Dept: FAMILY MEDICINE CLINIC | Facility: CLINIC | Age: 78
End: 2023-02-21
Payer: MEDICARE

## 2023-02-21 ENCOUNTER — OFFICE VISIT (OUTPATIENT)
Dept: FAMILY MEDICINE CLINIC | Facility: CLINIC | Age: 78
End: 2023-02-21
Payer: MEDICARE

## 2023-02-21 VITALS
BODY MASS INDEX: 34.25 KG/M2 | WEIGHT: 226 LBS | DIASTOLIC BLOOD PRESSURE: 69 MMHG | TEMPERATURE: 97.8 F | HEART RATE: 72 BPM | SYSTOLIC BLOOD PRESSURE: 134 MMHG | OXYGEN SATURATION: 93 % | HEIGHT: 68 IN

## 2023-02-21 DIAGNOSIS — E11.42 TYPE 2 DIABETES MELLITUS WITH DIABETIC POLYNEUROPATHY, WITHOUT LONG-TERM CURRENT USE OF INSULIN: ICD-10-CM

## 2023-02-21 DIAGNOSIS — Z23 NEED FOR VACCINATION: ICD-10-CM

## 2023-02-21 DIAGNOSIS — I10 ESSENTIAL HYPERTENSION: ICD-10-CM

## 2023-02-21 DIAGNOSIS — E78.2 MIXED HYPERLIPIDEMIA: ICD-10-CM

## 2023-02-21 DIAGNOSIS — Z00.00 MEDICARE ANNUAL WELLNESS VISIT, SUBSEQUENT: Primary | ICD-10-CM

## 2023-02-21 LAB
ALBUMIN SERPL-MCNC: 4.1 G/DL (ref 3.5–5.2)
ALBUMIN UR-MCNC: <1.2 MG/DL
ALBUMIN/GLOB SERPL: 1.6 G/DL
ALP SERPL-CCNC: 82 U/L (ref 39–117)
ALT SERPL W P-5'-P-CCNC: 20 U/L (ref 1–41)
ANION GAP SERPL CALCULATED.3IONS-SCNC: 9.1 MMOL/L (ref 5–15)
AST SERPL-CCNC: 21 U/L (ref 1–40)
BASOPHILS # BLD AUTO: 0.11 10*3/MM3 (ref 0–0.2)
BASOPHILS NFR BLD AUTO: 1 % (ref 0–1.5)
BILIRUB SERPL-MCNC: 0.5 MG/DL (ref 0–1.2)
BUN SERPL-MCNC: 16 MG/DL (ref 8–23)
BUN/CREAT SERPL: 19.8 (ref 7–25)
CALCIUM SPEC-SCNC: 9.1 MG/DL (ref 8.6–10.5)
CHLORIDE SERPL-SCNC: 106 MMOL/L (ref 98–107)
CHOLEST SERPL-MCNC: 124 MG/DL (ref 0–200)
CO2 SERPL-SCNC: 23.9 MMOL/L (ref 22–29)
CREAT SERPL-MCNC: 0.81 MG/DL (ref 0.76–1.27)
CREAT UR-MCNC: 81.1 MG/DL
DEPRECATED RDW RBC AUTO: 42 FL (ref 37–54)
EGFRCR SERPLBLD CKD-EPI 2021: 89.7 ML/MIN/1.73
EOSINOPHIL # BLD AUTO: 0.47 10*3/MM3 (ref 0–0.4)
EOSINOPHIL NFR BLD AUTO: 4.3 % (ref 0.3–6.2)
ERYTHROCYTE [DISTWIDTH] IN BLOOD BY AUTOMATED COUNT: 15.8 % (ref 12.3–15.4)
GLOBULIN UR ELPH-MCNC: 2.6 GM/DL
GLUCOSE SERPL-MCNC: 110 MG/DL (ref 65–99)
HBA1C MFR BLD: 6 % (ref 3.5–5.6)
HCT VFR BLD AUTO: 49.5 % (ref 37.5–51)
HDLC SERPL-MCNC: 38 MG/DL (ref 40–60)
HGB BLD-MCNC: 15.3 G/DL (ref 13–17.7)
IMM GRANULOCYTES # BLD AUTO: 0.04 10*3/MM3 (ref 0–0.05)
IMM GRANULOCYTES NFR BLD AUTO: 0.4 % (ref 0–0.5)
LDLC SERPL CALC-MCNC: 66 MG/DL (ref 0–100)
LDLC/HDLC SERPL: 1.71 {RATIO}
LYMPHOCYTES # BLD AUTO: 1.52 10*3/MM3 (ref 0.7–3.1)
LYMPHOCYTES NFR BLD AUTO: 13.8 % (ref 19.6–45.3)
MCH RBC QN AUTO: 23.9 PG (ref 26.6–33)
MCHC RBC AUTO-ENTMCNC: 30.9 G/DL (ref 31.5–35.7)
MCV RBC AUTO: 77.3 FL (ref 79–97)
MICROALBUMIN/CREAT UR: NORMAL MG/G{CREAT}
MONOCYTES # BLD AUTO: 0.77 10*3/MM3 (ref 0.1–0.9)
MONOCYTES NFR BLD AUTO: 7 % (ref 5–12)
NEUTROPHILS NFR BLD AUTO: 73.5 % (ref 42.7–76)
NEUTROPHILS NFR BLD AUTO: 8.13 10*3/MM3 (ref 1.7–7)
NRBC BLD AUTO-RTO: 0 /100 WBC (ref 0–0.2)
PLATELET # BLD AUTO: 405 10*3/MM3 (ref 140–450)
PMV BLD AUTO: 9.7 FL (ref 6–12)
POTASSIUM SERPL-SCNC: 4.1 MMOL/L (ref 3.5–5.2)
PROT SERPL-MCNC: 6.7 G/DL (ref 6–8.5)
RBC # BLD AUTO: 6.4 10*6/MM3 (ref 4.14–5.8)
SODIUM SERPL-SCNC: 139 MMOL/L (ref 136–145)
TRIGL SERPL-MCNC: 105 MG/DL (ref 0–150)
TSH SERPL DL<=0.05 MIU/L-ACNC: 1.16 UIU/ML (ref 0.27–4.2)
VLDLC SERPL-MCNC: 20 MG/DL (ref 5–40)
WBC NRBC COR # BLD: 11.04 10*3/MM3 (ref 3.4–10.8)

## 2023-02-21 PROCEDURE — G0009 ADMIN PNEUMOCOCCAL VACCINE: HCPCS | Performed by: FAMILY MEDICINE

## 2023-02-21 PROCEDURE — G0439 PPPS, SUBSEQ VISIT: HCPCS | Performed by: FAMILY MEDICINE

## 2023-02-21 PROCEDURE — 85025 COMPLETE CBC W/AUTO DIFF WBC: CPT | Performed by: FAMILY MEDICINE

## 2023-02-21 PROCEDURE — 80061 LIPID PANEL: CPT | Performed by: FAMILY MEDICINE

## 2023-02-21 PROCEDURE — 99214 OFFICE O/P EST MOD 30 MIN: CPT | Performed by: FAMILY MEDICINE

## 2023-02-21 PROCEDURE — 1170F FXNL STATUS ASSESSED: CPT | Performed by: FAMILY MEDICINE

## 2023-02-21 PROCEDURE — 83036 HEMOGLOBIN GLYCOSYLATED A1C: CPT | Performed by: FAMILY MEDICINE

## 2023-02-21 PROCEDURE — 82570 ASSAY OF URINE CREATININE: CPT | Performed by: FAMILY MEDICINE

## 2023-02-21 PROCEDURE — 90677 PCV20 VACCINE IM: CPT | Performed by: FAMILY MEDICINE

## 2023-02-21 PROCEDURE — 80053 COMPREHEN METABOLIC PANEL: CPT | Performed by: FAMILY MEDICINE

## 2023-02-21 PROCEDURE — 82043 UR ALBUMIN QUANTITATIVE: CPT | Performed by: FAMILY MEDICINE

## 2023-02-21 PROCEDURE — 36415 COLL VENOUS BLD VENIPUNCTURE: CPT | Performed by: FAMILY MEDICINE

## 2023-02-21 PROCEDURE — 1159F MED LIST DOCD IN RCRD: CPT | Performed by: FAMILY MEDICINE

## 2023-02-21 PROCEDURE — 84443 ASSAY THYROID STIM HORMONE: CPT | Performed by: FAMILY MEDICINE

## 2023-03-07 ENCOUNTER — APPOINTMENT (OUTPATIENT)
Dept: GENERAL RADIOLOGY | Facility: HOSPITAL | Age: 78
End: 2023-03-07
Payer: MEDICARE

## 2023-03-07 ENCOUNTER — HOSPITAL ENCOUNTER (OUTPATIENT)
Facility: HOSPITAL | Age: 78
Discharge: HOME | End: 2023-03-07
Attending: EMERGENCY MEDICINE | Admitting: EMERGENCY MEDICINE
Payer: MEDICARE

## 2023-03-07 VITALS
TEMPERATURE: 97.5 F | BODY MASS INDEX: 33.34 KG/M2 | HEIGHT: 68 IN | SYSTOLIC BLOOD PRESSURE: 112 MMHG | DIASTOLIC BLOOD PRESSURE: 70 MMHG | WEIGHT: 220 LBS | OXYGEN SATURATION: 94 % | RESPIRATION RATE: 18 BRPM | HEART RATE: 77 BPM

## 2023-03-07 DIAGNOSIS — J06.9 VIRAL URI WITH COUGH: Primary | ICD-10-CM

## 2023-03-07 LAB
FLUAV SUBTYP SPEC NAA+PROBE: NOT DETECTED
FLUAV SUBTYP SPEC NAA+PROBE: NOT DETECTED
FLUBV RNA ISLT QL NAA+PROBE: NOT DETECTED
FLUBV RNA ISLT QL NAA+PROBE: NOT DETECTED
RSV RNA NPH QL NAA+NON-PROBE: NOT DETECTED
SARS-COV-2 RNA RESP QL NAA+PROBE: NOT DETECTED
STREP A PCR: NOT DETECTED

## 2023-03-07 PROCEDURE — 87651 STREP A DNA AMP PROBE: CPT | Performed by: EMERGENCY MEDICINE

## 2023-03-07 PROCEDURE — 87637 SARSCOV2&INF A&B&RSV AMP PRB: CPT | Performed by: EMERGENCY MEDICINE

## 2023-03-07 PROCEDURE — 71045 X-RAY EXAM CHEST 1 VIEW: CPT

## 2023-03-07 PROCEDURE — 99213 OFFICE O/P EST LOW 20 MIN: CPT | Performed by: NURSE PRACTITIONER

## 2023-03-07 PROCEDURE — C9803 HOPD COVID-19 SPEC COLLECT: HCPCS

## 2023-03-07 PROCEDURE — 87636 SARSCOV2 & INF A&B AMP PRB: CPT | Performed by: EMERGENCY MEDICINE

## 2023-03-07 PROCEDURE — G0463 HOSPITAL OUTPT CLINIC VISIT: HCPCS | Performed by: EMERGENCY MEDICINE

## 2023-03-07 NOTE — FSED PROVIDER NOTE
EMERGENCY DEPARTMENT ENCOUNTER    Room Number:  04/04  Date seen:  3/7/2023  Time seen: 09:43 EST  PCP: Olesya Cueva MD  Historian: pt, wife    HPI:  Chief complaint:sore throat, cough  A complete HPI/ROS/PMH/PSH/SH/FH are unobtainable due to: n/a  Context:Heri Vasquez is a 77 y.o. male with history of type 2 diabetes on oral agents, mixed hyperlipidemia and hypertension who presents to the ED with c/o moderate sore throat and mild cough that started Sunday but definitely became worse on Monday.  He denies any shortness of breath, fever/chills or n/v/d.  His great-grandson was seen here yesterday and had positive strep throat and was ultimately transferred to [facility].  He has taken tylenol for his symptoms but most bothersome is the sore throat.       The patient was placed in a mask in triage, hand hygiene was performed before and after my interaction with the patient.  I wore a mask, safety glasses and gloves during my entire interaction with the patient.    Social determinants of health which may impact assessment: n/a    Review of prior external notes (non-ED): n/a    Review of prior external test results outside of this encounter: n/a    ALLERGIES  Patient has no known allergies.    PAST MEDICAL HISTORY  Active Ambulatory Problems     Diagnosis Date Noted   • Varicose veins of right lower extremity with pain 07/17/2019   • Nasal congestion 07/17/2019   • Actinic keratosis 03/14/2013   • Arthritis 07/17/2019   • Bradycardia 09/21/2018   • Chronic coronary artery disease 03/14/2013   • Degeneration of intervertebral disc of lumbar region 10/21/2015   • Dependent edema 08/26/2014   • Diabetic peripheral neuropathy (HCC) 10/03/2016   • Encounter for general adult medical examination without abnormal findings 08/09/2012   • Fatigue 12/28/2016   • Gastroesophageal reflux disease 01/09/2012   • Hay fever 06/25/2014   • Hearing loss 06/10/2016   • Hyperlipidemia 07/17/2019   • Knee pain 10/29/2014   •  Lightheadedness 03/13/2019   • Strain of lumbar region 06/29/2012   • Type 2 diabetes mellitus with diabetic polyneuropathy, without long-term current use of insulin ([facility]) 08/13/2014   • Screening for prostate cancer 10/29/2019   • Sciatic nerve pain, right 12/31/2019   • Hip pain, right 12/31/2019   • RUQ abdominal pain 02/24/2020   • Gallbladder polyp 03/06/2020   • Fatty liver 03/06/2020   • Left-sided low back pain with left-sided sciatica 03/24/2020   • Chronic right shoulder pain 06/19/2020   • Mass of joint of right shoulder 08/14/2020   • Cervical radiculopathy 08/14/2020   • Cervical radiculitis 08/30/2020   • Essential hypertension 10/20/2020   • Venous insufficiency of leg 11/12/2020   • Varicose veins of lower extremity 11/02/2020   • Infected sebaceous cyst 01/12/2021   • Neuritis of right ulnar nerve 02/15/2021   • Epistaxis 02/15/2021   • Acute right-sided thoracic back pain 04/13/2021   • Memory changes 04/13/2021   • Class 1 obesity 05/27/2021   • Stable angina pectoris (HCC) 05/27/2021   • Bronchitis 08/31/2021   • SIDDHARTHA treated with BiPAP 01/25/2022   • Sciatic pain, right 01/26/2022   • Right arm pain 01/26/2022   • Acute pain of left shoulder 01/26/2022   • DJD of left shoulder 03/21/2022   • Need for vaccination 10/23/2022   • Neck muscle spasm 12/23/2022   • Medicare annual wellness visit, subsequent 02/21/2023     Resolved Ambulatory Problems     Diagnosis Date Noted   • Dyspnea 07/17/2019   • Type 2 diabetes mellitus without complication, without long-term current use of insulin ([facility]) 02/15/2021   • Chest pain 05/26/2021   • Orthopedic aftercare 06/02/2022     Past Medical History:   Diagnosis Date   • Coronary heart disease 01/2016   • Coronary heart disease 10/31/2017   • Diverticulosis    • GERD (gastroesophageal reflux disease)    • Hyperlipemia    • Hypertension    • Low back pain    • Pain management    • Physical therapy evaluation, initial    • Rectal bleed 08/2012   • Retinal  hemorrhage of right eye 05/2014   • Sleep apnea    • Type 2 diabetes mellitus (HCC)    • Uses brace        PAST SURGICAL HISTORY  Past Surgical History:   Procedure Laterality Date   • BELPHAROPTOSIS REPAIR  12/11/2017     Dr. grimes (copied from [facility])   • BROW LIFT  12/11/2017    Dr. Grimes at [facility]   • CARDIAC CATHETERIZATION  03/2012    at [place]- single vessel disease. (copied from [facility])   • CARDIAC CATHETERIZATION  10/13/2017    no intervention - Low % Blockages.   • CARDIAC CATHETERIZATION Left 05/28/2021    Procedure: LEFT HEART CATH with possible PCI;  Surgeon: Leslie Clark MD;  Location: [facility] CATH INVASIVE LOCATION;  Service: Cardiovascular;  Laterality: Left;  Local and IV sedation   • CATARACT EXTRACTION  12/11/2017    brow lift and eye lid repair   • COLON SURGERY  06/2014    colonscopy   • COLONOSCOPY N/A 07/12/2019    Procedure: COLONOSCOPY with polypectomy x1;  Surgeon: Graham Byrd MD;  Location: [facility] ENDOSCOPY;  Service: Gastroenterology   • CUBITAL TUNNEL RELEASE Right    • HERNIA REPAIR  11/2008    umbilical hernia repair   • JOINT REPLACEMENT     • KNEE ARTHROSCOPY Right 11/2014    Dr. Mosqueda   • LASIK      lasik and Cataract Extraction, sep 11 and right Sept. 25th, 2014- Martínez Perez. (copied from [facility])   • TOTAL KNEE ARTHROPLASTY Left 11/2009    Dr. Mosqueda (copied from [employer])   • TOTAL SHOULDER ARTHROPLASTY W/ DISTAL CLAVICLE EXCISION Left 04/20/2022    Procedure: TOTAL SHOULDER REVERSE ARTHROPLASTY;  Surgeon: Dany Knapp MD;  Location: [facility] MAIN OR;  Service: Orthopedics;  Laterality: Left;       FAMILY HISTORY  Family History   Problem Relation Age of Onset   • Heart disease Mother         CHF   • Hypertension Mother    • Heart failure Mother    • Stroke Maternal Grandfather    • Diabetes Other    • Diabetes Other    • Stroke Other        SOCIAL HISTORY  Social History     Socioeconomic History   • Marital status:      Spouse name: Shani    Tobacco Use   • Smoking status: Former     Packs/day: 1.50     Years: 30.00     Pack years: 45.00     Types: Cigarettes     Quit date: 2000     Years since quittin.1   • Smokeless tobacco: Former   Vaping Use   • Vaping Use: Never used   Substance and Sexual Activity   • Alcohol use: No   • Drug use: No   • Sexual activity: Not Currently     Partners: Female       REVIEW OF SYSTEMS  Review of Systems    All systems reviewed and negative except for those discussed in HPI.     PHYSICAL EXAM    I have reviewed the triage vital signs and nursing notes.  ED Triage Vitals [23 0923]   Temp Heart Rate Resp BP SpO2   97.5 °F (36.4 °C) 70 18 116/61 95 %      Temp src Heart Rate Source Patient Position BP Location FiO2 (%)   Temporal Monitor Sitting Right arm --     Physical Exam    GENERAL: not distressed  HENT: nares patent, mild tonsillar erythema, no significant edema and there are no tonsillar exudates.   EYES: no scleral icterus  NECK: no ROM limitations  CV: regular rhythm, regular rate, no murmur  RESPIRATORY: normal effort, CTAB.  No wheezing/rales.  Speaks in full sentences  ABDOMEN: soft  : deferred  MUSCULOSKELETAL: no deformity  NEURO: alert, moves all extremities, follows commands  SKIN: warm, dry    LAB RESULTS  Recent Results (from the past 24 hour(s))   COVID-19 and FLU A/B PCR - Swab, Nasopharynx    Collection Time: 23  9:34 AM    Specimen: Nasopharynx; Swab   Result Value Ref Range    COVID19 Not Detected Not Detected - Ref. Range    Influenza A PCR Not Detected Not Detected    Influenza B PCR Not Detected Not Detected   Rapid Strep A Screen - Swab, Throat    Collection Time: 23  9:37 AM    Specimen: Throat; Swab   Result Value Ref Range    STREP A PCR Not Detected Not Detected   Influenza A/B, RSV PCR    Collection Time: 23 10:12 AM    Specimen: Swab   Result Value Ref Range    Influenza A PCR Not Detected Not Detected    Influenza B PCR Not Detected Not Detected    RSV,  PCR Not Detected Not Detected       Ordered the above labs and independently interpreted results.  My findings will be discussed in the ED course or medical decision making section below    RADIOLOGY RESULTS  XR Chest 1 View    Result Date: 3/7/2023  XR CHEST 1 VW Date of Exam: 3/7/2023 10:09 AM EST Indication: cough. Comparison: April 8, 2022 Findings: The heart is not definitely enlarged. The lungs seem clear. There are no pleural effusions. The patient has had left shoulder reverse arthroplasty.     Impression: 1.An acute pulmonary process is not apparent. Electronically Signed: Jarod Chan  3/7/2023 10:26 AM EST  Workstation ID: [id number]       Ordered the above noted radiological studies.  Independently interpreted by me .  My findings will be discussed in the medical decision section below.     PROGRESS, DATA ANALYSIS, CONSULTS AND MEDICAL DECISION MAKING    Please note that this section constitutes my independent interpretation of clinical data including lab results, radiology, EKG's.  This constitutes my independent professional opinion regarding differential diagnosis and management of this patient.  It may include any factors such as history from outside sources, review of external records, social determinants of health, management of medications, response to those treatments, and discussions with other providers.      ED Course as of 03/07/23 1132   Tue Mar 07, 2023   0955 STREP A PCR: Not Detected [EW]   1043 COVID19: Not Detected [EW]   1043 Influenza A PCR: Not Detected [EW]   1043 Influenza B PCR: Not Detected [EW]   1043 RSV, PCR: Not Detected [EW]   1043 STREP A PCR: Not Detected [EW]   1043 I viewed and independently interpreted the chest x-ray in PACS.  My interpretation are no focal infiltrates.    MDM: The patient's flu, COVID, RSV and strep throat testing is all negative.  He does have a great grandchild who is currently hospitalized at [facility] for similar symptoms.  The  patient is not hypoxic or tachycardic and has no adventitious breath sounds.  We will have him continue to treat this with over-the-counter home measures.  Patient was given return to ER precautions. [EW]      ED Course User Index  [EW] Gabriela aLfleur APRN       Orders placed during this visit:  Orders Placed This Encounter   Procedures   • COVID-19 and FLU A/B PCR - Swab, Nasopharynx   • Rapid Strep A Screen - Swab, Throat   • XR Chest 1 View   • Influenza A/B, RSV PCR              MDM DDX: strep throat exposure, viral URI, covid, RSV.  SEE ED COURSE      DIAGNOSIS  Final diagnoses:   Viral URI with cough       FOLLOW-UP  Olesya Cueva MD  [address] [facility] [address]  [place] [id number] [phone]    Schedule an appointment as soon as possible for a visit           Latest Documented Vital Signs:  As of 11:32 EST  BP- 112/70 HR- 77 Temp- 97.5 °F (36.4 °C) (Temporal) O2 sat- 94%    Please note that portions of this were completed with a voice recognition program.     Note Disclaimer: At [facility], we believe that sharing information builds trust and better relationships. You are receiving this note because you are receiving care at [facility] or recently visited. It is possible you will see health information before a provider has talked with you about it. This kind of information can be easy to misunderstand. To help you fully understand what it means for your health, we urge you to discuss this note with your provider.      MD ATTESTATION NOTE     I was available for discussion for this patient's history, physical exam, and treatment plan.However the APRN/NPP/PA performed substantive portion of the visit.     I have reviewed the documentation and I affirm the documentation.

## 2023-03-07 NOTE — DISCHARGE INSTRUCTIONS
"For salt water gargles combine 1 teaspoon salt to 8 ounces of warm water and swish and spit.  This can be done 4-5 times a day with a fresh batch of salt and warm water        Virus precautions, simple things to do at home to help with illness    You have been diagnosed with a non-COVID-19 viral illness, supportive care recommended.    Wash/sanitize common household surfaces with antibacterial wipes.  Especially door knobs, light switches.    Change bed linens and wash bath towels/washcloths    Frequent handwashing    Cough/sneeze into your sleeve    Treat fever every 6-8 hours with age appropriate Tylenol (generic acetaminophen) or Ibuprofen according to package directions.      Over-the-counter medications for symptomatic relief may include:  Sudafed, DayQuil/NyQuil, flonase nasal spray.  If you have history of high blood pressure please use caution with these medications.  Check with pharmacist for recommendations.  Coricidin has brand \"HBP\" which is better for patient's with high blood pressure.     Over-the-counter supplements including vitamin C, zinc  may also be helpful.    Return Precautions    Although you are being discharged from the ED today, I encourage you to return for worsening symptoms.  Things can, and do, change such that treatment at home with medication may not be adequate.      Specifically, return for any of the following:    Chest pain, shortness of breath, pain or nausea and vomiting not controlled by medications provided.    Please make a follow up with your Primary Care Provider for a blood pressure recheck.         "

## 2023-03-14 ENCOUNTER — TELEPHONE (OUTPATIENT)
Dept: FAMILY MEDICINE CLINIC | Facility: CLINIC | Age: 78
End: 2023-03-14

## 2023-03-14 RX ORDER — NAPROXEN 500 MG/1
TABLET ORAL
Qty: 180 TABLET | Refills: 3 | Status: SHIPPED | OUTPATIENT
Start: 2023-03-14

## 2023-03-14 RX ORDER — BLOOD SUGAR DIAGNOSTIC
STRIP MISCELLANEOUS
Qty: 50 EACH | Refills: 0 | Status: SHIPPED | OUTPATIENT
Start: 2023-03-14

## 2023-03-14 RX ORDER — PANTOPRAZOLE SODIUM 40 MG/1
TABLET, DELAYED RELEASE ORAL
Qty: 90 TABLET | Refills: 3 | Status: SHIPPED | OUTPATIENT
Start: 2023-03-14

## 2023-03-14 NOTE — TELEPHONE ENCOUNTER
Pharmacy Name:  [facility]    Pharmacy representative name: SANDRA    Pharmacy representative phone number: [phone]    What medication are you calling in regards to: ONE TOUCH ULTRA BLUE INVITRO TEST STRIPS    What question does the pharmacy have:  THE PHARMACY STATES THAT DUE TO INSURANCE, THEY NEED A NEW SCRIPT SENT TO THEM FOR THIS MEDICATION ALONG WITH THE DIAGNOSIS CODE WRITTEN ON THE SCRIPT.      Who is the provider that prescribed the medication: OWEN

## 2023-03-20 ENCOUNTER — APPOINTMENT (OUTPATIENT)
Dept: GENERAL RADIOLOGY | Facility: HOSPITAL | Age: 78
End: 2023-03-20
Payer: MEDICARE

## 2023-03-20 ENCOUNTER — HOSPITAL ENCOUNTER (OUTPATIENT)
Facility: HOSPITAL | Age: 78
Discharge: HOME | End: 2023-03-20
Attending: EMERGENCY MEDICINE | Admitting: EMERGENCY MEDICINE
Payer: MEDICARE

## 2023-03-20 VITALS
OXYGEN SATURATION: 94 % | BODY MASS INDEX: 33.34 KG/M2 | HEART RATE: 69 BPM | SYSTOLIC BLOOD PRESSURE: 141 MMHG | TEMPERATURE: 98.1 F | RESPIRATION RATE: 18 BRPM | HEIGHT: 68 IN | DIASTOLIC BLOOD PRESSURE: 82 MMHG | WEIGHT: 220 LBS

## 2023-03-20 DIAGNOSIS — M54.9 UPPER BACK PAIN ON RIGHT SIDE: ICD-10-CM

## 2023-03-20 DIAGNOSIS — S29.8XXA BLUNT TRAUMA TO CHEST, INITIAL ENCOUNTER: Primary | ICD-10-CM

## 2023-03-20 DIAGNOSIS — S20.211A RIB CONTUSION, RIGHT, INITIAL ENCOUNTER: ICD-10-CM

## 2023-03-20 PROCEDURE — G0463 HOSPITAL OUTPT CLINIC VISIT: HCPCS | Performed by: EMERGENCY MEDICINE

## 2023-03-20 PROCEDURE — 99213 OFFICE O/P EST LOW 20 MIN: CPT | Performed by: EMERGENCY MEDICINE

## 2023-03-20 PROCEDURE — 71101 X-RAY EXAM UNILAT RIBS/CHEST: CPT

## 2023-03-20 RX ORDER — HYDROCODONE BITARTRATE AND ACETAMINOPHEN 7.5; 325 MG/1; MG/1
1 TABLET ORAL EVERY 6 HOURS PRN
Qty: 12 TABLET | Refills: 0 | OUTPATIENT
Start: 2023-03-20 | End: 2023-03-24

## 2023-03-20 RX ORDER — HYDROCODONE BITARTRATE AND ACETAMINOPHEN 5; 325 MG/1; MG/1
1 TABLET ORAL ONCE
Status: COMPLETED | OUTPATIENT
Start: 2023-03-20 | End: 2023-03-20

## 2023-03-20 RX ADMIN — HYDROCODONE BITARTRATE AND ACETAMINOPHEN 1 TABLET: 5; 325 TABLET ORAL at 09:18

## 2023-03-20 NOTE — DISCHARGE INSTRUCTIONS
Apply cold compress to sore areas for the next 2 or 3 days.  Take Tylenol as needed for mild pain.  Take Norco 7.5 mg as needed for breakthrough pain.  Seek immediate medical attention if having difficulty breathing or vomiting or any concerns.

## 2023-03-20 NOTE — FSED PROVIDER NOTE
Subjective   History of Present Illness  Patient 77-year-old man who had a mechanical fall yesterday landing on the ground and striking his right posterior ribs.  He denies any neck pain or loss of consciousness or head injury or abdominal pain or vomiting or bloody stools.  Pain is worse with movement and deep inspiration.  No shortness of breath other than pain with inspiration.  Patient denies any open wounds or other injuries.        Review of Systems    Past Medical History:   Diagnosis Date   • Arthritis     Dr Mosqueda   • Coronary heart disease 01/2016    single vessel disease, nl stress test (copied from [facility])   • Coronary heart disease 10/31/2017    cath 10/31/17 - Low % Blockages- N o Intervention   (copied from [facility])   • Diverticulosis    • Fatty liver    • GERD (gastroesophageal reflux disease)    • Hearing loss    • Hyperlipemia    • Hyperlipidemia    • Hypertension    • Low back pain    • SIDDHARTHA treated with BiPAP    • Pain management     injections Lumbar spine X2 with [facility] Pain management @ [facility] (copied from [facility])   • Physical therapy evaluation, initial     @ [facility] in [place] 6 weeks x3 per week, Kalen leal (copied from [facility])   • Rectal bleed 08/2012    hemorrhoids   • Retinal hemorrhage of right eye 05/2014   • Sleep apnea     bipap   • Type 2 diabetes mellitus (HCC)    • Uses brace        No Known Allergies    Past Surgical History:   Procedure Laterality Date   • BELPHAROPTOSIS REPAIR  12/11/2017     Dr. grimes (copied from [facility])   • BROW LIFT  12/11/2017    Dr. Grimes at [facility]   • CARDIAC CATHETERIZATION  03/2012    at [place]- single vessel disease. (copied from [facility])   • CARDIAC CATHETERIZATION  10/13/2017    no intervention - Low % Blockages.   • CARDIAC CATHETERIZATION Left 05/28/2021    Procedure: LEFT HEART CATH with possible PCI;  Surgeon: Leslie Clark MD;  Location: [facility] CATH INVASIVE LOCATION;  Service: Cardiovascular;  Laterality: Left;   Local and IV sedation   • CATARACT EXTRACTION  2017    brow lift and eye lid repair   • COLON SURGERY  2014    colonscopy   • COLONOSCOPY N/A 2019    Procedure: COLONOSCOPY with polypectomy x1;  Surgeon: Graham Byrd MD;  Location: [facility] ENDOSCOPY;  Service: Gastroenterology   • CUBITAL TUNNEL RELEASE Right    • HERNIA REPAIR  2008    umbilical hernia repair   • JOINT REPLACEMENT     • KNEE ARTHROSCOPY Right 2014    Dr. Mosqueda   • LASIK      lasik and Cataract Extraction, sep 11 and right 2014- [facility] Perez. (copied from [email or web address])   • TOTAL KNEE ARTHROPLASTY Left 2009    Dr. Mosqueda (copied from [email or web address])   • TOTAL SHOULDER ARTHROPLASTY W/ DISTAL CLAVICLE EXCISION Left 2022    Procedure: TOTAL SHOULDER REVERSE ARTHROPLASTY;  Surgeon: Dany Knapp MD;  Location: [facility] MAIN OR;  Service: Orthopedics;  Laterality: Left;       Family History   Problem Relation Age of Onset   • Heart disease Mother         CHF   • Hypertension Mother    • Heart failure Mother    • Stroke Maternal Grandfather    • Diabetes Other    • Diabetes Other    • Stroke Other        Social History     Socioeconomic History   • Marital status:      Spouse name: Shani   Tobacco Use   • Smoking status: Former     Packs/day: 1.50     Years: 30.00     Pack years: 45.00     Types: Cigarettes     Quit date: 2000     Years since quittin.2   • Smokeless tobacco: Former   Vaping Use   • Vaping Use: Never used   Substance and Sexual Activity   • Alcohol use: No   • Drug use: No   • Sexual activity: Not Currently     Partners: Female           Objective   Physical Exam  Vitals and nursing note reviewed.   Constitutional:       General: He is in acute distress.      Appearance: Normal appearance. He is not ill-appearing or toxic-appearing.   HENT:      Head: Normocephalic and atraumatic.      Comments: No evidence of head injury.     Nose: Nose normal.      Mouth/Throat:      Mouth: Mucous  membranes are moist.   Eyes:      Extraocular Movements: Extraocular movements intact.      Pupils: Pupils are equal, round, and reactive to light.   Cardiovascular:      Rate and Rhythm: Normal rate and regular rhythm.      Pulses: Normal pulses.      Heart sounds: Normal heart sounds.      Comments: 2+ equal and symmetrical bilateral radial pulses.  Pulmonary:      Effort: Pulmonary effort is normal. No respiratory distress.      Breath sounds: Normal breath sounds.      Comments: Normal effort however there is pain and splinting with deep inspiration secondary to right posterior thoracic tenderness.  Abdominal:      Palpations: Abdomen is soft.      Tenderness: There is no abdominal tenderness. There is no right CVA tenderness or left CVA tenderness.      Comments: No pain to the upper abdomen.  I able to palpate deeply to the left and right upper quadrant without causing any discomfort.   Musculoskeletal:         General: Tenderness present. Normal range of motion.      Cervical back: Normal range of motion and neck supple. No rigidity or tenderness.      Right lower leg: No edema.      Left lower leg: No edema.      Comments: Right upper posterior paraspinal thoracic tenderness.  No crepitus or open wounds or swelling.  No midline vertebral tenderness.  Pain is reproducible with twisting of torso and palpation and deep inspiration and cough.   Skin:     General: Skin is warm and dry.      Capillary Refill: Capillary refill takes less than 2 seconds.   Neurological:      General: No focal deficit present.      Mental Status: He is alert.      Sensory: No sensory deficit.      Motor: No weakness.         Procedures           ED Course                                           MDM   Patient with mechanical fall 1 day ago when he was walking outside and landed on a curb striking his right upper posterior thorax.  Pain has been gradually worsening and is stiff moderate intensity.  He denies any shortness of breath  but does have pain with inspiration.  On examination he appears well-nourished well-developed in mild distress.  Patient states that he has had hydrocodone in the past and has tolerated this although feels that in the past hydrocodone did not help his pain.  He does have tenderness to the right posterior thorax region without ecchymosis or swelling or open wounds or crepitus.  No midline vertebral tenderness and no neck tenderness or head injury.  He has no abdominal tenderness or evidence of intra-abdominal injuries.  Patient underwent rib series on the right with PA chest.  Results showed the following:    IMPRESSION:  Impression:  No acute rib fracture identified.    Patient advised of no acute fracture pneumothorax.  He has been advised to apply cold compress and has been placed on Norco 7.5 mg every 6 hours as needed for pain.  Patient to seek immediate medical attention if having worsening symptoms or difficulty breathing or any concerns.    Inspect report obtained.    Final diagnoses:   Blunt trauma to chest, initial encounter   Upper back pain on right side   Rib contusion, right, initial encounter       ED Disposition  ED Disposition     ED Disposition   Discharge    Condition   Stable    Comment   --             Olesya Cueva MD  [address] [facility]  [address]  [place] [address]  [phone]    In 1 week      [place] [address] [place] [id number] [phone]    If symptoms worsen         Medication List      New Prescriptions    HYDROcodone-acetaminophen 7.5-325 MG per tablet  Commonly known as: NORCO  Take 1 tablet by mouth Every 6 (Six) Hours As Needed for Moderate Pain (pain).           Where to Get Your Medications      These medications were sent to [facility] - [place], [place] - [address] [place] [address] - [phone]  - [phone]   [address] [place] [address] [place] [address]    Phone: [phone]    · HYDROcodone-acetaminophen 7.5-325 MG per tablet

## 2023-03-23 ENCOUNTER — HOSPITAL ENCOUNTER (EMERGENCY)
Facility: HOSPITAL | Age: 78
Discharge: HOME | End: 2023-03-24
Attending: EMERGENCY MEDICINE | Admitting: EMERGENCY MEDICINE
Payer: MEDICARE

## 2023-03-23 ENCOUNTER — APPOINTMENT (OUTPATIENT)
Dept: CT IMAGING | Facility: HOSPITAL | Age: 78
End: 2023-03-23
Payer: MEDICARE

## 2023-03-23 DIAGNOSIS — S22.41XA CLOSED FRACTURE OF MULTIPLE RIBS OF RIGHT SIDE, INITIAL ENCOUNTER: Primary | ICD-10-CM

## 2023-03-23 LAB
ALBUMIN SERPL-MCNC: 4.1 G/DL (ref 3.5–5.2)
ALBUMIN/GLOB SERPL: 1.3 G/DL
ALP SERPL-CCNC: 100 U/L (ref 39–117)
ALT SERPL W P-5'-P-CCNC: 21 U/L (ref 1–41)
ANION GAP SERPL CALCULATED.3IONS-SCNC: 12 MMOL/L (ref 5–15)
AST SERPL-CCNC: 26 U/L (ref 1–40)
BILIRUB SERPL-MCNC: 0.3 MG/DL (ref 0–1.2)
BUN SERPL-MCNC: 18 MG/DL (ref 8–23)
BUN/CREAT SERPL: 15.9 (ref 7–25)
CALCIUM SPEC-SCNC: 9.3 MG/DL (ref 8.6–10.5)
CHLORIDE SERPL-SCNC: 104 MMOL/L (ref 98–107)
CO2 SERPL-SCNC: 25 MMOL/L (ref 22–29)
CREAT SERPL-MCNC: 1.13 MG/DL (ref 0.76–1.27)
EGFRCR SERPLBLD CKD-EPI 2021: 66.1 ML/MIN/1.73
GLOBULIN UR ELPH-MCNC: 3.1 GM/DL
GLUCOSE SERPL-MCNC: 117 MG/DL (ref 65–99)
POTASSIUM SERPL-SCNC: 4.7 MMOL/L (ref 3.5–5.2)
PROT SERPL-MCNC: 7.2 G/DL (ref 6–8.5)
SODIUM SERPL-SCNC: 141 MMOL/L (ref 136–145)

## 2023-03-23 PROCEDURE — 71250 CT THORAX DX C-: CPT

## 2023-03-23 PROCEDURE — 99283 EMERGENCY DEPT VISIT LOW MDM: CPT

## 2023-03-23 PROCEDURE — 80053 COMPREHEN METABOLIC PANEL: CPT | Performed by: PHYSICIAN ASSISTANT

## 2023-03-23 PROCEDURE — 36415 COLL VENOUS BLD VENIPUNCTURE: CPT

## 2023-03-23 RX ORDER — LIDOCAINE 50 MG/G
1 PATCH TOPICAL
Status: DISCONTINUED | OUTPATIENT
Start: 2023-03-23 | End: 2023-03-24 | Stop reason: HOSPADM

## 2023-03-23 RX ORDER — SODIUM CHLORIDE 0.9 % (FLUSH) 0.9 %
10 SYRINGE (ML) INJECTION AS NEEDED
Status: DISCONTINUED | OUTPATIENT
Start: 2023-03-23 | End: 2023-03-23

## 2023-03-23 RX ADMIN — LIDOCAINE 1 PATCH: 50 PATCH CUTANEOUS at 21:19

## 2023-03-24 VITALS
BODY MASS INDEX: 34.62 KG/M2 | HEIGHT: 68 IN | RESPIRATION RATE: 16 BRPM | TEMPERATURE: 97.8 F | HEART RATE: 74 BPM | OXYGEN SATURATION: 95 % | WEIGHT: 228.4 LBS | SYSTOLIC BLOOD PRESSURE: 156 MMHG | DIASTOLIC BLOOD PRESSURE: 76 MMHG

## 2023-03-24 RX ORDER — HYDROCODONE BITARTRATE AND ACETAMINOPHEN 7.5; 325 MG/1; MG/1
1 TABLET ORAL EVERY 6 HOURS PRN
Qty: 15 TABLET | Refills: 0 | Status: SHIPPED | OUTPATIENT
Start: 2023-03-24

## 2023-03-24 RX ORDER — LIDOCAINE 50 MG/G
1 PATCH TOPICAL EVERY 24 HOURS
Qty: 10 PATCH | Refills: 0 | Status: SHIPPED | OUTPATIENT
Start: 2023-03-24

## 2023-03-24 NOTE — DISCHARGE INSTRUCTIONS
Follow-up with primary provider or thoracic surgeon for repeat evaluation, avoid heavy lifting or twisting.  Return new or worsening symptoms fever or increased trouble breathing.

## 2023-03-26 ENCOUNTER — HOSPITAL ENCOUNTER (OUTPATIENT)
Facility: HOSPITAL | Age: 78
Discharge: HOME | End: 2023-03-26
Attending: EMERGENCY MEDICINE | Admitting: EMERGENCY MEDICINE
Payer: MEDICARE

## 2023-03-26 VITALS
DIASTOLIC BLOOD PRESSURE: 64 MMHG | BODY MASS INDEX: 33.34 KG/M2 | RESPIRATION RATE: 18 BRPM | WEIGHT: 220 LBS | TEMPERATURE: 97.7 F | OXYGEN SATURATION: 95 % | HEART RATE: 75 BPM | SYSTOLIC BLOOD PRESSURE: 159 MMHG | HEIGHT: 68 IN

## 2023-03-26 DIAGNOSIS — J02.0 STREP PHARYNGITIS: Primary | ICD-10-CM

## 2023-03-26 LAB
FLUAV SUBTYP SPEC NAA+PROBE: NOT DETECTED
FLUBV RNA ISLT QL NAA+PROBE: NOT DETECTED
SARS-COV-2 RNA RESP QL NAA+PROBE: NOT DETECTED
STREP A PCR: DETECTED

## 2023-03-26 PROCEDURE — 25010000002 PENICILLIN G BENZATHINE PER 1200000 UNITS

## 2023-03-26 PROCEDURE — G0463 HOSPITAL OUTPT CLINIC VISIT: HCPCS

## 2023-03-26 PROCEDURE — 87651 STREP A DNA AMP PROBE: CPT | Performed by: EMERGENCY MEDICINE

## 2023-03-26 PROCEDURE — C9803 HOPD COVID-19 SPEC COLLECT: HCPCS

## 2023-03-26 PROCEDURE — 87636 SARSCOV2 & INF A&B AMP PRB: CPT | Performed by: EMERGENCY MEDICINE

## 2023-03-26 RX ORDER — ACETAMINOPHEN 500 MG
1000 TABLET ORAL ONCE
Status: COMPLETED | OUTPATIENT
Start: 2023-03-26 | End: 2023-03-26

## 2023-03-26 RX ADMIN — ACETAMINOPHEN 1000 MG: 500 TABLET, FILM COATED ORAL at 16:50

## 2023-03-26 RX ADMIN — PENICILLIN G BENZATHINE 1.2 MILLION UNITS: 1200000 INJECTION, SUSPENSION INTRAMUSCULAR at 16:49

## 2023-03-26 NOTE — DISCHARGE INSTRUCTIONS
Thank you for letting us care for you today.  You can use Tylenol as needed for pain.  Make sure you are drinking plenty of fluids.  Throw away or sanitize your toothbrush after 24 hours.  You can do warm salt water gargles several times a day.  To make a salt-water mixture, completely dissolve ½-1 tsp (3-6 g) of salt in 1 cup (237 mL) of warm water.  This can be done 4-5 times a day with a fresh batch of salt and warm water.  Please follow-up with your primary care provider in 2 days as previously scheduled.    Although you are being discharged from the ED today, I encourage you to return for worsening symptoms. Things can, and do, change such that treatment at home with medication may not be adequate. Specifically I recommend returning for chest pain or discomfort, difficulty breathing, persistent vomiting or difficulty holding down liquids or medications, fever > 102.0 F, or any other worsening or alarming symptoms.

## 2023-03-26 NOTE — FSED PROVIDER NOTE
EMERGENCY DEPARTMENT ENCOUNTER    Room Number:  02/02  Date seen:  3/26/2023  Time seen: 16:30 EDT  PCP: Olesya Cueva MD  Historian: patient and wife    HPI:  Chief complaint: sore throat  Context:Heri Vasquez is a 77 y.o. male who presents to the ED with c/o sore throat. The patient states that he has been having a sore throat a 2 days. He denies any known fever, chills, chest pain, shortness of breath or trouble swallowing. He states that his grandson has been ill and he was recently around him. The patient states that he has had an intermittent cough that is nonproductive. He reports that he has had some congestion but denies headache, ear pain or runny nose. The patient is nontoxic in appearance.     Timing:constant  Duration: 2 days  Location:throat  Radiation:nonradiating  Quality: soreness  Intensity/Severity: mild to moderate  Associated Symptoms: sore throat, cough, congestion  Aggravating Factors: worse when swallowing  Alleviating Factors: no known allievating  Previous Episodes: none      The patient was placed in a mask in triage, hand hygiene was performed before and after my interaction with the patient.  I wore a mask and gloves during my entire interaction with the patient.    MEDICAL RECORD REVIEW      ALLERGIES  Patient has no known allergies.    PAST MEDICAL HISTORY  Active Ambulatory Problems     Diagnosis Date Noted   • Varicose veins of right lower extremity with pain 07/17/2019   • Nasal congestion 07/17/2019   • Actinic keratosis 03/14/2013   • Arthritis 07/17/2019   • Bradycardia 09/21/2018   • Chronic coronary artery disease 03/14/2013   • Degeneration of intervertebral disc of lumbar region 10/21/2015   • Dependent edema 08/26/2014   • Diabetic peripheral neuropathy (HCC) 10/03/2016   • Encounter for general adult medical examination without abnormal findings 08/09/2012   • Fatigue 12/28/2016   • Gastroesophageal reflux disease 01/09/2012   • Hay fever 06/25/2014   • Hearing loss  06/10/2016   • Hyperlipidemia 07/17/2019   • Knee pain 10/29/2014   • Lightheadedness 03/13/2019   • Strain of lumbar region 06/29/2012   • Type 2 diabetes mellitus with diabetic polyneuropathy, without long-term current use of insulin ([facility]) 08/13/2014   • Screening for prostate cancer 10/29/2019   • Sciatic nerve pain, right 12/31/2019   • Hip pain, right 12/31/2019   • RUQ abdominal pain 02/24/2020   • Gallbladder polyp 03/06/2020   • Fatty liver 03/06/2020   • Left-sided low back pain with left-sided sciatica 03/24/2020   • Chronic right shoulder pain 06/19/2020   • Mass of joint of right shoulder 08/14/2020   • Cervical radiculopathy 08/14/2020   • Cervical radiculitis 08/30/2020   • Essential hypertension 10/20/2020   • Venous insufficiency of leg 11/12/2020   • Varicose veins of lower extremity 11/02/2020   • Infected sebaceous cyst 01/12/2021   • Neuritis of right ulnar nerve 02/15/2021   • Epistaxis 02/15/2021   • Acute right-sided thoracic back pain 04/13/2021   • Memory changes 04/13/2021   • Class 1 obesity 05/27/2021   • Stable angina pectoris (HCC) 05/27/2021   • Bronchitis 08/31/2021   • SIDDHARTHA treated with BiPAP 01/25/2022   • Sciatic pain, right 01/26/2022   • Right arm pain 01/26/2022   • Acute pain of left shoulder 01/26/2022   • DJD of left shoulder 03/21/2022   • Need for vaccination 10/23/2022   • Neck muscle spasm 12/23/2022   • Medicare annual wellness visit, subsequent 02/21/2023     Resolved Ambulatory Problems     Diagnosis Date Noted   • Dyspnea 07/17/2019   • Type 2 diabetes mellitus without complication, without long-term current use of insulin ([facility]) 02/15/2021   • Chest pain 05/26/2021   • Orthopedic aftercare 06/02/2022     Past Medical History:   Diagnosis Date   • Coronary heart disease 01/2016   • Coronary heart disease 10/31/2017   • Diverticulosis    • GERD (gastroesophageal reflux disease)    • Hyperlipemia    • Hypertension    • Low back pain    • Pain management    • Physical  therapy evaluation, initial    • Rectal bleed 08/2012   • Retinal hemorrhage of right eye 05/2014   • Sleep apnea    • Type 2 diabetes mellitus (HCC)    • Uses brace        PAST SURGICAL HISTORY  Past Surgical History:   Procedure Laterality Date   • BELPHAROPTOSIS REPAIR  12/11/2017     Dr. grimes (copied from [facility])   • BROW LIFT  12/11/2017    Dr. Grimes at [facility]   • CARDIAC CATHETERIZATION  03/2012    at [place]- single vessel disease. (copied from [facility])   • CARDIAC CATHETERIZATION  10/13/2017    no intervention - Low % Blockages.   • CARDIAC CATHETERIZATION Left 05/28/2021    Procedure: LEFT HEART CATH with possible PCI;  Surgeon: Leslie Clark MD;  Location: [facility] CATH INVASIVE LOCATION;  Service: Cardiovascular;  Laterality: Left;  Local and IV sedation   • CATARACT EXTRACTION  12/11/2017    brow lift and eye lid repair   • COLON SURGERY  06/2014    colonscopy   • COLONOSCOPY N/A 07/12/2019    Procedure: COLONOSCOPY with polypectomy x1;  Surgeon: Graham Byrd MD;  Location: [facility] ENDOSCOPY;  Service: Gastroenterology   • CUBITAL TUNNEL RELEASE Right    • HERNIA REPAIR  11/2008    umbilical hernia repair   • JOINT REPLACEMENT     • KNEE ARTHROSCOPY Right 11/2014    Dr. Mosqueda   • LASIK      lasik and Cataract Extraction, sep 11 and right Sept. 25th, 2014- Mart[facility]nez Perez. (copied from [facility])   • TOTAL KNEE ARTHROPLASTY Left 11/2009    Dr. Mosqueda (copied from [facility])   • TOTAL SHOULDER ARTHROPLASTY W/ DISTAL CLAVICLE EXCISION Left 04/20/2022    Procedure: TOTAL SHOULDER REVERSE ARTHROPLASTY;  Surgeon: Dany Knapp MD;  Location: [facility] MAIN OR;  Service: Orthopedics;  Laterality: Left;       FAMILY HISTORY  Family History   Problem Relation Age of Onset   • Heart disease Mother         CHF   • Hypertension Mother    • Heart failure Mother    • Stroke Maternal Grandfather    • Diabetes Other    • Diabetes Other    • Stroke Other        SOCIAL HISTORY  Social History      Socioeconomic History   • Marital status:      Spouse name: Shani   Tobacco Use   • Smoking status: Former     Packs/day: 1.50     Years: 30.00     Pack years: 45.00     Types: Cigarettes     Quit date: 2000     Years since quittin.2   • Smokeless tobacco: Former   Vaping Use   • Vaping Use: Never used   Substance and Sexual Activity   • Alcohol use: No   • Drug use: No   • Sexual activity: Not Currently     Partners: Female       REVIEW OF SYSTEMS  Review of Systems    All systems reviewed and negative except for those discussed in HPI.     PHYSICAL EXAM    I have reviewed the triage vital signs and nursing notes.    ED Triage Vitals   Temp Heart Rate Resp BP SpO2   23 1556 23 1556 23 1556 23 1557 23 1557   97.7 °F (36.5 °C) 75 18 159/64 95 %      Temp src Heart Rate Source Patient Position BP Location FiO2 (%)   23 1556 23 1556 23 1557 23 1557 --   Oral Monitor Sitting Left arm        Physical Exam  Constitutional:       Appearance: He is well-developed.   HENT:      Head: Normocephalic.      Right Ear: Tympanic membrane normal.      Left Ear: Tympanic membrane normal.      Nose: No congestion.      Mouth/Throat:      Mouth: Mucous membranes are moist.      Pharynx: Uvula midline. Posterior oropharyngeal erythema present. No uvula swelling.      Tonsils: Tonsillar exudate present. 2+ on the right. 2+ on the left.   Cardiovascular:      Rate and Rhythm: Normal rate and regular rhythm.      Heart sounds: Normal heart sounds.   Pulmonary:      Effort: Pulmonary effort is normal.      Breath sounds: Normal breath sounds.   Abdominal:      General: Bowel sounds are normal.      Palpations: Abdomen is soft.   Musculoskeletal:      Cervical back: Normal range of motion.   Skin:     General: Skin is warm.   Neurological:      General: No focal deficit present.      Mental Status: He is alert.   Psychiatric:         Mood and Affect: Mood normal.          Behavior: Behavior normal.         Vital signs and nursing notes reviewed.        LAB RESULTS  Recent Results (from the past 24 hour(s))   Rapid Strep A Screen - Swab, Throat    Collection Time: 03/26/23  3:58 PM    Specimen: Throat; Swab   Result Value Ref Range    STREP A PCR Detected (A) Not Detected   COVID-19 and FLU A/B PCR - Swab, Nasopharynx    Collection Time: 03/26/23  3:58 PM    Specimen: Nasopharynx; Swab   Result Value Ref Range    COVID19 Not Detected Not Detected - Ref. Range    Influenza A PCR Not Detected Not Detected    Influenza B PCR Not Detected Not Detected       Ordered the above labs and independently reviewed the results.      RADIOLOGY RESULTS  No Radiology Exams Resulted Within Past 24 Hours       I ordered the above noted radiological studies. Independently reviewed by me and discussed with radiologist.  See dictation above for official radiology interpretation.      Orders placed during this visit:  Orders Placed This Encounter   Procedures   • Rapid Strep A Screen - Swab, Throat   • COVID-19 and FLU A/B PCR - Swab, Nasopharynx         ED Course as of 03/26/23 1642   Sun Mar 26, 2023   1625 STREP A PCR(!): Detected [KJ]   1628 COVID19: Not Detected [KJ]   1628 Influenza A PCR: Not Detected [KJ]   1628 Influenza B PCR: Not Detected [KJ]      ED Course User Index  [KJ] Annalee Pino, DORY           PROCEDURES    Procedures        MEDICATIONS GIVEN IN ER    Medications   acetaminophen (TYLENOL) tablet 1,000 mg (has no administration in time range)   Penicillin G Benzathine (BICILLIN-LA) injection 1.2 Million Units (has no administration in time range)         PROGRESS, DATA ANALYSIS, CONSULTS, AND MEDICAL DECISION MAKING    All labs have been independently reviewed by me.  All radiology studies have been reviewed by me.   EKG's independently reviewed by me.  Discussion below represents my analysis of pertinent findings related to patient's condition, differential diagnosis, treatment  plan and final disposition.    I rechecked the patient.  I discussed the patient's labs, radiology findings (including all incidental findings), diagnosis, and plan for discharge.  A repeat exam reveals no new worrisome changes from my initial exam findings.  The patient understands that the fact that they are being discharged does not denote that nothing is abnormal, it indicates that no clinical emergency is present and that they must follow-up as directed in order to properly maintain their health.  Follow-up instructions (specifically listed below) and return to ER precautions were given at this time.  I specifically instructed the patient to follow-up with their PCP.  The patient understands and agrees with the plan, and is ready for discharge.  All questions answered.    ED Course as of 03/26/23 1642   Sun Mar 26, 2023   1625 STREP A PCR(!): Detected [KJ]   1628 COVID19: Not Detected [KJ]   1628 Influenza A PCR: Not Detected [KJ]   1628 Influenza B PCR: Not Detected [KJ]      ED Course User Index  [KJ] Annalee Pino APRN       AS OF 16:42 EDT VITALS:    BP - 159/64  HR - 75  TEMP - 97.7 °F (36.5 °C) (Oral)  02 SATS - 95%    Medical Decision Making  MEDICAL DECISION  Comorbidities: Sleep apnea, GERD, diabetes, hypertension, hyperlipidemia, CAD  Differentials: There is a high level of influenza, COVID and strep activity in the community currently so patient may likely have been exposed to any of these. Because of this and the symptoms will check labs and treat symptomatically and encourage patient to use motrin/tylenol and drink plenty of fluids and follow-up with their primary care physician. ; this list is not all inclusive and does not constitute the entirety of considered causes      The patient is a pleasant 77-year-old male who reports he has been having a sore throat over the last 2 days.  He reports he was recently exposed to his grandchild who was sick.  The patient reports that he has been having  pain with swallowing.  He denies any known fever.  The patient was tested positive for strep today.  His COVID and influenza were negative.  He elected to do the Bicillin shot while in the emergency room.  I discussed his discharge instructions and encouraged him to follow-up with his primary care provider in the next 48 hours.  He reports that he already has an appointment on Tuesday which I encouraged him to make sure he is able to make that appointment.  We discussed the discharge instructions and red flag symptoms to follow-up with.  He reports understanding denies any questions at this time      I wore protective equipment throughout this patient encounter to include mask. Hand hygiene was performed before donning protective equipment and after removal when leaving the room.    Amount and/or Complexity of Data Reviewed  Labs:  Decision-making details documented in ED Course.            DIAGNOSIS  Final diagnoses:   Strep pharyngitis         Pt masked in first look. I wore a surgical mask throughout my encounters with the pt. I performed hand hygiene on entry into the pt room and upon exit.     Dictated utilizing Dragon dictation     Note Disclaimer: At [facility], we believe that sharing information builds trust and better relationships. You are receiving this note because you recently visited [facility]. It is possible you will see health information before a provider has talked with you about it. This kind of information can be easy to misunderstand. To help you fully understand what it means for your health, we urge you to discuss this note with your provider.

## 2023-03-28 ENCOUNTER — OFFICE VISIT (OUTPATIENT)
Dept: FAMILY MEDICINE CLINIC | Facility: CLINIC | Age: 78
End: 2023-03-28
Payer: MEDICARE

## 2023-03-28 VITALS
BODY MASS INDEX: 35.01 KG/M2 | TEMPERATURE: 98.4 F | SYSTOLIC BLOOD PRESSURE: 153 MMHG | OXYGEN SATURATION: 93 % | WEIGHT: 231 LBS | HEART RATE: 74 BPM | DIASTOLIC BLOOD PRESSURE: 83 MMHG | HEIGHT: 68 IN

## 2023-03-28 DIAGNOSIS — S22.31XD: Primary | ICD-10-CM

## 2023-03-28 DIAGNOSIS — J02.0 STREP PHARYNGITIS: ICD-10-CM

## 2023-03-28 NOTE — PROGRESS NOTES
"Chief Complaint  Fall (F/u )    Subjective        Heri Vasquez presents to [facility]  History of Present Illness  He was taking some trash out to the curb when he stepped on something and slipped and fell  [place] [facility] [place] where a CT scan showed 4 right side nondisplaced rib fractures.  Then he developed a sore throat and was diagnosed with strep.  This was treated with a PCN injection.  His throat is feeling better but he continues to have rib pain especially with deep breath or cough..   Fall  The accident occurred more than 1 week ago. The fall occurred while standing. He landed on concrete. Pain location: right ribs. The pain is moderate. Exacerbated by: deep breath or cough. He has tried acetaminophen and rest for the symptoms. The treatment provided mild relief.       Objective   Vital Signs:  /83 (BP Location: Left arm, Patient Position: Sitting, Cuff Size: Large Adult)   Pulse 74   Temp 98.4 °F (36.9 °C) (Infrared)   Ht 172.7 cm (68\")   Wt 105 kg (231 lb)   SpO2 93%   BMI 35.12 kg/m²   Estimated body mass index is 35.12 kg/m² as calculated from the following:    Height as of this encounter: 172.7 cm (68\").    Weight as of this encounter: 105 kg (231 lb).             Physical Exam  Vitals and nursing note reviewed.   Constitutional:       General: He is not in acute distress.     Appearance: He is well-developed.   HENT:      Head: Normocephalic.      Mouth/Throat:      Pharynx: Posterior oropharyngeal erythema present.   Eyes:      General: Lids are normal.      Conjunctiva/sclera: Conjunctivae normal.   Neck:      Thyroid: No thyroid mass or thyromegaly.      Trachea: Trachea normal.   Cardiovascular:      Rate and Rhythm: Normal rate and regular rhythm.      Heart sounds: Normal heart sounds.   Pulmonary:      Effort: Pulmonary effort is normal.      Breath sounds: Normal breath sounds.   Chest:      Chest wall: Tenderness present. "   Abdominal:      Palpations: Abdomen is soft.   Musculoskeletal:      Cervical back: Normal range of motion.   Lymphadenopathy:      Cervical: No cervical adenopathy.   Skin:     General: Skin is warm and dry.   Neurological:      Mental Status: He is alert and oriented to person, place, and time.   Psychiatric:         Attention and Perception: He is attentive.         Mood and Affect: Mood normal.         Speech: Speech normal.         Behavior: Behavior normal.        Result Review :  The following data was reviewed by: Olesya Cueva MD on 03/28/2023:  Common labs    Common Labs 7/26/22 7/26/22 2/21/23 2/21/23 2/21/23 2/21/23 2/21/23 3/23/23    1019 1019 1039 1039 1039 1039 1039    Glucose  101 (A)   110 (A)   117 (A)   BUN  12   16   18   Creatinine  0.88   0.81   1.13   Sodium  137   139   141   Potassium  4.6   4.1   4.7   Chloride  101   106   104   Calcium  9.5   9.1   9.3   Albumin  4.30   4.1   4.1   Total Bilirubin  0.4   0.5   0.3   Alkaline Phosphatase  76   82   100   AST (SGOT)  20   21   26   ALT (SGPT)  21   20   21   WBC    11.04 (A)       Hemoglobin    15.3       Hematocrit    49.5       Platelets    405       Total Cholesterol      124     Triglycerides      105     HDL Cholesterol      38 (A)     LDL Cholesterol       66     Hemoglobin A1C 6.1 (A)  6.0 (A)        Microalbumin, Urine       <1.2    (A) Abnormal value       Comments are available for some flowsheets but are not being displayed.                        Assessment and Plan   Diagnoses and all orders for this visit:    1. Traumatic closed nondisplaced fracture of rib with routine healing, right (Primary)  Assessment & Plan:  Tylenol 1-2 tabs po q4h prn  Rest.  Encouraged deep breathing.      2. Strep pharyngitis           Follow Up   No follow-ups on file.  Patient was given instructions and counseling regarding his condition or for health maintenance advice. Please see specific information pulled into the AVS if appropriate.

## 2023-04-06 ENCOUNTER — OFFICE VISIT (OUTPATIENT)
Dept: ORTHOPEDIC SURGERY | Facility: CLINIC | Age: 78
End: 2023-04-06
Payer: MEDICARE

## 2023-04-06 VITALS — HEIGHT: 68 IN | WEIGHT: 220 LBS | BODY MASS INDEX: 33.34 KG/M2 | HEART RATE: 79 BPM

## 2023-04-06 DIAGNOSIS — Z47.89 ORTHOPEDIC AFTERCARE: Primary | ICD-10-CM

## 2023-04-06 DIAGNOSIS — M19.012 OSTEOARTHRITIS OF LEFT GLENOHUMERAL JOINT: ICD-10-CM

## 2023-04-06 PROCEDURE — 99212 OFFICE O/P EST SF 10 MIN: CPT | Performed by: ORTHOPAEDIC SURGERY

## 2023-04-06 NOTE — PROGRESS NOTES
"     Patient ID: Heri Vasquez is a 78 y.o. male.  Left shoulder pain  4/20/22 left reverse total shoulder  Mild soreness at times  Review of Systems:        Objective:    Pulse 79   Ht 172.7 cm (68\")   Wt 99.8 kg (220 lb)   BMI 33.45 kg/m²     Physical Examination:  Left shoulder no tenderness active elevation 170 abduction 140 external rotation 40 internal rotation L5       Imaging:   left Shoulder X-Ray  Indication: Postop total shoulder  AP Y and Lateral views  Findings: Reverse total shoulder unchanged position  no bony lesion  Soft tissues normal  not examined joint spaces  Hardware appropriately positioned yes      yes prior studies available for comparison.    Assessment:    Doing well after shoulder arthroplasty    Plan:   Activity as tolerated and see me as needed      Procedures          Disclaimer: Part of this note may be an electronic transcription/translation of spoken language to printed text using the Dragon Dictation System  "

## 2023-04-11 ENCOUNTER — TELEPHONE (OUTPATIENT)
Dept: FAMILY MEDICINE CLINIC | Facility: CLINIC | Age: 78
End: 2023-04-11

## 2023-04-11 RX ORDER — BLOOD SUGAR DIAGNOSTIC
STRIP MISCELLANEOUS
Qty: 50 EACH | Refills: 5 | Status: SHIPPED | OUTPATIENT
Start: 2023-04-11 | End: 2023-04-14 | Stop reason: SDUPTHER

## 2023-04-11 NOTE — TELEPHONE ENCOUNTER
Pharmacy Name:    [facility] [address] [place] [address] - [phone]  - [phone] (Pharmacy) [phone]         Pharmacy representative name: ADELITA       What medication are you calling in regards to: ONE TOUCH ULTRA TEST STRIPS     What question does the pharmacy have:  HAS TO HAVE A DIAGNOSIS CODE FOR INSURANCE     Who is the provider that prescribed the medication:  DR WEAVER     Additional notes:    PHARMACY IS NEEDING THE DIAGNOSIS CODE FOR THE TEST STRIPS     CAN BE IN THE SIG IF NEEDED

## 2023-04-14 RX ORDER — BLOOD SUGAR DIAGNOSTIC
1 STRIP MISCELLANEOUS DAILY
Qty: 50 EACH | Refills: 5 | Status: SHIPPED | OUTPATIENT
Start: 2023-04-14

## 2023-06-07 ENCOUNTER — HOSPITAL ENCOUNTER (EMERGENCY)
Facility: HOSPITAL | Age: 78
Discharge: HOME | End: 2023-06-07
Attending: STUDENT IN AN ORGANIZED HEALTH CARE EDUCATION/TRAINING PROGRAM
Payer: MEDICARE

## 2023-06-07 ENCOUNTER — APPOINTMENT (OUTPATIENT)
Dept: ULTRASOUND IMAGING | Facility: HOSPITAL | Age: 78
End: 2023-06-07
Payer: MEDICARE

## 2023-06-07 VITALS
TEMPERATURE: 98 F | RESPIRATION RATE: 16 BRPM | SYSTOLIC BLOOD PRESSURE: 128 MMHG | HEIGHT: 68 IN | DIASTOLIC BLOOD PRESSURE: 72 MMHG | WEIGHT: 225 LBS | OXYGEN SATURATION: 95 % | BODY MASS INDEX: 34.1 KG/M2 | HEART RATE: 82 BPM

## 2023-06-07 DIAGNOSIS — R51.9 ACUTE NONINTRACTABLE HEADACHE, UNSPECIFIED HEADACHE TYPE: Primary | ICD-10-CM

## 2023-06-07 DIAGNOSIS — R22.31 SKIN LUMP OF ARM, RIGHT: ICD-10-CM

## 2023-06-07 PROCEDURE — 96361 HYDRATE IV INFUSION ADD-ON: CPT

## 2023-06-07 PROCEDURE — 25010000002 METOCLOPRAMIDE PER 10 MG

## 2023-06-07 PROCEDURE — 99283 EMERGENCY DEPT VISIT LOW MDM: CPT

## 2023-06-07 PROCEDURE — 96374 THER/PROPH/DIAG INJ IV PUSH: CPT

## 2023-06-07 PROCEDURE — 93971 EXTREMITY STUDY: CPT

## 2023-06-07 RX ORDER — METOCLOPRAMIDE HYDROCHLORIDE 5 MG/ML
10 INJECTION INTRAMUSCULAR; INTRAVENOUS ONCE
Status: COMPLETED | OUTPATIENT
Start: 2023-06-07 | End: 2023-06-07

## 2023-06-07 RX ORDER — SODIUM CHLORIDE 0.9 % (FLUSH) 0.9 %
10 SYRINGE (ML) INJECTION AS NEEDED
Status: DISCONTINUED | OUTPATIENT
Start: 2023-06-07 | End: 2023-06-07 | Stop reason: HOSPADM

## 2023-06-07 RX ADMIN — METOCLOPRAMIDE 10 MG: 5 INJECTION, SOLUTION INTRAMUSCULAR; INTRAVENOUS at 15:55

## 2023-06-07 RX ADMIN — SODIUM CHLORIDE 1000 ML: 9 INJECTION, SOLUTION INTRAVENOUS at 15:55

## 2023-06-07 NOTE — FSED PROVIDER NOTE
[facility] ED / URGENT CARE    EMERGENCY DEPARTMENT ENCOUNTER    Room Number:  03/03  Date seen:  6/7/2023  Time seen: 15:42 EDT  PCP: Olesya Cueva MD  Historian: patient and wife    HPI:  Chief complaint:headache  Context:Heri Vasquez is a 78 y.o. male who presents to the ED with c/o headache. The patient states that he has been having head and neck pain since last night. The patient states that he has history of headache that are similar to the pain he is having now. The patient denies any falls or head injuries. He states that he only takes a baby aspirin and no other blood thinners. He also reports that he has a lump on the underside of his right forearm that is concerned it might be blood clot. The patient does not have history of blood clots and denies any history of clotting issues.  The patient denies any sudden onset of this pain, slurred speech or unilateral weakness.  He denies any vision changes.  The patient is nontoxic in appearance.  He denies any chest pain, shortness of breath or abdominal pain.    Timing: Constant  Duration: Last night  Location: Head and neck, right forearm  Radiation: Nonradiating  Quality: Aching  Intensity/Severity: Mild  Associated Symptoms: Headache, lump on right arm  Aggravating Factors: No known aggravating  Alleviating Factors: No attempted home treatment  Previous Episodes: History of previous headaches  Treatment before arrival: None    The patient was placed in a mask in triage, hand hygiene was performed before and after my interaction with the patient.  I wore a mask and gloves during my entire interaction with the patient.    MEDICAL RECORD REVIEW  Sleep apnea, CAD, hyperlipidemia, GERD, diabetes, hypertension    ALLERGIES  Patient has no known allergies.    PAST MEDICAL HISTORY  Active Ambulatory Problems     Diagnosis Date Noted    Varicose veins of right lower extremity with pain 07/17/2019    Nasal congestion 07/17/2019    Actinic  keratosis 03/14/2013    Arthritis 07/17/2019    Bradycardia 09/21/2018    Chronic coronary artery disease 03/14/2013    Degeneration of intervertebral disc of lumbar region 10/21/2015    Dependent edema 08/26/2014    Diabetic peripheral neuropathy (HCC) 10/03/2016    Encounter for general adult medical examination without abnormal findings 08/09/2012    Fatigue 12/28/2016    Gastroesophageal reflux disease 01/09/2012    Hay fever 06/25/2014    Hearing loss 06/10/2016    Hyperlipidemia 07/17/2019    Knee pain 10/29/2014    Lightheadedness 03/13/2019    Strain of lumbar region 06/29/2012    Type 2 diabetes mellitus with diabetic polyneuropathy, without long-term current use of insulin (HCC) 08/13/2014    Screening for prostate cancer 10/29/2019    Sciatic nerve pain, right 12/31/2019    Hip pain, right 12/31/2019    RUQ abdominal pain 02/24/2020    Gallbladder polyp 03/06/2020    Fatty liver 03/06/2020    Left-sided low back pain with left-sided sciatica 03/24/2020    Chronic right shoulder pain 06/19/2020    Mass of joint of right shoulder 08/14/2020    Cervical radiculopathy 08/14/2020    Cervical radiculitis 08/30/2020    Essential hypertension 10/20/2020    Venous insufficiency of leg 11/12/2020    Varicose veins of lower extremity 11/02/2020    Infected sebaceous cyst 01/12/2021    Neuritis of right ulnar nerve 02/15/2021    Epistaxis 02/15/2021    Acute right-sided thoracic back pain 04/13/2021    Memory changes 04/13/2021    Class 1 obesity 05/27/2021    Stable angina pectoris 05/27/2021    Bronchitis 08/31/2021    SIDDHARTHA treated with BiPAP 01/25/2022    Sciatic pain, right 01/26/2022    Right arm pain 01/26/2022    Acute pain of left shoulder 01/26/2022    DJD of left shoulder 03/21/2022    Need for vaccination 10/23/2022    Neck muscle spasm 12/23/2022    Medicare annual wellness visit, subsequent 02/21/2023    Strep pharyngitis 03/28/2023    Traumatic closed nondisplaced fracture of rib with routine healing,  right 03/28/2023     Resolved Ambulatory Problems     Diagnosis Date Noted    Dyspnea 07/17/2019    Type 2 diabetes mellitus without complication, without long-term current use of insulin (HCC) 02/15/2021    Chest pain 05/26/2021    Orthopedic aftercare 06/02/2022     Past Medical History:   Diagnosis Date    Coronary heart disease 01/2016    Coronary heart disease 10/31/2017    Diverticulosis     GERD (gastroesophageal reflux disease)     Hyperlipemia     Hypertension     Low back pain     Pain management     Physical therapy evaluation, initial     Rectal bleed 08/2012    Retinal hemorrhage of right eye 05/2014    Sleep apnea     Type 2 diabetes mellitus     Uses brace        PAST SURGICAL HISTORY  Past Surgical History:   Procedure Laterality Date    BELPHAROPTOSIS REPAIR  12/11/2017     Dr. grimes (copied from [place])    BROW LIFT  12/11/2017    Dr. Grimes at [facility]    CARDIAC CATHETERIZATION  03/2012    at [facility]- single vessel disease. (copied from [place])    CARDIAC CATHETERIZATION  10/13/2017    no intervention - Low % Blockages.    CARDIAC CATHETERIZATION Left 05/28/2021    Procedure: LEFT HEART CATH with possible PCI;  Surgeon: Leslie Clark MD;  Location: [facility] CATH INVASIVE LOCATION;  Service: Cardiovascular;  Laterality: Left;  Local and IV sedation    CATARACT EXTRACTION  12/11/2017    brow lift and eye lid repair    COLON SURGERY  06/2014    colonscopy    COLONOSCOPY N/A 07/12/2019    Procedure: COLONOSCOPY with polypectomy x1;  Surgeon: Graham Byrd MD;  Location: [facility] ENDOSCOPY;  Service: Gastroenterology    CUBITAL TUNNEL RELEASE Right     HERNIA REPAIR  11/2008    umbilical hernia repair    JOINT REPLACEMENT      KNEE ARTHROSCOPY Right 11/2014    Dr. Panda VASQUEZ      lasik and Cataract Extraction, sep 11 and right Sept. 25th, 2014- [facility]. (copied from [place])    TOTAL KNEE ARTHROPLASTY Left 11/2009    Dr. Mosqueda (copied from [place])    TOTAL SHOULDER ARTHROPLASTY  W/ DISTAL CLAVICLE EXCISION Left 2022    Procedure: TOTAL SHOULDER REVERSE ARTHROPLASTY;  Surgeon: Dany Knapp MD;  Location: [facility] MAIN OR;  Service: Orthopedics;  Laterality: Left;       FAMILY HISTORY  Family History   Problem Relation Age of Onset    Heart disease Mother         CHF    Hypertension Mother     Heart failure Mother     Stroke Maternal Grandfather     Diabetes Other     Diabetes Other     Stroke Other        SOCIAL HISTORY  Social History     Socioeconomic History    Marital status:      Spouse name: Shani   Tobacco Use    Smoking status: Former     Packs/day: 1.50     Years: 30.00     Pack years: 45.00     Types: Cigarettes     Quit date: 2000     Years since quittin.4    Smokeless tobacco: Former   Vaping Use    Vaping Use: Never used   Substance and Sexual Activity    Alcohol use: No    Drug use: No    Sexual activity: Not Currently     Partners: Female       REVIEW OF SYSTEMS  Review of Systems    All systems reviewed and negative except for those discussed in HPI.     PHYSICAL EXAM    I have reviewed the triage vital signs and nursing notes.    ED Triage Vitals   Temp Heart Rate Resp BP SpO2   23 1509 23 1444 23 1444 23 1507 23 1444   98.1 °F (36.7 °C) 68 16 129/73 96 %      Temp src Heart Rate Source Patient Position BP Location FiO2 (%)   23 1509 23 1444 23 1507 23 1507 --   Oral Monitor Sitting Left arm        Physical Exam  Constitutional:       Appearance: Normal appearance. He is not toxic-appearing.   HENT:      Mouth/Throat:      Mouth: Mucous membranes are moist.   Eyes:      Pupils: Pupils are equal, round, and reactive to light.   Cardiovascular:      Rate and Rhythm: Normal rate and regular rhythm.      Pulses: Normal pulses.      Heart sounds: Normal heart sounds.   Pulmonary:      Effort: Pulmonary effort is normal.      Breath sounds: Normal breath sounds.   Abdominal:      General: Bowel  sounds are normal.      Palpations: Abdomen is soft.   Musculoskeletal:         General: Normal range of motion.      Cervical back: Normal range of motion. No tenderness.   Skin:     General: Skin is warm.      Capillary Refill: Capillary refill takes less than 2 seconds.   Neurological:      General: No focal deficit present.      Mental Status: He is alert and oriented to person, place, and time. Mental status is at baseline.      GCS: GCS eye subscore is 4. GCS verbal subscore is 5. GCS motor subscore is 6.      Sensory: Sensation is intact.      Motor: Motor function is intact.      Coordination: Coordination is intact.      Gait: Gait is intact. Gait normal.   Psychiatric:         Mood and Affect: Mood normal.         Behavior: Behavior normal.     NIH Stroke Scale/Score (NIHSS) - MDCalc  Calculated on Jun 07 2023 3:48 PM  0 points -> NIH Stroke Scale  Vital signs and nursing notes reviewed.        LAB RESULTS  No results found for this or any previous visit (from the past 24 hour(s)).    Ordered the above labs and independently reviewed the results.      RADIOLOGY RESULTS  US Venous Doppler Upper Extremity Right (duplex)    Result Date: 6/7/2023  US VENOUS DOPPLER UPPER EXTREMITY RIGHT (DUPLEX) Date of Exam: 6/7/2023 4:23 PM EDT Indication: lump on right forearm. Comparison: None available. Technique:  Routine gray scale, color flow and spectral Doppler analysis of the right upper extremity. A complete venous study was performed with image documentation obtained per protocol. Findings: Right upper extremity deep veins are well visualized. There is normal blood flow. Normal compression. Normal augmentation. Duplicated brachial vein is noted.     Impression: No findings of right upper extremity deep vein thrombosis. Electronically Signed: Farheen Grider  6/7/2023 5:09 PM EDT  Workstation ID: [id number]        I ordered the above noted radiological studies. Independently reviewed by me and discussed with radiologist.   See dictation above for official radiology interpretation.      Orders placed during this visit:  Orders Placed This Encounter   Procedures    US Venous Doppler Upper Extremity Right (duplex)    Insert peripheral IV    ED Acknowledgement Form Needed;         ED Course as of 06/07/23 1717 Wed Jun 07, 2023   1657 Patient reports that his headache has improved. [KJ]   1713 US Venous Doppler Upper Extremity Right (duplex)  Impression:  No findings of right upper extremity deep vein thrombosis.  Electronically Signed: Farheen Grider    6/7/2023 5:09 PM EDT  [KJ]      ED Course User Index  [KJ] Annalee Pino, DORY           PROCEDURES    Procedures        MEDICATIONS GIVEN IN ER    Medications   sodium chloride 0.9 % flush 10 mL (has no administration in time range)   sodium chloride 0.9 % bolus 1,000 mL (1,000 mL Intravenous New Bag 6/7/23 1555)   metoclopramide (REGLAN) injection 10 mg (10 mg Intravenous Given 6/7/23 1555)         PROGRESS, DATA ANALYSIS, CONSULTS, AND MEDICAL DECISION MAKING    All labs have been independently reviewed by me.  All radiology studies have been reviewed by me.   EKG's independently reviewed by me.  Discussion below represents my analysis of pertinent findings related to patient's condition, differential diagnosis, treatment plan and final disposition.    I rechecked the patient.  I discussed the patient's labs, radiology findings (including all incidental findings), diagnosis, and plan for discharge.  A repeat exam reveals no new worrisome changes from my initial exam findings.  The patient understands that the fact that they are being discharged does not denote that nothing is abnormal, it indicates that no clinical emergency is present and that they must follow-up as directed in order to properly maintain their health.  Follow-up instructions (specifically listed below) and return to ER precautions were given at this time.  I specifically instructed the patient to follow-up with  their PCP.  The patient understands and agrees with the plan, and is ready for discharge.  All questions answered.    ED Course as of 06/07/23 1717   Wed Jun 07, 2023   1657 Patient reports that his headache has improved. [KJ]   1713 US Venous Doppler Upper Extremity Right (duplex)  Impression:  No findings of right upper extremity deep vein thrombosis.  Electronically Signed: Farheen Grider    6/7/2023 5:09 PM EDT  [KJ]      ED Course User Index  [KJ] Annalee Pino, DORY       AS OF 17:17 EDT VITALS:    BP - 129/73  HR - 81  TEMP - 98.1 °F (36.7 °C) (Oral)  02 SATS - 94%    Medical Decision Making  MEDICAL DECISION  Comorbidities: Sleep apnea, CAD, hyperlipidemia, GERD, diabetes, hypertension  This patient presents with a headache most consistent with benign headache from either tension type headache vs migraine. No headache red flags. Neurologic exam without evidence of meningismus, AMS, focal neurologic findings so doubt meningitis, encephalitis, stroke. Presentation not consistent with acute intracranial bleed to include SAH (lack of risk factors, headache history). No history of trauma so doubt ICH. Given history and physical temporal arteritis unlikely, as is acute angle closure glaucoma. Doubt carotid artery dissection given no focal neuro deficits, no neck trauma or recent neck strain. Patient with no signs of increased intracranial pressure or weight loss and history and physical suggest more benign headache so less likely mass effect in brain from tumor or abscess or idiopathic intracranial hypertension. Pain was controlled with headache cocktail (reglan and IV fluids) and patient discharged home with PMD follow up.  The patient's neurological exam is benign and I have no concerns at this time for neuro emergencies.  Do feel like the lump in his right forearm is likely a cyst or fatty lipoma.  I instructed him to follow-up with his primary care provider regarding this finding as well.  He reports  understanding denies questions at this time.              Problems Addressed:  Acute nonintractable headache, unspecified headache type: complicated acute illness or injury  Skin lump of arm, right: complicated acute illness or injury    Amount and/or Complexity of Data Reviewed  Radiology:  Decision-making details documented in ED Course.    Risk  Prescription drug management.          DIAGNOSIS  Final diagnoses:   Acute nonintractable headache, unspecified headache type   Skin lump of arm, right           Pt masked in first look. I wore a surgical mask throughout my encounters with the pt. I performed hand hygiene on entry into the pt room and upon exit.     Dictated utilizing [place] dictation     Note Disclaimer: At [facility], we believe that sharing information builds trust and better relationships. You are receiving this note because you recently visited [facility]. It is possible you will see health information before a provider has talked with you about it. This kind of information can be easy to misunderstand. To help you fully understand what it means for your health, we urge you to discuss this note with your provider.

## 2023-06-07 NOTE — ED TRIAGE NOTES
Pt c/o knot on R forearm and head & neck pain since last night. NKI. Pt reports he is concerned for blood clots, denies any hx of blood clots.

## 2023-06-07 NOTE — DISCHARGE INSTRUCTIONS
Thank you for letting us care for you today.  You can take Tylenol as needed for your headache.  Make sure you are drinking plenty of fluids.  Follow-up with your primary care provider regarding the lump on your arm.  Please return to the emergency room for any numbness, tingling, slurred speech, unilateral weakness or any other concerning symptoms.

## 2023-06-14 PROBLEM — G44.219 EPISODIC TENSION-TYPE HEADACHE, NOT INTRACTABLE: Status: ACTIVE | Noted: 2023-06-14

## 2023-06-14 PROBLEM — R22.31 LUMP OF SKIN OF RIGHT UPPER EXTREMITY: Status: ACTIVE | Noted: 2023-06-14

## 2023-07-02 PROBLEM — B35.3 TINEA PEDIS OF RIGHT FOOT: Status: ACTIVE | Noted: 2023-07-02

## 2023-07-21 ENCOUNTER — OFFICE VISIT (OUTPATIENT)
Dept: PODIATRY | Facility: CLINIC | Age: 78
End: 2023-07-21
Payer: MEDICARE

## 2023-07-21 VITALS
RESPIRATION RATE: 20 BRPM | HEIGHT: 68 IN | WEIGHT: 231 LBS | HEART RATE: 85 BPM | BODY MASS INDEX: 35.01 KG/M2 | OXYGEN SATURATION: 97 %

## 2023-07-21 DIAGNOSIS — M79.671 RIGHT FOOT PAIN: Primary | ICD-10-CM

## 2023-07-21 DIAGNOSIS — E11.42 DIABETIC PERIPHERAL NEUROPATHY ASSOCIATED WITH TYPE 2 DIABETES MELLITUS: ICD-10-CM

## 2023-07-21 DIAGNOSIS — G62.9 NEUROPATHY: ICD-10-CM

## 2023-07-21 DIAGNOSIS — E11.65 TYPE 2 DIABETES MELLITUS WITH HYPERGLYCEMIA, WITHOUT LONG-TERM CURRENT USE OF INSULIN: ICD-10-CM

## 2023-07-21 DIAGNOSIS — B35.3 TINEA PEDIS OF RIGHT FOOT: ICD-10-CM

## 2023-07-21 PROCEDURE — 1160F RVW MEDS BY RX/DR IN RCRD: CPT

## 2023-07-21 PROCEDURE — 11721 DEBRIDE NAIL 6 OR MORE: CPT

## 2023-07-21 PROCEDURE — 1159F MED LIST DOCD IN RCRD: CPT

## 2023-07-21 PROCEDURE — 99212 OFFICE O/P EST SF 10 MIN: CPT

## 2023-07-21 NOTE — PROGRESS NOTES
07/21/2023  Foot and Ankle Surgery - New Patient   Provider: DORY Carias   Location: [facility]    Subjective:  Heri Vasquez is a 78 y.o. male.     Chief Complaint   Patient presents with    Right Foot - Pain, Diabetes     5th toe   Nail trim    Left Foot - Diabetes     Nail trim     Initial Evaluation     GRACIELA Cueva md  05/2023       HPI: The patient presents to clinic today regarding concerns to bilateral feet. He is accompanied by adult female.    The patient acknowledges issues with bilateral feet, right greater than left. He notes he is experiencing a burning sensation and pain to his 5th toe constantly. The adult female reports his pain to right 5th toe has been going on for approximately 1 year. The patient states he the skin cracks in between his toes. He notes he has been seen by another provider previously and he was referred to this clinic. He acknowledges lower back issues. He repots he has been less active. He notes frequent burning and tingling to his bilateral feet. He reports symptoms are bothersome; however, he experiences minor pain to his middle toe, constantly. The adult female inquires if the way the patient ambulates have anything to do with his issues. He states he thinks he has had a previous injury to his foot. He reports he takes medication to help calm down his lower extremities. He desires to get toenails trimmed today.    He reports he is diabetic. He states his blood glucose was 97mg/dL this morning. He states his last A1c was 6.1 percent.    No Known Allergies    Past Medical History:   Diagnosis Date    Arthritis     Dr Mosqueda    Coronary heart disease 01/2016    single vessel disease, nl stress test (copied from [facility])    Coronary heart disease 10/31/2017    cath 10/31/17 - Low % Blockages- N o Intervention   (copied from [facility])    Diverticulosis     Fatty liver     GERD (gastroesophageal reflux disease)     Hearing loss     Hyperlipemia      Hyperlipidemia     Hypertension     Low back pain     Neuropathy in diabetes     SIDDHARTHA treated with BiPAP     Pain management     injections Lumbar spine X2 with [facility] @ [facility] (copied from [facility])    Physical therapy evaluation, initial     @ [facility] in [place] 6 weeks x3 per week, Kalen [facility]al (copied from [facility])    Rectal bleed 08/2012    hemorrhoids    Retinal hemorrhage of right eye 05/2014    Sleep apnea     bipap    Type 2 diabetes mellitus     Uses brace        Past Surgical History:   Procedure Laterality Date    BELPHAROPTOSIS REPAIR  12/11/2017     Dr. grimes (copied from [facility])    BROW LIFT  12/11/2017    Dr. Grimes at [facility]    CARDIAC CATHETERIZATION  03/2012    at [facility]- single vessel disease. (copied from [facility])    CARDIAC CATHETERIZATION  10/13/2017    no intervention - Low % Blockages.    CARDIAC CATHETERIZATION Left 05/28/2021    Procedure: LEFT HEART CATH with possible PCI;  Surgeon: Leslie Clark MD;  Location: [facility] CATH INVASIVE LOCATION;  Service: Cardiovascular;  Laterality: Left;  Local and IV sedation    CATARACT EXTRACTION  12/11/2017    brow lift and eye lid repair    COLON SURGERY  06/2014    colonscopy    COLONOSCOPY N/A 07/12/2019    Procedure: COLONOSCOPY with polypectomy x1;  Surgeon: Graham Byrd MD;  Location: [facility] ENDOSCOPY;  Service: Gastroenterology    CUBITAL TUNNEL RELEASE Right     HERNIA REPAIR  11/2008    umbilical hernia repair    JOINT REPLACEMENT      KNEE ARTHROSCOPY Right 11/2014    Dr. Panda pollack and Cataract Extraction, sep 11 and right Sept. 25th, 2014- [facility]    TOTAL KNEE ARTHROPLASTY Left 11/2009    [facility]    TOTAL SHOULDER ARTHROPLASTY W/ DISTAL CLAVICLE EXCISION Left 04/20/2022    Procedure: TOTAL SHOULDER REVERSE ARTHROPLASTY;  Surgeon: Dany Knapp MD;  Location: [facility] MAIN OR;  Service: Orthopedics;  Laterality: Left;        Family History   Problem Relation Age of Onset    Heart disease Mother         CHF    Hypertension Mother     Heart failure Mother     Stroke Maternal Grandfather     Diabetes Other     Diabetes Other     Stroke Other     Arthritis Maternal Grandmother     Diabetes Brother        Social History     Socioeconomic History    Marital status:      Spouse name: Shani   Tobacco Use    Smoking status: Former     Packs/day: 1.50     Years: 30.00     Pack years: 45.00     Types: Cigarettes     Quit date: 2000     Years since quittin.5    Smokeless tobacco: Former   Vaping Use    Vaping Use: Never used   Substance and Sexual Activity    Alcohol use: No    Drug use: No    Sexual activity: Not Currently     Partners: Female        Current Outpatient Medications on File Prior to Visit   Medication Sig Dispense Refill    acetaminophen (TYLENOL) 650 MG 8 hr tablet Take 1 tablet by mouth Every 8 (Eight) Hours As Needed for Mild Pain.      atorvastatin (LIPITOR) 20 MG tablet TAKE 1 TABLET BY MOUTH  DAILY 90 tablet 3    betamethasone dipropionate (DIPROLENE) 0.05 % lotion       betamethasone valerate (VALISONE) 0.1 % ointment Apply 1 application  topically to the appropriate area as directed 2 (Two) Times a Day.      calcium citrate-vitamin d (CALCITRATE) 315-250 MG-UNIT tablet tablet Take  by mouth Daily.      cyclobenzaprine (FLEXERIL) 10 MG tablet Take 1 tablet by mouth At Night As Needed for Muscle Spasms. 30 tablet 0    donepezil (ARICEPT) 5 MG tablet TAKE 1 TABLET BY MOUTH AT  NIGHT 90 tablet 3    gabapentin (NEURONTIN) 300 MG capsule TAKE 1 CAPSULE BY MOUTH  TWICE DAILY 180 capsule 3    HYDROcodone-acetaminophen (NORCO) 7.5-325 MG per tablet Take 1 tablet by mouth Every 6 (Six) Hours As Needed for Moderate Pain or Severe Pain. 15 tablet 0    hydrocortisone 2.5 % cream Apply 1 application  topically to the appropriate area as directed 2 (Two) Times a Day.      isosorbide mononitrate (IMDUR) 60 MG 24 hr  tablet TAKE 1 AND 1/2 TABLETS BY  MOUTH DAILY 135 tablet 3    Lancets (OneTouch Delica Plus [id number]) misc USE   TO CHECK GLUCOSE ONCE DAILY 100 each 4    lisinopril (PRINIVIL,ZESTRIL) 10 MG tablet TAKE 1 TABLET BY MOUTH  DAILY 90 tablet 3    metFORMIN (GLUCOPHAGE) 500 MG tablet Take 1 tablet by mouth 2 (Two) Times a Day With Meals. 180 tablet 3    Multiple Vitamins-Minerals (MULTIVITAMIN WITH MINERALS) tablet tablet Take 1 tablet by mouth Daily.      naproxen (NAPROSYN) 500 MG tablet TAKE 1 TABLET BY MOUTH  TWICE DAILY AS NEEDED FOR  MODERATE PAIN 180 tablet 3    nitroglycerin (NITROSTAT) 0.4 MG SL tablet Place 1 tablet under the tongue Every 5 (Five) Minutes As Needed for Chest Pain. 30 tablet 4    OneTouch Ultra test strip 1 each by Other route Daily. E11.9 50 each 5    pantoprazole (PROTONIX) 40 MG EC tablet TAKE 1 TABLET BY MOUTH  DAILY 90 tablet 3    promethazine (PHENERGAN) 12.5 MG tablet Take 1 tablet by mouth Every 6 (Six) Hours As Needed for Nausea or Vomiting. 21 tablet 1    triamcinolone (KENALOG) 0.025 % cream       aspirin 81 MG EC tablet Take 1 tablet by mouth Daily. LD 4/13       No current facility-administered medications on file prior to visit.       Review of Systems:  General: Denies fever, chills, fatigue, and weakness.  Eyes: Denies vision loss, blurry vision, and excessive redness.  ENT: Denies hearing issues and difficulty swallowing.  Cardiovascular: Denies palpitations, chest pain, or syncopal episodes.  Respiratory: Denies shortness of breath, wheezing, and coughing.  GI: Denies abdominal pain, nausea, and vomiting.   : Denies frequency, hematuria, and urgency.  Musculoskeletal: Denies muscle cramps, joint pains, and stiffness.  Derm: Denies rash, open wounds, or suspicious lesions.  Neuro: Denies headaches, numbness, loss of coordination, and tremors.  Psych: Denies anxiety and depression.  Endocrine: Denies temperature intolerance and changes in appetite.  Heme: Denies bleeding  "disorders or abnormal bruising.     Objective   Pulse 85   Resp 20   Ht 172.7 cm (68\")   Wt 105 kg (231 lb)   SpO2 97%   BMI 35.12 kg/m²     Foot/Ankle Exam    GENERAL  Orientation:  AAOx3  Affect:  appropriate    VASCULAR     Right Foot Vascularity   Normal vascular exam    Dorsalis pedis:  2+  Posterior tibial:  2+  Skin temperature:  warm  Edema grading:  None  CFT:  < 3 seconds  Pedal hair growth:  Present  Varicosities:  none     Left Foot Vascularity   Normal vascular exam    Dorsalis pedis:  2+  Posterior tibial:  2+  Skin temperature:  warm  Edema grading:  None  CFT:  < 3 seconds  Pedal hair growth:  Present  Varicosities:  none     NEUROLOGIC     Right Foot Neurologic   Protective Sensation using [place]es Monofilament:   Sites tested: 7     Left Foot Neurologic   Protective Sensation using [place]es Monofilament:   Sites tested: 7    MUSCULOSKELETAL     Right Foot Musculoskeletal   Tenderness:  MTP 5 dorsal tenderness and metatarsal 5       Left Foot Musculoskeletal   Tenderness:  none    MUSCLE STRENGTH     Right Foot Muscle Strength   Normal strength    Foot dorsiflexion:  5  Foot plantar flexion:  5  Foot inversion:  5  Foot eversion:  5    DERMATOLOGIC      Right Foot Dermatologic   Skin  Positive for dryness and skin changes.   Nails  1.  Positive for elongated, abnormal thickness and dystrophic nail.  2.  Positive for elongated, abnormal thickness and dystrophic nail.  3.  Positive for elongated, abnormal thickness and dystrophic nail.  4.  Positive for elongated, abnormal thickness and dystrophic nail.  5.  Positive for elongated, abnormal thickness and dystrophic nail.     Left Foot Dermatologic   Skin  Positive for dryness and skin changes.   Nails  1.  Positive for elongated, abnormal thickness and dystrophic nail.  2.  Positive for elongated, abnormal thickness and dystrophic nail.  3.  Positive for elongated, abnormal thickness and dystrophic nail.  4.  Positive for " elongated, abnormally thick and dystrophic nail.  5.  Positive for elongated, abnormally thick and dystrophic nail.    Assessment & Plan   Diagnoses and all orders for this visit:    1. Right foot pain (Primary)  -     XR Foot 3+ View Right      Bilateral foot    Patient presents to clinic regarding issues to bilateral feet; right greater than left. His blood glucose is well-controlled. I explained the symptoms he is describing sounds like neuropathy. I discussed the etiology of neuropathy. I explained keeping blood glucose under control and wearing shoes that help offload pressure can help with symptoms. I recommend topical pain cream. supportive shoes, and insoles. I recommend calf stretching exercises. I advise to avoid ambulating barefoot. X-ray reviewed. I explained he has an area where it looks like he may have had a fracture to his right 5th metatarsal joint which could cause some arthritic pain. I discussed different medications that could help with symptoms. Patient will return to clinic in 2 months for reevaluation.    I discussed the cracks and bleeding in between his right 4th and 5th toe could be do secondary to athlete's foot; however, I am not seeing signs of athlete's foot today. I discussed purchasing an over-the-counter foot spray.     [place] monofilament testing 7 out of 10 to bilateral feet. Do feel that patient is at mild risk for pedal complications related to diabetes.    Explained importance of diabetic foot care, daily foot checks, and glycemic control. Patient should check both feet on a daily basis, monitor and control blood sugars, make sure that both feet and in between toes are towel dried after baths or showers. Avoid barefoot walking at all times. Check shoes before putting them on.   Patient was given information on proper foot care. Call the office at the first signs of a wound or with signs of infection.    Nail debridement: Both feet x9    Consent and time out was  performed before proceeding with the procedure. Nails were debrided with a nail nipper without complication.  No anesthesia was required.  Indications for procedure were thickened, dystrophic, and fungal appearing nails which are difficult to trim.  Proper self-care and technique was discussed with patient.  Patient was stable after procedure.     Orders Placed This Encounter   Procedures    XR Foot 3+ View Right     Order Specific Question:   Reason for Exam:     Answer:   5th toe pain rm 14 wb     Order Specific Question:   Does this patient have a diabetic monitoring/medication delivering device on?     Answer:   No     Order Specific Question:   Release to patient     Answer:   Routine Release        Note is dictated utilizing voice recognition software. Unfortunately this leads to occasional typographical errors. I apologize in advance if the situation occurs. If questions occur please do not hesitate to call our office.    Transcribed from ambient dictation for DORY Leary by Christina Barkley.  07/21/23   14:24 EDT    Patient or patient representative verbalized consent to the visit recording.  I have personally performed the services described in this document as transcribed by the above individual, and it is both accurate and complete.  DORY Leary  7/25/2023  10:33 EDT

## 2023-07-25 NOTE — PROGRESS NOTES
I called patient to let him know there is sub acute / old fracture to right 5th toe that could be explaining pain. Recommend patient come in for post op/ surgical she and wear when ambulating. Follow up with me in 4 weeks for xray and reevaluation. Please have front office schedule with me in 4 weeks and call patient to give new appt. thanks

## 2023-08-03 ENCOUNTER — OFFICE VISIT (OUTPATIENT)
Dept: ORTHOPEDIC SURGERY | Facility: CLINIC | Age: 78
End: 2023-08-03
Payer: MEDICARE

## 2023-08-03 VITALS — HEART RATE: 69 BPM | WEIGHT: 229.4 LBS | BODY MASS INDEX: 34.88 KG/M2 | OXYGEN SATURATION: 96 %

## 2023-08-03 DIAGNOSIS — Z47.89 ORTHOPEDIC AFTERCARE: Primary | ICD-10-CM

## 2023-08-03 DIAGNOSIS — M19.012 OSTEOARTHRITIS OF LEFT GLENOHUMERAL JOINT: ICD-10-CM

## 2023-08-03 PROCEDURE — 99212 OFFICE O/P EST SF 10 MIN: CPT | Performed by: ORTHOPAEDIC SURGERY

## 2023-08-15 ENCOUNTER — OFFICE VISIT (OUTPATIENT)
Dept: PODIATRY | Facility: CLINIC | Age: 78
End: 2023-08-15
Payer: MEDICARE

## 2023-08-15 VITALS — WEIGHT: 229 LBS | RESPIRATION RATE: 20 BRPM | BODY MASS INDEX: 34.71 KG/M2 | HEIGHT: 68 IN

## 2023-08-15 DIAGNOSIS — E11.65 TYPE 2 DIABETES MELLITUS WITH HYPERGLYCEMIA, WITHOUT LONG-TERM CURRENT USE OF INSULIN: ICD-10-CM

## 2023-08-15 DIAGNOSIS — E11.42 DIABETIC PERIPHERAL NEUROPATHY ASSOCIATED WITH TYPE 2 DIABETES MELLITUS: Primary | ICD-10-CM

## 2023-08-15 DIAGNOSIS — S92.514G CLOSED NONDISPLACED FRACTURE OF PROXIMAL PHALANX OF LESSER TOE OF RIGHT FOOT WITH DELAYED HEALING, SUBSEQUENT ENCOUNTER: ICD-10-CM

## 2023-08-15 NOTE — PROGRESS NOTES
08/15/2023  Foot and Ankle Surgery - Established Patient/Follow-up  Provider: DORY Carias   Location: [facility]    Subjective:  Heri Vasquez is a 78 y.o. male.     Chief Complaint   Patient presents with    Left Foot - Pain     Dm foot care    Right Foot - Pain    Follow-up     GRACIELAlizbeth 6/14/23       Heri Vasquez presents today for a routine diabetic foot exam. He is accompanied by an adult female.    The patient reports since using the shoe inserts, he has experienced great improvement in his right fifth toe pain. He denies pain since using insoles. He notes improvement in the burning sensation and tinea pedis as well. He obtained an x-ray at his last visit that showed a subacute fracture in his right fifth toe. He denies any known injury to his foot in the last three months. He denies any swelling in his right fifth toe. He denies ambulating barefoot.    The patient's last hemoglobin A1c was 6.1 percent. He is currently utilizing metformin.    No Known Allergies    Current Outpatient Medications on File Prior to Visit   Medication Sig Dispense Refill    acetaminophen (TYLENOL) 650 MG 8 hr tablet Take 1 tablet by mouth Every 8 (Eight) Hours As Needed for Mild Pain.      atorvastatin (LIPITOR) 20 MG tablet TAKE 1 TABLET BY MOUTH  DAILY 90 tablet 3    betamethasone dipropionate (DIPROLENE) 0.05 % lotion       betamethasone valerate (VALISONE) 0.1 % ointment Apply 1 application  topically to the appropriate area as directed 2 (Two) Times a Day.      calcium citrate-vitamin d (CALCITRATE) 315-250 MG-UNIT tablet tablet Take  by mouth Daily.      cyclobenzaprine (FLEXERIL) 10 MG tablet Take 1 tablet by mouth At Night As Needed for Muscle Spasms. 30 tablet 0    donepezil (ARICEPT) 5 MG tablet TAKE 1 TABLET BY MOUTH AT  NIGHT 90 tablet 3    gabapentin (NEURONTIN) 300 MG capsule TAKE 1 CAPSULE BY MOUTH  TWICE DAILY 180 capsule 3    HYDROcodone-acetaminophen (NORCO) 7.5-325 MG per tablet Take 1 tablet  "by mouth Every 6 (Six) Hours As Needed for Moderate Pain or Severe Pain. 15 tablet 0    hydrocortisone 2.5 % cream Apply 1 application  topically to the appropriate area as directed 2 (Two) Times a Day.      Ibuprofen 3 %, Gabapentin 10 %, Baclofen 2 %, lidocaine 4 % Apply 1-2 g topically to the appropriate area as directed 3 (Three) to 4 (Four) times daily. 90 g 2    isosorbide mononitrate (IMDUR) 60 MG 24 hr tablet TAKE 1 AND 1/2 TABLETS BY  MOUTH DAILY 135 tablet 3    Lancets (OneTouch Delica Plus [id number]) misc USE   TO CHECK GLUCOSE ONCE DAILY 100 each 4    lisinopril (PRINIVIL,ZESTRIL) 10 MG tablet TAKE 1 TABLET BY MOUTH  DAILY 90 tablet 3    metFORMIN (GLUCOPHAGE) 500 MG tablet Take 1 tablet by mouth 2 (Two) Times a Day With Meals. 180 tablet 3    Multiple Vitamins-Minerals (MULTIVITAMIN WITH MINERALS) tablet tablet Take 1 tablet by mouth Daily.      naproxen (NAPROSYN) 500 MG tablet TAKE 1 TABLET BY MOUTH  TWICE DAILY AS NEEDED FOR  MODERATE PAIN 180 tablet 3    nitroglycerin (NITROSTAT) 0.4 MG SL tablet Place 1 tablet under the tongue Every 5 (Five) Minutes As Needed for Chest Pain. 30 tablet 4    OneTouch Ultra test strip 1 each by Other route Daily. E11.9 50 each 5    pantoprazole (PROTONIX) 40 MG EC tablet TAKE 1 TABLET BY MOUTH  DAILY 90 tablet 3    promethazine (PHENERGAN) 12.5 MG tablet Take 1 tablet by mouth Every 6 (Six) Hours As Needed for Nausea or Vomiting. 21 tablet 1    triamcinolone (KENALOG) 0.025 % cream       aspirin 81 MG EC tablet Take 1 tablet by mouth Daily. LD 4/13       No current facility-administered medications on file prior to visit.       Objective   Resp 20   Ht 172.7 cm (68\")   Wt 104 kg (229 lb)   BMI 34.82 kg/mý     Foot/Ankle Exam    GENERAL  Appearance:  appears stated age and elderly  Orientation:  AAOx3  Affect:  appropriate  Gait:  unimpaired  Assistance:  cane use  Right shoe gear: casual shoe and sock (powersteps insoles)  Left shoe gear: casual shoe and " sock    VASCULAR     Right Foot Vascularity   Dorsalis pedis:  2+  Skin temperature:  warm  Edema grading:  None  CFT:  < 3 seconds  Pedal hair growth:  Absent  Varicosities:  none    MUSCULOSKELETAL     Right Foot Musculoskeletal   Ecchymosis:  none  Tenderness:  none      DERMATOLOGIC      Right Foot Dermatologic   Skin  Right foot skin is intact.       Assessment & Plan   Diagnoses and all orders for this visit:    1. Diabetic peripheral neuropathy associated with type 2 diabetes mellitus (Primary)    2. Type 2 diabetes mellitus with hyperglycemia, without long-term current use of insulin    3. Closed nondisplaced fracture of proximal phalanx of lesser toe of right foot with delayed healing, subsequent encounter      Diabetic foot exam.    Discussed his x-ray which resulted in a right fifth toe subacute fracture. Advised him unless he experiences any concerning changes to his toe, we can leave it alone. Encouraged him to continue using his inserts and replace them every six months. Recommended a metatarsal pad for his affected toe to help offload the pressure on his foot.    Explained importance of diabetic foot care, daily foot checks, and glycemic control. Patient should check both feet on a daily basis, monitor and control blood sugars, make sure that both feet and in between toes are towel dried after baths or showers. Avoid barefoot walking at all times. Check shoes before putting them on.   Patient was given information on proper foot care. Call the office at the first signs of a wound or with signs of infection.    He will follow up in one year for routine diabetic foot exam.    No orders of the defined types were placed in this encounter.      Transcribed from ambient dictation for DORY Leary by Peggy Sales.  08/15/23   13:55 EDT    Patient or patient representative verbalized consent to the visit recording.  I have personally performed the services described in this document as  transcribed by the above individual, and it is both accurate and complete.  Sue Holman, APRN  8/17/2023  09:05 EDT

## 2023-08-15 NOTE — PATIENT INSTRUCTIONS
Powerstep insoles with metatarsal pad and arch support in shoes change every 6 months. Can obtain from our office ( call first) or amazon, or pacers and racers.

## 2023-08-25 ENCOUNTER — OFFICE VISIT (OUTPATIENT)
Dept: FAMILY MEDICINE CLINIC | Facility: CLINIC | Age: 78
End: 2023-08-25
Payer: MEDICARE

## 2023-08-25 VITALS
WEIGHT: 224.8 LBS | HEART RATE: 75 BPM | HEIGHT: 68 IN | SYSTOLIC BLOOD PRESSURE: 154 MMHG | TEMPERATURE: 98.1 F | BODY MASS INDEX: 34.07 KG/M2 | DIASTOLIC BLOOD PRESSURE: 74 MMHG | OXYGEN SATURATION: 94 %

## 2023-08-25 DIAGNOSIS — J30.1 HAY FEVER: Primary | ICD-10-CM

## 2023-08-25 PROCEDURE — 3077F SYST BP >= 140 MM HG: CPT | Performed by: FAMILY MEDICINE

## 2023-08-25 PROCEDURE — 99213 OFFICE O/P EST LOW 20 MIN: CPT | Performed by: FAMILY MEDICINE

## 2023-08-25 PROCEDURE — 3078F DIAST BP <80 MM HG: CPT | Performed by: FAMILY MEDICINE

## 2023-08-25 RX ORDER — AZELASTINE 1 MG/ML
2 SPRAY, METERED NASAL 2 TIMES DAILY
Qty: 30 ML | Refills: 12 | Status: SHIPPED | OUTPATIENT
Start: 2023-08-25

## 2023-08-25 NOTE — PROGRESS NOTES
"Chief Complaint  Nasal Congestion and Fatigue    Subjective        Heri Vasquez presents to [facility]  History of Present Illness  He reports that his nose is loose.  He can bend over and get watery, clear fluid dripping out.   Lightheaded at times, especially when getting up too quickly..  Fatigue  This is a chronic problem. The current episode started more than 1 year ago. The problem occurs daily. The problem has been unchanged. Associated symptoms include fatigue.     Objective   Vital Signs:  /74 (BP Location: Left arm, Patient Position: Sitting)   Pulse 75   Temp 98.1 øF (36.7 øC) (Temporal)   Ht 172.7 cm (68\")   Wt 102 kg (224 lb 12.8 oz)   SpO2 94%   BMI 34.18 kg/mý   Estimated body mass index is 34.18 kg/mý as calculated from the following:    Height as of this encounter: 172.7 cm (68\").    Weight as of this encounter: 102 kg (224 lb 12.8 oz).     BMI is >= 30 and <35. (Class 1 Obesity). The following options were offered after discussion;: exercise counseling/recommendations           Physical Exam  Vitals and nursing note reviewed.   Constitutional:       General: He is not in acute distress.     Appearance: He is well-developed.   HENT:      Head: Normocephalic.      Nose: Rhinorrhea present.   Eyes:      General: Lids are normal.      Conjunctiva/sclera: Conjunctivae normal.   Neck:      Thyroid: No thyroid mass or thyromegaly.      Trachea: Trachea normal.   Cardiovascular:      Rate and Rhythm: Normal rate and regular rhythm.      Heart sounds: Normal heart sounds.   Pulmonary:      Effort: Pulmonary effort is normal.      Breath sounds: Normal breath sounds.   Musculoskeletal:      Cervical back: Normal range of motion.   Lymphadenopathy:      Cervical: No cervical adenopathy.   Skin:     General: Skin is warm and dry.   Neurological:      Mental Status: He is alert and oriented to person, place, and time.   Psychiatric:         Attention and " Perception: He is attentive.         Mood and Affect: Mood normal.         Speech: Speech normal.         Behavior: Behavior normal.      Result Review :  The following data was reviewed by: Olesya Cueva MD on 08/25/2023:  Common labs          2/21/2023    10:39 3/23/2023    20:05   Common Labs   Glucose 110  117    BUN 16  18    Creatinine 0.81  1.13    Sodium 139  141    Potassium 4.1  4.7    Chloride 106  104    Calcium 9.1  9.3    Albumin 4.1  4.1    Total Bilirubin 0.5  0.3    Alkaline Phosphatase 82  100    AST (SGOT) 21  26    ALT (SGPT) 20  21    WBC 11.04     Hemoglobin 15.3     Hematocrit 49.5     Platelets 405     Total Cholesterol 124     Triglycerides 105     HDL Cholesterol 38     LDL Cholesterol  66     Hemoglobin A1C 6.0     Microalbumin, Urine <1.2                    Assessment and Plan   Diagnoses and all orders for this visit:    1. Hay fever (Primary)    Other orders  -     azelastine (ASTELIN) 0.1 % nasal spray; 2 sprays into the nostril(s) as directed by provider 2 (Two) Times a Day. Use in each nostril as directed  Dispense: 30 mL; Refill: 12             Follow Up   No follow-ups on file.  Patient was given instructions and counseling regarding his condition or for health maintenance advice. Please see specific information pulled into the AVS if appropriate.

## 2023-09-18 ENCOUNTER — OFFICE VISIT (OUTPATIENT)
Dept: FAMILY MEDICINE CLINIC | Facility: CLINIC | Age: 78
End: 2023-09-18
Payer: MEDICARE

## 2023-09-18 ENCOUNTER — LAB (OUTPATIENT)
Dept: FAMILY MEDICINE CLINIC | Facility: CLINIC | Age: 78
End: 2023-09-18
Payer: MEDICARE

## 2023-09-18 VITALS
OXYGEN SATURATION: 94 % | WEIGHT: 231.5 LBS | HEIGHT: 68 IN | DIASTOLIC BLOOD PRESSURE: 75 MMHG | BODY MASS INDEX: 35.09 KG/M2 | HEART RATE: 75 BPM | TEMPERATURE: 98 F | SYSTOLIC BLOOD PRESSURE: 166 MMHG

## 2023-09-18 DIAGNOSIS — R07.9 CHEST PAIN, UNSPECIFIED TYPE: Primary | ICD-10-CM

## 2023-09-18 DIAGNOSIS — E11.8 TYPE 2 DIABETES MELLITUS WITH UNSPECIFIED COMPLICATIONS: ICD-10-CM

## 2023-09-18 DIAGNOSIS — R07.9 CHEST PAIN, UNSPECIFIED TYPE: ICD-10-CM

## 2023-09-18 PROBLEM — J02.0 STREP PHARYNGITIS: Status: RESOLVED | Noted: 2023-03-28 | Resolved: 2023-09-18

## 2023-09-18 PROBLEM — J40 BRONCHITIS: Status: RESOLVED | Noted: 2021-08-31 | Resolved: 2023-09-18

## 2023-09-18 LAB
ALBUMIN SERPL-MCNC: 4.1 G/DL (ref 3.5–5.2)
ALBUMIN/GLOB SERPL: 1.4 G/DL
ALP SERPL-CCNC: 87 U/L (ref 39–117)
ALT SERPL W P-5'-P-CCNC: 23 U/L (ref 1–41)
ANION GAP SERPL CALCULATED.3IONS-SCNC: 14.7 MMOL/L (ref 5–15)
AST SERPL-CCNC: 20 U/L (ref 1–40)
BASOPHILS # BLD AUTO: 0.12 10*3/MM3 (ref 0–0.2)
BASOPHILS NFR BLD AUTO: 1.2 % (ref 0–1.5)
BILIRUB SERPL-MCNC: 0.4 MG/DL (ref 0–1.2)
BUN SERPL-MCNC: 15 MG/DL (ref 8–23)
BUN/CREAT SERPL: 16.5 (ref 7–25)
CALCIUM SPEC-SCNC: 9.2 MG/DL (ref 8.6–10.5)
CHLORIDE SERPL-SCNC: 104 MMOL/L (ref 98–107)
CO2 SERPL-SCNC: 21.3 MMOL/L (ref 22–29)
CREAT SERPL-MCNC: 0.91 MG/DL (ref 0.76–1.27)
DEPRECATED RDW RBC AUTO: 43.6 FL (ref 37–54)
EGFRCR SERPLBLD CKD-EPI 2021: 85.2 ML/MIN/1.73
EOSINOPHIL # BLD AUTO: 0.39 10*3/MM3 (ref 0–0.4)
EOSINOPHIL NFR BLD AUTO: 3.8 % (ref 0.3–6.2)
ERYTHROCYTE [DISTWIDTH] IN BLOOD BY AUTOMATED COUNT: 17 % (ref 12.3–15.4)
GLOBULIN UR ELPH-MCNC: 3 GM/DL
GLUCOSE SERPL-MCNC: 134 MG/DL (ref 65–99)
HBA1C MFR BLD: 5.9 % (ref 4.8–5.6)
HCT VFR BLD AUTO: 50.3 % (ref 37.5–51)
HGB BLD-MCNC: 15.7 G/DL (ref 13–17.7)
IMM GRANULOCYTES # BLD AUTO: 0.06 10*3/MM3 (ref 0–0.05)
IMM GRANULOCYTES NFR BLD AUTO: 0.6 % (ref 0–0.5)
LYMPHOCYTES # BLD AUTO: 1.45 10*3/MM3 (ref 0.7–3.1)
LYMPHOCYTES NFR BLD AUTO: 14.1 % (ref 19.6–45.3)
MCH RBC QN AUTO: 24.3 PG (ref 26.6–33)
MCHC RBC AUTO-ENTMCNC: 31.2 G/DL (ref 31.5–35.7)
MCV RBC AUTO: 78 FL (ref 79–97)
MONOCYTES # BLD AUTO: 0.63 10*3/MM3 (ref 0.1–0.9)
MONOCYTES NFR BLD AUTO: 6.1 % (ref 5–12)
NEUTROPHILS NFR BLD AUTO: 7.62 10*3/MM3 (ref 1.7–7)
NEUTROPHILS NFR BLD AUTO: 74.2 % (ref 42.7–76)
NRBC BLD AUTO-RTO: 0 /100 WBC (ref 0–0.2)
PLATELET # BLD AUTO: 386 10*3/MM3 (ref 140–450)
PMV BLD AUTO: 10.1 FL (ref 6–12)
POTASSIUM SERPL-SCNC: 4 MMOL/L (ref 3.5–5.2)
PROT SERPL-MCNC: 7.1 G/DL (ref 6–8.5)
RBC # BLD AUTO: 6.45 10*6/MM3 (ref 4.14–5.8)
SODIUM SERPL-SCNC: 140 MMOL/L (ref 136–145)
TROPONIN T SERPL HS-MCNC: 12 NG/L
WBC NRBC COR # BLD: 10.27 10*3/MM3 (ref 3.4–10.8)

## 2023-09-18 PROCEDURE — 1160F RVW MEDS BY RX/DR IN RCRD: CPT | Performed by: FAMILY MEDICINE

## 2023-09-18 PROCEDURE — 90662 IIV NO PRSV INCREASED AG IM: CPT | Performed by: FAMILY MEDICINE

## 2023-09-18 PROCEDURE — 3077F SYST BP >= 140 MM HG: CPT | Performed by: FAMILY MEDICINE

## 2023-09-18 PROCEDURE — 80053 COMPREHEN METABOLIC PANEL: CPT | Performed by: FAMILY MEDICINE

## 2023-09-18 PROCEDURE — 93000 ELECTROCARDIOGRAM COMPLETE: CPT | Performed by: FAMILY MEDICINE

## 2023-09-18 PROCEDURE — 36415 COLL VENOUS BLD VENIPUNCTURE: CPT | Performed by: FAMILY MEDICINE

## 2023-09-18 PROCEDURE — 3078F DIAST BP <80 MM HG: CPT | Performed by: FAMILY MEDICINE

## 2023-09-18 PROCEDURE — 99214 OFFICE O/P EST MOD 30 MIN: CPT | Performed by: FAMILY MEDICINE

## 2023-09-18 PROCEDURE — 83036 HEMOGLOBIN GLYCOSYLATED A1C: CPT | Performed by: FAMILY MEDICINE

## 2023-09-18 PROCEDURE — 1159F MED LIST DOCD IN RCRD: CPT | Performed by: FAMILY MEDICINE

## 2023-09-18 PROCEDURE — 85025 COMPLETE CBC W/AUTO DIFF WBC: CPT | Performed by: FAMILY MEDICINE

## 2023-09-18 PROCEDURE — 84484 ASSAY OF TROPONIN QUANT: CPT | Performed by: FAMILY MEDICINE

## 2023-09-18 PROCEDURE — G0008 ADMIN INFLUENZA VIRUS VAC: HCPCS | Performed by: FAMILY MEDICINE

## 2023-09-18 NOTE — PROGRESS NOTES
"Chief Complaint  Shoulder Pain (Rt side and radiates to Rt side of chest x 1 months )    Subjective        Heri Vasquez presents to [facility]  History of Present Illness  He has an appointment to see his cardiologist next month.   Chest Pain   This is a new problem. The current episode started 1 to 4 weeks ago. The onset quality is undetermined. The problem occurs daily. The problem has been waxing and waning. The pain is present in the lateral region (right side of chest and right scapular area). The pain is moderate. The quality of the pain is described as heavy and pressure. The pain does not radiate. Associated symptoms include shortness of breath. Pertinent negatives include no cough, dizziness or fever. The pain is aggravated by nothing. He has tried nothing for the symptoms.   Diabetes  He presents for his follow-up diabetic visit. He has type 2 diabetes mellitus. His disease course has been stable. Pertinent negatives for hypoglycemia include no dizziness. Associated symptoms include chest pain. Pertinent negatives for diabetes include no blurred vision, no foot ulcerations, no polydipsia, no polyphagia and no polyuria. Symptoms are stable. Current diabetic treatment includes diet and oral agent (monotherapy). He is compliant with treatment most of the time. Meal planning includes avoidance of concentrated sweets. There is no change in his home blood glucose trend.     Objective   Vital Signs:  /75 (BP Location: Left arm, Patient Position: Sitting, Cuff Size: Large Adult)   Pulse 75   Temp 98 °F (36.7 °C) (Infrared)   Ht 172.7 cm (68\")   Wt 105 kg (231 lb 8 oz)   SpO2 94%   BMI 35.20 kg/m²   Estimated body mass index is 35.2 kg/m² as calculated from the following:    Height as of this encounter: 172.7 cm (68\").    Weight as of this encounter: 105 kg (231 lb 8 oz).     Class 2 Severe Obesity (BMI >=35 and <=39.9). Obesity-related health conditions include the " - s/p hypothermia protocol  -neurology on board   -cont supportive t x       following: hypertension, coronary heart disease, diabetes mellitus, and dyslipidemias. Obesity is unchanged. BMI is is above average; BMI management plan is completed. We discussed portion control and increasing exercise.           Physical Exam  Vitals and nursing note reviewed.   Constitutional:       General: He is not in acute distress.     Appearance: He is well-developed.   HENT:      Head: Normocephalic.   Eyes:      General: Lids are normal.      Conjunctiva/sclera: Conjunctivae normal.   Neck:      Thyroid: No thyroid mass or thyromegaly.      Trachea: Trachea normal.   Cardiovascular:      Rate and Rhythm: Normal rate and regular rhythm.      Heart sounds: Normal heart sounds.   Pulmonary:      Effort: Pulmonary effort is normal.      Breath sounds: Normal breath sounds.   Musculoskeletal:      Cervical back: Normal range of motion.   Lymphadenopathy:      Cervical: No cervical adenopathy.   Skin:     General: Skin is warm and dry.   Neurological:      Mental Status: He is alert and oriented to person, place, and time.   Psychiatric:         Attention and Perception: He is attentive.         Mood and Affect: Mood normal.         Speech: Speech normal.         Behavior: Behavior normal.      Result Review :  The following data was reviewed by: Olesya Cueva MD on 09/18/2023:  Common labs          2/21/2023    10:39 3/23/2023    20:05   Common Labs   Glucose 110  117    BUN 16  18    Creatinine 0.81  1.13    Sodium 139  141    Potassium 4.1  4.7    Chloride 106  104    Calcium 9.1  9.3    Albumin 4.1  4.1    Total Bilirubin 0.5  0.3    Alkaline Phosphatase 82  100    AST (SGOT) 21  26    ALT (SGPT) 20  21    WBC 11.04     Hemoglobin 15.3     Hematocrit 49.5     Platelets 405     Total Cholesterol 124     Triglycerides 105     HDL Cholesterol 38     LDL Cholesterol  66     Hemoglobin A1C 6.0     Microalbumin, Urine <1.2               ECG 12 Lead    Date/Time: 9/18/2023 10:02 AM  Performed by: Anay  Olesay Bay MD  Authorized by: Olesya Cueva MD   Comparison: compared with previous ECG from 10/20/2022  Similar to previous ECG  Rhythm: sinus rhythm  Rate: normal  BPM: 70  ST Depression: II  QRS axis: normal  Other findings: non-specific ST-T wave changes    Clinical impression: non-specific ECG          Assessment and Plan   Diagnoses and all orders for this visit:    1. Chest pain, unspecified type (Primary)  Assessment & Plan:  Keep appointment with cardiologist as planned.  Go to ER if any worsening symptoms.     Orders:  -     Hemoglobin A1c  -     CBC & Differential  -     Comprehensive Metabolic Panel  -     High Sensitivity Troponin T; Future  -     ECG 12 Lead    2. Type 2 diabetes mellitus with unspecified complications  -     Hemoglobin A1c  -     CBC & Differential  -     Comprehensive Metabolic Panel  -     High Sensitivity Troponin T; Future    Other orders  -     Fluzone High-Dose 65+yrs             Follow Up   No follow-ups on file.  Patient was given instructions and counseling regarding his condition or for health maintenance advice. Please see specific information pulled into the AVS if appropriate.

## 2023-09-21 ENCOUNTER — HOSPITAL ENCOUNTER (OUTPATIENT)
Facility: HOSPITAL | Age: 78
Setting detail: OBSERVATION
Discharge: HOME | End: 2023-09-22
Attending: EMERGENCY MEDICINE | Admitting: EMERGENCY MEDICINE
Payer: MEDICARE

## 2023-09-21 ENCOUNTER — APPOINTMENT (OUTPATIENT)
Dept: GENERAL RADIOLOGY | Facility: HOSPITAL | Age: 78
End: 2023-09-21
Payer: MEDICARE

## 2023-09-21 DIAGNOSIS — R07.9 CHEST PAIN, UNSPECIFIED TYPE: Primary | ICD-10-CM

## 2023-09-21 LAB
ALBUMIN SERPL-MCNC: 4.1 G/DL (ref 3.5–5.2)
ALBUMIN/GLOB SERPL: 1.7 G/DL
ALP SERPL-CCNC: 91 U/L (ref 39–117)
ALT SERPL W P-5'-P-CCNC: 25 U/L (ref 1–41)
ANION GAP SERPL CALCULATED.3IONS-SCNC: 10 MMOL/L (ref 5–15)
AST SERPL-CCNC: 22 U/L (ref 1–40)
BASOPHILS # BLD MANUAL: 0.22 10*3/MM3 (ref 0–0.2)
BASOPHILS NFR BLD MANUAL: 2 % (ref 0–1.5)
BILIRUB SERPL-MCNC: 0.5 MG/DL (ref 0–1.2)
BUN SERPL-MCNC: 15 MG/DL (ref 8–23)
BUN/CREAT SERPL: 19 (ref 7–25)
CALCIUM SPEC-SCNC: 9.2 MG/DL (ref 8.6–10.5)
CHLORIDE SERPL-SCNC: 106 MMOL/L (ref 98–107)
CO2 SERPL-SCNC: 26 MMOL/L (ref 22–29)
CREAT SERPL-MCNC: 0.79 MG/DL (ref 0.76–1.27)
DEPRECATED RDW RBC AUTO: 48.6 FL (ref 37–54)
EGFRCR SERPLBLD CKD-EPI 2021: 89.8 ML/MIN/1.73
EOSINOPHIL # BLD MANUAL: 0.44 10*3/MM3 (ref 0–0.4)
EOSINOPHIL NFR BLD MANUAL: 4 % (ref 0.3–6.2)
ERYTHROCYTE [DISTWIDTH] IN BLOOD BY AUTOMATED COUNT: 17.2 % (ref 12.3–15.4)
GLOBULIN UR ELPH-MCNC: 2.4 GM/DL
GLUCOSE SERPL-MCNC: 121 MG/DL (ref 65–99)
HCT VFR BLD AUTO: 48.3 % (ref 37.5–51)
HGB BLD-MCNC: 15.6 G/DL (ref 13–17.7)
HOLD SPECIMEN: NORMAL
LYMPHOCYTES # BLD MANUAL: 1.1 10*3/MM3 (ref 0.7–3.1)
LYMPHOCYTES NFR BLD MANUAL: 3 % (ref 5–12)
MCH RBC QN AUTO: 24.8 PG (ref 26.6–33)
MCHC RBC AUTO-ENTMCNC: 32.4 G/DL (ref 31.5–35.7)
MCV RBC AUTO: 76.8 FL (ref 79–97)
MONOCYTES # BLD: 0.33 10*3/MM3 (ref 0.1–0.9)
NEUTROPHILS # BLD AUTO: 8.91 10*3/MM3 (ref 1.7–7)
NEUTROPHILS NFR BLD MANUAL: 81 % (ref 42.7–76)
NT-PROBNP SERPL-MCNC: 38.1 PG/ML (ref 0–1800)
PLAT MORPH BLD: NORMAL
PLATELET # BLD AUTO: 359 10*3/MM3 (ref 140–450)
PMV BLD AUTO: 8.2 FL (ref 6–12)
POTASSIUM SERPL-SCNC: 4 MMOL/L (ref 3.5–5.2)
PROT SERPL-MCNC: 6.5 G/DL (ref 6–8.5)
RBC # BLD AUTO: 6.29 10*6/MM3 (ref 4.14–5.8)
RBC MORPH BLD: NORMAL
SCAN SLIDE: NORMAL
SODIUM SERPL-SCNC: 142 MMOL/L (ref 136–145)
TROPONIN T SERPL HS-MCNC: 14 NG/L
VARIANT LYMPHS NFR BLD MANUAL: 2 % (ref 0–5)
VARIANT LYMPHS NFR BLD MANUAL: 8 % (ref 19.6–45.3)
WBC MORPH BLD: NORMAL
WBC NRBC COR # BLD: 11 10*3/MM3 (ref 3.4–10.8)

## 2023-09-21 PROCEDURE — 99284 EMERGENCY DEPT VISIT MOD MDM: CPT

## 2023-09-21 PROCEDURE — 96366 THER/PROPH/DIAG IV INF ADDON: CPT

## 2023-09-21 PROCEDURE — G0378 HOSPITAL OBSERVATION PER HR: HCPCS

## 2023-09-21 PROCEDURE — 25010000002 NITROGLYCERIN 5 MG/ML SOLUTION: Performed by: EMERGENCY MEDICINE

## 2023-09-21 PROCEDURE — 96365 THER/PROPH/DIAG IV INF INIT: CPT

## 2023-09-21 PROCEDURE — 93005 ELECTROCARDIOGRAM TRACING: CPT

## 2023-09-21 PROCEDURE — 85025 COMPLETE CBC W/AUTO DIFF WBC: CPT | Performed by: EMERGENCY MEDICINE

## 2023-09-21 PROCEDURE — 85007 BL SMEAR W/DIFF WBC COUNT: CPT | Performed by: EMERGENCY MEDICINE

## 2023-09-21 PROCEDURE — 83880 ASSAY OF NATRIURETIC PEPTIDE: CPT | Performed by: EMERGENCY MEDICINE

## 2023-09-21 PROCEDURE — 84484 ASSAY OF TROPONIN QUANT: CPT | Performed by: EMERGENCY MEDICINE

## 2023-09-21 PROCEDURE — 80053 COMPREHEN METABOLIC PANEL: CPT | Performed by: EMERGENCY MEDICINE

## 2023-09-21 PROCEDURE — 71045 X-RAY EXAM CHEST 1 VIEW: CPT

## 2023-09-21 RX ORDER — SODIUM CHLORIDE 0.9 % (FLUSH) 0.9 %
10 SYRINGE (ML) INJECTION AS NEEDED
Status: DISCONTINUED | OUTPATIENT
Start: 2023-09-21 | End: 2023-09-22 | Stop reason: HOSPADM

## 2023-09-21 RX ORDER — SODIUM CHLORIDE 0.9 % (FLUSH) 0.9 %
10 SYRINGE (ML) INJECTION EVERY 12 HOURS SCHEDULED
Status: DISCONTINUED | OUTPATIENT
Start: 2023-09-21 | End: 2023-09-22 | Stop reason: HOSPADM

## 2023-09-21 RX ORDER — AMOXICILLIN 250 MG
2 CAPSULE ORAL 2 TIMES DAILY
Status: DISCONTINUED | OUTPATIENT
Start: 2023-09-21 | End: 2023-09-22 | Stop reason: HOSPADM

## 2023-09-21 RX ORDER — NITROGLYCERIN 20 MG/100ML
10-50 INJECTION INTRAVENOUS
Status: DISCONTINUED | OUTPATIENT
Start: 2023-09-21 | End: 2023-09-22

## 2023-09-21 RX ORDER — ONDANSETRON 2 MG/ML
4 INJECTION INTRAMUSCULAR; INTRAVENOUS EVERY 6 HOURS PRN
Status: DISCONTINUED | OUTPATIENT
Start: 2023-09-21 | End: 2023-09-22 | Stop reason: HOSPADM

## 2023-09-21 RX ORDER — ACETAMINOPHEN 325 MG/1
650 TABLET ORAL EVERY 4 HOURS PRN
Status: DISCONTINUED | OUTPATIENT
Start: 2023-09-21 | End: 2023-09-22 | Stop reason: HOSPADM

## 2023-09-21 RX ORDER — ASPIRIN 325 MG
325 TABLET ORAL ONCE
Status: COMPLETED | OUTPATIENT
Start: 2023-09-21 | End: 2023-09-21

## 2023-09-21 RX ORDER — BISACODYL 5 MG/1
5 TABLET, DELAYED RELEASE ORAL DAILY PRN
Status: DISCONTINUED | OUTPATIENT
Start: 2023-09-21 | End: 2023-09-22 | Stop reason: HOSPADM

## 2023-09-21 RX ORDER — POLYETHYLENE GLYCOL 3350 17 G/17G
17 POWDER, FOR SOLUTION ORAL DAILY PRN
Status: DISCONTINUED | OUTPATIENT
Start: 2023-09-21 | End: 2023-09-22 | Stop reason: HOSPADM

## 2023-09-21 RX ORDER — CHOLECALCIFEROL (VITAMIN D3) 125 MCG
5 CAPSULE ORAL NIGHTLY PRN
Status: DISCONTINUED | OUTPATIENT
Start: 2023-09-21 | End: 2023-09-22 | Stop reason: HOSPADM

## 2023-09-21 RX ORDER — BISACODYL 10 MG
10 SUPPOSITORY, RECTAL RECTAL DAILY PRN
Status: DISCONTINUED | OUTPATIENT
Start: 2023-09-21 | End: 2023-09-22 | Stop reason: HOSPADM

## 2023-09-21 RX ORDER — SODIUM CHLORIDE 9 MG/ML
40 INJECTION, SOLUTION INTRAVENOUS AS NEEDED
Status: DISCONTINUED | OUTPATIENT
Start: 2023-09-21 | End: 2023-09-22 | Stop reason: HOSPADM

## 2023-09-21 RX ADMIN — ASPIRIN 325 MG: 325 TABLET ORAL at 14:18

## 2023-09-21 RX ADMIN — NITROGLYCERIN 10 MCG/MIN: 5 INJECTION, SOLUTION INTRAVENOUS at 14:25

## 2023-09-21 RX ADMIN — Medication 10 ML: at 22:49

## 2023-09-21 NOTE — ED PROVIDER NOTES
Subjective   History of Present Illness  78-year-old male [facility] for evaluation of chest pain.  The patient states that increasing pain over the last 2 weeks he says its been more s severe and more persistent.  He reports the pain is associated with shortness of breath consistently and occasionally with some nausea.  He reports no recent fever chills or cough.  He reports no any known injury to the chest wall.  He reports no right upper quadrant abdominal component    Review of Systems   Constitutional:  Positive for fatigue. Negative for chills and fever.   Respiratory:  Positive for chest tightness and shortness of breath. Negative for cough.    Cardiovascular:  Positive for chest pain and palpitations. Negative for leg swelling.   Gastrointestinal:  Positive for nausea.   Hematological:  Does not bruise/bleed easily.   All other systems reviewed and are negative.    Past Medical History:   Diagnosis Date    Arthritis     Dr Mosqueda    Coronary heart disease 01/2016    single vessel disease, nl stress test (copied from [facility])    Coronary heart disease 10/31/2017    cath 10/31/17 - Low % Blockages- N o Intervention   (copied from [facility])    Diverticulosis     Fatty liver     GERD (gastroesophageal reflux disease)     Hearing loss     Hyperlipemia     Hyperlipidemia     Hypertension     Low back pain     Neuropathy in diabetes     SIDDHARTHA treated with BiPAP     Pain management     injections Lumbar spine X2 with [facility] @ [facility] (copied from [facility])    Physical therapy evaluation, initial     @ [facility] in [place] 6 weeks x3 per week, [facility] (copied from [facility])    Rectal bleed 08/2012    hemorrhoids    Retinal hemorrhage of right eye 05/2014    Sleep apnea     bipap    Type 2 diabetes mellitus     Uses brace        No Known Allergies    Past Surgical History:   Procedure Laterality Date    BELPHAROPTOSIS REPAIR  12/11/2017     Dr. grimes (copied from  [facility])    BROW LIFT  2017    Dr. Trent at [facility]    CARDIAC CATHETERIZATION  2012    at [facility]- single vessel disease. (copied from [facility])    CARDIAC CATHETERIZATION  10/13/2017    no intervention - Low % Blockages.    CARDIAC CATHETERIZATION Left 2021    Procedure: LEFT HEART CATH with possible PCI;  Surgeon: Leslie Clark MD;  Location: [facility] CATH INVASIVE LOCATION;  Service: Cardiovascular;  Laterality: Left;  Local and IV sedation    CATARACT EXTRACTION  2017    brow lift and eye lid repair    COLON SURGERY  2014    colonscopy    COLONOSCOPY N/A 2019    Procedure: COLONOSCOPY with polypectomy x1;  Surgeon: Graham Byrd MD;  Location: [facility] ENDOSCOPY;  Service: Gastroenterology    CUBITAL TUNNEL RELEASE Right     HERNIA REPAIR  2008    umbilical hernia repair    JOINT REPLACEMENT      KNEE ARTHROSCOPY Right 2014    Dr. Panda VASQUEZ      lasik and Cataract Extraction, sep 11 and right 2014- [facility]. (copied from [facility])    TOTAL KNEE ARTHROPLASTY Left 2009    Dr. Mosqueda (copied from [facility])    TOTAL SHOULDER ARTHROPLASTY W/ DISTAL CLAVICLE EXCISION Left 2022    Procedure: TOTAL SHOULDER REVERSE ARTHROPLASTY;  Surgeon: Dany Knapp MD;  Location: [facility] MAIN OR;  Service: Orthopedics;  Laterality: Left;       Family History   Problem Relation Age of Onset    Heart disease Mother         CHF    Hypertension Mother     Heart failure Mother     Stroke Maternal Grandfather     Diabetes Other     Diabetes Other     Stroke Other     Arthritis Maternal Grandmother     Diabetes Brother        Social History     Socioeconomic History    Marital status:      Spouse name: Shani   Tobacco Use    Smoking status: Former     Packs/day: 1.50     Years: 30.00     Pack years: 45.00     Types: Cigarettes     Quit date: 2000     Years since quittin.7    Smokeless tobacco: Former   Vaping Use    Vaping Use: Never used    Substance and Sexual Activity    Alcohol use: No    Drug use: No    Sexual activity: Not Currently     Partners: Female       Reports no unusual food water travel or activity    Objective   Physical Exam  Alert Steph Coma Scale 15   HEENT: Pupils equal and reactive to light. Conjunctivae are not injected. Normal tympanic membranes. Oropharynx and nares are normal.   Neck: Supple. Midline trachea. No JVD. No goiter.   Chest: Clear and equal breath sounds bilaterally, regular rate and rhythm without murmur or rub.  Nontender chest wall no subcutaneous air crepitance   Abdomen: Positive bowel sounds, nontender, nondistended. No rebound or peritoneal signs. No CVA tenderness.   Extremities no clubbing. cyanosis or edema. Motor sensory exam is normal. The full range of motion is intact reproduction of the pain with shoulder range of motion   Skin: Warm and dry, no rashes or petechia.   Lymphatic: No regional lymphadenopathy. No calf pain, swelling or Homans sign    Procedures           ED Course           Labs Reviewed   COMPREHENSIVE METABOLIC PANEL - Abnormal; Notable for the following components:       Result Value    Glucose 121 (*)     All other components within normal limits    Narrative:     GFR Normal >60  Chronic Kidney Disease <60  Kidney Failure <15    The GFR formula is only valid for adults with stable renal function between ages 18 and 70.   CBC WITH AUTO DIFFERENTIAL - Abnormal; Notable for the following components:    WBC 11.00 (*)     RBC 6.29 (*)     MCV 76.8 (*)     MCH 24.8 (*)     RDW 17.2 (*)     All other components within normal limits   MANUAL DIFFERENTIAL - Abnormal; Notable for the following components:    Neutrophil % 81.0 (*)     Lymphocyte % 8.0 (*)     Monocyte % 3.0 (*)     Basophil % 2.0 (*)     Neutrophils Absolute 8.91 (*)     Eosinophils Absolute 0.44 (*)     Basophils Absolute 0.22 (*)     All other components within normal limits   SINGLE HSTROPONIN T - Normal    Narrative:      High Sensitive Troponin T Reference Range:  <10.0 ng/L- Negative Female for AMI  <15.0 ng/L- Negative Male for AMI  >=10 - Abnormal Female indicating possible myocardial injury.  >=15 - Abnormal Male indicating possible myocardial injury.   Clinicians would have to utilize clinical acumen, EKG, Troponin, and serial changes to determine if it is an Acute Myocardial Infarction or myocardial injury due to an underlying chronic condition.        BNP (IN-HOUSE) - Normal    Narrative:     Among patients with dyspnea, NT-proBNP is highly sensitive for the detection of acute congestive heart failure. In addition NT-proBNP of <300 pg/ml effectively rules out acute congestive heart failure with 99% negative predictive value.     SCAN SLIDE   CBC AND DIFFERENTIAL    Narrative:     The following orders were created for panel order CBC & Differential.  Procedure                               Abnormality         Status                     ---------                               -----------         ------                     CBC Auto Differential[[id number]]        Abnormal            Final result               Scan Slide[[id number]]                                       Final result                 Please view results for these tests on the individual orders.   GOLD TOP - SST   EXTRA TUBES    Narrative:     The following orders were created for panel order Extra Tubes.  Procedure                               Abnormality         Status                     ---------                               -----------         ------                     Gold Top - SST[[id number]]                                   Final result               Light Blue Top[[id number]]                                                                Please view results for these tests on the individual orders.   LIGHT BLUE TOP     Medications   nitroglycerin (TRIDIL) 200 mcg/ml infusion (20 mcg/min Intravenous Rate/Dose Change 9/21/23 9262)   aspirin tablet 325 mg  (325 mg Oral Given 9/21/23 1418)     XR Chest 1 View    Result Date: 9/21/2023  Impression: No acute process. Electronically Signed: Gagandeep Blum MD  9/21/2023 2:24 PM EDT  Workstation ID: [id number]                           Due to significant overcrowding in the emergency department patient was evaluated by myself in a hallway bed.  This examination might be limited by privacy concerns, noise, exposure issues, and the patient not wearing a hospital gown.  Explained to the patient our limitations and our overcrowding.  They were in agreement to continue the exam and treatment at this time.        The patient was eventually moved to a room there is no additional pain after initiation of nitroglycerin  Medical Decision Making  Patient will be admitted to observation area obtain serial troponin and EKG.  The patient will need cardiology evaluation a particular risk concern is more frequent and more severe discomfort chest x-ray showed borderline cardiomegaly    Amount and/or Complexity of Data Reviewed  Independent Historian: manuel  External Data Reviewed: notes.  Labs: ordered. Decision-making details documented in ED Course.  Radiology: ordered and independent interpretation performed.    Risk  OTC drugs.  Prescription drug management.  Decision regarding hospitalization.        Final diagnoses:   Chest pain, unspecified type       ED Disposition  ED Disposition       ED Disposition   Decision to Admit    Condition   --    Comment   --               No follow-up provider specified.       Medication List      No changes were made to your prescriptions during this visit.            Audie Franco MD  09/21/23 2001

## 2023-09-22 ENCOUNTER — APPOINTMENT (OUTPATIENT)
Dept: NUCLEAR MEDICINE | Facility: HOSPITAL | Age: 78
End: 2023-09-22
Payer: MEDICARE

## 2023-09-22 ENCOUNTER — READMISSION MANAGEMENT (OUTPATIENT)
Dept: CALL CENTER | Facility: HOSPITAL | Age: 78
End: 2023-09-22
Payer: MEDICARE

## 2023-09-22 VITALS
WEIGHT: 237.66 LBS | TEMPERATURE: 98 F | OXYGEN SATURATION: 95 % | SYSTOLIC BLOOD PRESSURE: 154 MMHG | HEART RATE: 66 BPM | HEIGHT: 68 IN | DIASTOLIC BLOOD PRESSURE: 82 MMHG | RESPIRATION RATE: 18 BRPM | BODY MASS INDEX: 36.02 KG/M2

## 2023-09-22 LAB
ANION GAP SERPL CALCULATED.3IONS-SCNC: 10 MMOL/L (ref 5–15)
BASOPHILS # BLD AUTO: 0.2 10*3/MM3 (ref 0–0.2)
BASOPHILS NFR BLD AUTO: 1.9 % (ref 0–1.5)
BH CV NUCLEAR PRIOR STUDY: 3
BH CV REST NUCLEAR ISOTOPE DOSE: 11 MCI
BH CV STRESS BP STAGE 1: NORMAL
BH CV STRESS BP STAGE 2: NORMAL
BH CV STRESS BP STAGE 3: NORMAL
BH CV STRESS BP STAGE 4: NORMAL
BH CV STRESS COMMENTS STAGE 1: NORMAL
BH CV STRESS COMMENTS STAGE 2: NORMAL
BH CV STRESS DOSE REGADENOSON STAGE 1: 0.4
BH CV STRESS DURATION MIN STAGE 1: 0
BH CV STRESS DURATION MIN STAGE 2: 4
BH CV STRESS DURATION SEC STAGE 1: 10
BH CV STRESS DURATION SEC STAGE 2: 0
BH CV STRESS HR STAGE 1: 84
BH CV STRESS HR STAGE 2: 85
BH CV STRESS HR STAGE 3: 84
BH CV STRESS HR STAGE 4: 81
BH CV STRESS NUCLEAR ISOTOPE DOSE: 33 MCI
BH CV STRESS PROTOCOL 1: NORMAL
BH CV STRESS RECOVERY BP: NORMAL MMHG
BH CV STRESS RECOVERY HR: 74 BPM
BH CV STRESS STAGE 1: 1
BH CV STRESS STAGE 2: 2
BH CV STRESS STAGE 3: 3
BH CV STRESS STAGE 4: 4
BUN SERPL-MCNC: 18 MG/DL (ref 8–23)
BUN/CREAT SERPL: 18.9 (ref 7–25)
CALCIUM SPEC-SCNC: 8.9 MG/DL (ref 8.6–10.5)
CHLORIDE SERPL-SCNC: 106 MMOL/L (ref 98–107)
CHOLEST SERPL-MCNC: 113 MG/DL (ref 0–200)
CO2 SERPL-SCNC: 26 MMOL/L (ref 22–29)
CREAT SERPL-MCNC: 0.95 MG/DL (ref 0.76–1.27)
DEPRECATED RDW RBC AUTO: 48.1 FL (ref 37–54)
EGFRCR SERPLBLD CKD-EPI 2021: 80.9 ML/MIN/1.73
EOSINOPHIL # BLD AUTO: 0.5 10*3/MM3 (ref 0–0.4)
EOSINOPHIL NFR BLD AUTO: 4.6 % (ref 0.3–6.2)
ERYTHROCYTE [DISTWIDTH] IN BLOOD BY AUTOMATED COUNT: 17 % (ref 12.3–15.4)
GLUCOSE BLDC GLUCOMTR-MCNC: 118 MG/DL (ref 70–105)
GLUCOSE BLDC GLUCOMTR-MCNC: 118 MG/DL (ref 70–105)
GLUCOSE SERPL-MCNC: 124 MG/DL (ref 65–99)
HCT VFR BLD AUTO: 47.4 % (ref 37.5–51)
HDLC SERPL-MCNC: 28 MG/DL (ref 40–60)
HGB BLD-MCNC: 15.2 G/DL (ref 13–17.7)
LDLC SERPL CALC-MCNC: 54 MG/DL (ref 0–100)
LDLC/HDLC SERPL: 1.73 {RATIO}
LV EF NUC BP: 67 %
LYMPHOCYTES # BLD AUTO: 1.6 10*3/MM3 (ref 0.7–3.1)
LYMPHOCYTES NFR BLD AUTO: 15.5 % (ref 19.6–45.3)
MAXIMAL PREDICTED HEART RATE: 140 BPM
MCH RBC QN AUTO: 24.8 PG (ref 26.6–33)
MCHC RBC AUTO-ENTMCNC: 32.2 G/DL (ref 31.5–35.7)
MCV RBC AUTO: 77 FL (ref 79–97)
MONOCYTES # BLD AUTO: 0.7 10*3/MM3 (ref 0.1–0.9)
MONOCYTES NFR BLD AUTO: 6.5 % (ref 5–12)
NEUTROPHILS NFR BLD AUTO: 7.5 10*3/MM3 (ref 1.7–7)
NEUTROPHILS NFR BLD AUTO: 71.5 % (ref 42.7–76)
NRBC BLD AUTO-RTO: 0.1 /100 WBC (ref 0–0.2)
PERCENT MAX PREDICTED HR: 57.86 %
PLATELET # BLD AUTO: 347 10*3/MM3 (ref 140–450)
PMV BLD AUTO: 8.4 FL (ref 6–12)
POTASSIUM SERPL-SCNC: 3.9 MMOL/L (ref 3.5–5.2)
QT INTERVAL: 382 MS
QTC INTERVAL: 412 MS
RBC # BLD AUTO: 6.15 10*6/MM3 (ref 4.14–5.8)
SODIUM SERPL-SCNC: 142 MMOL/L (ref 136–145)
STRESS BASELINE BP: NORMAL MMHG
STRESS BASELINE HR: 67 BPM
STRESS PERCENT HR: 68 %
STRESS POST PEAK BP: NORMAL MMHG
STRESS POST PEAK HR: 81 BPM
STRESS TARGET HR: 119 BPM
TRIGL SERPL-MCNC: 183 MG/DL (ref 0–150)
TROPONIN T SERPL HS-MCNC: 11 NG/L
VLDLC SERPL-MCNC: 31 MG/DL (ref 5–40)
WBC NRBC COR # BLD: 10.5 10*3/MM3 (ref 3.4–10.8)

## 2023-09-22 PROCEDURE — 78452 HT MUSCLE IMAGE SPECT MULT: CPT

## 2023-09-22 PROCEDURE — 85025 COMPLETE CBC W/AUTO DIFF WBC: CPT | Performed by: EMERGENCY MEDICINE

## 2023-09-22 PROCEDURE — G0378 HOSPITAL OBSERVATION PER HR: HCPCS

## 2023-09-22 PROCEDURE — 93017 CV STRESS TEST TRACING ONLY: CPT

## 2023-09-22 PROCEDURE — 82948 REAGENT STRIP/BLOOD GLUCOSE: CPT

## 2023-09-22 PROCEDURE — 0 TECHNETIUM TETROFOSMIN KIT: Performed by: EMERGENCY MEDICINE

## 2023-09-22 PROCEDURE — 96366 THER/PROPH/DIAG IV INF ADDON: CPT

## 2023-09-22 PROCEDURE — 99214 OFFICE O/P EST MOD 30 MIN: CPT | Performed by: INTERNAL MEDICINE

## 2023-09-22 PROCEDURE — 80048 BASIC METABOLIC PNL TOTAL CA: CPT | Performed by: EMERGENCY MEDICINE

## 2023-09-22 PROCEDURE — 78452 HT MUSCLE IMAGE SPECT MULT: CPT | Performed by: INTERNAL MEDICINE

## 2023-09-22 PROCEDURE — 80061 LIPID PANEL: CPT | Performed by: EMERGENCY MEDICINE

## 2023-09-22 PROCEDURE — 84484 ASSAY OF TROPONIN QUANT: CPT | Performed by: EMERGENCY MEDICINE

## 2023-09-22 PROCEDURE — A9502 TC99M TETROFOSMIN: HCPCS | Performed by: EMERGENCY MEDICINE

## 2023-09-22 PROCEDURE — 93018 CV STRESS TEST I&R ONLY: CPT | Performed by: INTERNAL MEDICINE

## 2023-09-22 PROCEDURE — 25010000002 REGADENOSON 0.4 MG/5ML SOLUTION: Performed by: EMERGENCY MEDICINE

## 2023-09-22 RX ORDER — DEXTROSE MONOHYDRATE 25 G/50ML
25 INJECTION, SOLUTION INTRAVENOUS
Status: DISCONTINUED | OUTPATIENT
Start: 2023-09-22 | End: 2023-09-22 | Stop reason: HOSPADM

## 2023-09-22 RX ORDER — NICOTINE POLACRILEX 4 MG
15 LOZENGE BUCCAL
Status: DISCONTINUED | OUTPATIENT
Start: 2023-09-22 | End: 2023-09-22 | Stop reason: HOSPADM

## 2023-09-22 RX ORDER — HYDROCODONE BITARTRATE AND ACETAMINOPHEN 7.5; 325 MG/1; MG/1
1 TABLET ORAL EVERY 6 HOURS PRN
Qty: 12 TABLET | Refills: 0 | Status: SHIPPED | OUTPATIENT
Start: 2023-09-22

## 2023-09-22 RX ORDER — DONEPEZIL HYDROCHLORIDE 5 MG/1
5 TABLET, FILM COATED ORAL NIGHTLY
Status: DISCONTINUED | OUTPATIENT
Start: 2023-09-22 | End: 2023-09-22 | Stop reason: HOSPADM

## 2023-09-22 RX ORDER — GABAPENTIN 300 MG/1
300 CAPSULE ORAL 2 TIMES DAILY
Status: DISCONTINUED | OUTPATIENT
Start: 2023-09-22 | End: 2023-09-22 | Stop reason: HOSPADM

## 2023-09-22 RX ORDER — IBUPROFEN 600 MG/1
1 TABLET ORAL
Status: DISCONTINUED | OUTPATIENT
Start: 2023-09-22 | End: 2023-09-22 | Stop reason: HOSPADM

## 2023-09-22 RX ORDER — INSULIN LISPRO 100 [IU]/ML
2-9 INJECTION, SOLUTION INTRAVENOUS; SUBCUTANEOUS
Status: DISCONTINUED | OUTPATIENT
Start: 2023-09-22 | End: 2023-09-22 | Stop reason: HOSPADM

## 2023-09-22 RX ORDER — HYDROCODONE BITARTRATE AND ACETAMINOPHEN 7.5; 325 MG/1; MG/1
1 TABLET ORAL EVERY 6 HOURS PRN
Status: DISCONTINUED | OUTPATIENT
Start: 2023-09-22 | End: 2023-09-22 | Stop reason: HOSPADM

## 2023-09-22 RX ORDER — ATORVASTATIN CALCIUM 20 MG/1
20 TABLET, FILM COATED ORAL DAILY
Status: DISCONTINUED | OUTPATIENT
Start: 2023-09-22 | End: 2023-09-22 | Stop reason: HOSPADM

## 2023-09-22 RX ORDER — LISINOPRIL 5 MG/1
10 TABLET ORAL DAILY
Status: DISCONTINUED | OUTPATIENT
Start: 2023-09-22 | End: 2023-09-22 | Stop reason: HOSPADM

## 2023-09-22 RX ORDER — PANTOPRAZOLE SODIUM 40 MG/1
40 TABLET, DELAYED RELEASE ORAL DAILY
Status: DISCONTINUED | OUTPATIENT
Start: 2023-09-22 | End: 2023-09-22 | Stop reason: HOSPADM

## 2023-09-22 RX ORDER — REGADENOSON 0.08 MG/ML
0.4 INJECTION, SOLUTION INTRAVENOUS
Status: COMPLETED | OUTPATIENT
Start: 2023-09-22 | End: 2023-09-22

## 2023-09-22 RX ORDER — ASPIRIN 81 MG/1
81 TABLET ORAL DAILY
Status: DISCONTINUED | OUTPATIENT
Start: 2023-09-22 | End: 2023-09-22 | Stop reason: HOSPADM

## 2023-09-22 RX ADMIN — ASPIRIN 81 MG: 81 TABLET, COATED ORAL at 09:49

## 2023-09-22 RX ADMIN — SENNOSIDES AND DOCUSATE SODIUM 2 TABLET: 50; 8.6 TABLET ORAL at 09:49

## 2023-09-22 RX ADMIN — Medication 10 ML: at 08:58

## 2023-09-22 RX ADMIN — PANTOPRAZOLE SODIUM 40 MG: 40 TABLET, DELAYED RELEASE ORAL at 09:50

## 2023-09-22 RX ADMIN — GABAPENTIN 300 MG: 300 CAPSULE ORAL at 09:50

## 2023-09-22 RX ADMIN — TETROFOSMIN 1 DOSE: 1.38 INJECTION, POWDER, LYOPHILIZED, FOR SOLUTION INTRAVENOUS at 14:30

## 2023-09-22 RX ADMIN — REGADENOSON 0.4 MG: 0.08 INJECTION, SOLUTION INTRAVENOUS at 15:30

## 2023-09-22 RX ADMIN — ATORVASTATIN CALCIUM 20 MG: 20 TABLET, FILM COATED ORAL at 09:50

## 2023-09-22 RX ADMIN — TETROFOSMIN 1 DOSE: 1.38 INJECTION, POWDER, LYOPHILIZED, FOR SOLUTION INTRAVENOUS at 15:30

## 2023-09-22 RX ADMIN — LISINOPRIL 10 MG: 5 TABLET ORAL at 09:54

## 2023-09-22 NOTE — CASE MANAGEMENT/SOCIAL WORK
Discharge Planning Assessment  [facility]     Patient Name: Heri Vasquez  MRN: [id number]  Today's Date: 9/22/2023    Admit Date: 9/21/2023    Plan: Return home with spouse.   Discharge Needs Assessment       Row Name 09/22/23 1215       Living Environment    People in Home spouse    Name(s) of People in Home Dakota Mcleod    Current Living Arrangements home    Potentially Unsafe Housing Conditions none    Primary Care Provided by self    Provides Primary Care For no one    Family Caregiver if Needed spouse    Family Caregiver Names Dakota Mcleod    Quality of Family Relationships helpful;involved;supportive    Able to Return to Prior Arrangements yes       Resource/Environmental Concerns    Resource/Environmental Concerns none    Transportation Concerns none       Transition Planning    Patient/Family Anticipates Transition to home with family    Patient/Family Anticipated Services at Transition none    Transportation Anticipated car, drives self;family or friend will provide       Discharge Needs Assessment    Readmission Within the Last 30 Days no previous admission in last 30 days    Equipment Currently Used at Home grab bar;walker, rolling;bipap  Bipap from [facility]    Concerns to be Addressed denies needs/concerns at this time    Provided Post Acute Provider List? N/A    Provided Post Acute Provider Quality & Resource List? N/A                   Discharge Plan       Row Name 09/22/23 1221       Plan    Plan Return home with spouse.    Plan Comments CM met with patient at the bedside. Confirmed PCP, insurance, and pharmacy. Patient denies any difficulty affording medications. Patient is not current with any HHC/OPPT/OT services. Patient lives at home with spouse.Patient is independent with ADLS. Patient drives self and will provide his own transportation at discharge. Patient denies further needs from CM at this time. DC barriers: Nitro gtt.                   Demographic Summary       Row Name 09/22/23 1212       General  Information    Admission Type observation    Arrived From emergency department    Referral Source admission list    Reason for Consult care coordination/care conference;discharge planning    Preferred Language English       Contact Information    Permission Granted to Share Info With     Contact Information Obtained for                    Functional Status       Row Name 09/22/23 1213       Functional Status    Usual Activity Tolerance moderate    Current Activity Tolerance moderate       Physical Activity    On average, how many days per week do you engage in moderate to strenuous exercise (like a brisk walk)? 0 days    On average, how many minutes do you engage in exercise at this level? 0 min    Number of minutes of exercise per week 0       Functional Status, IADL    Medications independent    Meal Preparation independent    Housekeeping independent    Laundry independent    Shopping independent       Mental Status Summary    Recent Changes in Mental Status/Cognitive Functioning no changes                Miguel Rios RN     Office Phone: [phone]

## 2023-09-22 NOTE — PLAN OF CARE
Problem: Adult Inpatient Plan of Care  Goal: Absence of Hospital-Acquired Illness or Injury  Intervention: Identify and Manage Fall Risk  Recent Flowsheet Documentation  Taken 9/22/2023 1648 by Ria Singh RN  Safety Promotion/Fall Prevention:   safety round/check completed   room organization consistent   nonskid shoes/slippers when out of bed   fall prevention program maintained   clutter free environment maintained   assistive device/personal items within reach   activity supervised  Taken 9/22/2023 1400 by Ria Singh RN  Safety Promotion/Fall Prevention:   safety round/check completed   room organization consistent   nonskid shoes/slippers when out of bed   fall prevention program maintained   clutter free environment maintained   assistive device/personal items within reach   activity supervised  Taken 9/22/2023 1210 by Ria Singh RN  Safety Promotion/Fall Prevention:   safety round/check completed   room organization consistent   nonskid shoes/slippers when out of bed   fall prevention program maintained   clutter free environment maintained   assistive device/personal items within reach   activity supervised  Taken 9/22/2023 1000 by Ria Singh RN  Safety Promotion/Fall Prevention:   safety round/check completed   room organization consistent   nonskid shoes/slippers when out of bed   fall prevention program maintained   clutter free environment maintained   assistive device/personal items within reach   activity supervised  Taken 9/22/2023 0729 by Ria Singh RN  Safety Promotion/Fall Prevention:   safety round/check completed   room organization consistent   nonskid shoes/slippers when out of bed   fall prevention program maintained   clutter free environment maintained   assistive device/personal items within reach   activity supervised  Intervention: Prevent and Manage VTE (Venous Thromboembolism) Risk  Recent Flowsheet Documentation  Taken 9/22/2023 0729 by Ria Singh RN  Activity  Management:   dorsiflexion/plantar flexion performed   ambulated in room  Intervention: Prevent Infection  Recent Flowsheet Documentation  Taken 9/22/2023 1648 by Ria Singh RN  Infection Prevention:   environmental surveillance performed   equipment surfaces disinfected   hand hygiene promoted   personal protective equipment utilized   rest/sleep promoted   single patient room provided  Taken 9/22/2023 1400 by Ria Singh RN  Infection Prevention:   equipment surfaces disinfected   environmental surveillance performed   hand hygiene promoted   single patient room provided   personal protective equipment utilized   rest/sleep promoted  Taken 9/22/2023 1210 by Ria Singh RN  Infection Prevention:   single patient room provided   rest/sleep promoted   personal protective equipment utilized   hand hygiene promoted   equipment surfaces disinfected   environmental surveillance performed  Taken 9/22/2023 1000 by Ria Singh RN  Infection Prevention:   environmental surveillance performed   equipment surfaces disinfected   hand hygiene promoted   personal protective equipment utilized   rest/sleep promoted   single patient room provided  Taken 9/22/2023 0729 by Ria Singh RN  Infection Prevention:   single patient room provided   rest/sleep promoted   personal protective equipment utilized   hand hygiene promoted   environmental surveillance performed   equipment surfaces disinfected  Goal: Optimal Comfort and Wellbeing  Intervention: Provide Person-Centered Care  Recent Flowsheet Documentation  Taken 9/22/2023 1210 by Ria Singh RN  Trust Relationship/Rapport:   care explained   thoughts/feelings acknowledged   questions encouraged   questions answered  Taken 9/22/2023 0729 by Ria Singh RN  Trust Relationship/Rapport:   care explained   thoughts/feelings acknowledged   questions answered   questions encouraged     Problem: Osteoarthritis Comorbidity  Goal: Maintenance of Osteoarthritis Symptom  Control  Intervention: Maintain Osteoarthritis Symptom Control  Recent Flowsheet Documentation  Taken 9/22/2023 0729 by Ria Singh, RN  Activity Management:   dorsiflexion/plantar flexion performed   ambulated in room   Goal Outcome Evaluation:

## 2023-09-22 NOTE — OUTREACH NOTE
Prep Survey      Flowsheet Row Responses   [facility] patient discharged from? [facility]   Is LACE score < 7 ? No   Eligibility [facility]   Date of Admission 09/21/23   Date of Discharge 09/22/23   Discharge Disposition Home or Self Care   Discharge diagnosis Chest pain   Does the patient have one of the following disease processes/diagnoses(primary or secondary)? Other   Does the patient have Home health ordered? No   Is there a DME ordered? No   Prep survey completed? Yes            Anna BORGES - Registered Nurse

## 2023-09-22 NOTE — CONSULTS
"            Cardiology Consult Note      REQUESTING PHYSICIAN    Audie Franco MD    PATIENT IDENTIFICATION  Name: Heri Vasquez  Age: 78 y.o.  Sex: male  :  1945  MRN: [id number]             REASON FOR CONSULTATION: Chest pain    History of present illness  78 years old man with hypertension, hyperlipidemia, diabetes, sleep apnea on BiPAP, known nonobstructive coronary disease per previous cardiac catheterization in  presented to the hospital with chest pain.  Due to multiple risk factors cardiology has been consulted for further evaluation and management.  He complains of worsening pain in the right scapular area as well as right side of the chest.  Reports increased discomfort with palpation but no significant worsening with movement.  He reports that his legs feel wobbly which made him come to the emergency room.  He has mild shortness of breath but no fevers chills or cough.  He denies any dizziness, lightheadedness or lower extremity edema.  He was started on nitroglycerin drip        REVIEW OF SYSTEMS:      Constitutional: + weakness,fatigue, fever, rigors, chills   Eyes: No vision changes, eye pain   ENT/oropharynx: No difficulty swallowing, sore throat, epistaxis, changes in hearing   Cardiovascular: + chest pain, chest tightness, palpitations, paroxysmal nocturnal dyspnea, orthopnea, diaphoresis, dizziness / syncopal episode   Respiratory: No shortness of breath, dyspnea on exertion, cough, wheezing hemoptysis   Gastrointestinal: No abdominal pain, nausea, vomiting, diarrhea, bloody stools   Genitourinary: No hematuria, dysuria   Neurological: No headache, tremors, numbness,  one-sided weakness    Musculoskeletal: No cramps, myalgias,  joint pain, joint swelling   Integument: No rash, edema           Vital Signs  Visit Vitals  /82 (BP Location: Right arm, Patient Position: Lying)   Pulse 66   Temp 98 °F (36.7 °C) (Oral)   Resp 18   Ht 172.7 cm (68\")   Wt 108 kg (237 lb 10.5 oz)   SpO2 " "95%   BMI 36.14 kg/m²     Oxygen Therapy  SpO2: 95 %  Pulse Oximetry Type: Continuous  Device (Oxygen Therapy): room air  Flowsheet Rows      Flowsheet Row First Filed Value   Admission Height 172.7 cm (68\") Documented at 09/21/2023 1214   Admission Weight 102 kg (225 lb) Documented at 09/21/2023 1214          Intake & Output (last 3 days)         09/19 0701  09/20 0700 09/20 0701  09/21 0700 09/21 0701  09/22 0700 09/22 0701  09/23 0700            Urine Unmeasured Occurrence   1 x           Lines, Drains & Airways       Active LDAs       Name Placement date Placement time Site Days    Peripheral IV 09/21/23 1406 Left Antecubital 09/21/23  1406  Antecubital  1                    MEDICAL HISTORY    Past Medical History:   Diagnosis Date    Arthritis     Dr Mosqueda    Coronary heart disease 01/2016    single vessel disease, nl stress test (copied from [email or web address])    Coronary heart disease 10/31/2017    cath 10/31/17 - Low % Blockages- N o Intervention   (copied from [email or web address])    Diverticulosis     Fatty liver     GERD (gastroesophageal reflux disease)     Hearing loss     Hyperlipemia     Hyperlipidemia     Hypertension     Low back pain     Neuropathy in diabetes     SIDDHARTHA treated with BiPAP     Pain management     injections Lumbar spine X2 with [facility] @ [facility] (copied from [email or web address])    Physical therapy evaluation, initial     @ [facility] in [place] 6 weeks x3 per week, Kalen leal (copied from [email or web address])    Rectal bleed 08/2012    hemorrhoids    Retinal hemorrhage of right eye 05/2014    Sleep apnea     bipap    Type 2 diabetes mellitus     Uses brace         SURGICAL HISTORY    Past Surgical History:   Procedure Laterality Date    BELPHAROPTOSIS REPAIR  12/11/2017     Dr. grimes (copied from [email or web address])    BROW LIFT  12/11/2017    Dr. Grimes at [facility]    CARDIAC CATHETERIZATION  03/2012    at [facility]- single vessel disease. (copied from [email or web address])    CARDIAC CATHETERIZATION  10/13/2017    no intervention " "- Low % Blockages.    CARDIAC CATHETERIZATION Left 2021    Procedure: LEFT HEART CATH with possible PCI;  Surgeon: Leslie Clark MD;  Location: [facility] CATH INVASIVE LOCATION;  Service: Cardiovascular;  Laterality: Left;  Local and IV sedation    CATARACT EXTRACTION  2017    brow lift and eye lid repair    COLON SURGERY  2014    colonscopy    COLONOSCOPY N/A 2019    Procedure: COLONOSCOPY with polypectomy x1;  Surgeon: Graham Byrd MD;  Location: [facility] ENDOSCOPY;  Service: Gastroenterology    CUBITAL TUNNEL RELEASE Right     HERNIA REPAIR  2008    umbilical hernia repair    JOINT REPLACEMENT      KNEE ARTHROSCOPY Right 2014    Dr. Mosqueda    LASIK      lasik and Cataract Extraction, sep 11 and right 2014- [facility]. (copied from [employer])    TOTAL KNEE ARTHROPLASTY Left 2009    Dr. Mosqueda (copied from [employer])    TOTAL SHOULDER ARTHROPLASTY W/ DISTAL CLAVICLE EXCISION Left 2022    Procedure: TOTAL SHOULDER REVERSE ARTHROPLASTY;  Surgeon: Dany Knapp MD;  Location: [facility] MAIN OR;  Service: Orthopedics;  Laterality: Left;        FAMILY HISTORY    Family History   Problem Relation Age of Onset    Heart disease Mother         CHF    Hypertension Mother     Heart failure Mother     Stroke Maternal Grandfather     Diabetes Other     Diabetes Other     Stroke Other     Arthritis Maternal Grandmother     Diabetes Brother        SOCIAL HISTORY    Social History     Tobacco Use    Smoking status: Former     Packs/day: 1.50     Years: 30.00     Pack years: 45.00     Types: Cigarettes     Quit date: 2000     Years since quittin.7    Smokeless tobacco: Former   Substance Use Topics    Alcohol use: No        ALLERGIES    No Known Allergies           /82 (BP Location: Right arm, Patient Position: Lying)   Pulse 66   Temp 98 °F (36.7 °C) (Oral)   Resp 18   Ht 172.7 cm (68\")   Wt 108 kg (237 lb 10.5 oz)   SpO2 95%   BMI 36.14 kg/m² "   Intake/Output last 3 shifts:  No intake/output data recorded.  Intake/Output this shift:  No intake/output data recorded.    PHYSICAL EXAM:  The patient is alert, oriented and in no distress.  Vital signs as noted above.  Head and neck revealed no carotid bruits or jugular venous distention.  No thyromegaly or lymph adenopathy is present  Lungs clear.  No wheezing.  Breath sounds are normal bilaterally.  Heart normal first and second heart sounds.No murmur.  No precordial rub is present.  No gallop is present.  Abdomen soft and nontender.  No organomegaly is present.  Extremities with good peripheral pulses without any pedal edema.  Skin warm and dry.  Musculoskeletal system is grossly normal  CNS grossly normal        Scheduled Meds:      aspirin, 81 mg, Oral, Daily  atorvastatin, 20 mg, Oral, Daily  donepezil, 5 mg, Oral, Nightly  gabapentin, 300 mg, Oral, BID  insulin lispro, 2-9 Units, Subcutaneous, 4x Daily AC & at Bedtime  lisinopril, 10 mg, Oral, Daily  pantoprazole, 40 mg, Oral, Daily  [COMPLETED] regadenoson, 0.4 mg, Intravenous, Once in imaging  senna-docusate sodium, 2 tablet, Oral, BID  sodium chloride, 10 mL, Intravenous, Q12H  [COMPLETED] technetium tetrofosmin, 1 dose, Intravenous, Once in imaging        Continuous Infusions:         PRN Meds:      acetaminophen    senna-docusate sodium **AND** polyethylene glycol **AND** bisacodyl **AND** bisacodyl    dextrose    dextrose    glucagon (human recombinant)    HYDROcodone-acetaminophen    melatonin    ondansetron    sodium chloride    sodium chloride        Results Review:     I reviewed the patient's new clinical results.    CBC    Results from last 7 days   Lab Units 09/22/23  0314 09/21/23  1407 09/18/23  1000   WBC 10*3/mm3 10.50 11.00* 10.27   HEMOGLOBIN g/dL 15.2 15.6 15.7   PLATELETS 10*3/mm3 347 359 386     Cr Clearance Estimated Creatinine Clearance: 73.9 mL/min (by C-G formula based on SCr of 0.95 mg/dL).  Coag     HbA1C   Lab Results    Component Value Date    HGBA1C 5.90 (H) 09/18/2023    HGBA1C 6.0 (H) 02/21/2023    HGBA1C 6.1 (H) 07/26/2022     Blood Glucose   Glucose   Date/Time Value Ref Range Status   09/22/2023 1146 118 (H) 70 - 105 mg/dL Final     Comment:     Serial Number: [id number]:  [id number]   09/22/2023 0740 118 (H) 70 - 105 mg/dL Final     Comment:     Serial Number: [id number]:  [id number]     Infection     CMP   Results from last 7 days   Lab Units 09/22/23  0314 09/21/23  1407 09/18/23  1000   SODIUM mmol/L 142 142 140   POTASSIUM mmol/L 3.9 4.0 4.0   CHLORIDE mmol/L 106 106 104   CO2 mmol/L 26.0 26.0 21.3*   BUN mg/dL 18 15 15   CREATININE mg/dL 0.95 0.79 0.91   GLUCOSE mg/dL 124* 121* 134*   ALBUMIN g/dL  --  4.1 4.1   BILIRUBIN mg/dL  --  0.5 0.4   ALK PHOS U/L  --  91 87   AST (SGOT) U/L  --  22 20   ALT (SGPT) U/L  --  25 23     ABG      UA      ANGÉLICA  No results found for: POCMETH, POCAMPHET, POCBARBITUR, POCBENZO, POCCOCAINE, POCOPIATES, POCOXYCODO, POCPHENCYC, POCPROPOXY, POCTHC, POCTRICYC  Lysis Labs   Results from last 7 days   Lab Units 09/22/23  0314 09/21/23  1407 09/18/23  1000   HEMOGLOBIN g/dL 15.2 15.6 15.7   PLATELETS 10*3/mm3 347 359 386   CREATININE mg/dL 0.95 0.79 0.91     Radiology(recent) XR Chest 1 View    Result Date: 9/21/2023  Impression: No acute process. Electronically Signed: Gagandeep Blum MD  9/21/2023 2:24 PM EDT  Workstation ID: [id number]       Results from last 7 days   Lab Units 09/22/23  0314   HSTROP T ng/L 11       Xrays, labs reviewed personally by physician.    ECG/EMG Results (most recent)       Procedure Component Value Units Date/Time    ECG 12 Lead Chest Pain [[id number]] Collected: 09/21/23 1226     Updated: 09/22/23 0621     QT Interval 382 ms      QTC Interval 412 ms     Narrative:      HEART RATE= 70  bpm  RR Interval= 860  ms  AL Interval= 147  ms  P Horizontal Axis= -4  deg  P Front Axis= 31  deg  QRSD Interval= 95  ms  QT Interval= 382  ms  QTcB= 412  ms  QRS Axis= -42   deg  T Wave Axis= 41  deg  - OTHERWISE NORMAL ECG -  Sinus rhythm  Left axis deviation  When compared with ECG of 08-Apr-2022 10:20:44,  No significant change  Electronically Signed By: Audie Fracno (Mehdi) 22-Sep-2023 06:21:43  Date and Time of Study: 2023-09-21 12:26:33              Medication Review:   I have reviewed the patient's current medication list  Scheduled Meds:aspirin, 81 mg, Oral, Daily  atorvastatin, 20 mg, Oral, Daily  donepezil, 5 mg, Oral, Nightly  gabapentin, 300 mg, Oral, BID  insulin lispro, 2-9 Units, Subcutaneous, 4x Daily AC & at Bedtime  lisinopril, 10 mg, Oral, Daily  pantoprazole, 40 mg, Oral, Daily  [COMPLETED] regadenoson, 0.4 mg, Intravenous, Once in imaging  senna-docusate sodium, 2 tablet, Oral, BID  sodium chloride, 10 mL, Intravenous, Q12H  [COMPLETED] technetium tetrofosmin, 1 dose, Intravenous, Once in imaging      Continuous Infusions:   PRN Meds:.  acetaminophen    senna-docusate sodium **AND** polyethylene glycol **AND** bisacodyl **AND** bisacodyl    dextrose    dextrose    glucagon (human recombinant)    HYDROcodone-acetaminophen    melatonin    ondansetron    sodium chloride    sodium chloride    Imaging:  Imaging Results (Last 72 Hours)       Procedure Component Value Units Date/Time    XR Chest 1 View [[id number]] Collected: 09/21/23 1423     Updated: 09/21/23 1426    Narrative:      XR CHEST 1 VW    Date of Exam: 9/21/2023 2:00 PM EDT    Indication: chest pain    Comparison: CT chest 3/23/2023    Findings:  Stable top normal heart size. Lungs are without consolidation. No pneumothorax or pleural effusion. Mild scarring at the left lung base unchanged. Left reverse shoulder prosthesis partially imaged. Aortic arch vascular calcifications.      Impression:      Impression:  No acute process.        Electronically Signed: Gagandeep Blum MD    9/21/2023 2:24 PM EDT    Workstation ID: [id number]              ASSESSMENT AND PLAN    Chest pain  Appears atypical  High-sensitivity  troponin is negative  proBNP is normal  ECG with no evidence of ischemia  Reviewed previous cardiac catheterization from 2021  Nuclear stress test is negative for ischemia  Inferolateral artifact noted.  Continue aspirin, high intensity statin.  Focus on risk factors modification.    Hypertension  Blood pressures well controlled on lisinopril    Hyperlipidemia  LDL 54, HDL 28, total cholesterol 113 and triglycerides 183.  Continue high intensity statin    Diabetes  Most recent A1c is less than 6  Continue insulin    Obesity  BMI is 36  Diet, exercise, weight loss recommended  Lifestyle modifications  Encouraged treatment for sleep apnea

## 2023-09-22 NOTE — DISCHARGE SUMMARY
[facility]    Olesya Cueva MD    CHIEF COMPLAINT:     Chest pain    HISTORY OF PRESENT ILLNESS:    Obtained from admitting physician HPI on 9/21/2023:  History of Present Illness  78-year-old male presents to [facility] for evaluation of chest pain.  The patient states that increasing pain over the last 2 weeks he says its been more s severe and more persistent.  He reports the pain is associated with shortness of breath consistently and occasionally with some nausea.  He reports no recent fever chills or cough.  He reports no any known injury to the chest wall.  He reports no right upper quadrant abdominal component      09/22/23:  Patient confirms the HPI noted above including approximately 3-week history of intermittent chest discomfort which became constant and worse on the day of presentation.  Pain is located primarily on the right side of his chest radiating towards his back made worse with weightbearing and described as an aching type sensation.  He denies any dyspnea, nausea, vomiting, fever or changes in bowel or bladder habits.  Patient does not smoke or drink alcohol.  Symptoms have improved to some degree with Tridil infusion.          Past Medical History:   Diagnosis Date    Arthritis     Dr Mosqueda    Coronary heart disease 01/2016    single vessel disease, nl stress test (copied from [employer])    Coronary heart disease 10/31/2017    cath 10/31/17 - Low % Blockages- N o Intervention   (copied from [employer])    Diverticulosis     Fatty liver     GERD (gastroesophageal reflux disease)     Hearing loss     Hyperlipemia     Hyperlipidemia     Hypertension     Low back pain     Neuropathy in diabetes     SIDDHARTHA treated with BiPAP     Pain management     injections Lumbar spine X2 with Muprhys Pain management @ [facility] (copied from [employer])    Physical therapy evaluation, initial     @ [facility] in [place] 6 weeks x3 per week, [facility] (copied from [employer])     Rectal bleed 08/2012    hemorrhoids    Retinal hemorrhage of right eye 05/2014    Sleep apnea     bipap    Type 2 diabetes mellitus     Uses brace      Past Surgical History:   Procedure Laterality Date    BELPHAROPTOSIS REPAIR  12/11/2017     Dr. grimes (copied from [employer])    BROW LIFT  12/11/2017    Dr. Grimes at [facility]    CARDIAC CATHETERIZATION  03/2012    at [facility]- single vessel disease. (copied from [employer])    CARDIAC CATHETERIZATION  10/13/2017    no intervention - Low % Blockages.    CARDIAC CATHETERIZATION Left 05/28/2021    Procedure: LEFT HEART CATH with possible PCI;  Surgeon: Leslie Clark MD;  Location: [facility] CATH INVASIVE LOCATION;  Service: Cardiovascular;  Laterality: Left;  Local and IV sedation    CATARACT EXTRACTION  12/11/2017    brow lift and eye lid repair    COLON SURGERY  06/2014    colonscopy    COLONOSCOPY N/A 07/12/2019    Procedure: COLONOSCOPY with polypectomy x1;  Surgeon: Graham Byrd MD;  Location: [facility] ENDOSCOPY;  Service: Gastroenterology    CUBITAL TUNNEL RELEASE Right     HERNIA REPAIR  11/2008    umbilical hernia repair    JOINT REPLACEMENT      KNEE ARTHROSCOPY Right 11/2014    Dr. Panda VASQUEZ      lasik and Cataract Extraction, sep 11 and right Sept. 25th, 2014- [facility]. (copied from [employer])    TOTAL KNEE ARTHROPLASTY Left 11/2009    Dr. Mosqueda (copied from [employer])    TOTAL SHOULDER ARTHROPLASTY W/ DISTAL CLAVICLE EXCISION Left 04/20/2022    Procedure: TOTAL SHOULDER REVERSE ARTHROPLASTY;  Surgeon: Dany Knapp MD;  Location: [facility] MAIN OR;  Service: Orthopedics;  Laterality: Left;     Family History   Problem Relation Age of Onset    Heart disease Mother         CHF    Hypertension Mother     Heart failure Mother     Stroke Maternal Grandfather     Diabetes Other     Diabetes Other     Stroke Other     Arthritis Maternal Grandmother     Diabetes Brother      Social History     Tobacco Use    Smoking status: Former     Packs/day:  1.50     Years: 30.00     Pack years: 45.00     Types: Cigarettes     Quit date: 2000     Years since quittin.7    Smokeless tobacco: Former   Vaping Use    Vaping Use: Never used   Substance Use Topics    Alcohol use: No    Drug use: No     Medications Prior to Admission   Medication Sig Dispense Refill Last Dose    calcium citrate-vitamin d (CALCITRATE) 315-250 MG-UNIT tablet tablet Take  by mouth Daily.   2023    HYDROcodone-acetaminophen (NORCO) 7.5-325 MG per tablet Take 1 tablet by mouth Every 6 (Six) Hours As Needed for Moderate Pain or Severe Pain. 15 tablet 0 2023    isosorbide mononitrate (IMDUR) 60 MG 24 hr tablet TAKE 1 AND 1/2 TABLETS BY  MOUTH DAILY 135 tablet 3 2023    metFORMIN (GLUCOPHAGE) 500 MG tablet Take 1 tablet by mouth 2 (Two) Times a Day With Meals. 180 tablet 3 2023    Multiple Vitamins-Minerals (MULTIVITAMIN WITH MINERALS) tablet tablet Take 1 tablet by mouth Daily.   2023    naproxen (NAPROSYN) 500 MG tablet TAKE 1 TABLET BY MOUTH  TWICE DAILY AS NEEDED FOR  MODERATE PAIN 180 tablet 3 2023    pantoprazole (PROTONIX) 40 MG EC tablet TAKE 1 TABLET BY MOUTH  DAILY 90 tablet 3 2023    acetaminophen (TYLENOL) 650 MG 8 hr tablet Take 1 tablet by mouth Every 8 (Eight) Hours As Needed for Mild Pain.       aspirin 81 MG EC tablet Take 1 tablet by mouth Daily. LD 2023    atorvastatin (LIPITOR) 20 MG tablet TAKE 1 TABLET BY MOUTH  DAILY 90 tablet 3     azelastine (ASTELIN) 0.1 % nasal spray 2 sprays into the nostril(s) as directed by provider 2 (Two) Times a Day. Use in each nostril as directed 30 mL 12     betamethasone dipropionate (DIPROLENE) 0.05 % lotion        betamethasone valerate (VALISONE) 0.1 % ointment Apply 1 application  topically to the appropriate area as directed 2 (Two) Times a Day.       cyclobenzaprine (FLEXERIL) 10 MG tablet Take 1 tablet by mouth At Night As Needed for Muscle Spasms. 30 tablet 0     donepezil (ARICEPT) 5  MG tablet TAKE 1 TABLET BY MOUTH AT  NIGHT 90 tablet 3     gabapentin (NEURONTIN) 300 MG capsule TAKE 1 CAPSULE BY MOUTH  TWICE DAILY 180 capsule 3     hydrocortisone 2.5 % cream Apply 1 application  topically to the appropriate area as directed 2 (Two) Times a Day.       Ibuprofen 3 %, Gabapentin 10 %, Baclofen 2 %, lidocaine 4 % Apply 1-2 g topically to the appropriate area as directed 3 (Three) to 4 (Four) times daily. 90 g 2     Lancets (OneTouch Delica Plus Ceecbq37L) misc USE   TO CHECK GLUCOSE ONCE DAILY 100 each 4     lisinopril (PRINIVIL,ZESTRIL) 10 MG tablet TAKE 1 TABLET BY MOUTH  DAILY 90 tablet 3     nitroglycerin (NITROSTAT) 0.4 MG SL tablet Place 1 tablet under the tongue Every 5 (Five) Minutes As Needed for Chest Pain. 30 tablet 4     OneTouch Ultra test strip 1 each by Other route Daily. E11.9 50 each 5     promethazine (PHENERGAN) 12.5 MG tablet Take 1 tablet by mouth Every 6 (Six) Hours As Needed for Nausea or Vomiting. 21 tablet 1      Allergies:  Patient has no known allergies.    Immunization History   Administered Date(s) Administered    COVID-19 (PFIZER) BIVALENT 12+YRS 10/14/2022    COVID-19 (PFIZER) Purple Cap Monovalent 01/27/2021, 02/17/2021, 09/26/2021    Covid-19 (Pfizer) Gray Cap Monovalent 04/09/2022    FLUAD TRI 65YR+ 10/07/2019    Fluad Quad 65+ 10/08/2020    Fluzone High Dose =>65 Years ([employer] ONLY) 10/21/2015, 10/03/2016, 10/23/2017, 09/21/2018, 10/07/2019    Fluzone High-Dose 65+yrs 10/08/2021, 10/04/2022, 09/18/2023    Pneumococcal Conjugate 13-Valent (PCV13) 12/18/2013, 10/08/2021    Pneumococcal Conjugate 20-Valent (PCV20) 02/21/2023    Shingrix 10/28/2019    Tdap 04/01/2021           REVIEW OF SYSTEMS:    Review of Systems   Constitutional: Negative.   HENT: Negative.     Eyes: Negative.    Cardiovascular:  Positive for chest pain.   Respiratory: Negative.     Skin: Negative.    Musculoskeletal:  Positive for back pain.   Gastrointestinal: Negative.    Genitourinary:  Negative.    Neurological: Negative.    Psychiatric/Behavioral: Negative.         Vital Signs  Temp:  [97.6 °F (36.4 °C)-98 °F (36.7 °C)] 98 °F (36.7 °C)  Heart Rate:  [57-83] 66  Resp:  [16-18] 18  BP: ()/(64-87) 154/82          Physical Exam:  Physical Exam  Vitals reviewed.   Constitutional:       General: He is not in acute distress.     Appearance: Normal appearance. He is normal weight. He is not ill-appearing, toxic-appearing or diaphoretic.   HENT:      Head: Normocephalic.      Right Ear: External ear normal.      Left Ear: External ear normal.      Nose: Nose normal.      Mouth/Throat:      Mouth: Mucous membranes are moist.   Eyes:      Extraocular Movements: Extraocular movements intact.   Cardiovascular:      Rate and Rhythm: Normal rate and regular rhythm.      Pulses: Normal pulses.      Heart sounds: Normal heart sounds.      Comments: Right chest wall tenderness  Pulmonary:      Effort: Pulmonary effort is normal.      Breath sounds: Normal breath sounds.   Abdominal:      General: Bowel sounds are normal.      Palpations: Abdomen is soft.      Tenderness: There is no abdominal tenderness.   Musculoskeletal:         General: Normal range of motion.      Cervical back: Normal range of motion.      Right lower leg: No edema.      Left lower leg: No edema.   Skin:     General: Skin is warm and dry.      Capillary Refill: Capillary refill takes less than 2 seconds.      Comments: Multiple skin tags in patient's right axilla and on the right side of chest with no signs of inflammation   Neurological:      General: No focal deficit present.      Mental Status: He is alert and oriented to person, place, and time.   Psychiatric:         Mood and Affect: Mood normal.         Behavior: Behavior normal.         Thought Content: Thought content normal.         Judgment: Judgment normal.         Emotional Behavior:   Normal   Debilities:  None  Results Review:    I reviewed the patient's new clinical  results.  Lab Results (most recent)       Procedure Component Value Units Date/Time    POC Glucose Once [[id number]]  (Abnormal) Collected: 09/22/23 1146    Specimen: Blood Updated: 09/22/23 1147     Glucose 118 mg/dL      Comment: Serial Number: [id number]:  [id number]       POC Glucose Once [[id number]]  (Abnormal) Collected: 09/22/23 0740    Specimen: Blood Updated: 09/22/23 0742     Glucose 118 mg/dL      Comment: Serial Number: [id number]:  [id number]       Basic Metabolic Panel [[id number]]  (Abnormal) Collected: 09/22/23 0314    Specimen: Blood Updated: 09/22/23 0442     Glucose 124 mg/dL      BUN 18 mg/dL      Creatinine 0.95 mg/dL      Sodium 142 mmol/L      Potassium 3.9 mmol/L      Chloride 106 mmol/L      CO2 26.0 mmol/L      Calcium 8.9 mg/dL      BUN/Creatinine Ratio 18.9     Anion Gap 10.0 mmol/L      eGFR 80.9 mL/min/1.73     Narrative:      GFR Normal >60  Chronic Kidney Disease <60  Kidney Failure <15    The GFR formula is only valid for adults with stable renal function between ages 18 and 70.    High Sensitivity Troponin T [[id number]]  (Normal) Collected: 09/22/23 0314    Specimen: Blood Updated: 09/22/23 0442     HS Troponin T 11 ng/L     Narrative:      High Sensitive Troponin T Reference Range:  <10.0 ng/L- Negative Female for AMI  <15.0 ng/L- Negative Male for AMI  >=10 - Abnormal Female indicating possible myocardial injury.  >=15 - Abnormal Male indicating possible myocardial injury.   Clinicians would have to utilize clinical acumen, EKG, Troponin, and serial changes to determine if it is an Acute Myocardial Infarction or myocardial injury due to an underlying chronic condition.         Lipid Panel [[id number]]  (Abnormal) Collected: 09/22/23 0314    Specimen: Blood Updated: 09/22/23 0442     Total Cholesterol 113 mg/dL      Triglycerides 183 mg/dL      HDL Cholesterol 28 mg/dL      LDL Cholesterol  54 mg/dL      VLDL Cholesterol 31 mg/dL      LDL/HDL Ratio 1.73    Narrative:       Cholesterol Reference Ranges  (U.S. Department of Health and Human Services ATP III Classifications)    Desirable          <200 mg/dL  Borderline High    200-239 mg/dL  High Risk          >240 mg/dL      Triglyceride Reference Ranges  (U.S. Department of Health and Human Services ATP III Classifications)    Normal           <150 mg/dL  Borderline High  150-199 mg/dL  High             200-499 mg/dL  Very High        >500 mg/dL    HDL Reference Ranges  (U.S. Department of Health and Human Services ATP III Classifications)    Low     <40 mg/dl (major risk factor for CHD)  High    >60 mg/dl ('negative' risk factor for CHD)        LDL Reference Ranges  (U.S. Department of Health and Human Services ATP III Classifications)    Optimal          <100 mg/dL  Near Optimal     100-129 mg/dL  Borderline High  130-159 mg/dL  High             160-189 mg/dL  Very High        >189 mg/dL    CBC Auto Differential [[id number]]  (Abnormal) Collected: 09/22/23 0314    Specimen: Blood Updated: 09/22/23 0411     WBC 10.50 10*3/mm3      RBC 6.15 10*6/mm3      Hemoglobin 15.2 g/dL      Hematocrit 47.4 %      MCV 77.0 fL      MCH 24.8 pg      MCHC 32.2 g/dL      RDW 17.0 %      RDW-SD 48.1 fl      MPV 8.4 fL      Platelets 347 10*3/mm3      Neutrophil % 71.5 %      Lymphocyte % 15.5 %      Monocyte % 6.5 %      Eosinophil % 4.6 %      Basophil % 1.9 %      Neutrophils, Absolute 7.50 10*3/mm3      Lymphocytes, Absolute 1.60 10*3/mm3      Monocytes, Absolute 0.70 10*3/mm3      Eosinophils, Absolute 0.50 10*3/mm3      Basophils, Absolute 0.20 10*3/mm3      nRBC 0.1 /100 WBC     Extra Tubes [[id number]] Collected: 09/21/23 1608    Specimen: Blood, Venous Line Updated: 09/21/23 0876    Narrative:      The following orders were created for panel order Extra Tubes.  Procedure                               Abnormality         Status                     ---------                               -----------         ------                     Gold Top -  SST[[id number]]                                   Final result               Light Blue Top[[id number]]                                                                Please view results for these tests on the individual orders.    Gold Top - SST [[id number]] Collected: 09/21/23 1608    Specimen: Blood Updated: 09/21/23 1715     Extra Tube Hold for add-ons.     Comment: Auto resulted.       Manual Differential [[id number]]  (Abnormal) Collected: 09/21/23 1407    Specimen: Blood Updated: 09/21/23 1550     Neutrophil % 81.0 %      Lymphocyte % 8.0 %      Monocyte % 3.0 %      Eosinophil % 4.0 %      Basophil % 2.0 %      Atypical Lymphocyte % 2.0 %      Neutrophils Absolute 8.91 10*3/mm3      Lymphocytes Absolute 1.10 10*3/mm3      Monocytes Absolute 0.33 10*3/mm3      Eosinophils Absolute 0.44 10*3/mm3      Basophils Absolute 0.22 10*3/mm3      RBC Morphology Normal     WBC Morphology Normal     Platelet Morphology Normal    CBC & Differential [[id number]]  (Abnormal) Collected: 09/21/23 1407    Specimen: Blood Updated: 09/21/23 1550    Narrative:      The following orders were created for panel order CBC & Differential.  Procedure                               Abnormality         Status                     ---------                               -----------         ------                     CBC Auto Differential[[id number]]        Abnormal            Final result               Scan Slide[[id number]]                                       Final result                 Please view results for these tests on the individual orders.    CBC Auto Differential [[id number]]  (Abnormal) Collected: 09/21/23 1407    Specimen: Blood Updated: 09/21/23 1550     WBC 11.00 10*3/mm3      RBC 6.29 10*6/mm3      Hemoglobin 15.6 g/dL      Hematocrit 48.3 %      MCV 76.8 fL      MCH 24.8 pg      MCHC 32.4 g/dL      RDW 17.2 %      RDW-SD 48.6 fl      MPV 8.2 fL      Platelets 359 10*3/mm3     Scan Slide [[id number]] Collected: 09/21/23 1407     Specimen: Blood Updated: 09/21/23 1550     Scan Slide --     Comment: See Manual Differential Results       Comprehensive Metabolic Panel [[id number]]  (Abnormal) Collected: 09/21/23 1407    Specimen: Blood Updated: 09/21/23 1454     Glucose 121 mg/dL      BUN 15 mg/dL      Creatinine 0.79 mg/dL      Sodium 142 mmol/L      Potassium 4.0 mmol/L      Chloride 106 mmol/L      CO2 26.0 mmol/L      Calcium 9.2 mg/dL      Total Protein 6.5 g/dL      Albumin 4.1 g/dL      ALT (SGPT) 25 U/L      AST (SGOT) 22 U/L      Alkaline Phosphatase 91 U/L      Total Bilirubin 0.5 mg/dL      Globulin 2.4 gm/dL      A/G Ratio 1.7 g/dL      BUN/Creatinine Ratio 19.0     Anion Gap 10.0 mmol/L      eGFR 89.8 mL/min/1.73     Narrative:      GFR Normal >60  Chronic Kidney Disease <60  Kidney Failure <15    The GFR formula is only valid for adults with stable renal function between ages 18 and 70.    Single High Sensitivity Troponin T [[id number]]  (Normal) Collected: 09/21/23 1407    Specimen: Blood Updated: 09/21/23 1454     HS Troponin T 14 ng/L     Narrative:      High Sensitive Troponin T Reference Range:  <10.0 ng/L- Negative Female for AMI  <15.0 ng/L- Negative Male for AMI  >=10 - Abnormal Female indicating possible myocardial injury.  >=15 - Abnormal Male indicating possible myocardial injury.   Clinicians would have to utilize clinical acumen, EKG, Troponin, and serial changes to determine if it is an Acute Myocardial Infarction or myocardial injury due to an underlying chronic condition.         BNP [[id number]]  (Normal) Collected: 09/21/23 1407    Specimen: Blood Updated: 09/21/23 1454     proBNP 38.1 pg/mL     Narrative:      Among patients with dyspnea, NT-proBNP is highly sensitive for the detection of acute congestive heart failure. In addition NT-proBNP of <300 pg/ml effectively rules out acute congestive heart failure with 99% negative predictive value.              Imaging Results (Most Recent)       Procedure Component  Value Units Date/Time    XR Chest 1 View [[id number]] Collected: 09/21/23 1423     Updated: 09/21/23 1426    Narrative:      XR CHEST 1 VW    Date of Exam: 9/21/2023 2:00 PM EDT    Indication: chest pain    Comparison: CT chest 3/23/2023    Findings:  Stable top normal heart size. Lungs are without consolidation. No pneumothorax or pleural effusion. Mild scarring at the left lung base unchanged. Left reverse shoulder prosthesis partially imaged. Aortic arch vascular calcifications.      Impression:      Impression:  No acute process.        Electronically Signed: Gagandeep Blum MD    9/21/2023 2:24 PM EDT    Workstation ID: [id number]          reviewed    ECG/EMG Results (most recent)       Procedure Component Value Units Date/Time    ECG 12 Lead Chest Pain [[id number]] Collected: 09/21/23 1226     Updated: 09/22/23 0621     QT Interval 382 ms      QTC Interval 412 ms     Narrative:      HEART RATE= 70  bpm  RR Interval= 860  ms  MD Interval= 147  ms  P Horizontal Axis= -4  deg  P Front Axis= 31  deg  QRSD Interval= 95  ms  QT Interval= 382  ms  QTcB= 412  ms  QRS Axis= -42  deg  T Wave Axis= 41  deg  - OTHERWISE NORMAL ECG -  Sinus rhythm  Left axis deviation  When compared with ECG of 08-Apr-2022 10:20:44,  No significant change  Electronically Signed By: Audie Franco ([facility]) 22-Sep-2023 06:21:43  Date and Time of Study: 2023-09-21 12:26:33          reviewed            Microbiology Results (last 10 days)       ** No results found for the last 240 hours. **            Assessment & Plan     Chest pain        Chest pain  Lab Results   Component Value Date    TROPONINT 11 09/22/2023    TROPONINT 14 09/21/2023    TROPONINT 12 09/18/2023   -proBNP: 38.1  -Lipid panel showed total cholesterol of 113 with an LDL of 54 and an HDL of 28  -Chest X-ray: No acute process  -EKG: Sinus rhythm at 70 without obvious acute changes or ectopy with QTc of 412 ms  -In the ED pt given 325 mg aspirin and Tridil infusion  -Cardiology consulted  who recommended stress testing which showed a normal ECG stress test with some basal inferolateral artifact with an EF of 67% and normal cardial perfusion imaging consistent with a low risk study.  Cardiologist recommended evaluation for alternative etiologies including possible musculoskeletal source of patient's symptoms  -Telemetry  -NPO  -Continue statin  -Hold Imdur while on Tridil infusion    Diabetes mellitus  -Well controlled   Lab Results   Component Value Date    GLUCOSE 124 (H) 09/22/2023    GLUCOSE 121 (H) 09/21/2023    GLUCOSE 134 (H) 09/18/2023    GLUCOSE 117 (H) 03/23/2023   -Hold metformin  -Correctional insulin ordered  -Diabetic diet  -Monitor AC and HS    Hypertension  -Poorly controlled   BP Readings from Last 1 Encounters:   09/22/23 154/82   - Continue lisinopril  - Monitor while admitted    Hyperlipidemia  -Statin    Dementia  -Aricept    GERD  -PPI    Obesity (BMI: 36.14)  -Encourage diet lifestyle modifications      I discussed the patients findings and my recommendations with patient and nursing staff.     Discharge Diagnosis:      Chest pain      Hospital Course  Patient is a 78 y.o. male presented with chest pain with an HPI noted above.  Serial troponins were assessed and found to be 14, 11 with proBNP within normal limits at 38.1.  Lipid panel showed total cholesterol of 113 with an LDL of 54 and an HDL of 28.  Chest x-ray showed no acute process and EKG showed sinus rhythm at 70 without obvious acute changes.  He was continued on telemetry without significant events reported.  Patient received 325 mg aspirin as well as Tridil infusion in the ED.  Cardiology was consulted who ordered stress testing which showed a normal ECG and perfusion study though some artifact was reported with a normal EF consistent with a low risk study.  Cardiologist recommended evaluation for possible alternative etiologies of patient's symptoms including musculoskeletal and he was instructed to take his  naproxen which she has previously taken as needed on a scheduled basis for a short duration to see if this improves his symptoms.  At this time patient is felt to be in good condition for discharge with close follow-up with his PCP as well as cardiology on an outpatient basis.  His full testing/results and plan were discussed with patient along with concerning/alarm symptoms for which to call 911/return to the ED.  All questions were answered he verbalizes his understanding and agreement.    Past Medical History:     Past Medical History:   Diagnosis Date    Arthritis     Dr Mosqueda    Coronary heart disease 01/2016    single vessel disease, nl stress test (copied from [employer])    Coronary heart disease 10/31/2017    cath 10/31/17 - Low % Blockages- N o Intervention   (copied from [employer])    Diverticulosis     Fatty liver     GERD (gastroesophageal reflux disease)     Hearing loss     Hyperlipemia     Hyperlipidemia     Hypertension     Low back pain     Neuropathy in diabetes     SIDDHARTHA treated with BiPAP     Pain management     injections Lumbar spine X2 with Muprhys Pain management @ [facility] (copied from [employer])    Physical therapy evaluation, initial     @ [facility] in [place] 6 weeks x3 per week, [facility] leal (copied from [employer])    Rectal bleed 08/2012    hemorrhoids    Retinal hemorrhage of right eye 05/2014    Sleep apnea     bipap    Type 2 diabetes mellitus     Uses brace        Past Surgical History:     Past Surgical History:   Procedure Laterality Date    BELPHAROPTOSIS REPAIR  12/11/2017     Dr. grimes (copied from [employer])    BROW LIFT  12/11/2017    Dr. Grimes at [facility]    CARDIAC CATHETERIZATION  03/2012    at [facility]- single vessel disease. (copied from [employer])    CARDIAC CATHETERIZATION  10/13/2017    no intervention - Low % Blockages.    CARDIAC CATHETERIZATION Left 05/28/2021    Procedure: LEFT HEART CATH with possible PCI;  Surgeon: Leslie Clark MD;  [facility]  INVASIVE LOC[facility];  Service: Cardiovascular;  Laterality: Left;  Local and IV sedation    CATARACT EXTRACTION  2017    brow lift and eye lid repair    COLON SURGERY  2014    colonscopy    COLONOSCOPY N/A 2019    Procedure: COLONOSCOPY with polypectomy x1;  Surgeon: Graham Byrd MD;  Location: [facility] ENDOSCOPY;  Service: Gastroenterology    CUBITAL TUNNEL RELEASE Right     HERNIA REPAIR  2008    umbilical hernia repair    JOINT REPLACEMENT      KNEE ARTHROSCOPY Right 2014    Dr. Mosqueda    LASIK      lasik and Cataract Extraction, sep 11 and right 2014- Martínez Perez. (copied from [facility])    TOTAL KNEE ARTHROPLASTY Left 2009    Dr. Mosqueda (copied from [facility])    TOTAL SHOULDER ARTHROPLASTY W/ DISTAL CLAVICLE EXCISION Left 2022    Procedure: TOTAL SHOULDER REVERSE ARTHROPLASTY;  Surgeon: Dany Knapp MD;  Location: [facility] MAIN OR;  Service: Orthopedics;  Laterality: Left;       Social History:   Social History     Socioeconomic History    Marital status:      Spouse name: Shani   Tobacco Use    Smoking status: Former     Packs/day: 1.50     Years: 30.00     Pack years: 45.00     Types: Cigarettes     Quit date: 2000     Years since quittin.7    Smokeless tobacco: Former   Vaping Use    Vaping Use: Never used   Substance and Sexual Activity    Alcohol use: No    Drug use: No    Sexual activity: Not Currently     Partners: Female       Procedures Performed         Consults:   Consults       No orders found for last 30 day(s).            Condition on Discharge:     Stable    Discharge Disposition      Discharge Medications     Discharge Medications        Continue These Medications        Instructions Start Date   OneTouch Delica Plus [id number] misc   USE   TO CHECK GLUCOSE ONCE DAILY             ASK your doctor about these medications        Instructions Start Date   acetaminophen 650 MG 8 hr tablet  Commonly known as: TYLENOL   650 mg, Oral, Every  8 Hours PRN      aspirin 81 MG EC tablet   81 mg, Oral, Daily, LD 4/13      atorvastatin 20 MG tablet  Commonly known as: LIPITOR   TAKE 1 TABLET BY MOUTH  DAILY      azelastine 0.1 % nasal spray  Commonly known as: ASTELIN   2 sprays, Nasal, 2 Times Daily, Use in each nostril as directed      betamethasone dipropionate 0.05 % lotion   No dose, route, or frequency recorded.      betamethasone valerate 0.1 % ointment  Commonly known as: VALISONE   1 application , Topical, 2 Times Daily      calcium citrate-vitamin d 315-250 MG-UNIT tablet tablet  Commonly known as: CALCITRATE   Oral, Daily      cyclobenzaprine 10 MG tablet  Commonly known as: FLEXERIL   10 mg, Oral, Nightly PRN      donepezil 5 MG tablet  Commonly known as: ARICEPT   TAKE 1 TABLET BY MOUTH AT  NIGHT      gabapentin 300 MG capsule  Commonly known as: NEURONTIN   TAKE 1 CAPSULE BY MOUTH  TWICE DAILY      HYDROcodone-acetaminophen 7.5-325 MG per tablet  Commonly known as: NORCO   1 tablet, Oral, Every 6 Hours PRN      hydrocortisone 2.5 % cream   1 application , Topical, 2 Times Daily      Ibuprofen 3 %, Gabapentin 10 %, Baclofen 2 %, lidocaine 4 %   1-2 g, Topical, 3 to 4 Times Daily      isosorbide mononitrate 60 MG 24 hr tablet  Commonly known as: IMDUR   TAKE 1 AND 1/2 TABLETS BY  MOUTH DAILY      lisinopril 10 MG tablet  Commonly known as: PRINIVIL,ZESTRIL   TAKE 1 TABLET BY MOUTH  DAILY      metFORMIN 500 MG tablet  Commonly known as: GLUCOPHAGE   500 mg, Oral, 2 Times Daily With Meals      multivitamin with minerals tablet tablet   1 tablet, Oral, Daily      naproxen 500 MG tablet  Commonly known as: NAPROSYN   TAKE 1 TABLET BY MOUTH  TWICE DAILY AS NEEDED FOR  MODERATE PAIN      nitroglycerin 0.4 MG SL tablet  Commonly known as: NITROSTAT   0.4 mg, Sublingual, Every 5 Minutes PRN      OneTouch Ultra test strip  Generic drug: glucose blood   1 each, Other, Daily, E11.9      pantoprazole 40 MG EC tablet  Commonly known as: PROTONIX   TAKE 1 TABLET  BY MOUTH  DAILY      promethazine 12.5 MG tablet  Commonly known as: PHENERGAN   12.5 mg, Oral, Every 6 Hours PRN               Discharge Diet:     Activity at Discharge:     Follow-up Appointments  Future Appointments   Date Time Provider Department Center   10/26/2023 10:00 AM Leslie Clark MD [facility]   1/23/2024  9:45 AM Nancy Harris MD [facility]   8/13/2024 11:00 AM Sue Holman APRN [facility]         Test Results Pending at Discharge  Pending Labs       Order Current Status    Extra Tubes In process             Risk for Readmission (LACE) Score: 7 (9/22/2023  6:00 AM)      Greater than 30 minutes spent in discharge activities for this patient    Diaz Washington PA-C  09/22/23  12:20 EDT

## 2023-09-22 NOTE — PLAN OF CARE
Goal Outcome Evaluation:              Outcome Evaluation: pt came in with right sided chest pain that he says was worse when he touched it or when he would try to reposition himself, was put on a nitro drip in the ed and he says that got rid of the chest pain, hasn't complained of any cp tonight, slept throughout the nigtht, will continue to monitor

## 2023-09-25 ENCOUNTER — TRANSITIONAL CARE MANAGEMENT TELEPHONE ENCOUNTER (OUTPATIENT)
Dept: CALL CENTER | Facility: HOSPITAL | Age: 78
End: 2023-09-25

## 2023-09-25 RX ORDER — ATORVASTATIN CALCIUM 20 MG/1
TABLET, FILM COATED ORAL
Qty: 90 TABLET | Refills: 0 | Status: SHIPPED | OUTPATIENT
Start: 2023-09-25

## 2023-09-25 RX ORDER — DONEPEZIL HYDROCHLORIDE 5 MG/1
TABLET, FILM COATED ORAL
Qty: 90 TABLET | Refills: 3 | Status: SHIPPED | OUTPATIENT
Start: 2023-09-25

## 2023-09-25 NOTE — CASE MANAGEMENT/SOCIAL WORK
Case Management Discharge Note      Final Note: Routine home    Provided Post Acute Provider List?: N/A  Provided Post Acute Provider Quality & Resource List?: N/A    Selected Continued Care - Discharged on 9/22/2023 Admission date: 9/21/2023 - Discharge disposition: Home or Self Care       Transportation Services  Private: Car    Final Discharge Disposition Code: 01 - home or self-care

## 2023-09-25 NOTE — OUTREACH NOTE
Call Center TCM Note      Flowsheet Row Responses   [facility] patient discharged from? [facility]   Does the patient have one of the following disease processes/diagnoses(primary or secondary)? Other   TCM attempt successful? Yes   Call start time 0937   Call end time 0939   Discharge diagnosis Chest pain   Is the patient taking all medications as directed (includes completed medication regime)? Yes   Medication comments No changes   Comments Has f/u with cardiology on 10/26.  Pt is going to call office for an appt.  He wants to make one with his wife for the same time.   Does the patient have an appointment with their PCP within 7-14 days of discharge? No   Nursing Interventions Patient declined scheduling/rescheduling appointment at this time   Psychosocial issues? No   Did the patient receive a copy of their discharge instructions? Yes   Nursing interventions Reviewed instructions with patient   What is the patient's perception of their health status since discharge? Improving   Is the patient/caregiver able to teach back signs and symptoms related to disease process for when to call PCP? Yes   Is the patient/caregiver able to teach back signs and symptoms related to disease process for when to call 911? Yes   Is the patient/caregiver able to teach back the hierarchy of who to call/visit for symptoms/problems? PCP, Specialist, Home health nurse, Urgent Care, ED, 911 Yes   If the patient is a current smoker, are they able to teach back resources for cessation? Not a smoker   Additional teach back comments States he is doing fair.   TCM call completed? Yes   Wrap up additional comments Pt is going to call PCP for an appt due to he wants to make one for his wife at the same time.   Call end time 0939            Nerissa Isbell LPN    9/25/2023, 09:41 EDT

## 2023-10-02 ENCOUNTER — READMISSION MANAGEMENT (OUTPATIENT)
Dept: CALL CENTER | Facility: HOSPITAL | Age: 78
End: 2023-10-02
Payer: MEDICARE

## 2023-10-02 NOTE — OUTREACH NOTE
Medical Week 2 Survey      Flowsheet Row Responses   [facility] facility patient discharged from? [facility]   Does the patient have one of the following disease processes/diagnoses(primary or secondary)? Other   Week 2 attempt successful? No   Unsuccessful attempts Attempt 1            Janie Edwards Registered Nurse

## 2023-10-04 ENCOUNTER — OFFICE VISIT (OUTPATIENT)
Dept: FAMILY MEDICINE CLINIC | Facility: CLINIC | Age: 78
End: 2023-10-04
Payer: MEDICARE

## 2023-10-04 VITALS
DIASTOLIC BLOOD PRESSURE: 78 MMHG | HEART RATE: 68 BPM | WEIGHT: 232.6 LBS | OXYGEN SATURATION: 95 % | SYSTOLIC BLOOD PRESSURE: 177 MMHG | HEIGHT: 68 IN | TEMPERATURE: 98.2 F | BODY MASS INDEX: 35.25 KG/M2

## 2023-10-04 DIAGNOSIS — I10 ESSENTIAL HYPERTENSION: ICD-10-CM

## 2023-10-04 DIAGNOSIS — R07.9 CHEST PAIN, UNSPECIFIED TYPE: Primary | ICD-10-CM

## 2023-10-04 RX ORDER — ASPIRIN 81 MG/1
81 TABLET ORAL DAILY
Start: 2023-10-04

## 2023-10-04 NOTE — PROGRESS NOTES
Transitional Care Follow Up Visit  Subjective     Heri Vasquez is a 78 y.o. male who presents for a transitional care management visit.    Within 48 business hours after discharge our office contacted him via telephone to coordinate his care and needs.      I reviewed and discussed the details of that call along with the discharge summary, hospital problems, inpatient lab results, inpatient diagnostic studies, and consultation reports with Heri.     Current outpatient and discharge medications have been reconciled for the patient.  Reviewed by: Olesya Cueva MD          9/22/2023     7:13 PM   Date of TCM Phone Call   [facility]   Date of Admission 9/21/2023   Date of Discharge 9/22/2023   Discharge Disposition Home or Self Care     Risk for Readmission (LACE) Score: 7 (9/22/2023  6:00 AM)      History of Present Illness   Course During Hospital Stay:  Patient is a 78 y.o. male presented with chest pain.  Serial troponins were assessed and found to be 14, 11 with proBNP within normal limits at 38.1.  Lipid panel showed total cholesterol of 113 with an LDL of 54 and an HDL of 28.  Chest x-ray showed no acute process and EKG showed sinus rhythm at 70 without obvious acute changes.  He was continued on telemetry without significant events reported.  Patient received 325 mg aspirin as well as Tridil infusion in the ED.  Cardiology was consulted who ordered stress testing which showed a normal ECG and perfusion study though some artifact was reported with a normal EF consistent with a low risk study.  Cardiologist recommended evaluation for possible alternative etiologies of patient's symptoms including musculoskeletal and he was instructed to take his naproxen which she has previously taken as needed on a scheduled basis for a short duration to see if this improves his symptoms.      He is still having some right side chest pains at times but it is better than it was.     The following  portions of the patient's history were reviewed and updated as appropriate: allergies, current medications, past family history, past medical history, past social history, past surgical history, and problem list.    Review of Systems    Objective   Physical Exam  Vitals and nursing note reviewed.   Constitutional:       General: He is not in acute distress.     Appearance: He is well-developed.   HENT:      Head: Normocephalic.   Eyes:      General: Lids are normal.      Conjunctiva/sclera: Conjunctivae normal.   Neck:      Thyroid: No thyroid mass or thyromegaly.      Trachea: Trachea normal.   Cardiovascular:      Rate and Rhythm: Normal rate and regular rhythm.      Heart sounds: Normal heart sounds.   Pulmonary:      Effort: Pulmonary effort is normal.      Breath sounds: Normal breath sounds.   Musculoskeletal:      Cervical back: Normal range of motion.      Right lower leg: Edema present.   Lymphadenopathy:      Cervical: No cervical adenopathy.   Skin:     General: Skin is warm and dry.   Neurological:      Mental Status: He is alert and oriented to person, place, and time.   Psychiatric:         Attention and Perception: He is attentive.         Mood and Affect: Mood normal.         Speech: Speech normal.         Behavior: Behavior normal.       Assessment & Plan   Diagnoses and all orders for this visit:    1. Chest pain, unspecified type (Primary)  -     Adult Transthoracic Echo Complete W/ Cont if Necessary Per Protocol; Future    2. Essential hypertension    Other orders  -     aspirin 81 MG EC tablet; Take 1 tablet by mouth Daily. LD 4/13

## 2023-10-09 ENCOUNTER — DISEASE STATE MANAGEMENT VISIT (OUTPATIENT)
Dept: PHARMACY | Facility: HOSPITAL | Age: 78
End: 2023-10-09
Payer: MEDICARE

## 2023-10-09 NOTE — PROGRESS NOTES
[facility] Vaccination Administration      Allergies:    Patient has no known allergies.    Vaccine History:   Immunization History   Administered Date(s) Administered    COVID-19 (PFIZER) BIVALENT 12+YRS 10/14/2022    COVID-19 (PFIZER) Purple Cap Monovalent 01/27/2021, 02/17/2021, 09/26/2021    Covid-19 (Pfizer) Gray Cap Monovalent 04/09/2022    FLUAD TRI 65YR+ 10/07/2019    Fluad Quad 65+ 10/08/2020    Fluzone High Dose =>65 Years ([employer] ONLY) 10/21/2015, 10/03/2016, 10/23/2017, 09/21/2018, 10/07/2019    Fluzone High-Dose 65+yrs 10/08/2021, 10/04/2022, 09/18/2023    Pneumococcal Conjugate 13-Valent (PCV13) 12/18/2013, 10/08/2021    Pneumococcal Conjugate 20-Valent (PCV20) 02/21/2023    Shingrix 10/28/2019    Tdap 04/01/2021       Plan:    The following vaccines were administered today:  RSV    [employer], PharmLATOYA  10/9/2023  12:53 EDT

## 2023-10-16 ENCOUNTER — TELEPHONE (OUTPATIENT)
Dept: ORTHOPEDIC SURGERY | Facility: CLINIC | Age: 78
End: 2023-10-16
Payer: MEDICARE

## 2023-10-16 RX ORDER — AMOXICILLIN 500 MG/1
CAPSULE ORAL
Qty: 4 CAPSULE | Refills: 0 | Status: SHIPPED | OUTPATIENT
Start: 2023-10-16

## 2023-10-16 NOTE — TELEPHONE ENCOUNTER
Caller: Heri Vasquez    Relationship to patient: Self    Best call back number: [phone]    Patient is needing: PATIENT NEEDING ANTIBIOTIC PRIOR TO SCHEDULE DENTAL PROCEDURE DUE TO SHOULDER REPLACEMENT [facility] DR. ESPINO 2022. PATIENT WANTS TO KNOW HOW LONG BEFORE APPOINTMENT HE HAS TO TAKE MEDICATION. PREFERRED PHARMACY - [facility] IN [place]. PLEASE ADVISE.

## 2023-10-17 ENCOUNTER — TELEPHONE (OUTPATIENT)
Dept: ORTHOPEDIC SURGERY | Facility: CLINIC | Age: 78
End: 2023-10-17

## 2023-10-17 NOTE — TELEPHONE ENCOUNTER
I spoke to patient and advised him pre med antibiotic is waiting for him at pharmacy.  Sheet from dentist will be faxed later today.

## 2023-10-17 NOTE — TELEPHONE ENCOUNTER
Caller: Heri Vasquez    Relationship to patient: Self    Best call back number: [phone]    Patient is needing: PATIENTS IS TRYING TO GET DENTAL WORK DONE AT [facility] AND THEY FAXED CLEARANCE OVER THAT NEEDS TO BE SENT BACK ASAP. PLEASE ADVISE IF YOU RECEIVED THE PAPERWORK FROM THEM.   HE STATED THAT HE HAS HAD A TOOTH ACHE FOR A COUPLE WEEKS AND NEEDS THAT SENT BACK. HE SAID THEY FAXED IT ON 10/12/23.     PLEASE LET THE PATIENT KNOW ASAP

## 2023-10-19 RX ORDER — ISOSORBIDE MONONITRATE 60 MG/1
TABLET, EXTENDED RELEASE ORAL
Qty: 135 TABLET | Refills: 0 | Status: SHIPPED | OUTPATIENT
Start: 2023-10-19

## 2023-10-20 ENCOUNTER — TELEPHONE (OUTPATIENT)
Dept: CARDIOLOGY | Facility: CLINIC | Age: 78
End: 2023-10-20

## 2023-10-20 NOTE — TELEPHONE ENCOUNTER
Caller: Heri Vasquez    Relationship: Self    Best call back number: [phone]    What is the best time to reach you: ANY    Who are you requesting to speak with (clinical staff, provider,  specific staff member): ANY    What was the call regarding: PATIENT IS HAVING TEETH PULLED ON 10/25/23 AND SEE DE PENDYLAL ON 10/26. HE WANTS TO MAKE SURE THAT PULLING HIS TEETH WONT INTERFERE WITH DR SALAS APPT.

## 2023-11-13 ENCOUNTER — HOSPITAL ENCOUNTER (OUTPATIENT)
Dept: CARDIOLOGY | Facility: HOSPITAL | Age: 78
Discharge: HOME | End: 2023-11-13
Admitting: FAMILY MEDICINE
Payer: MEDICARE

## 2023-11-13 VITALS
BODY MASS INDEX: 35.16 KG/M2 | DIASTOLIC BLOOD PRESSURE: 80 MMHG | HEIGHT: 68 IN | SYSTOLIC BLOOD PRESSURE: 130 MMHG | WEIGHT: 232 LBS

## 2023-11-13 DIAGNOSIS — R07.9 CHEST PAIN, UNSPECIFIED TYPE: ICD-10-CM

## 2023-11-13 PROCEDURE — 93306 TTE W/DOPPLER COMPLETE: CPT

## 2023-11-15 LAB
BH CV ECHO MEAS - AO MAX PG: 6 MMHG
BH CV ECHO MEAS - AO MEAN PG: 3.2 MMHG
BH CV ECHO MEAS - AO ROOT DIAM: 3.2 CM
BH CV ECHO MEAS - AO V2 MAX: 122 CM/SEC
BH CV ECHO MEAS - AO V2 VTI: 19.3 CM
BH CV ECHO MEAS - AVA(I,D): 3 CM2
BH CV ECHO MEAS - EDV(CUBED): 73.4 ML
BH CV ECHO MEAS - EDV(MOD-SP4): 43.8 ML
BH CV ECHO MEAS - EF(MOD-SP4): 57.5 %
BH CV ECHO MEAS - ESV(CUBED): 29.6 ML
BH CV ECHO MEAS - ESV(MOD-SP4): 18.6 ML
BH CV ECHO MEAS - FS: 26.1 %
BH CV ECHO MEAS - IVS/LVPW: 1.1 CM
BH CV ECHO MEAS - IVSD: 1.32 CM
BH CV ECHO MEAS - LA DIMENSION: 3.1 CM
BH CV ECHO MEAS - LV DIASTOLIC VOL/BSA (35-75): 20.1 CM2
BH CV ECHO MEAS - LV MASS(C)D: 190.2 GRAMS
BH CV ECHO MEAS - LV MAX PG: 4.1 MMHG
BH CV ECHO MEAS - LV MEAN PG: 2.08 MMHG
BH CV ECHO MEAS - LV SYSTOLIC VOL/BSA (12-30): 8.6 CM2
BH CV ECHO MEAS - LV V1 MAX: 100.8 CM/SEC
BH CV ECHO MEAS - LV V1 VTI: 18.5 CM
BH CV ECHO MEAS - LVIDD: 4.2 CM
BH CV ECHO MEAS - LVIDS: 3.1 CM
BH CV ECHO MEAS - LVOT AREA: 3.1 CM2
BH CV ECHO MEAS - LVOT DIAM: 1.99 CM
BH CV ECHO MEAS - LVPWD: 1.2 CM
BH CV ECHO MEAS - MV A MAX VEL: 96.4 CM/SEC
BH CV ECHO MEAS - MV DEC SLOPE: 280.3 CM/SEC2
BH CV ECHO MEAS - MV DEC TIME: 0.21 SEC
BH CV ECHO MEAS - MV E MAX VEL: 59.7 CM/SEC
BH CV ECHO MEAS - MV E/A: 0.62
BH CV ECHO MEAS - MV MAX PG: 4.6 MMHG
BH CV ECHO MEAS - MV MEAN PG: 1.57 MMHG
BH CV ECHO MEAS - MV V2 VTI: 21.4 CM
BH CV ECHO MEAS - MVA(VTI): 2.7 CM2
BH CV ECHO MEAS - PA ACC TIME: 0.1 SEC
BH CV ECHO MEAS - PA V2 MAX: 97.1 CM/SEC
BH CV ECHO MEAS - RAP SYSTOLE: 3 MMHG
BH CV ECHO MEAS - RV MAX PG: 2.09 MMHG
BH CV ECHO MEAS - RV V1 MAX: 72.4 CM/SEC
BH CV ECHO MEAS - RV V1 VTI: 13.9 CM
BH CV ECHO MEAS - RVDD: 3.4 CM
BH CV ECHO MEAS - SI(MOD-SP4): 11.6 ML/M2
BH CV ECHO MEAS - SV(LVOT): 57.4 ML
BH CV ECHO MEAS - SV(MOD-SP4): 25.2 ML

## 2023-11-16 ENCOUNTER — HOSPITAL ENCOUNTER (EMERGENCY)
Facility: HOSPITAL | Age: 78
Discharge: HOME | End: 2023-11-16
Attending: EMERGENCY MEDICINE
Payer: MEDICARE

## 2023-11-16 ENCOUNTER — APPOINTMENT (OUTPATIENT)
Dept: CT IMAGING | Facility: HOSPITAL | Age: 78
End: 2023-11-16
Payer: MEDICARE

## 2023-11-16 VITALS
RESPIRATION RATE: 18 BRPM | TEMPERATURE: 98 F | HEART RATE: 89 BPM | SYSTOLIC BLOOD PRESSURE: 98 MMHG | WEIGHT: 214 LBS | BODY MASS INDEX: 32.43 KG/M2 | HEIGHT: 68 IN | OXYGEN SATURATION: 94 % | DIASTOLIC BLOOD PRESSURE: 64 MMHG

## 2023-11-16 DIAGNOSIS — S29.9XXA CHEST WALL TRAUMA, INITIAL ENCOUNTER: Primary | ICD-10-CM

## 2023-11-16 DIAGNOSIS — S39.81XA BLUNT TRAUMA OF ABDOMINAL WALL, INITIAL ENCOUNTER: ICD-10-CM

## 2023-11-16 PROCEDURE — 99284 EMERGENCY DEPT VISIT MOD MDM: CPT

## 2023-11-16 PROCEDURE — 71250 CT THORAX DX C-: CPT

## 2023-11-16 RX ORDER — ACETAMINOPHEN 325 MG/1
650 TABLET ORAL ONCE
Status: COMPLETED | OUTPATIENT
Start: 2023-11-16 | End: 2023-11-16

## 2023-11-16 RX ADMIN — ACETAMINOPHEN 650 MG: 325 TABLET, FILM COATED ORAL at 11:34

## 2023-11-16 NOTE — FSED PROVIDER NOTE
Subjective   History of Present Illness  Patient complaint of accidentally falling off roof and injuring left ribs. No pattern to symptoms. No precipitating or rleieving factors. Pain with movement only. No numbness, tingling or weakness. No abdominal pain, headache, neck pain. No LOC.         Review of Systems   All other systems reviewed and are negative.      Past Medical History:   Diagnosis Date    Arthritis     Dr Mosqueda    Coronary heart disease 01/2016    single vessel disease, nl stress test (copied from [facility])    Coronary heart disease 10/31/2017    cath 10/31/17 - Low % Blockages- N o Intervention   (copied from [facility])    Diverticulosis     Fatty liver     GERD (gastroesophageal reflux disease)     Hearing loss     Hyperlipemia     Hyperlipidemia     Hypertension     Low back pain     Neuropathy in diabetes     SIDDHARTHA treated with BiPAP     Pain management     injections Lumbar spine X2 with [facility] @ [facility] (copied from [facility])    Physical therapy evaluation, initial     @ [facility] in [place] 6 weeks x3 per week, [facility] (copied from [facility])    Rectal bleed 08/2012    hemorrhoids    Retinal hemorrhage of right eye 05/2014    Sleep apnea     bipap    Tinea pedis of right foot 07/02/2023    Type 2 diabetes mellitus     Uses brace        No Known Allergies    Past Surgical History:   Procedure Laterality Date    BELPHAROPTOSIS REPAIR  12/11/2017     Dr. grimes (copied from [facility])    BROW LIFT  12/11/2017    Dr. Grimes at [facility]    CARDIAC CATHETERIZATION  03/2012    at [facility]- single vessel disease. (copied from [facility])    CARDIAC CATHETERIZATION  10/13/2017    no intervention - Low % Blockages.    CARDIAC CATHETERIZATION Left 05/28/2021    Procedure: LEFT HEART CATH with possible PCI;  Surgeon: Leslie Clark MD;  Location: [facility] CATH INVASIVE LOCATION;  Service: Cardiovascular;  Laterality: Left;  Local and IV sedation    CATARACT EXTRACTION  12/11/2017     brow lift and eye lid repair    COLON SURGERY  2014    colonscopy    COLONOSCOPY N/A 2019    Procedure: COLONOSCOPY with polypectomy x1;  Surgeon: Graham Byrd MD;  Location: [facility] ENDOSCOPY;  Service: Gastroenterology    CUBITAL TUNNEL RELEASE Right     HERNIA REPAIR  2008    umbilical hernia repair    JOINT REPLACEMENT      KNEE ARTHROSCOPY Right 2014    Dr. Mosqueda    LASIK      lasik and Cataract Extraction, sep  and right 2014- [facility]. (copied from [facility])    TOTAL KNEE ARTHROPLASTY Left 2009    Dr. Mosqueda (copied from [facility])    TOTAL SHOULDER ARTHROPLASTY W/ DISTAL CLAVICLE EXCISION Left 2022    Procedure: TOTAL SHOULDER REVERSE ARTHROPLASTY;  Surgeon: Dany Knapp MD;  Location: [facility] MAIN OR;  Service: Orthopedics;  Laterality: Left;       Family History   Problem Relation Age of Onset    Heart disease Mother         CHF    Hypertension Mother     Heart failure Mother     Stroke Maternal Grandfather     Diabetes Other     Diabetes Other     Stroke Other     Arthritis Maternal Grandmother     Diabetes Brother        Social History     Socioeconomic History    Marital status:      Spouse name: Shani   Tobacco Use    Smoking status: Former     Packs/day: 1.50     Years: 30.00     Additional pack years: 0.00     Total pack years: 45.00     Types: Cigarettes     Quit date: 2000     Years since quittin.8    Smokeless tobacco: Former   Vaping Use    Vaping Use: Never used   Substance and Sexual Activity    Alcohol use: No    Drug use: No    Sexual activity: Not Currently     Partners: Female           Objective   Physical Exam  Vitals and nursing note reviewed.   Constitutional:       Appearance: Normal appearance.   HENT:      Head: Normocephalic and atraumatic.   Eyes:      Extraocular Movements: Extraocular movements intact.      Pupils: Pupils are equal, round, and reactive to light.   Cardiovascular:      Rate and Rhythm: Normal  rate and regular rhythm.   Pulmonary:      Effort: Pulmonary effort is normal.      Breath sounds: Normal breath sounds.   Abdominal:      Palpations: Abdomen is soft.   Musculoskeletal:         General: Normal range of motion.      Comments: BRUISING ALONG LEFT UPPER ANTERIOR ABDOMINAL ASPECT AND LOWER RIBS. NO REFERRED RIB TENDERNESS.    Skin:     General: Skin is warm and dry.      Capillary Refill: Capillary refill takes less than 2 seconds.   Neurological:      General: No focal deficit present.      Mental Status: He is alert and oriented to person, place, and time.   Psychiatric:         Mood and Affect: Mood normal.         Behavior: Behavior normal.         Procedures           ED Course                                           Medical Decision Making  CONCERN FOR BLUNT TRAUMA TO LEFT RIBS AND UPPER ABDOMEN.     Problems Addressed:  Blunt trauma of abdominal wall, initial encounter: complicated acute illness or injury  Chest wall trauma, initial encounter: complicated acute illness or injury    Amount and/or Complexity of Data Reviewed  Radiology: ordered.    Risk  OTC drugs.        Final diagnoses:   Chest wall trauma, initial encounter   Blunt trauma of abdominal wall, initial encounter       ED Disposition  ED Disposition       ED Disposition   Discharge    Condition   Stable    Comment   --               Olesya Cueva MD  [address] [address]  [phone]    Schedule an appointment as soon as possible for a visit            Medication List      No changes were made to your prescriptions during this visit.

## 2023-11-21 ENCOUNTER — OFFICE VISIT (OUTPATIENT)
Dept: FAMILY MEDICINE CLINIC | Facility: CLINIC | Age: 78
End: 2023-11-21
Payer: MEDICARE

## 2023-11-21 VITALS
DIASTOLIC BLOOD PRESSURE: 68 MMHG | TEMPERATURE: 98.4 F | SYSTOLIC BLOOD PRESSURE: 124 MMHG | HEART RATE: 81 BPM | WEIGHT: 221.2 LBS | OXYGEN SATURATION: 93 % | HEIGHT: 68 IN | BODY MASS INDEX: 33.52 KG/M2

## 2023-11-21 DIAGNOSIS — S00.83XD CONTUSION OF FACE, SUBSEQUENT ENCOUNTER: ICD-10-CM
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
## 2023-11-21 DIAGNOSIS — S30.1XXD CONTUSION OF ABDOMINAL WALL, SUBSEQUENT ENCOUNTER: ICD-10-CM

## 2023-11-21 PROBLEM — S30.1XXA CONTUSION OF ABDOMINAL WALL: Status: ACTIVE | Noted: 2023-11-21
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
## 2023-11-21 PROBLEM — S00.83XA CONTUSION OF FACE: Status: ACTIVE | Noted: 2023-11-21

## 2023-11-21 NOTE — PROGRESS NOTES
"Chief Complaint  Fall (F/u - fell off his roof of his house )    Subjective        Heri Vasquez presents to [facility]  History of Present Illness  He had some leaves clogging up his house gutters about 10 days ago.  He was on his roof with a leaf blower and fell.  He fell on the leaf blower and hit his left ribs and left brow area.  He did not fall off of the roof. No LOC.  He went ahead and finished the work and climbed down the ladder. He went to [facility] [place] [place] for evaluation and had an unremarkable CT.   Fall        Objective   Vital Signs:  /68 (BP Location: Right arm, Patient Position: Sitting, Cuff Size: Large Adult)   Pulse 81   Temp 98.4 °F (36.9 °C) (Infrared)   Ht 172.7 cm (68\")   Wt 100 kg (221 lb 3.2 oz)   SpO2 93%   BMI 33.63 kg/m²   Estimated body mass index is 33.63 kg/m² as calculated from the following:    Height as of this encounter: 172.7 cm (68\").    Weight as of this encounter: 100 kg (221 lb 3.2 oz).     BMI is >= 30 and <35. (Class 1 Obesity). The following options were offered after discussion;: exercise counseling/recommendations           Physical Exam  Vitals and nursing note reviewed.   Constitutional:       General: He is not in acute distress.     Appearance: He is well-developed.   HENT:      Head: Normocephalic.      Comments: Bruising of left brow area  Eyes:      General: Lids are normal.      Conjunctiva/sclera: Conjunctivae normal.   Neck:      Thyroid: No thyroid mass or thyromegaly.      Trachea: Trachea normal.   Cardiovascular:      Rate and Rhythm: Normal rate and regular rhythm.      Heart sounds: Normal heart sounds.   Pulmonary:      Effort: Pulmonary effort is normal.      Breath sounds: Normal breath sounds.   Abdominal:      Palpations: Abdomen is soft.      Comments: Mild bruising to left upper abdomen   Musculoskeletal:      Cervical back: Normal range of motion.   Lymphadenopathy:      Cervical: No cervical adenopathy.   Skin:    "  General: Skin is warm and dry.      Findings: Bruising present.   Neurological:      Mental Status: He is alert and oriented to person, place, and time. Mental status is at baseline.   Psychiatric:         Attention and Perception: He is attentive.         Mood and Affect: Mood normal.         Speech: Speech normal.         Behavior: Behavior normal.        Result Review :  The following data was reviewed by: Olesya Cueva MD on 11/21/2023:  Common labs          9/18/2023    10:00 9/21/2023    14:07 9/22/2023    03:14   Common Labs   Glucose 134  121  124    BUN 15  15  18    Creatinine 0.91  0.79  0.95    Sodium 140  142  142    Potassium 4.0  4.0  3.9    Chloride 104  106  106    Calcium 9.2  9.2  8.9    Albumin 4.1  4.1     Total Bilirubin 0.4  0.5     Alkaline Phosphatase 87  91     AST (SGOT) 20  22     ALT (SGPT) 23  25     WBC 10.27  11.00  10.50    Hemoglobin 15.7  15.6  15.2    Hematocrit 50.3  48.3  47.4    Platelets 386  359  347    Total Cholesterol   113    Triglycerides   183    HDL Cholesterol   28    LDL Cholesterol    54    Hemoglobin A1C 5.90                     Assessment and Plan   Diagnoses and all orders for this visit:    1. Fall, subsequent encounter (Primary)  Assessment & Plan:  Stay off of the roof!      2. Contusion of abdominal wall, subsequent encounter  Assessment & Plan:  Apply a compressive ACE bandage. Rest and elevate the affected painful area.  Apply cold compresses intermittently as needed.  As pain recedes, begin normal activities slowly as tolerated.  Call if symptoms persist.        3. Contusion of face, subsequent encounter             Follow Up   No follow-ups on file.  Patient was given instructions and counseling regarding his condition or for health maintenance advice. Please see specific information pulled into the AVS if appropriate.

## 2023-11-27 RX ORDER — ATORVASTATIN CALCIUM 20 MG/1
TABLET, FILM COATED ORAL
Qty: 90 TABLET | Refills: 3 | Status: SHIPPED | OUTPATIENT
Start: 2023-11-27

## 2023-11-28 ENCOUNTER — OFFICE VISIT (OUTPATIENT)
Dept: CARDIOLOGY | Facility: CLINIC | Age: 78
End: 2023-11-28
Payer: MEDICARE

## 2023-11-28 VITALS
OXYGEN SATURATION: 96 % | SYSTOLIC BLOOD PRESSURE: 148 MMHG | HEIGHT: 68 IN | BODY MASS INDEX: 33.65 KG/M2 | HEART RATE: 78 BPM | DIASTOLIC BLOOD PRESSURE: 70 MMHG | WEIGHT: 222 LBS

## 2023-11-28 DIAGNOSIS — I20.89 STABLE ANGINA PECTORIS: Primary | ICD-10-CM

## 2023-11-28 DIAGNOSIS — I10 ESSENTIAL HYPERTENSION: ICD-10-CM

## 2023-11-28 DIAGNOSIS — I25.10 CHRONIC CORONARY ARTERY DISEASE: ICD-10-CM

## 2023-11-28 DIAGNOSIS — E78.2 MIXED HYPERLIPIDEMIA: ICD-10-CM

## 2023-11-28 PROCEDURE — 99214 OFFICE O/P EST MOD 30 MIN: CPT | Performed by: INTERNAL MEDICINE

## 2023-11-28 PROCEDURE — 1160F RVW MEDS BY RX/DR IN RCRD: CPT | Performed by: INTERNAL MEDICINE

## 2023-11-28 PROCEDURE — 93000 ELECTROCARDIOGRAM COMPLETE: CPT | Performed by: INTERNAL MEDICINE

## 2023-11-28 PROCEDURE — 3077F SYST BP >= 140 MM HG: CPT | Performed by: INTERNAL MEDICINE

## 2023-11-28 PROCEDURE — 1159F MED LIST DOCD IN RCRD: CPT | Performed by: INTERNAL MEDICINE

## 2023-11-28 PROCEDURE — 3078F DIAST BP <80 MM HG: CPT | Performed by: INTERNAL MEDICINE

## 2023-11-28 NOTE — PROGRESS NOTES
Cardiology Office Visit      Encounter Date:  11/28/2023    Patient ID:   Heri Vasquez is a 78 y.o. male.      Reason For Followup:  Follow-up  for coronary artery disease, dyslipidemia, gastroesophageal reflux disease, obstructive sleep apnea    Brief Clinical History:  Dear Dr. Cueva, Olesya Bay MD    I had the pleasure of seeing Heri Vasquez today. As you are well aware, this is a 78 y.o. male  with history of mild coronary artery disease, ,  dyslipidemia, and gastroesophageal reflux disease.  He uses BiPAP machine for obstructive sleep apnea.     [facility] with chest pains and underwent cardiac catheterization which showed mild coronary artery disease in October 2017.  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  Left main was normal.  The distal LAD showed a 50-60% lesion and was too small to do any intervention.  30-40% lesion was noted in the right coronary artery.  Left circumflex and ramus intermedius branches were normal.      Impressions:/2021  Mild to moderate obstructive coronary disease involving the LAD and right coronary artery  Distal LAD with segment of intramyocardial bridging  Normal LV systolic function  Normal wall motion  Estimated LV ejection fraction of 60%        Interval History:  Recent hospitalization for symptoms of chest pain with extensive workup  Denies any further chest pain  Denies any cardiac symptoms  Denies any new cardiac symptoms  Assessment & Plan    Impressions:    Coronary artery disease stable with no anginal chest discomfort  Dyslipidemia.  Lipids are being checked periodically.  Hypertension under fairly good control.  Fatty liver  Obesity  Cardiac catheterization in May 2021 with a mild obstructive coronary artery disease  Hospitalization in September 2022 with extensive workup including a normal echocardiogram normal stress test normal CT chest    Recommendations:  Continue aspirin 81 mg p.o. once a day  Lipitor 20 mg p.o. once a day  "lisinopril 10 mg p.o. once a day isosorbide 90 mg p.o. once a day  Continue current medical therapy  Stable from cardiac standpoint  Continue current medical therapy continued aggressive risk factor modification  Cardiac work-up reviewed and discussed with the patient  Medical management for obstructive coronary artery disease  Recent workup and hospitalization medical records reviewed and discussed with patient and family  Continued aggressive risk factor modification  We will see patient in the office in 6 months  Thank you very much for allowing us to participate in the care of your patients        Vitals:  Vitals:    11/28/23 1013   BP: 148/70   BP Location: Left arm   Pulse: 78   SpO2: 96%   Weight: 101 kg (222 lb)   Height: 172.7 cm (68\")       Physical Exam:    General: Alert, cooperative, no distress, appears stated age  Head:  Normocephalic, atraumatic, mucous membranes moist  Eyes:  Conjunctiva/corneas clear, EOM's intact     Neck:  Supple,  no adenopathy;      Lungs: Clear to auscultation bilaterally, no wheezes rhonchi rales are noted  Chest wall: No tenderness  Heart::  Regular rate and rhythm, S1 and S2 normal, no murmur, rub or gallop  Abdomen: Soft, non-tender, nondistended bowel sounds active  Extremities: No cyanosis, clubbing, or edema  Pulses: 2+ and symmetric all extremities  Skin:  No rashes or lesions  Neuro/psych: A&O x3. CN II through XII are grossly intact with appropriate affect              Lab Results   Component Value Date    GLUCOSE 124 (H) 09/22/2023    BUN 18 09/22/2023    CREATININE 0.95 09/22/2023    EGFR 80.9 09/22/2023    BCR 18.9 09/22/2023    K 3.9 09/22/2023    CO2 26.0 09/22/2023    CALCIUM 8.9 09/22/2023    ALBUMIN 4.1 09/21/2023    BILITOT 0.5 09/21/2023    AST 22 09/21/2023    ALT 25 09/21/2023     Results for orders placed during the hospital encounter of 11/13/23    Adult Transthoracic Echo Complete W/ Cont if Necessary Per Protocol    Interpretation Summary    Left " ventricular ejection fraction appears to be 51 - 55%.    The right ventricular cavity is mildly dilated.    There is calcification of the aortic valve.     Results for orders placed during the hospital encounter of 05/26/21    Cardiac Catheterization/Vascular Study    Narrative  Table formatting from the original result was not included.  Cardiac Catheterization Operative Report    Heri Vasquez  [id number]  5/28/2021  @PCP@    He underwent cardiac catheterization.    Indications for the procedure include: abnormal stress test.    Procedure Details:  The risks, benefits, complications, treatment options, and expected outcomes were discussed with the patient. The patient and/or family concurred with the proposed plan, giving informed consent.    After informed consent the patient was brought to the cath lab after appropriate IV hydration was begun and oral premedication was given. He was further sedated with fentanyl. He was prepped and draped in the usual manner. Using the modified Seldinger access technique, a 6 [place] sheath was placed in the femoral artery. A left heart catheterization with coronary arteriography was performed. Findings are discussed below.    After the procedure was completed, sedation was stopped and the sheaths and catheters were all removed. Hemostasis was achieved per established hospital protocols.    Conscious sedation:  Conscious sedation was performed according to protocol.  I supervised and directed an independent trained observer with the assistance of monitoring the patient's level of consciousness and physiologic status throughout the procedure.  Intraoperative service time was 30 minutes.    Findings:    Hemodynamics  Central artery pressure systolic 130 and diastolic 60 with a mean pressure of 88 mmHg  LV end-diastolic pressure was 10 mmHg  No gradient across aortic valve on the pullback of the pigtail catheter    Left Main  left main coronary arteries angiographically normal  bifurcates into left anterior descending and left circumflex arteries.  With the initial injection of the coronary arteries there was a diffuse coronary artery spasm which got improved with intracoronary nitroglycerin  RCA  dominant vessel large-caliber vessel.  Right coronary artery has a focal area of angiographic 40% stenosis.  Right coronary artery distally provides PDA and PLB branches both of them are angiographically normal.  LAD  large-caliber vessel angiographically normal in the proximal portion distal right coronary artery after the first diagonal branch tapers down quickly with segment of intramyocardial bridging involving the mid to distal stent segment otherwise no focal angiographic obstructive coronary artery disease involving the LAD.  Still LAD has long segment portion of intramyocardial bridging.  Circ  moderate to large caliber vessel provides 2 marginal branches that are moderate size angiographically normal without any significant obstructive coronary artery disease.  Moderate size ramus intermediate branch that is angiographically normal  LV  normal LV systolic function normal wall motion estimated LV ejection fraction of 55 to 60%  Coronary Dominance  right coronary artery    Estimated Blood Loss:  Minimal    Specimens: None    Complications:  None; patient tolerated the procedure well.    Disposition: PACU - hemodynamically stable.    Condition: stable    Impressions:  Mild to moderate obstructive coronary disease involving the LAD and right coronary artery  Distal LAD with segment of intramyocardial bridging  Normal LV systolic function  Normal wall motion  Estimated LV ejection fraction of 60%    Recommendations:  Medical management for obstructive coronary artery disease  Continued aggressive risk factor modification     Lab Results   Component Value Date    CHOL 113 09/22/2023    TRIG 183 (H) 09/22/2023    HDL 28 (L) 09/22/2023    LDL 54 09/22/2023      Results for orders placed during  the hospital encounter of 09/21/23    Stress Test With Myocardial Perfusion One Day    Interpretation Summary    Findings consistent with a normal ECG stress test.    Left ventricular ejection fraction is normal (Calculated EF = 67%).    Basal inferolateral artifact noted.    Myocardial perfusion imaging indicates a normal myocardial perfusion study with no evidence of ischemia.    Impressions are consistent with a low risk study.   Results for orders placed in visit on 10/31/17    CARDIOVASCULAR STUDIES - CONVERTED    Narrative  HEART CATH PROCEDURE      Imported By: Olesya Williamson 11/17/2017 8:58:46 PM    _____________________________________________________________________    External Attachment:    Type: Image  Comment:  External Document      Signed before import by SAILAJA Jang MD  Filed automatically on 11/17/2017 at 8:59 PM  ________________________________________________________________________      Disclaimer: Converted Note message may not contain all data elements that existed in the legacy source system. Please see [facility] for the original note details.           Objective:          Allergies:  No Known Allergies    Medication Review:     Current Outpatient Medications:     acetaminophen (TYLENOL) 650 MG 8 hr tablet, Take 1 tablet by mouth Every 8 (Eight) Hours As Needed for Mild Pain., Disp: , Rfl:     aspirin 81 MG EC tablet, Take 1 tablet by mouth Daily. LD 4/13, Disp: , Rfl:     atorvastatin (LIPITOR) 20 MG tablet, TAKE 1 TABLET BY MOUTH ONCE  DAILY, Disp: 90 tablet, Rfl: 3    azelastine (ASTELIN) 0.1 % nasal spray, 2 sprays into the nostril(s) as directed by provider 2 (Two) Times a Day. Use in each nostril as directed, Disp: 30 mL, Rfl: 12    betamethasone valerate (VALISONE) 0.1 % ointment, Apply 1 application  topically to the appropriate area as directed 2 (Two) Times a Day., Disp: , Rfl:     calcium citrate-vitamin d (CALCITRATE) 315-250 MG-UNIT tablet tablet, Take  by  mouth Daily., Disp: , Rfl:     cyclobenzaprine (FLEXERIL) 10 MG tablet, Take 1 tablet by mouth At Night As Needed for Muscle Spasms., Disp: 30 tablet, Rfl: 0    donepezil (ARICEPT) 5 MG tablet, TAKE 1 TABLET BY MOUTH AT  NIGHT, Disp: 90 tablet, Rfl: 3    gabapentin (NEURONTIN) 300 MG capsule, TAKE 1 CAPSULE BY MOUTH  TWICE DAILY, Disp: 180 capsule, Rfl: 3    isosorbide mononitrate (IMDUR) 60 MG 24 hr tablet, TAKE 1 AND 1/2 TABLETS BY MOUTH  DAILY, Disp: 135 tablet, Rfl: 0    Lancets (OneTouch Delica Plus [id number]) misc, USE   TO CHECK GLUCOSE ONCE DAILY, Disp: 100 each, Rfl: 4    lisinopril (PRINIVIL,ZESTRIL) 10 MG tablet, TAKE 1 TABLET BY MOUTH  DAILY, Disp: 90 tablet, Rfl: 3    metFORMIN (GLUCOPHAGE) 500 MG tablet, TAKE 1 TABLET BY MOUTH TWICE  DAILY WITH A MEAL, Disp: 180 tablet, Rfl: 3    Multiple Vitamins-Minerals (MULTIVITAMIN WITH MINERALS) tablet tablet, Take 1 tablet by mouth Daily., Disp: , Rfl:     naproxen (NAPROSYN) 500 MG tablet, TAKE 1 TABLET BY MOUTH  TWICE DAILY AS NEEDED FOR  MODERATE PAIN, Disp: 180 tablet, Rfl: 3    nitroglycerin (NITROSTAT) 0.4 MG SL tablet, Place 1 tablet under the tongue Every 5 (Five) Minutes As Needed for Chest Pain., Disp: 30 tablet, Rfl: 4    OneTouch Ultra test strip, 1 each by Other route Daily. E11.9, Disp: 50 each, Rfl: 5    pantoprazole (PROTONIX) 40 MG EC tablet, TAKE 1 TABLET BY MOUTH  DAILY, Disp: 90 tablet, Rfl: 3    amoxicillin (AMOXIL) 500 MG capsule, Take all 4 pills 30 minutes prior to dental appointment. (Patient not taking: Reported on 11/28/2023), Disp: 4 capsule, Rfl: 0    betamethasone dipropionate (DIPROLENE) 0.05 % lotion, , Disp: , Rfl:     HYDROcodone-acetaminophen (NORCO) 7.5-325 MG per tablet, Take 1 tablet by mouth Every 6 (Six) Hours As Needed for Moderate Pain or Severe Pain. (Patient not taking: Reported on 11/28/2023), Disp: 12 tablet, Rfl: 0    hydrocortisone 2.5 % cream, Apply 1 application  topically to the appropriate area as directed 2  (Two) Times a Day. (Patient not taking: Reported on 11/28/2023), Disp: , Rfl:     Ibuprofen 3 %, Gabapentin 10 %, Baclofen 2 %, lidocaine 4 %, Apply 1-2 g topically to the appropriate area as directed 3 (Three) to 4 (Four) times daily. (Patient not taking: Reported on 11/28/2023), Disp: 90 g, Rfl: 2    promethazine (PHENERGAN) 12.5 MG tablet, Take 1 tablet by mouth Every 6 (Six) Hours As Needed for Nausea or Vomiting. (Patient not taking: Reported on 11/28/2023), Disp: 21 tablet, Rfl: 1    Family History:  Family History   Problem Relation Age of Onset    Heart disease Mother         CHF    Hypertension Mother     Heart failure Mother     Stroke Maternal Grandfather     Diabetes Other     Diabetes Other     Stroke Other     Arthritis Maternal Grandmother     Diabetes Brother        Past Medical History:  Past Medical History:   Diagnosis Date    Arthritis     Dr Mosqueda    Coronary heart disease 01/2016    single vessel disease, nl stress test (copied from [facility])    Coronary heart disease 10/31/2017    cath 10/31/17 - Low % Blockages- N o Intervention   (copied from [facility])    Diverticulosis     Fatty liver     GERD (gastroesophageal reflux disease)     Hearing loss     Hyperlipemia     Hyperlipidemia     Hypertension     Low back pain     Neuropathy in diabetes     SIDDHARTHA treated with BiPAP     Pain management     injections Lumbar spine X2 with [facility] @ [facility] (copied from [facility])    Physical therapy evaluation, initial     @ [facility] in [place] 6 weeks x3 per week, Kalen leal (copied from [facility])    Rectal bleed 08/2012    hemorrhoids    Retinal hemorrhage of right eye 05/2014    Sleep apnea     bipap    Tinea pedis of right foot 07/02/2023    Type 2 diabetes mellitus     Uses brace        Past surgical History:  Past Surgical History:   Procedure Laterality Date    BELPHAROPTOSIS REPAIR  12/11/2017     Dr. grimes (copied from [facility])    BROW LIFT  12/11/2017    Dr. Grimes at [facility]     CARDIAC CATHETERIZATION  2012    at [place]- single vessel disease. (copied from [facility])    CARDIAC CATHETERIZATION  10/13/2017    no intervention - Low % Blockages.    CARDIAC CATHETERIZATION Left 2021    Procedure: LEFT HEART CATH with possible PCI;  Surgeon: Leslie Clark MD;  Location: [facility] CATH INVASIVE LOCATION;  Service: Cardiovascular;  Laterality: Left;  Local and IV sedation    CATARACT EXTRACTION  2017    brow lift and eye lid repair    COLON SURGERY  2014    colonscopy    COLONOSCOPY N/A 2019    Procedure: COLONOSCOPY with polypectomy x1;  Surgeon: Graham Byrd MD;  Location: [facility] ENDOSCOPY;  Service: Gastroenterology    CUBITAL TUNNEL RELEASE Right     HERNIA REPAIR  2008    umbilical hernia repair    JOINT REPLACEMENT      KNEE ARTHROSCOPY Right 2014    Dr. Panda pollack and Cataract Extraction, sep 11 and right 2014- [facility]. (copied from [facility])    TOTAL KNEE ARTHROPLASTY Left 2009    Dr. Mosqueda (copied from [facility])    TOTAL SHOULDER ARTHROPLASTY W/ DISTAL CLAVICLE EXCISION Left 2022    Procedure: TOTAL SHOULDER REVERSE ARTHROPLASTY;  Surgeon: Dany Knapp MD;  Location: [facility] MAIN OR;  Service: Orthopedics;  Laterality: Left;       Social History:  Social History     Socioeconomic History    Marital status:      Spouse name: Shani   Tobacco Use    Smoking status: Former     Packs/day: 1.50     Years: 30.00     Additional pack years: 0.00     Total pack years: 45.00     Types: Cigarettes     Quit date: 2000     Years since quittin.9    Smokeless tobacco: Former   Vaping Use    Vaping Use: Never used   Substance and Sexual Activity    Alcohol use: No    Drug use: No    Sexual activity: Not Currently     Partners: Female       Review of Systems:  The following systems were reviewed as they relate to the cardiovascular system: Constitutional, Eyes, ENT, Cardiovascular, Respiratory,  Gastrointestinal, Integumentary, Neurological, Psychiatric, Hematologic, Endocrine, Musculoskeletal, and Genitourinary. The pertinent cardiovascular findings are reported above with all other cardiovascular points within those systems being negative.    Diagnostic Study Review:     Current Electrocardiogram:    ECG 12 Lead    Date/Time: 11/28/2023 11:05 AM  Performed by: Leslie Clark MD    Authorized by: Leslie Clark MD  Comparison: compared with previous ECG   Similar to previous ECG  Rhythm: sinus rhythm  Rate: normal  BPM: 77  Conduction: conduction normal  ST Segments: ST segments normal  T Waves: T waves normal  QRS axis: normal    Clinical impression: normal ECG                NOTE: The following portions of the patient's history were reviewed and updated this visit as appropriate: allergies, current medications, past family history, past medical history, past social history, past surgical history and problem list.

## 2023-11-29 NOTE — ED PROVIDER NOTES
Addended by: REN MARTIN on: 11/29/2023 11:56 AM     Modules accepted: Orders     Subjective          Provider in Triage Note  Patient is a 77-year-old male comes in complaining of right rib pain since his fall on Sunday.  Patient states he fell onto his right ribs was seen by urgent care at that time and had x-rays done that were negative and sent home on pain medicine.  Patient states pain got worse.  Patient denies any fever, chills or significant cough.  Patient reports his pain is worse upon deep breathing and is tender to palpation of right lower ribs.  Patient states he takes an aspirin daily.          History of Present Illness  Patient reports no head or neck pain.  No abdominal pain.  No complaints of extremity injury other than a minor abrasion to his right knee  Review of Systems    Past Medical History:   Diagnosis Date   • Arthritis     Dr Mosqueda   • Coronary heart disease 01/2016    single vessel disease, nl stress test (copied from [facility])   • Coronary heart disease 10/31/2017    cath 10/31/17 - Low % Blockages- N o Intervention   (copied from [facility] [facility] [employer])   • Diverticulosis    • Fatty liver    • GERD (gastroesophageal reflux disease)    • Hearing loss    • Hyperlipemia    • Hyperlipidemia    • Hypertension    • Low back pain    • SIDDHARTHA treated with BiPAP    • Pain management     injections Lumbar spine X2 with Muprhys Pain management @ [facility] (copied from [facility] [facility])   • Physical therapy evaluation, initial     @ [facility] in [place] 6 weeks x3 per week, Kalen leal (copied from [employer] [facility] [employer])   • Rectal bleed 08/2012    hemorrhoids   • Retinal hemorrhage of right eye 05/2014   • Sleep apnea     bipap   • Type 2 diabetes mellitus (HCC)    • Uses brace        No Known Allergies    Past Surgical History:   Procedure Laterality Date   • BELPHAROPTOSIS REPAIR  12/11/2017     Dr. grimes (copied from [facility])   • BROW LIFT  12/11/2017    Dr. Grimes at [facility]   • CARDIAC CATHETERIZATION  03/2012    at [facility]- single vessel disease. (copied from [facility] [employer] [facility] [employer])   • CARDIAC CATHETERIZATION   10/13/2017    no intervention - Low % Blockages.   • CARDIAC CATHETERIZATION Left 2021    Procedure: LEFT HEART CATH with possible PCI;  Surgeon: Leslie Clark MD;  Location: [facility] CATH INVASIVE LOCATION;  Service: Cardiovascular;  Laterality: Left;  Local and IV sedation   • CATARACT EXTRACTION  2017    brow lift and eye lid repair   • COLON SURGERY  2014    colonscopy   • COLONOSCOPY N/A 2019    Procedure: COLONOSCOPY with polypectomy x1;  Surgeon: Graham Byrd MD;  Location: [facility] ENDOSCOPY;  Service: Gastroenterology   • CUBITAL TUNNEL RELEASE Right    • HERNIA REPAIR  2008    umbilical hernia repair   • JOINT REPLACEMENT     • KNEE ARTHROSCOPY Right 2014    Dr. Mosqueda   • LASIK      lasik and Cataract Extraction, sep 11 and right 2014- [facility]. (copied from [facility])   • TOTAL KNEE ARTHROPLASTY Left 2009    Dr. Mosqueda (copied from [facility] [facility])   • TOTAL SHOULDER ARTHROPLASTY W/ DISTAL CLAVICLE EXCISION Left 2022    Procedure: TOTAL SHOULDER REVERSE ARTHROPLASTY;  Surgeon: Dany Knapp MD;  Location: [facility] MAIN OR;  Service: Orthopedics;  Laterality: Left;       Family History   Problem Relation Age of Onset   • Heart disease Mother         CHF   • Hypertension Mother    • Heart failure Mother    • Stroke Maternal Grandfather    • Diabetes Other    • Diabetes Other    • Stroke Other        Social History     Socioeconomic History   • Marital status:      Spouse name: Shani   Tobacco Use   • Smoking status: Former     Packs/day: 1.50     Years: 30.00     Pack years: 45.00     Types: Cigarettes     Quit date: 2000     Years since quittin.2   • Smokeless tobacco: Former   Vaping Use   • Vaping Use: Never used   Substance and Sexual Activity   • Alcohol use: No   • Drug use: No   • Sexual activity: Not Currently     Partners: Female     Prior to Admission medications    Medication Sig Start Date End Date Taking? Authorizing  Provider   acetaminophen (TYLENOL) 650 MG 8 hr tablet Take 650 mg by mouth Every 8 (Eight) Hours As Needed for Mild Pain .    Arielle Carmona MD   aspirin 81 MG EC tablet Take 81 mg by mouth Daily. LD 4/13 10/20/21   Leslie Clark MD   atorvastatin (LIPITOR) 20 MG tablet TAKE 1 TABLET BY MOUTH  DAILY 10/24/22   Leslie Clark MD   betamethasone dipropionate (DIPROLENE) 0.05 % lotion  7/22/22   Arielle Carmona MD   betamethasone valerate (VALISONE) 0.1 % ointment Apply 1 application topically to the appropriate area as directed 2 (Two) Times a Day.    Arielle Carmona MD   calcium citrate-vitamin d (CALCITRATE) 315-250 MG-UNIT tablet tablet Take  by mouth Daily.    Arielle Carmona MD   cyclobenzaprine (FLEXERIL) 10 MG tablet Take 1 tablet by mouth At Night As Needed for Muscle Spasms. 12/23/22   Olesya Cueva MD   donepezil (ARICEPT) 5 MG tablet TAKE 1 TABLET BY MOUTH AT  NIGHT 10/24/22   Olesya Cueva MD   gabapentin (NEURONTIN) 300 MG capsule Take 1 capsule by mouth 2 (Two) Times a Day. 10/4/22   Olesya Cueva MD   HYDROcodone-acetaminophen (NORCO) 7.5-325 MG per tablet Take 1 tablet by mouth Every 6 (Six) Hours As Needed for Moderate Pain (pain). 3/20/23   Calvin Gomes MD   hydrocortisone 2.5 % cream Apply 1 application topically to the appropriate area as directed 2 (Two) Times a Day.    Arielle Carmona MD   isosorbide mononitrate (IMDUR) 60 MG 24 hr tablet TAKE 1 AND 1/2 TABLETS BY  MOUTH DAILY 11/22/22   Leslie Clark MD   Lancets (OneTouch Delica Plus [id number]) misc 1 each by Other route Daily. 7/26/22   Olesya Cueva MD   lisinopril (PRINIVIL,ZESTRIL) 10 MG tablet TAKE 1 TABLET BY MOUTH  DAILY 1/20/23   Olesya Cueva MD   metFORMIN (GLUCOPHAGE) 500 MG tablet Take 1 tablet by mouth 2 (Two) Times a Day With Meals. 3/6/23   Olesya Cueva MD   Multiple Vitamins-Minerals (MULTIVITAMIN WITH MINERALS) tablet tablet Take  1 tablet by mouth Daily.    Provider, MD Arielle   naproxen (NAPROSYN) 500 MG tablet TAKE 1 TABLET BY MOUTH  TWICE DAILY AS NEEDED FOR  MODERATE PAIN 3/14/23   Olesya Cueva MD   nitroglycerin (NITROSTAT) 0.4 MG SL tablet Place 1 tablet under the tongue Every 5 (Five) Minutes As Needed for Chest Pain. 6/24/21   Alejandra Mcdonald APRN   OneTouch Ultra test strip USE TO TEST ONCE DAILY AS DIRECTED 3/14/23   Olesya Cueva MD   pantoprazole (PROTONIX) 40 MG EC tablet TAKE 1 TABLET BY MOUTH  DAILY 3/14/23   Olesya Cueva MD   promethazine (PHENERGAN) 12.5 MG tablet Take 1 tablet by mouth Every 6 (Six) Hours As Needed for Nausea or Vomiting. 4/20/22   Dany Knapp MD   triamcinolone (KENALOG) 0.025 % cream  7/22/22   Provider, MD Arielle           Objective   Physical Exam   77-year-old male awake alert.  Generally well-developed well-nourished.  He has no complaints of head or neck tenderness.  No thoracic spine tenderness.  He complains of pain with palpation of right anterolateral mid to lower ribs.  Lungs are clear.  Cardiovascular regular rhythm.  Abdomen is soft with no tenderness.  Specifically no right upper quadrant tenderness.  He has intact movement of extremities neurovascular grossly intact  Procedures           ED Course  ED Course as of 03/24/23 0012   Fri Mar 24, 2023   0011 Norco RX sent in per attending review of INSPECT.  [LB]      ED Course User Index  [LB] Jessica Romo APRN      Results for orders placed or performed during the hospital encounter of 03/23/23   Comprehensive Metabolic Panel    Specimen: Blood   Result Value Ref Range    Glucose 117 (H) 65 - 99 mg/dL    BUN 18 8 - 23 mg/dL    Creatinine 1.13 0.76 - 1.27 mg/dL    Sodium 141 136 - 145 mmol/L    Potassium 4.7 3.5 - 5.2 mmol/L    Chloride 104 98 - 107 mmol/L    CO2 25.0 22.0 - 29.0 mmol/L    Calcium 9.3 8.6 - 10.5 mg/dL    Total Protein 7.2 6.0 - 8.5 g/dL    Albumin 4.1 3.5 - 5.2 g/dL     "ALT (SGPT) 21 1 - 41 U/L    AST (SGOT) 26 1 - 40 U/L    Alkaline Phosphatase 100 39 - 117 U/L    Total Bilirubin 0.3 0.0 - 1.2 mg/dL    Globulin 3.1 gm/dL    A/G Ratio 1.3 g/dL    BUN/Creatinine Ratio 15.9 7.0 - 25.0    Anion Gap 12.0 5.0 - 15.0 mmol/L    eGFR 66.1 >60.0 mL/min/1.73     CT Chest Without Contrast Diagnostic    Result Date: 3/23/2023  Acute nondisplaced fractures of right ribs. Electronically signed by:  Narciso Burciaga D.O.  3/23/2023 9:59 PM Mountain Time    Medications   lidocaine (LIDODERM) 5 % 1 patch (1 patch Transdermal Medication Applied 3/23/23 2119)     /76   Pulse 69   Temp 97.8 °F (36.6 °C) (Oral)   Resp 16   Ht 172.7 cm (68\")   Wt 104 kg (228 lb 6.3 oz)   SpO2 94%   BMI 34.73 kg/m²                                        MDM  Chart review: Patient had been evaluated at [facility] emergency department on the 20th was diagnosed with rib contusion and given prescription for hydrocodone  Comorbidity: As per past history   Differential: Rib fracture, pneumothorax, hemothorax  My EKG interpretation: Not indicated  Lab: Comprehensive metabolic panel essentially normal glucose 117  My Radiology review and interpretation: CT chest without contrast reveals fractures of lateral 6,7,8,9 and 10th ribs.  There is no pneumothorax hemothorax.  There is some mild bibasilar linear subsegmental atelectasis.  No liver injury noted.  Discussion/treatment: Patient's findings were discussed with him and his wife.  He had Lidoderm patch placed.  His inspect report was reviewed he was discharged with prescription for Lidoderm patch.  His inspect report was reviewed he was given prescription for Norco 7.5.  He was given thoracic surgery follow-up.  Return new or worsening symptoms  Patient was evaluated using appropriate PPE      Final diagnoses:   Closed fracture of multiple ribs of right side, initial encounter       ED Disposition  ED Disposition     ED Disposition   Discharge    " Condition   Stable    Comment   --             Olesya Cueva MD  [address] [facility]  [address]  [place] [address] [phone]          Dickson Hughes MD  [address] [facility] [address]  [place] [address] [phone]               Medication List      New Prescriptions    lidocaine 5 %  Commonly known as: LIDODERM  Place 1 patch on the skin as directed by provider Daily. Remove & Discard patch within 12 hours or as directed by MD        Changed    HYDROcodone-acetaminophen 7.5-325 MG per tablet  Commonly known as: NORCO  Take 1 tablet by mouth Every 6 (Six) Hours As Needed for Moderate Pain or Severe Pain.  What changed: reasons to take this           Where to Get Your Medications      These medications were sent to [facility] [address] [place] [address] [place] - [address] [place] [address] - [phone]  - [phone]   [address] [place] [address] [place] [address]    Phone: [phone]   · HYDROcodone-acetaminophen 7.5-325 MG per tablet  · lidocaine 5 %          Genaro Gallegos MD  03/24/23 0012

## 2023-12-10 ENCOUNTER — APPOINTMENT (OUTPATIENT)
Dept: CT IMAGING | Facility: HOSPITAL | Age: 78
End: 2023-12-10
Payer: MEDICARE

## 2023-12-10 ENCOUNTER — HOSPITAL ENCOUNTER (EMERGENCY)
Facility: HOSPITAL | Age: 78
Discharge: HOME | End: 2023-12-10
Attending: EMERGENCY MEDICINE | Admitting: EMERGENCY MEDICINE
Payer: MEDICARE

## 2023-12-10 VITALS
DIASTOLIC BLOOD PRESSURE: 80 MMHG | SYSTOLIC BLOOD PRESSURE: 151 MMHG | OXYGEN SATURATION: 95 % | TEMPERATURE: 97.5 F | BODY MASS INDEX: 34.78 KG/M2 | HEART RATE: 69 BPM | WEIGHT: 221.56 LBS | RESPIRATION RATE: 16 BRPM | HEIGHT: 67 IN

## 2023-12-10 DIAGNOSIS — R07.81 RIB PAIN ON LEFT SIDE: ICD-10-CM

## 2023-12-10 DIAGNOSIS — S22.42XA CLOSED FRACTURE OF MULTIPLE RIBS OF LEFT SIDE, INITIAL ENCOUNTER: Primary | ICD-10-CM

## 2023-12-10 PROCEDURE — 99284 EMERGENCY DEPT VISIT MOD MDM: CPT

## 2023-12-10 PROCEDURE — 71250 CT THORAX DX C-: CPT

## 2023-12-10 RX ORDER — LIDOCAINE 50 MG/G
1 PATCH TOPICAL ONCE
Status: DISCONTINUED | OUTPATIENT
Start: 2023-12-10 | End: 2023-12-10 | Stop reason: HOSPADM

## 2023-12-10 RX ORDER — LIDOCAINE 50 MG/G
1 PATCH TOPICAL EVERY 24 HOURS
Qty: 6 PATCH | Refills: 0 | Status: SHIPPED | OUTPATIENT
Start: 2023-12-10

## 2023-12-10 RX ORDER — ACETAMINOPHEN AND CODEINE PHOSPHATE 300; 30 MG/1; MG/1
1 TABLET ORAL ONCE
Status: COMPLETED | OUTPATIENT
Start: 2023-12-10 | End: 2023-12-10

## 2023-12-10 RX ORDER — ACETAMINOPHEN AND CODEINE PHOSPHATE 300; 30 MG/1; MG/1
1 TABLET ORAL EVERY 4 HOURS PRN
Qty: 8 TABLET | Refills: 0 | Status: SHIPPED | OUTPATIENT
Start: 2023-12-10

## 2023-12-10 RX ADMIN — LIDOCAINE 1 PATCH: 50 PATCH CUTANEOUS at 10:56

## 2023-12-10 RX ADMIN — ACETAMINOPHEN AND CODEINE PHOSPHATE 1 TABLET: 300; 30 TABLET ORAL at 10:56

## 2023-12-10 NOTE — ED NOTES
Patient presents to the ED with complaints of Left Rib Pain that started about a month ago after a leaf blower fell on the Left side/Rib. Patient denies any sob, chest pain

## 2023-12-10 NOTE — DISCHARGE INSTRUCTIONS
Take Tylenol No. 3 as needed for moderate or severe pain.  Do not take this with regular Tylenol.  Use lidocaine patches for additional pain relief.  May keep this on for 12 hours.    May also use heating pad.  Use in 10 to 15-minute increments as needed    Use your incentive spirometer once per hour to prevent pneumonia.  Follow-up with primary care for recheck      Return to the ER for new or worsening symptoms

## 2023-12-10 NOTE — ED PROVIDER NOTES
Subjective   History of Present Illness  Chief Complaint: Rib pain    Patient is a 78-year-old  male with history of arthritis, CAD, hypertension presents the ER with complaints of left rib pain for 1 month.  Patient states that he tripped and fell over his leaf blower in his garage and landed on it directly on the left ribs.  No head injury or LOC.  Patient states he was seen in urgent care after the initial injury, had negative x-rays.  He states that he has had pain since then.  He describes pain as an aching pain that is localized to the left lower ribs that he rates a 5/10.  He states the pain is worse with movement, twisting or bending.  No cough or hemoptysis.  No abdominal pain nausea vomiting.  No bruising.  No anterior chest wall pain.  No shortness of breath.  No fever or chills.    PCP: Olesya Cueva    History provided by:  Patient      Review of Systems   Constitutional:  Negative for chills and fever.   HENT:  Negative for sore throat and trouble swallowing.    Eyes: Negative.    Respiratory:  Negative for shortness of breath and wheezing.    Cardiovascular:  Negative for chest pain.        Chest wall pain   Gastrointestinal:  Negative for abdominal pain, nausea and vomiting.   Endocrine: Negative.    Genitourinary:  Negative for dysuria.   Musculoskeletal: Negative.  Negative for myalgias.        Left rib   Skin:  Negative for rash.   Allergic/Immunologic: Negative.    Neurological:  Negative for headaches.   Psychiatric/Behavioral:  Negative for behavioral problems.    All other systems reviewed and are negative.      Past Medical History:   Diagnosis Date    Arthritis     Dr Mosqueda    Coronary heart disease 01/2016    single vessel disease, nl stress test (copied from [facility])    Coronary heart disease 10/31/2017    cath 10/31/17 - Low % Blockages- N o Intervention   (copied from [facility])    Diverticulosis     Fatty liver     GERD (gastroesophageal reflux disease)     Hearing loss      Hyperlipemia     Hyperlipidemia     Hypertension     Low back pain     Neuropathy in diabetes     SIDDHARTHA treated with BiPAP     Pain management     injections Lumbar spine X2 with [facility] Pain management @ [facility] (copied from [facility])    Physical therapy evaluation, initial     @ [facility] in [place] 6 weeks x3 per week, Kalen leal (copied from [facility])    Rectal bleed 08/2012    hemorrhoids    Retinal hemorrhage of right eye 05/2014    Sleep apnea     bipap    Tinea pedis of right foot 07/02/2023    Type 2 diabetes mellitus     Uses brace        No Known Allergies    Past Surgical History:   Procedure Laterality Date    BELPHAROPTOSIS REPAIR  12/11/2017     Dr. grimes (copied from [facility])    BROW LIFT  12/11/2017    Dr. Grimes at [facility]    CARDIAC CATHETERIZATION  03/2012    at [facility]- single vessel disease. (copied from [facility])    CARDIAC CATHETERIZATION  10/13/2017    no intervention - Low % Blockages.    CARDIAC CATHETERIZATION Left 05/28/2021    Procedure: LEFT HEART CATH with possible PCI;  Surgeon: Leslie Clark MD;  Location: [facility] CATH INVASIVE LOCATION;  Service: Cardiovascular;  Laterality: Left;  Local and IV sedation    CATARACT EXTRACTION  12/11/2017    brow lift and eye lid repair    COLON SURGERY  06/2014    colonscopy    COLONOSCOPY N/A 07/12/2019    Procedure: COLONOSCOPY with polypectomy x1;  Surgeon: Graham Byrd MD;  Location: [facility] ENDOSCOPY;  Service: Gastroenterology    CUBITAL TUNNEL RELEASE Right     HERNIA REPAIR  11/2008    umbilical hernia repair    JOINT REPLACEMENT      KNEE ARTHROSCOPY Right 11/2014    Dr. Mosqueda    LASIK      lasik and Cataract Extraction, sep 11 and right Sept. 25th, 2014- [facility]. (copied from [facility])    TOTAL KNEE ARTHROPLASTY Left 11/2009    Dr. Mosqueda (copied from [facility])    TOTAL SHOULDER ARTHROPLASTY W/ DISTAL CLAVICLE EXCISION Left 04/20/2022    Procedure: TOTAL SHOULDER REVERSE ARTHROPLASTY;  Surgeon: Dany Knapp MD;   Location: [facility] MAIN OR;  Service: Orthopedics;  Laterality: Left;       Family History   Problem Relation Age of Onset    Heart disease Mother         CHF    Hypertension Mother     Heart failure Mother     Stroke Maternal Grandfather     Diabetes Other     Diabetes Other     Stroke Other     Arthritis Maternal Grandmother     Diabetes Brother        Social History     Socioeconomic History    Marital status:      Spouse name: Shani   Tobacco Use    Smoking status: Former     Packs/day: 1.50     Years: 30.00     Additional pack years: 0.00     Total pack years: 45.00     Types: Cigarettes     Quit date: 2000     Years since quittin.9    Smokeless tobacco: Former   Vaping Use    Vaping Use: Never used   Substance and Sexual Activity    Alcohol use: No    Drug use: No    Sexual activity: Not Currently     Partners: Female           Objective   Physical Exam  Vitals and nursing note reviewed.   Constitutional:       General: He is not in acute distress.     Appearance: Normal appearance. He is normal weight. He is not diaphoretic.   Eyes:      Extraocular Movements: Extraocular movements intact.      Pupils: Pupils are equal, round, and reactive to light.   Cardiovascular:      Rate and Rhythm: Normal rate and regular rhythm.      Pulses: Normal pulses.      Heart sounds: Normal heart sounds.   Pulmonary:      Effort: Pulmonary effort is normal.      Breath sounds: Normal breath sounds.      Comments: Left lateral chest wall and lower rib tenderness without crepitus or flail chest  Chest:      Chest wall: Tenderness present.   Abdominal:      General: Abdomen is flat. Bowel sounds are normal. There is no distension.      Palpations: Abdomen is soft.      Tenderness: There is no abdominal tenderness. There is no guarding.   Musculoskeletal:         General: Normal range of motion.      Cervical back: Normal range of motion.   Skin:     General: Skin is warm.      Capillary Refill: Capillary refill takes  "less than 2 seconds.      Findings: No bruising or erythema.      Comments: No bruising, no abrasion, no laceration no obvious evidence of trauma   Neurological:      General: No focal deficit present.      Mental Status: He is alert and oriented to person, place, and time.   Psychiatric:         Mood and Affect: Mood normal.         Behavior: Behavior normal.         Procedures           ED Course    /80 (BP Location: Left arm, Patient Position: Sitting)   Pulse 69   Temp 97.5 °F (36.4 °C) (Oral)   Resp 16   Ht 170.2 cm (67\")   Wt 101 kg (221 lb 9 oz)   SpO2 95%   BMI 34.70 kg/m²   Labs Reviewed - No data to display  Medications   acetaminophen-codeine (TYLENOL with CODEINE #3) 300-30 MG per tablet 1 tablet (1 tablet Oral Given 12/10/23 1056)     CT Chest Without Contrast Diagnostic    Result Date: 12/10/2023  Impression: 1.Acute nondisplaced fractures of the anterior left seventh and eighth ribs. 2.Chronic fractures of the right lateral sixth-ninth ribs. 3.Calcific atherosclerosis. 4.Mild bronchial wall thickening in the left lower lobe which could indicate acute or chronic bronchitis. Electronically Signed: Levon Sol  12/10/2023 11:01 AM EST  Workstation ID: [id number]                                            Medical Decision Making  Differential Dx (Includes but not limited to): Rib fracture contusion, pneumothorax  Medical Records Reviewed: No pertinent records to review  Labs: N/A  Imaging: On my interpretation, few left anterior rib fractures without evidence of pneumothorax  Telemetry: N/A  Testing considered but not ordered: CT head patient denies headache or head injury  Nature of Complaint: Acute  Admission vs Discharge: Discharge  Discussion: While in the ED IV was placed appropriate PPE was worn during exam and throughout all encounters with the patient.  Patient with above evaluation.  He was given Tylenol 3 for pain.  Vital signs are stable.  CT imaging suggestive of acute nondisplaced " fractures of the left anterior seventh and eighth ribs with chronic fractures of the lateral sixth through ninth ribs.  Patient was notified of these findings.  He is hemodynamically stable.  He is not requiring supplemental oxygen.  I see no indication for admission.  Patient be discharged with prescription for Tylenol 3, lidocaine patch as well as a incentive spirometer.  Recommended follow-up with PCP for recheck.    Discharge plan and instructions were discussed with the patient who verbalized understanding and is in agreement with the plan, all questions were answered at this time.  Patient is aware of signs symptoms that would require immediate return to the emergency room.  Patient understands importance of following up with primary care provider for further evaluation and worsening concerns as well as blood pressure recheck in the next 4 weeks.    Patient was discharged in improved stable condition with an upright steady gait.    Patient is aware that discharge does not mean that nothing is wrong but indicates no emergencies present and they must continue care with follow-up as given below or physician of their choice.    Problems Addressed:  Closed fracture of multiple ribs of left side, initial encounter: acute illness or injury  Rib pain on left side: acute illness or injury    Amount and/or Complexity of Data Reviewed  Radiology: ordered. Decision-making details documented in ED Course.    Risk  Prescription drug management.        Final diagnoses:   Closed fracture of multiple ribs of left side, initial encounter   Rib pain on left side       ED Disposition  ED Disposition       ED Disposition   Discharge    Condition   Stable    Comment   --               Olesya Cueva MD  [address] [id number]  [phone]    Schedule an appointment as soon as possible for a visit in 2 days  As needed, If symptoms worsen         Medication List        New Prescriptions       acetaminophen-codeine 300-30 MG per tablet  Commonly known as: TYLENOL with CODEINE #3  Take 1 tablet by mouth Every 4 (Four) Hours As Needed for Moderate Pain.     lidocaine 5 %  Commonly known as: LIDODERM  Place 1 patch on the skin as directed by provider Daily. Remove & Discard patch within 12 hours or as directed by MD               Where to Get Your Medications        These medications were sent to [facility] [address] [place] [address] - [address] [place] [address] - [phone] [facility] [phone] [address] [place] [address]      Phone: [phone]   acetaminophen-codeine 300-30 MG per tablet  lidocaine 5 %            Shani Mancilla PA  12/10/23 2006

## 2023-12-21 RX ORDER — ISOSORBIDE MONONITRATE 60 MG/1
TABLET, EXTENDED RELEASE ORAL
Qty: 135 TABLET | Refills: 1 | Status: SHIPPED | OUTPATIENT
Start: 2023-12-21

## 2024-01-23 ENCOUNTER — OFFICE VISIT (OUTPATIENT)
Dept: SLEEP MEDICINE | Facility: CLINIC | Age: 79
End: 2024-01-23
Payer: MEDICARE

## 2024-01-23 VITALS
DIASTOLIC BLOOD PRESSURE: 72 MMHG | OXYGEN SATURATION: 96 % | HEART RATE: 81 BPM | SYSTOLIC BLOOD PRESSURE: 136 MMHG | BODY MASS INDEX: 30.31 KG/M2 | WEIGHT: 200 LBS | HEIGHT: 68 IN

## 2024-01-23 DIAGNOSIS — E66.9 CLASS 1 OBESITY: ICD-10-CM

## 2024-01-23 DIAGNOSIS — G47.33 OSA TREATED WITH BIPAP: Primary | ICD-10-CM

## 2024-01-23 PROCEDURE — 99213 OFFICE O/P EST LOW 20 MIN: CPT | Performed by: INTERNAL MEDICINE

## 2024-01-23 PROCEDURE — G0463 HOSPITAL OUTPT CLINIC VISIT: HCPCS

## 2024-01-23 PROCEDURE — 3075F SYST BP GE 130 - 139MM HG: CPT | Performed by: INTERNAL MEDICINE

## 2024-01-23 PROCEDURE — 3078F DIAST BP <80 MM HG: CPT | Performed by: INTERNAL MEDICINE

## 2024-01-23 PROCEDURE — 1159F MED LIST DOCD IN RCRD: CPT | Performed by: INTERNAL MEDICINE

## 2024-01-23 PROCEDURE — 1160F RVW MEDS BY RX/DR IN RCRD: CPT | Performed by: INTERNAL MEDICINE

## 2024-01-23 NOTE — PROGRESS NOTES
"  [id number] [facility], [address]  [place] [address]  Phone   Fax         SLEEP CLINIC FOLLOW UP PROGRESS NOTE.    Heri Vasquez  1945  78 y.o.  male      PCP: Olesya Cueva MD      Date of visit: 1/23/2024    Chief Complaint   Patient presents with    Follow-up    Sleep Apnea       HPI:  This is a 78 y.o. years old patient who has a history of obstructive sleep apnea is here for  the annual compliance follow-up.  Patient is using positive airway pressure therapy with auto BiPAP and the symptoms of snoring, non-restorative sleep and daytime excessive sleepiness have improved significantly on the therapy. Normally goes to bed at 11 PM and wakes up at 7 AM.  The patient wakes up 1 time(s) during the night and has no problem going back to sleep.  Feels refreshed after waking up.  Patient also denies headaches and nasal congestion.   He is accompanied his wife who also uses the BiPAP.  Both of them says that they are doing well has no problems.  He has loss few pounds, and he is continues to lose weight and he wants to get below 200 pounds    Medications and allergies are reviewed by me and documented in the encounter.     SOCIAL ( habits pertaining to sleep medicine)  History tobacco use:No   History of alcohol use: 0 per week  Caffeine use: 3     REVIEW OF SYSTEMS:   Murphy Sleepiness Scale :Total score: 4   Nasal congestion:No   Dry mouth/nose:No   Post nasal drip; Yes     PHYSICAL EXAMINATION:  CONSTITUTIONAL:  Vitals:    01/23/24 0900   BP: 136/72   BP Location: Right arm   Patient Position: Sitting   Pulse: 81   SpO2: 96%   Weight: 90.7 kg (200 lb)   Height: 172.7 cm (68\")    Body mass index is 30.41 kg/m².   NOSE: nasal passages are clear, no nasal polyps, septum in the midline.  THROAT: throat is clear, oral airway Mallampati class 3  RESP SYSTEM: Breath sounds are normal, no wheezes or crackles  CARDIOVASULAR: Heart rate is regular without murmur. " No edema      Data reviewed:  The Smart card downloaded on 1/23/2024 has been reviewed independently by me for compliance and discussed the data with the patient.   Compliance; 100%  More than 4 hr use, 93%  Average use of the device 7 hours and 21 minutes per night  Residual AHI: 8 /hr (goal < 5.0 /hr)  Mask type: Nasal mask  Device: BiPAP auto   DME: [facility]      ASSESSMENT AND PLAN:  Obstructive sleep apnea ( G 47.33).  The symptoms of sleep apnea have improved with the device and the treatment.  Patient's compliance with the device is excellent for treatment of sleep apnea.  I have independently reviewed the smart card down load and discussed with the patient the download data and encouarged the patient to continue to use the device.The residual AHI is acceptable. The device is benefiting the patient and the device is medically necessary.  Without proper control of sleep apnea and good compliance there is a increased risk for hypertension, diabetes mellitus and nonrestorative sleep with hypersomnia which can increase risk for motor vehicle accidents.  Untreated sleep apnea is also a risk factor for development of atrial fibrillation, pulmonary hypertension and stroke. The patient is also instructed to get the supplies from the [employer] and and change them on a regular basis.  A prescription for supplies has been sent to the [facility].  I have also discussed the good sleep hygiene habits and adequate amount of sleep needed for good health.  Obesity, class 1 with BMI is Body mass index is 30.41 kg/m².. I have discuss the relationship between the weight and sleep apnea. The benefit of weight loss in reducing severity of sleep apnea was discussed. Discussed diet and exercise with the patient to achieve ideal BMI I am glad that he has lost some weight from obesity class II he has come down to class I, his goal is to get less than 200 pounds  Return in about 1 year (around 1/23/2025) for with smart card  down load. . Patient's questions were answered.        Nancy Harris MD  Sleep Medicine.  Medical Director, [facility]  1/23/2024 ,

## 2024-02-16 RX ORDER — PANTOPRAZOLE SODIUM 40 MG/1
TABLET, DELAYED RELEASE ORAL
Qty: 90 TABLET | Refills: 3 | Status: SHIPPED | OUTPATIENT
Start: 2024-02-16

## 2024-02-16 RX ORDER — BLOOD SUGAR DIAGNOSTIC
1 STRIP MISCELLANEOUS DAILY
Qty: 50 EACH | Refills: 0 | Status: SHIPPED | OUTPATIENT
Start: 2024-02-16 | End: 2024-02-19 | Stop reason: SDUPTHER

## 2024-02-16 RX ORDER — LISINOPRIL 10 MG/1
TABLET ORAL
Qty: 90 TABLET | Refills: 3 | Status: SHIPPED | OUTPATIENT
Start: 2024-02-16

## 2024-02-16 RX ORDER — NAPROXEN 500 MG/1
TABLET ORAL
Qty: 180 TABLET | Refills: 3 | Status: SHIPPED | OUTPATIENT
Start: 2024-02-16

## 2024-02-19 RX ORDER — BLOOD SUGAR DIAGNOSTIC
1 STRIP MISCELLANEOUS DAILY
Qty: 50 EACH | Refills: 0 | Status: SHIPPED | OUTPATIENT
Start: 2024-02-19 | End: 2024-02-20 | Stop reason: SDUPTHER

## 2024-02-19 NOTE — TELEPHONE ENCOUNTER
Caller: Heri Vasquez    Relationship: Self    Best call back number:     [phone] (Mobile)         What was the call regarding: NEEDING DIAGNOSIS CODE FOR THE TEST STRIPS     Is it okay if the provider responds through [employer]: CALL IF NEEDED

## 2024-02-20 RX ORDER — BLOOD SUGAR DIAGNOSTIC
1 STRIP MISCELLANEOUS DAILY
Qty: 50 EACH | Refills: 0 | Status: SHIPPED | OUTPATIENT
Start: 2024-02-20

## 2024-03-18 RX ORDER — BLOOD SUGAR DIAGNOSTIC
1 STRIP MISCELLANEOUS DAILY
Qty: 50 EACH | Refills: 0 | Status: SHIPPED | OUTPATIENT
Start: 2024-03-18 | End: 2024-03-19 | Stop reason: SDUPTHER

## 2024-03-19 ENCOUNTER — TELEPHONE (OUTPATIENT)
Dept: FAMILY MEDICINE CLINIC | Facility: CLINIC | Age: 79
End: 2024-03-19
Payer: MEDICARE

## 2024-03-19 DIAGNOSIS — E11.8 TYPE 2 DIABETES MELLITUS WITH UNSPECIFIED COMPLICATIONS: Primary | ICD-10-CM

## 2024-03-19 RX ORDER — BLOOD SUGAR DIAGNOSTIC
1 STRIP MISCELLANEOUS DAILY
Qty: 50 EACH | Refills: 0 | Status: SHIPPED | OUTPATIENT
Start: 2024-03-19 | End: 2024-03-22 | Stop reason: SDUPTHER

## 2024-03-19 NOTE — TELEPHONE ENCOUNTER
Caller: [facility] [address] [place] [address] - [address] [place] [address] - [phone]  - [phone]    Relationship: Pharmacy    Best call back number: [phone]    MARIANO IS REQUESTING THAT THE PRESCRIPTION FOR     OneTouch Ultra test strip BE RESENT AND INCLUDE A DIAGNOSIS CODE.

## 2024-03-22 DIAGNOSIS — E11.8 TYPE 2 DIABETES MELLITUS WITH UNSPECIFIED COMPLICATIONS: ICD-10-CM

## 2024-03-22 RX ORDER — BLOOD SUGAR DIAGNOSTIC
1 STRIP MISCELLANEOUS DAILY
Qty: 50 EACH | Refills: 2 | Status: SHIPPED | OUTPATIENT
Start: 2024-03-22

## 2024-03-24 ENCOUNTER — APPOINTMENT (OUTPATIENT)
Dept: GENERAL RADIOLOGY | Facility: HOSPITAL | Age: 79
End: 2024-03-24
Payer: MEDICARE

## 2024-03-24 ENCOUNTER — HOSPITAL ENCOUNTER (EMERGENCY)
Facility: HOSPITAL | Age: 79
Discharge: HOME | End: 2024-03-24
Attending: EMERGENCY MEDICINE | Admitting: EMERGENCY MEDICINE
Payer: MEDICARE

## 2024-03-24 VITALS
HEIGHT: 68 IN | BODY MASS INDEX: 32.58 KG/M2 | HEART RATE: 78 BPM | RESPIRATION RATE: 16 BRPM | WEIGHT: 214.95 LBS | SYSTOLIC BLOOD PRESSURE: 148 MMHG | TEMPERATURE: 97 F | OXYGEN SATURATION: 95 % | DIASTOLIC BLOOD PRESSURE: 66 MMHG

## 2024-03-24 DIAGNOSIS — M79.604 RIGHT LEG PAIN: Primary | ICD-10-CM

## 2024-03-24 DIAGNOSIS — M54.31 SCIATICA OF RIGHT SIDE: ICD-10-CM

## 2024-03-24 PROCEDURE — 25010000002 HYDROMORPHONE 1 MG/ML SOLUTION: Performed by: EMERGENCY MEDICINE

## 2024-03-24 PROCEDURE — 99283 EMERGENCY DEPT VISIT LOW MDM: CPT

## 2024-03-24 PROCEDURE — 72110 X-RAY EXAM L-2 SPINE 4/>VWS: CPT

## 2024-03-24 PROCEDURE — 25010000002 METHYLPREDNISOLONE PER 125 MG: Performed by: EMERGENCY MEDICINE

## 2024-03-24 PROCEDURE — 73502 X-RAY EXAM HIP UNI 2-3 VIEWS: CPT

## 2024-03-24 PROCEDURE — 96372 THER/PROPH/DIAG INJ SC/IM: CPT

## 2024-03-24 RX ORDER — METHYLPREDNISOLONE SODIUM SUCCINATE 125 MG/2ML
60 INJECTION, POWDER, LYOPHILIZED, FOR SOLUTION INTRAMUSCULAR; INTRAVENOUS ONCE
Qty: 0.96 ML | Refills: 0 | Status: COMPLETED | OUTPATIENT
Start: 2024-03-24 | End: 2024-03-24

## 2024-03-24 RX ORDER — METHYLPREDNISOLONE 4 MG/1
TABLET ORAL
Qty: 1 EACH | Refills: 0 | Status: SHIPPED | OUTPATIENT
Start: 2024-03-24

## 2024-03-24 RX ORDER — HYDROCODONE BITARTRATE AND ACETAMINOPHEN 5; 325 MG/1; MG/1
1 TABLET ORAL EVERY 6 HOURS PRN
Qty: 12 TABLET | Refills: 0 | Status: SHIPPED | OUTPATIENT
Start: 2024-03-24 | End: 2024-03-27

## 2024-03-24 RX ADMIN — METHYLPREDNISOLONE SODIUM SUCCINATE 60 MG: 125 INJECTION, POWDER, FOR SOLUTION INTRAMUSCULAR; INTRAVENOUS at 12:40

## 2024-03-24 RX ADMIN — HYDROMORPHONE HYDROCHLORIDE 1 MG: 1 INJECTION, SOLUTION INTRAMUSCULAR; INTRAVENOUS; SUBCUTANEOUS at 12:40

## 2024-03-24 NOTE — ED PROVIDER NOTES
Subjective   History of Present Illness  78-year-old male presents with complaints of right leg pain.  He states he was getting out of a chair yesterday when he twisted and developed pain that radiated from his hip to his big toe of his right leg.  He has increased pain with movement.  He reports no bowel or bladder complaints.  He does not complain of back pain.  Today he tried to get up and go outside and fell skinning his knee.  He has no complaints of other injuries though.  He reports history of sciatic nerve problem in the past.  He had a Norco 7.5 from a prescription from September of last year that he took without relief.  Review of Systems    Past Medical History:   Diagnosis Date    Arthritis     Dr Mosqueda    Coronary heart disease 01/2016    single vessel disease, nl stress test (copied from [facility])    Coronary heart disease 10/31/2017    cath 10/31/17 - Low % Blockages- N o Intervention   (copied from [facility])    Diverticulosis     Fatty liver     GERD (gastroesophageal reflux disease)     Hearing loss     Hyperlipemia     Hyperlipidemia     Hypertension     Low back pain     Neuropathy in diabetes     SIDDHARTHA treated with BiPAP     Pain management     injections Lumbar spine X2 with [facility] management @ [facility] (copied from [facility])    Physical therapy evaluation, initial     @ [facility] in [place] 6 weeks x3 per week, Kalen leal (copied from [facility])    Rectal bleed 08/2012    hemorrhoids    Retinal hemorrhage of right eye 05/2014    Sleep apnea     bipap    Tinea pedis of right foot 07/02/2023    Type 2 diabetes mellitus     Uses brace        No Known Allergies    Past Surgical History:   Procedure Laterality Date    BELPHAROPTOSIS REPAIR  12/11/2017     Dr. grimes (copied from [facility])    BROW LIFT  12/11/2017    Dr. Grimes at [facility]    CARDIAC CATHETERIZATION  03/2012    at [facility]- single vessel disease. (copied from [facility])    CARDIAC CATHETERIZATION  10/13/2017    no intervention - Low  % Blockages.    CARDIAC CATHETERIZATION Left 2021    Procedure: LEFT HEART CATH with possible PCI;  Surgeon: Leslie Clark MD;  Location: [facility] CATH INVASIVE LOCATION;  Service: Cardiovascular;  Laterality: Left;  Local and IV sedation    CATARACT EXTRACTION  2017    brow lift and eye lid repair    COLON SURGERY  2014    colonscopy    COLONOSCOPY N/A 2019    Procedure: COLONOSCOPY with polypectomy x1;  Surgeon: Graham Byrd MD;  Location: [facility] ENDOSCOPY;  Service: Gastroenterology    CUBITAL TUNNEL RELEASE Right     HERNIA REPAIR  2008    umbilical hernia repair    JOINT REPLACEMENT      KNEE ARTHROSCOPY Right 2014    Dr. Panda VASQUEZ      lasik and Cataract Extraction, sep 11 and right 2014- [facility]. (copied from [facility])    TOTAL KNEE ARTHROPLASTY Left 2009    Dr. Mosqueda (copied from [facility])    TOTAL SHOULDER ARTHROPLASTY W/ DISTAL CLAVICLE EXCISION Left 2022    Procedure: TOTAL SHOULDER REVERSE ARTHROPLASTY;  Surgeon: Dany Knapp MD;  Location: [facility] MAIN OR;  Service: Orthopedics;  Laterality: Left;       Family History   Problem Relation Age of Onset    Heart disease Mother         CHF    Hypertension Mother     Heart failure Mother     Diabetes Brother     Arthritis Maternal Grandmother     Stroke Maternal Grandfather     Diabetes Other     Diabetes Other     Stroke Other     Sleep apnea Neg Hx        Social History     Socioeconomic History    Marital status:      Spouse name: Shani   Tobacco Use    Smoking status: Former     Current packs/day: 0.00     Average packs/day: 1.5 packs/day for 30.0 years (45.0 ttl pk-yrs)     Types: Cigarettes     Start date: 1970     Quit date: 2000     Years since quittin.2    Smokeless tobacco: Former   Vaping Use    Vaping status: Never Used   Substance and Sexual Activity    Alcohol use: No    Drug use: No    Sexual activity: Not Currently     Partners: Female     Prior  to Admission medications    Medication Sig Start Date End Date Taking? Authorizing Provider   acetaminophen (TYLENOL) 650 MG 8 hr tablet Take 1 tablet by mouth Every 8 (Eight) Hours As Needed for Mild Pain. 7/2/23   Olesya Cueva MD   acetaminophen-codeine (TYLENOL with CODEINE #3) 300-30 MG per tablet Take 1 tablet by mouth Every 4 (Four) Hours As Needed for Moderate Pain. 12/10/23   Shani Mancilla PA   amoxicillin (AMOXIL) 500 MG capsule Take all 4 pills 30 minutes prior to dental appointment. 10/16/23   Dany Knapp MD   aspirin 81 MG EC tablet Take 1 tablet by mouth Daily. LD 4/13 10/4/23   Olesya Cueva MD   atorvastatin (LIPITOR) 20 MG tablet TAKE 1 TABLET BY MOUTH ONCE  DAILY 11/27/23   Leslie Clark MD   azelastine (ASTELIN) 0.1 % nasal spray 2 sprays into the nostril(s) as directed by provider 2 (Two) Times a Day. Use in each nostril as directed 8/25/23   Olesya Cueva MD   betamethasone dipropionate (DIPROLENE) 0.05 % lotion  7/22/22   Arielle Carmona MD   betamethasone valerate (VALISONE) 0.1 % ointment Apply 1 application  topically to the appropriate area as directed 2 (Two) Times a Day.    Arielle Carmona MD   calcium citrate-vitamin d (CALCITRATE) 315-250 MG-UNIT tablet tablet Take  by mouth Daily.    Arielle Carmona MD   cyclobenzaprine (FLEXERIL) 10 MG tablet Take 1 tablet by mouth At Night As Needed for Muscle Spasms. 12/23/22   Olesya Cueva MD   donepezil (ARICEPT) 5 MG tablet TAKE 1 TABLET BY MOUTH AT  NIGHT 9/25/23   Olesya Cueva MD   gabapentin (NEURONTIN) 300 MG capsule TAKE 1 CAPSULE BY MOUTH  TWICE DAILY 6/20/23   Olesya Cueva MD   HYDROcodone-acetaminophen (NORCO) 7.5-325 MG per tablet Take 1 tablet by mouth Every 6 (Six) Hours As Needed for Moderate Pain or Severe Pain. 9/22/23   Diaz Washington PA-C   hydrocortisone 2.5 % cream Apply 1 application  topically to the appropriate area as directed 2 (Two) Times a  Day.    Provider, MD Arielle   Ibuprofen 3 %, Gabapentin 10 %, Baclofen 2 %, lidocaine 4 % Apply 1-2 g topically to the appropriate area as directed 3 (Three) to 4 (Four) times daily. 7/21/23 7/20/24  Sue Holman APRN   isosorbide mononitrate (IMDUR) 60 MG 24 hr tablet TAKE 1 AND 1/2 TABLETS BY MOUTH  DAILY 12/21/23   Leslie Clark MD   Lancets (OneTouch Delica Plus Lkesxf16G) misc USE   TO CHECK GLUCOSE ONCE DAILY 7/3/23   Olesya Cueva MD   lidocaine (LIDODERM) 5 % Place 1 patch on the skin as directed by provider Daily. Remove & Discard patch within 12 hours or as directed by MD 12/10/23   Shani Mancilla PA   lisinopril (PRINIVIL,ZESTRIL) 10 MG tablet TAKE 1 TABLET BY MOUTH DAILY 2/16/24   Olesya Cueva MD   metFORMIN (GLUCOPHAGE) 500 MG tablet TAKE 1 TABLET BY MOUTH TWICE  DAILY WITH A MEAL 10/6/23   Olesya Cueva MD   Multiple Vitamins-Minerals (MULTIVITAMIN WITH MINERALS) tablet tablet Take 1 tablet by mouth Daily.    Provider, MD Arielle   naproxen (NAPROSYN) 500 MG tablet TAKE 1 TABLET BY MOUTH TWICE  DAILY AS NEEDED FOR MODERATE  PAIN 2/16/24   Olesya Cueva MD   nitroglycerin (NITROSTAT) 0.4 MG SL tablet Place 1 tablet under the tongue Every 5 (Five) Minutes As Needed for Chest Pain. 6/24/21   Alejandra Mcdonald APRN   OneTouch Ultra test strip 1 each by Other route Daily. E11.9 3/22/24   Olesya Cueva MD   pantoprazole (PROTONIX) 40 MG EC tablet TAKE 1 TABLET BY MOUTH DAILY 2/16/24   Olesya Cueva MD   promethazine (PHENERGAN) 12.5 MG tablet Take 1 tablet by mouth Every 6 (Six) Hours As Needed for Nausea or Vomiting. 4/20/22   Dany Knapp MD             Objective   Physical Exam  78-year-old male awake alert.  Generally well-developed well-nourished for age.  He was able to go from a lying to a sitting position but did have increased pain.  He does not complain of spine tenderness.  He complains of some pain with palpation of the  right hip.  He does not complain of any significant pain with internal/external rotation he has increased pain with flexion.  He has had bilateral knee replacements.  Motor or sensory is grossly intact to both legs without deficit.  He has minor abrasion anterior left knee.  No knee pain.  He has no left hip pain.  Skin without rash noted other than the abrasion to his knee.  Procedures           ED Course      Results for orders placed or performed during the hospital encounter of 11/13/23   Adult Transthoracic Echo Complete W/ Cont if Necessary Per Protocol   Result Value Ref Range    LVIDd 4.2 cm    LVIDs 3.1 cm    IVSd 1.32 cm    LVPWd 1.20 cm    FS 26.1 %    IVS/LVPW 1.10 cm    ESV(cubed) 29.6 ml    LV Sys Vol (BSA corrected) 8.6 cm2    EDV(cubed) 73.4 ml    LV Wells Vol (BSA corrected) 20.1 cm2    LV mass(C)d 190.2 grams    LVOT area 3.1 cm2    LVOT diam 1.99 cm    EDV(MOD-sp4) 43.8 ml    ESV(MOD-sp4) 18.6 ml    SV(MOD-sp4) 25.2 ml    SI(MOD-sp4) 11.6 ml/m2    EF(MOD-sp4) 57.5 %    MV E max britney 59.7 cm/sec    MV A max britney 96.4 cm/sec    MV dec time 0.21 sec    MV E/A 0.62     SV(LVOT) 57.4 ml    RVIDd 3.4 cm    LV V1 max 100.8 cm/sec    LV V1 max PG 4.1 mmHg    LV V1 mean PG 2.08 mmHg    LV V1 VTI 18.5 cm    Ao pk britney 122.0 cm/sec    Ao max PG 6.0 mmHg    Ao mean PG 3.2 mmHg    Ao V2 VTI 19.3 cm    TORRI(I,D) 3.0 cm2    MV max PG 4.6 mmHg    MV mean PG 1.57 mmHg    MV V2 VTI 21.4 cm    MVA(VTI) 2.7 cm2    MV dec slope 280.3 cm/sec2    RAP systole 3.0 mmHg    RV V1 max PG 2.09 mmHg    RV V1 max 72.4 cm/sec    RV V1 VTI 13.9 cm    PA V2 max 97.1 cm/sec    PA acc time 0.10 sec    Ao root diam 3.2 cm    LA dimension (2D)  3.1 cm     XR Hip With or Without Pelvis 2 - 3 View Right    Result Date: 3/24/2024  Impression: Pelvis/right hip: No evidence of acute fracture or joint malalignment. Mild degenerative changes of the right hip. Lumbar spine: Advanced multilevel degenerative disc disease, most pronounced at L3-L4  "and L5-S1 where findings are severe. Lower lumbar facet arthropathy. No significant vertebral body height loss or malalignment. Electronically Signed: Narinder Braga MD  3/24/2024 1:35 PM EDT  Workstation ID: [id number]    XR Spine Lumbar Complete 4+VW    Result Date: 3/24/2024  Impression: Pelvis/right hip: No evidence of acute fracture or joint malalignment. Mild degenerative changes of the right hip. Lumbar spine: Advanced multilevel degenerative disc disease, most pronounced at L3-L4 and L5-S1 where findings are severe. Lower lumbar facet arthropathy. No significant vertebral body height loss or malalignment. Electronically Signed: Narinder Braga MD  3/24/2024 1:35 PM EDT  Workstation ID: [id number]   Medications   HYDROmorphone (DILAUDID) injection 1 mg (1 mg Subcutaneous Given 3/24/24 1240)   methylPREDNISolone sodium succinate (SOLU-Medrol) injection 60 mg (60 mg Intramuscular Given 3/24/24 1240)     /69 (BP Location: Left arm, Patient Position: Sitting)   Pulse 74   Temp 97 °F (36.1 °C) (Oral)   Resp 18   Ht 172.7 cm (68\")   Wt 97.5 kg (214 lb 15.2 oz)   SpO2 95%   BMI 32.68 kg/m²                                          Medical Decision Making  Amount and/or Complexity of Data Reviewed  Radiology: ordered.    Risk  Prescription drug management.    Chart review: Patient had office visit January this year for obstructive sleep apnea treated with BiPAP.  Comorbidity: As per past history   Differential: Fracture felt to be unlikely based on history.  Sciatica,  My EKG interpretation: Not indicated  Lab: Not indicated  My Radiology review and interpretation: X-rays of lumbosacral spine and right hip reveal reveals mild degenerative change of right hip.  Lumbar spine reveals multilevel degenerative disease most pronounced at L3-4 and L5-S1.  There is facet arthropathy noted.  Discussion/treatment: Patient was given injection of hydromorphone subcu.  He was also given Solu-Medrol 60 mg IM.  Patient's " findings were discussed with him.  Inspect report was reviewed.  He was discharged placed on Medrol Dosepak.  He was given short course of hydrocodone.  Patient was evaluated using appropriate PPE      Final diagnoses:   Right leg pain   Sciatica of right side       ED Disposition  ED Disposition       ED Disposition   Discharge    Condition   Stable    Comment   --               Olesya Cueva MD  [address] [facility] [address]  [place] [address] [phone]    Schedule an appointment as soon as possible for a visit            Medication List        New Prescriptions      methylPREDNISolone 4 MG dose pack  Commonly known as: MEDROL  Take as directed on package instructions.            Changed      * HYDROcodone-acetaminophen 7.5-325 MG per tablet  Commonly known as: NORCO  Take 1 tablet by mouth Every 6 (Six) Hours As Needed for Moderate Pain or Severe Pain.  What changed: Another medication with the same name was added. Make sure you understand how and when to take each.     * HYDROcodone-acetaminophen 5-325 MG per tablet  Commonly known as: Norco  Take 1 tablet by mouth Every 6 (Six) Hours As Needed for Moderate Pain.  What changed: You were already taking a medication with the same name, and this prescription was added. Make sure you understand how and when to take each.           * This list has 2 medication(s) that are the same as other medications prescribed for you. Read the directions carefully, and ask your doctor or other care provider to review them with you.                   Where to Get Your Medications        These medications were sent to [facility] [address] [place] [address], [place] - [address] [place] [address] - [phone]  - [phone]   [address] [facility] [place] [address]      Phone: [phone]   HYDROcodone-acetaminophen 5-325 MG per tablet  methylPREDNISolone 4 MG dose pack            Genaro Gallegos MD  03/24/24 9349

## 2024-03-24 NOTE — DISCHARGE INSTRUCTIONS
Rest as needed.  Be sure to use cane or walker to assist with ambulation.  May use antibiotic to abrasion on knee.  Follow-up with primary provider, return new or worsening symptoms

## 2024-03-27 ENCOUNTER — OFFICE VISIT (OUTPATIENT)
Dept: FAMILY MEDICINE CLINIC | Facility: CLINIC | Age: 79
End: 2024-03-27
Payer: MEDICARE

## 2024-03-27 VITALS
RESPIRATION RATE: 16 BRPM | SYSTOLIC BLOOD PRESSURE: 128 MMHG | TEMPERATURE: 98.2 F | HEIGHT: 68 IN | BODY MASS INDEX: 31.86 KG/M2 | HEART RATE: 82 BPM | WEIGHT: 210.2 LBS | DIASTOLIC BLOOD PRESSURE: 66 MMHG | OXYGEN SATURATION: 95 %

## 2024-03-27 DIAGNOSIS — M54.31 SCIATICA OF RIGHT SIDE: Primary | ICD-10-CM

## 2024-03-27 PROCEDURE — 3078F DIAST BP <80 MM HG: CPT | Performed by: FAMILY MEDICINE

## 2024-03-27 PROCEDURE — 1111F DSCHRG MED/CURRENT MED MERGE: CPT | Performed by: FAMILY MEDICINE

## 2024-03-27 PROCEDURE — 99213 OFFICE O/P EST LOW 20 MIN: CPT | Performed by: FAMILY MEDICINE

## 2024-03-27 PROCEDURE — 3074F SYST BP LT 130 MM HG: CPT | Performed by: FAMILY MEDICINE

## 2024-03-27 PROCEDURE — G2211 COMPLEX E/M VISIT ADD ON: HCPCS | Performed by: FAMILY MEDICINE

## 2024-03-27 NOTE — PROGRESS NOTES
Transitional Care Follow Up Visit  Subjective     Heri Vasquez is a 78 y.o. male who presents for a transitional care management visit.    Within 48 business hours after discharge our office contacted him via telephone to coordinate his care and needs.      I reviewed and discussed the details of that call along with the discharge summary, hospital problems, inpatient lab results, inpatient diagnostic studies, and consultation reports with Heri.     Current outpatient and discharge medications have been reconciled for the patient.  Reviewed by: Olesya Cueva MD          9/22/2023     7:13 PM   Date of TCM Phone Call   [facility]   Date of Admission 9/21/2023   Date of Discharge 9/22/2023   Discharge Disposition Home or Self Care     Risk for Readmission (LACE) No data recorded    History of Present Illness   Course During Hospital Stay:  78-year-old male presents with complaints of right leg pain. He states he was getting out of a chair yesterday when he twisted and developed pain that radiated from his hip to his big toe of his right leg. He has increased pain with movement. He reports no bowel or bladder complaints. He does not complain of back pain. Today he tried to get up and go outside and fell skinning his knee. He has no complaints of other injuries though. He reports history of sciatic nerve problem in the past. He had a Norco 7.5 from a prescription from September of last year that he took without relief.     He was evaluated in the ER and he says he was treated for sciatica with a steroid shot and an oral steroid pill.  He is almost finished with the steroid. He had x-rays of his lower back and hip:  Pelvis/right hip: No evidence of acute fracture or joint malalignment. Mild degenerative changes of the right hip. Lumbar spine: Advanced multilevel degenerative disc disease, most pronounced at L3-L4 and L5-S1 where findings are severe.        The following portions of the  patient's history were reviewed and updated as appropriate: allergies, current medications, past family history, past medical history, past social history, past surgical history, and problem list.    Review of Systems    Objective   Physical Exam  Vitals and nursing note reviewed.   Constitutional:       General: He is not in acute distress.     Appearance: He is well-developed.   HENT:      Head: Normocephalic.   Eyes:      General: Lids are normal.      Conjunctiva/sclera: Conjunctivae normal.   Neck:      Thyroid: No thyroid mass or thyromegaly.      Trachea: Trachea normal.   Cardiovascular:      Rate and Rhythm: Normal rate and regular rhythm.      Heart sounds: Normal heart sounds.   Pulmonary:      Effort: Pulmonary effort is normal.      Breath sounds: Normal breath sounds.   Musculoskeletal:         General: Tenderness present.      Cervical back: Normal range of motion.   Lymphadenopathy:      Cervical: No cervical adenopathy.   Skin:     General: Skin is warm and dry.   Neurological:      Mental Status: He is alert and oriented to person, place, and time. Mental status is at baseline.      Gait: Gait abnormal.   Psychiatric:         Attention and Perception: He is attentive.         Mood and Affect: Mood normal.         Speech: Speech normal.         Behavior: Behavior normal.         Assessment & Plan   Diagnoses and all orders for this visit:    1. Sciatica of right side (Primary)    Other orders  -     naproxen (NAPROSYN) 500 MG tablet; Take 1 tablet by mouth 2 (Two) Times a Day As Needed for Moderate Pain.  Dispense: 180 tablet; Refill: 3

## 2024-04-15 RX ORDER — NAPROXEN 500 MG/1
500 TABLET ORAL 2 TIMES DAILY PRN
Qty: 180 TABLET | Refills: 3 | Status: SHIPPED | OUTPATIENT
Start: 2024-04-15

## 2024-04-22 ENCOUNTER — LAB (OUTPATIENT)
Dept: FAMILY MEDICINE CLINIC | Facility: CLINIC | Age: 79
End: 2024-04-22
Payer: MEDICARE

## 2024-04-22 ENCOUNTER — OFFICE VISIT (OUTPATIENT)
Dept: FAMILY MEDICINE CLINIC | Facility: CLINIC | Age: 79
End: 2024-04-22
Payer: MEDICARE

## 2024-04-22 VITALS
DIASTOLIC BLOOD PRESSURE: 74 MMHG | HEART RATE: 64 BPM | OXYGEN SATURATION: 95 % | BODY MASS INDEX: 32.28 KG/M2 | TEMPERATURE: 98.6 F | HEIGHT: 68 IN | WEIGHT: 213 LBS | SYSTOLIC BLOOD PRESSURE: 138 MMHG

## 2024-04-22 DIAGNOSIS — E11.8 TYPE 2 DIABETES MELLITUS WITH UNSPECIFIED COMPLICATIONS: ICD-10-CM

## 2024-04-22 DIAGNOSIS — G56.21 NEURITIS OF RIGHT ULNAR NERVE: ICD-10-CM

## 2024-04-22 DIAGNOSIS — Z00.00 MEDICARE ANNUAL WELLNESS VISIT, SUBSEQUENT: Primary | ICD-10-CM

## 2024-04-22 LAB
ALBUMIN SERPL-MCNC: 3.9 G/DL (ref 3.5–5.2)
ALBUMIN UR-MCNC: <1.2 MG/DL
ALBUMIN/GLOB SERPL: 1.7 G/DL
ALP SERPL-CCNC: 81 U/L (ref 39–117)
ALT SERPL W P-5'-P-CCNC: 15 U/L (ref 1–41)
ANION GAP SERPL CALCULATED.3IONS-SCNC: 11.6 MMOL/L (ref 5–15)
AST SERPL-CCNC: 17 U/L (ref 1–40)
BILIRUB SERPL-MCNC: 0.4 MG/DL (ref 0–1.2)
BUN SERPL-MCNC: 14 MG/DL (ref 8–23)
BUN/CREAT SERPL: 16.3 (ref 7–25)
CALCIUM SPEC-SCNC: 9.4 MG/DL (ref 8.6–10.5)
CHLORIDE SERPL-SCNC: 106 MMOL/L (ref 98–107)
CHOLEST SERPL-MCNC: 100 MG/DL (ref 0–200)
CO2 SERPL-SCNC: 25.4 MMOL/L (ref 22–29)
CREAT SERPL-MCNC: 0.86 MG/DL (ref 0.76–1.27)
CREAT UR-MCNC: 72.5 MG/DL
EGFRCR SERPLBLD CKD-EPI 2021: 87 ML/MIN/1.73
GLOBULIN UR ELPH-MCNC: 2.3 GM/DL
GLUCOSE SERPL-MCNC: 85 MG/DL (ref 65–99)
HBA1C MFR BLD: 5.7 % (ref 4.8–5.6)
HDLC SERPL-MCNC: 31 MG/DL (ref 40–60)
LDLC SERPL CALC-MCNC: 51 MG/DL (ref 0–100)
LDLC/HDLC SERPL: 1.66 {RATIO}
MICROALBUMIN/CREAT UR: NORMAL MG/G{CREAT}
POTASSIUM SERPL-SCNC: 4.1 MMOL/L (ref 3.5–5.2)
PROT SERPL-MCNC: 6.2 G/DL (ref 6–8.5)
SODIUM SERPL-SCNC: 143 MMOL/L (ref 136–145)
TRIGL SERPL-MCNC: 88 MG/DL (ref 0–150)
VLDLC SERPL-MCNC: 18 MG/DL (ref 5–40)

## 2024-04-22 PROCEDURE — G0439 PPPS, SUBSEQ VISIT: HCPCS | Performed by: FAMILY MEDICINE

## 2024-04-22 PROCEDURE — 1160F RVW MEDS BY RX/DR IN RCRD: CPT | Performed by: FAMILY MEDICINE

## 2024-04-22 PROCEDURE — 36415 COLL VENOUS BLD VENIPUNCTURE: CPT | Performed by: FAMILY MEDICINE

## 2024-04-22 PROCEDURE — 82570 ASSAY OF URINE CREATININE: CPT | Performed by: FAMILY MEDICINE

## 2024-04-22 PROCEDURE — 1159F MED LIST DOCD IN RCRD: CPT | Performed by: FAMILY MEDICINE

## 2024-04-22 PROCEDURE — 80053 COMPREHEN METABOLIC PANEL: CPT | Performed by: FAMILY MEDICINE

## 2024-04-22 PROCEDURE — 3075F SYST BP GE 130 - 139MM HG: CPT | Performed by: FAMILY MEDICINE

## 2024-04-22 PROCEDURE — 80061 LIPID PANEL: CPT | Performed by: FAMILY MEDICINE

## 2024-04-22 PROCEDURE — 82043 UR ALBUMIN QUANTITATIVE: CPT | Performed by: FAMILY MEDICINE

## 2024-04-22 PROCEDURE — 83036 HEMOGLOBIN GLYCOSYLATED A1C: CPT | Performed by: FAMILY MEDICINE

## 2024-04-22 PROCEDURE — 3078F DIAST BP <80 MM HG: CPT | Performed by: FAMILY MEDICINE

## 2024-04-22 NOTE — PROGRESS NOTES
The ABCs of the Annual Wellness Visit  Subsequent Medicare Wellness Visit    Subjective      Heri Vasquez is a 79 y.o. male who presents for a Subsequent Medicare Wellness Visit.    The following portions of the patient's history were reviewed and   updated as appropriate: allergies, current medications, past family history, past medical history, past social history, past surgical history, and problem list.    Compared to one year ago, the patient feels his physical   health is the same.    Compared to one year ago, the patient feels his mental   health is the same.    Recent Hospitalizations:  This patient has had a [facility] [place] admission record on file within the last 365 days.    Current Medical Providers:  Patient Care Team:  Olesya Cueva MD as PCP - General (Family Medicine)  Tan Rowley OD (Optometry)  Banet, Duane Edward, MD as Consulting Physician (Dermatology)  Leslie Clark MD as Consulting Physician (Cardiology)    Outpatient Medications Prior to Visit   Medication Sig Dispense Refill    acetaminophen (TYLENOL) 650 MG 8 hr tablet Take 1 tablet by mouth Every 8 (Eight) Hours As Needed for Mild Pain.      aspirin 81 MG EC tablet Take 1 tablet by mouth Daily. LD 4/13      atorvastatin (LIPITOR) 20 MG tablet TAKE 1 TABLET BY MOUTH ONCE  DAILY 90 tablet 3    betamethasone dipropionate (DIPROLENE) 0.05 % lotion       betamethasone valerate (VALISONE) 0.1 % ointment Apply 1 Application topically to the appropriate area as directed 2 (Two) Times a Day.      calcium citrate-vitamin d (CALCITRATE) 315-250 MG-UNIT tablet tablet Take  by mouth Daily.      cyclobenzaprine (FLEXERIL) 10 MG tablet Take 1 tablet by mouth At Night As Needed for Muscle Spasms. 30 tablet 0    donepezil (ARICEPT) 5 MG tablet TAKE 1 TABLET BY MOUTH AT  NIGHT 90 tablet 3    gabapentin (NEURONTIN) 300 MG capsule TAKE 1 CAPSULE BY MOUTH  TWICE DAILY 180 capsule 3    hydrocortisone 2.5 % cream Apply 1 Application  topically to the appropriate area as directed 2 (Two) Times a Day.      isosorbide mononitrate (IMDUR) 60 MG 24 hr tablet TAKE 1 AND 1/2 TABLETS BY MOUTH  DAILY 135 tablet 1    Lancets (OneTouch Delica Plus [id number]) misc USE   TO CHECK GLUCOSE ONCE DAILY 100 each 4    lidocaine (LIDODERM) 5 % Place 1 patch on the skin as directed by provider Daily. Remove & Discard patch within 12 hours or as directed by MD 6 patch 0    lisinopril (PRINIVIL,ZESTRIL) 10 MG tablet TAKE 1 TABLET BY MOUTH DAILY 90 tablet 3    metFORMIN (GLUCOPHAGE) 500 MG tablet TAKE 1 TABLET BY MOUTH TWICE  DAILY WITH A MEAL 180 tablet 3    Multiple Vitamins-Minerals (MULTIVITAMIN WITH MINERALS) tablet tablet Take 1 tablet by mouth Daily.      naproxen (NAPROSYN) 500 MG tablet Take 1 tablet by mouth 2 (Two) Times a Day As Needed for Moderate Pain. 180 tablet 3    nitroglycerin (NITROSTAT) 0.4 MG SL tablet Place 1 tablet under the tongue Every 5 (Five) Minutes As Needed for Chest Pain. 30 tablet 4    OneTouch Ultra test strip 1 each by Other route Daily. E11.9 50 each 2    pantoprazole (PROTONIX) 40 MG EC tablet TAKE 1 TABLET BY MOUTH DAILY 90 tablet 3    Ibuprofen 3 %, Gabapentin 10 %, Baclofen 2 %, lidocaine 4 % Apply 1-2 g topically to the appropriate area as directed 3 (Three) to 4 (Four) times daily. 90 g 2    methylPREDNISolone (MEDROL) 4 MG dose pack Take as directed on package instructions. 1 each 0     No facility-administered medications prior to visit.       No opioid medication identified on active medication list. I have reviewed chart for other potential  high risk medication/s and harmful drug interactions in the elderly.        Aspirin is on active medication list. Aspirin use is indicated based on review of current medical condition/s. Pros and cons of this therapy have been discussed today. Benefits of this medication outweigh potential harm.  Patient has been encouraged to continue taking this medication.  .      Patient Active  Problem List   Diagnosis    Varicose veins of right lower extremity with pain    Nasal congestion    Actinic keratosis    Arthritis    Bradycardia    Chronic coronary artery disease    Degeneration of intervertebral disc of lumbar region    Dependent edema    Diabetic peripheral neuropathy    Encounter for general adult medical examination without abnormal findings    Fatigue    Gastroesophageal reflux disease    Hay fever    Hearing loss    Hyperlipidemia    Knee pain    Lightheadedness    Strain of lumbar region    Type 2 diabetes mellitus with unspecified complications    Screening for prostate cancer    Sciatic nerve pain, right    Hip pain, right    RUQ abdominal pain    Gallbladder polyp    Fatty liver    Left-sided low back pain with left-sided sciatica    Chronic right shoulder pain    Mass of joint of right shoulder    Cervical radiculopathy    Cervical radiculitis    Essential hypertension    Venous insufficiency of leg    Varicose veins of lower extremity    Infected sebaceous cyst    Neuritis of right ulnar nerve    Epistaxis    Acute right-sided thoracic back pain    Memory changes    Chest pain    Class 1 obesity    Stable angina pectoris    SIDDHARTHA treated with BiPAP    Sciatic pain, right    Right arm pain    Acute pain of left shoulder    DJD of left shoulder    Need for vaccination    Neck muscle spasm    Medicare annual wellness visit, subsequent    Traumatic closed nondisplaced fracture of rib with routine healing, right    Lump of skin of right upper extremity    Episodic tension-type headache, not intractable    Tinea pedis of right foot    Contusion of face    Contusion of abdominal wall    Fall    Sciatica of right side     Advance Care Planning   Advance Care Planning     Advance Directive is not on file.  ACP discussion was held with the patient during this visit. Patient does not have an advance directive, information provided.     Objective    Vitals:    04/22/24 0822 04/22/24 0842   BP:  "147/74 138/74   BP Location: Left arm    Patient Position: Sitting    Cuff Size: Large Adult    Pulse: 64    Temp: 98.6 °F (37 °C)    TempSrc: Infrared    SpO2: 95%    Weight: 96.6 kg (213 lb)    Height: 172.7 cm (68\")      Estimated body mass index is 32.39 kg/m² as calculated from the following:    Height as of this encounter: 172.7 cm (68\").    Weight as of this encounter: 96.6 kg (213 lb).  BMI is >= 30 and <35. (Class 1 Obesity). The following options were offered after discussion;: exercise counseling/recommendations       Does the patient have evidence of cognitive impairment?   No     Physical Exam  Vitals and nursing note reviewed.   Constitutional:       General: He is not in acute distress.     Appearance: He is well-developed.   HENT:      Head: Normocephalic.   Eyes:      General: Lids are normal.      Conjunctiva/sclera: Conjunctivae normal.   Neck:      Thyroid: No thyroid mass or thyromegaly.      Trachea: Trachea normal.   Cardiovascular:      Rate and Rhythm: Normal rate and regular rhythm.      Heart sounds: Normal heart sounds.   Pulmonary:      Effort: Pulmonary effort is normal.      Breath sounds: Normal breath sounds.   Musculoskeletal:      Cervical back: Normal range of motion.   Lymphadenopathy:      Cervical: No cervical adenopathy.   Skin:     General: Skin is warm and dry.   Neurological:      Mental Status: He is alert and oriented to person, place, and time.      Motor: Tremor present.   Psychiatric:         Attention and Perception: He is attentive.         Mood and Affect: Mood normal.         Speech: Speech normal.         Behavior: Behavior normal.              HEALTH RISK ASSESSMENT    Smoking Status:  Social History     Tobacco Use   Smoking Status Former    Current packs/day: 0.00    Average packs/day: 1.5 packs/day for 30.0 years (45.0 ttl pk-yrs)    Types: Cigarettes    Start date: 1970    Quit date: 2000    Years since quittin.3   Smokeless Tobacco Former "     Alcohol Consumption:  Social History     Substance and Sexual Activity   Alcohol Use No     Fall Risk Screen:    MITRA Fall Risk Assessment has not been completed.    Depression Screenin/22/2024     8:42 AM   PHQ-2/PHQ-9 Depression Screening   Little Interest or Pleasure in Doing Things 0-->not at all   Feeling Down, Depressed or Hopeless 0-->not at all   PHQ-9: Brief Depression Severity Measure Score 0       Health Habits and Functional and Cognitive Screenin/19/2024     8:03 AM   Functional & Cognitive Status   Do you have difficulty preparing food and eating? Yes   Do you have difficulty bathing yourself, getting dressed or grooming yourself? No   Do you have difficulty using the toilet? No   Do you have difficulty moving around from place to place? No   Do you have trouble with steps or getting out of a bed or a chair? Yes   Current Diet Other   Dental Exam Up to date   Eye Exam Up to date   Exercise (times per week) 0 times per week   Do you need help using the phone?  No   Are you deaf or do you have serious difficulty hearing?  Yes   Do you need help to go to places out of walking distance? No   Do you need help shopping? No   Do you need help preparing meals?  No   Do you need help with housework?  No   Do you need help with laundry? No   Do you need help taking your medications? No   Do you need help managing money? No   Do you ever drive or ride in a car without wearing a seat belt? No   Have you felt unusual stress, anger or loneliness in the last month? No   Who do you live with? Spouse   If you need help, do you have trouble finding someone available to you? No   Have you been bothered in the last four weeks by sexual problems? No   Do you have difficulty concentrating, remembering or making decisions? No       Age-appropriate Screening Schedule:  Refer to the list below for future screening recommendations based on patient's age, sex and/or medical conditions. Orders for these  recommended tests are listed in the plan section. The patient has been provided with a written plan.    Health Maintenance   Topic Date Due    URINE MICROALBUMIN  02/21/2024    HEMOGLOBIN A1C  03/18/2024    DIABETIC EYE EXAM  05/18/2024    ZOSTER VACCINE (2 of 2) 04/22/2024 (Originally 12/23/2019)    INFLUENZA VACCINE  08/01/2024    LIPID PANEL  09/22/2024    BMI FOLLOWUP  11/21/2024    ANNUAL WELLNESS VISIT  04/22/2025    TDAP/TD VACCINES (2 - Td or Tdap) 04/01/2031    COVID-19 Vaccine  Completed    RSV Vaccine - Adults  Completed    Pneumococcal Vaccine 65+  Completed                  CMS Preventative Services Quick Reference  Risk Factors Identified During Encounter:    Inactivity/Sedentary: Patient was advised to exercise at least 150 minutes a week per CDC recommendations.  Dental Screening Recommended  Vision Screening Recommended    The above risks/problems have been discussed with the patient.  Pertinent information has been shared with the patient in the After Visit Summary.    Diagnoses and all orders for this visit:    1. Medicare annual wellness visit, subsequent (Primary)    2. Type 2 diabetes mellitus with unspecified complications  -     Comprehensive Metabolic Panel  -     Hemoglobin A1c  -     Microalbumin / Creatinine Urine Ratio - Urine, Clean Catch  -     Lipid Panel    3. Neuritis of right ulnar nerve  -     Ambulatory Referral to Neurology        Follow Up:   Next Medicare Wellness visit to be scheduled in 1 year.      An After Visit Summary and PPPS were made available to the patient.

## 2024-05-15 RX ORDER — ISOSORBIDE MONONITRATE 60 MG/1
TABLET, EXTENDED RELEASE ORAL
Qty: 135 TABLET | Refills: 0 | Status: SHIPPED | OUTPATIENT
Start: 2024-05-15

## 2024-05-23 ENCOUNTER — HOSPITAL ENCOUNTER (EMERGENCY)
Facility: HOSPITAL | Age: 79
Discharge: HOME | End: 2024-05-23
Payer: MEDICARE

## 2024-05-23 ENCOUNTER — APPOINTMENT (OUTPATIENT)
Dept: GENERAL RADIOLOGY | Facility: HOSPITAL | Age: 79
End: 2024-05-23
Payer: MEDICARE

## 2024-05-23 VITALS
BODY MASS INDEX: 35.36 KG/M2 | RESPIRATION RATE: 18 BRPM | HEIGHT: 66 IN | TEMPERATURE: 97.7 F | WEIGHT: 220 LBS | HEART RATE: 80 BPM | OXYGEN SATURATION: 96 % | DIASTOLIC BLOOD PRESSURE: 88 MMHG | SYSTOLIC BLOOD PRESSURE: 168 MMHG

## 2024-05-23 DIAGNOSIS — M25.512 ACUTE PAIN OF LEFT SHOULDER: Primary | ICD-10-CM

## 2024-05-23 LAB
ALBUMIN SERPL-MCNC: 3.9 G/DL (ref 3.5–5.2)
ALBUMIN/GLOB SERPL: 1.5 G/DL
ALP SERPL-CCNC: 86 U/L (ref 39–117)
ALT SERPL W P-5'-P-CCNC: 20 U/L (ref 1–41)
ANION GAP SERPL CALCULATED.3IONS-SCNC: 11.7 MMOL/L (ref 5–15)
AST SERPL-CCNC: 25 U/L (ref 1–40)
BASOPHILS # BLD AUTO: 0.13 10*3/MM3 (ref 0–0.2)
BASOPHILS NFR BLD AUTO: 1.1 % (ref 0–1.5)
BILIRUB SERPL-MCNC: 0.4 MG/DL (ref 0–1.2)
BUN SERPL-MCNC: 15 MG/DL (ref 8–23)
BUN/CREAT SERPL: 17 (ref 7–25)
CALCIUM SPEC-SCNC: 9.5 MG/DL (ref 8.6–10.5)
CHLORIDE SERPL-SCNC: 106 MMOL/L (ref 98–107)
CO2 SERPL-SCNC: 22.3 MMOL/L (ref 22–29)
CREAT SERPL-MCNC: 0.88 MG/DL (ref 0.76–1.27)
DEPRECATED RDW RBC AUTO: 49.1 FL (ref 37–54)
EGFRCR SERPLBLD CKD-EPI 2021: 86.4 ML/MIN/1.73
EOSINOPHIL # BLD AUTO: 0.36 10*3/MM3 (ref 0–0.4)
EOSINOPHIL NFR BLD AUTO: 2.9 % (ref 0.3–6.2)
ERYTHROCYTE [DISTWIDTH] IN BLOOD BY AUTOMATED COUNT: 18.3 % (ref 12.3–15.4)
GEN 5 2HR TROPONIN T REFLEX: 11 NG/L
GLOBULIN UR ELPH-MCNC: 2.6 GM/DL
GLUCOSE SERPL-MCNC: 109 MG/DL (ref 65–99)
HCT VFR BLD AUTO: 49.7 % (ref 37.5–51)
HGB BLD-MCNC: 14.8 G/DL (ref 13–17.7)
IMM GRANULOCYTES # BLD AUTO: 0.04 10*3/MM3 (ref 0–0.05)
IMM GRANULOCYTES NFR BLD AUTO: 0.3 % (ref 0–0.5)
LYMPHOCYTES # BLD AUTO: 1.49 10*3/MM3 (ref 0.7–3.1)
LYMPHOCYTES NFR BLD AUTO: 12.2 % (ref 19.6–45.3)
MCH RBC QN AUTO: 23.5 PG (ref 26.6–33)
MCHC RBC AUTO-ENTMCNC: 29.8 G/DL (ref 31.5–35.7)
MCV RBC AUTO: 78.8 FL (ref 79–97)
MONOCYTES # BLD AUTO: 0.64 10*3/MM3 (ref 0.1–0.9)
MONOCYTES NFR BLD AUTO: 5.2 % (ref 5–12)
NEUTROPHILS NFR BLD AUTO: 78.3 % (ref 42.7–76)
NEUTROPHILS NFR BLD AUTO: 9.55 10*3/MM3 (ref 1.7–7)
NRBC BLD AUTO-RTO: 0 /100 WBC (ref 0–0.2)
NT-PROBNP SERPL-MCNC: 43.2 PG/ML (ref 0–1800)
PLATELET # BLD AUTO: 385 10*3/MM3 (ref 140–450)
PMV BLD AUTO: 9.9 FL (ref 6–12)
POTASSIUM SERPL-SCNC: 4.3 MMOL/L (ref 3.5–5.2)
PROT SERPL-MCNC: 6.5 G/DL (ref 6–8.5)
RBC # BLD AUTO: 6.31 10*6/MM3 (ref 4.14–5.8)
SODIUM SERPL-SCNC: 140 MMOL/L (ref 136–145)
TROPONIN T DELTA: -3 NG/L
TROPONIN T SERPL HS-MCNC: 14 NG/L
WBC NRBC COR # BLD AUTO: 12.21 10*3/MM3 (ref 3.4–10.8)

## 2024-05-23 PROCEDURE — 71045 X-RAY EXAM CHEST 1 VIEW: CPT

## 2024-05-23 PROCEDURE — 84484 ASSAY OF TROPONIN QUANT: CPT | Performed by: PHYSICIAN ASSISTANT

## 2024-05-23 PROCEDURE — 80053 COMPREHEN METABOLIC PANEL: CPT | Performed by: PHYSICIAN ASSISTANT

## 2024-05-23 PROCEDURE — 36415 COLL VENOUS BLD VENIPUNCTURE: CPT

## 2024-05-23 PROCEDURE — 73030 X-RAY EXAM OF SHOULDER: CPT

## 2024-05-23 PROCEDURE — 93005 ELECTROCARDIOGRAM TRACING: CPT

## 2024-05-23 PROCEDURE — 83880 ASSAY OF NATRIURETIC PEPTIDE: CPT | Performed by: PHYSICIAN ASSISTANT

## 2024-05-23 PROCEDURE — 96372 THER/PROPH/DIAG INJ SC/IM: CPT

## 2024-05-23 PROCEDURE — 85025 COMPLETE CBC W/AUTO DIFF WBC: CPT | Performed by: PHYSICIAN ASSISTANT

## 2024-05-23 PROCEDURE — 99284 EMERGENCY DEPT VISIT MOD MDM: CPT

## 2024-05-23 PROCEDURE — 25010000002 MORPHINE PER 10 MG: Performed by: NURSE PRACTITIONER

## 2024-05-23 RX ORDER — MORPHINE SULFATE 2 MG/ML
2 INJECTION, SOLUTION INTRAMUSCULAR; INTRAVENOUS ONCE
Status: COMPLETED | OUTPATIENT
Start: 2024-05-23 | End: 2024-05-23

## 2024-05-23 RX ORDER — SODIUM CHLORIDE 0.9 % (FLUSH) 0.9 %
10 SYRINGE (ML) INJECTION AS NEEDED
Status: DISCONTINUED | OUTPATIENT
Start: 2024-05-23 | End: 2024-05-23 | Stop reason: HOSPADM

## 2024-05-23 RX ORDER — LIDOCAINE 50 MG/G
1 PATCH TOPICAL EVERY 24 HOURS
Qty: 6 EACH | Refills: 0 | Status: SHIPPED | OUTPATIENT
Start: 2024-05-23

## 2024-05-23 RX ORDER — HYDROCODONE BITARTRATE AND ACETAMINOPHEN 5; 325 MG/1; MG/1
1 TABLET ORAL EVERY 6 HOURS PRN
Qty: 8 TABLET | Refills: 0 | Status: SHIPPED | OUTPATIENT
Start: 2024-05-23

## 2024-05-23 RX ADMIN — MORPHINE SULFATE 2 MG: 2 INJECTION, SOLUTION INTRAMUSCULAR; INTRAVENOUS at 18:20

## 2024-05-23 NOTE — DISCHARGE INSTRUCTIONS
Medications as directed and follow-up pharmacy precautions and warnings regarding any prescriptions.  Follow-up with your orthopedist.  Light massage.  May use heat 20 instead time several times a day.  Return for any new or worsening symptoms

## 2024-05-23 NOTE — ED PROVIDER NOTES
Subjective Due to significant overcrowding in the emergency department patient was initially seen and evaluated in triage.  Provider in triage recommended patient placement in the treatment area to initiate therapy and movement to an ER bed as soon as possible.    Provider in Triage Note  Patient is an 79-year-old male who presents with left shoulder chest pain over the last 2 weeks.  Worse when he tries to move his left shoulder denies any inciting injury.  He reports some associated dizziness and lightheadedness with this.      History of Present Illness    Patient endorses above history.  He states his pain is reproducible with range of motion to me and he came into the ER to make sure his hardware is okay.  He denies any injury or fever.    Review of Systems   Constitutional:  Negative for fever.       Past Medical History:   Diagnosis Date    Arthritis     Dr Mosqueda    Coronary heart disease 01/2016    single vessel disease, nl stress test (copied from [facility])    Coronary heart disease 10/31/2017    cath 10/31/17 - Low % Blockages- N o Intervention   (copied from [facility])    Diverticulosis     Fatty liver     GERD (gastroesophageal reflux disease)     Hearing loss     Hyperlipemia     Hyperlipidemia     Hypertension     Low back pain     Neuropathy in diabetes     SIDDHARTHA treated with BiPAP     Pain management     injections Lumbar spine X2 with [facility] @ [facility] (copied from [facility])    Physical therapy evaluation, initial     @ [facility] in [place] 6 weeks x3 per week, Kalen leal (copied from [facility])    Rectal bleed 08/2012    hemorrhoids    Retinal hemorrhage of right eye 05/2014    Sleep apnea     bipap    Tinea pedis of right foot 07/02/2023    Type 2 diabetes mellitus     Uses brace        No Known Allergies    Past Surgical History:   Procedure Laterality Date    BELPHAROPTOSIS REPAIR  12/11/2017     Dr. grimes (copied from [facility])    BROW LIFT  12/11/2017    Dr. Grimes at [facility]     CARDIAC CATHETERIZATION  2012    at [facility]- single vessel disease. (copied from [facility])    CARDIAC CATHETERIZATION  10/13/2017    no intervention - Low % Blockages.    CARDIAC CATHETERIZATION Left 2021    Procedure: LEFT HEART CATH with possible PCI;  Surgeon: Leslie Clark MD;  Location: [facility] CATH INVASIVE LOCATION;  Service: Cardiovascular;  Laterality: Left;  Local and IV sedation    CATARACT EXTRACTION  2017    brow lift and eye lid repair    COLON SURGERY  2014    colonscopy    COLONOSCOPY N/A 2019    Procedure: COLONOSCOPY with polypectomy x1;  Surgeon: Graham Byrd MD;  Location: [facility] ENDOSCOPY;  Service: Gastroenterology    CUBITAL TUNNEL RELEASE Right     HERNIA REPAIR  2008    umbilical hernia repair    JOINT REPLACEMENT      KNEE ARTHROSCOPY Right 2014    Dr. Panda pollack and Cataract Extraction, sep 11 and right 2014- [facility]. (copied from [facility])    TOTAL KNEE ARTHROPLASTY Left 2009    Dr. Mosqueda (copied from [facility])    TOTAL SHOULDER ARTHROPLASTY W/ DISTAL CLAVICLE EXCISION Left 2022    Procedure: TOTAL SHOULDER REVERSE ARTHROPLASTY;  Surgeon: Dany Knapp MD;  Location: [facility] MAIN OR;  Service: Orthopedics;  Laterality: Left;       Family History   Problem Relation Age of Onset    Heart disease Mother         CHF    Hypertension Mother     Heart failure Mother     Diabetes Brother     Arthritis Maternal Grandmother     Stroke Maternal Grandfather     Diabetes Other     Diabetes Other     Stroke Other     Sleep apnea Neg Hx        Social History     Socioeconomic History    Marital status:      Spouse name: Shani   Tobacco Use    Smoking status: Former     Current packs/day: 0.00     Average packs/day: 1.5 packs/day for 30.0 years (45.0 ttl pk-yrs)     Types: Cigarettes     Start date: 1970     Quit date: 2000     Years since quittin.4    Smokeless tobacco: Former   Vaping Use     Vaping status: Never Used   Substance and Sexual Activity    Alcohol use: No    Drug use: No    Sexual activity: Not Currently     Partners: Female           Objective   Physical Exam    Due to significant overcrowding in the emergency department patient was evaluated by myself in a hallway bed.  Explained to the patient our limitations due to overcrowding and they were in agreement to continue exam and treatment at this time.       Vital signs and triage nurse note reviewed.  Constitutional: Awake, alert; well-developed and well-nourished. No acute distress is noted.  Significant other bedside  HEENT: Normocephalic, atraumatic; pupils are PERRL with intact EOM; oropharynx is pink and moist without exudate or erythema.  Neck: Supple, full range of motion without pain;    Cardiovascular: Regular rate and rhythm, normal S1-S2.  Pulmonary: Respiratory effort regular nonlabored, breath sounds clear to auscultation all fields.  Abdomen: Soft, nontender nondistended with normoactive bowel sounds; no rebound or guarding.  Musculoskeletal: Independent range of motion of all extremities some tenderness to palpation over the left trapezius.  +3 radial pulse.  Reproducible pain with abduction.  There is no warmth or erythema.  Neuro: Alert oriented x3, speech is clear and appropriate, GCS 15  Skin:  Fleshtone warm, dry, intact; no erythematous or petechial rash or lesion      Procedures           ED Course  ED Course as of 05/23/24 1950   Thu May 23, 2024   1552 Assumed care [JW]   1701 Waiting for second troponin [JW]      ED Course User Index  [JW] Liliana Florian, DORY      Labs Reviewed   COMPREHENSIVE METABOLIC PANEL - Abnormal; Notable for the following components:       Result Value    Glucose 109 (*)     All other components within normal limits    Narrative:     GFR Normal >60  Chronic Kidney Disease <60  Kidney Failure <15    The GFR formula is only valid for adults with stable renal function between ages 18 and  70.   CBC WITH AUTO DIFFERENTIAL - Abnormal; Notable for the following components:    WBC 12.21 (*)     RBC 6.31 (*)     MCV 78.8 (*)     MCH 23.5 (*)     MCHC 29.8 (*)     RDW 18.3 (*)     Neutrophil % 78.3 (*)     Lymphocyte % 12.2 (*)     Neutrophils, Absolute 9.55 (*)     All other components within normal limits   TROPONIN - Normal    Narrative:     High Sensitive Troponin T Reference Range:  <14.0 ng/L- Negative Female for AMI  <22.0 ng/L- Negative Male for AMI  >=14 - Abnormal Female indicating possible myocardial injury.  >=22 - Abnormal Male indicating possible myocardial injury.   Clinicians would have to utilize clinical acumen, EKG, Troponin, and serial changes to determine if it is an Acute Myocardial Infarction or myocardial injury due to an underlying chronic condition.        BNP (IN-HOUSE) - Normal    Narrative:     This assay is used as an aid in the diagnosis of individuals suspected of having heart failure. It can be used as an aid in the diagnosis of acute decompensated heart failure (ADHF) in patients presenting with signs and symptoms of ADHF to the emergency department (ED). In addition, NT-proBNP of <300 pg/mL indicates ADHF is not likely.    Age Range Result Interpretation  NT-proBNP Concentration (pg/mL:      <50             Positive            >450                   Gray                 300-450                    Negative             <300    50-75           Positive            >900                  Gray                300-900                  Negative            <300      >75             Positive            >1800                  Gray                300-1800                  Negative            <300   HIGH SENSITIVITIY TROPONIN T 2HR - Normal    Narrative:     High Sensitive Troponin T Reference Range:  <14.0 ng/L- Negative Female for AMI  <22.0 ng/L- Negative Male for AMI  >=14 - Abnormal Female indicating possible myocardial injury.  >=22 - Abnormal Male indicating possible myocardial  injury.   Clinicians would have to utilize clinical acumen, EKG, Troponin, and serial changes to determine if it is an Acute Myocardial Infarction or myocardial injury due to an underlying chronic condition.        CBC AND DIFFERENTIAL    Narrative:     The following orders were created for panel order CBC & Differential.  Procedure                               Abnormality         Status                     ---------                               -----------         ------                     CBC Auto Differential[[id number]]        Abnormal            Final result                 Please view results for these tests on the individual orders.     Medications   sodium chloride 0.9 % flush 10 mL (has no administration in time range)   morphine injection 2 mg (2 mg Subcutaneous Given 5/23/24 1820)     XR Chest 1 View    Result Date: 5/23/2024  Impression: 1.Cardiomegaly. 2.Bibasilar areas of density that may relate to some chronic interstitial change as well as possibly some atelectasis or scarring Electronically Signed: Jarod Chan MD  5/23/2024 3:11 PM EDT  Workstation ID: [id number]    XR Shoulder 2+ View Left    Result Date: 5/23/2024  Impression: 1.Changes from total left shoulder arthroplasty. 2.Degenerative change AC joint Electronically Signed: Jarod Chan MD  5/23/2024 3:09 PM EDT  Workstation ID: [id number]   Prior to Admission medications    Medication Sig Start Date End Date Taking? Authorizing Provider   acetaminophen (TYLENOL) 650 MG 8 hr tablet Take 1 tablet by mouth Every 8 (Eight) Hours As Needed for Mild Pain. 7/2/23   Olesya Cueva MD   aspirin 81 MG EC tablet Take 1 tablet by mouth Daily. LD 4/13 10/4/23   Olesya Cueva MD   atorvastatin (LIPITOR) 20 MG tablet TAKE 1 TABLET BY MOUTH ONCE  DAILY 11/27/23   Leslie Clark MD   betamethasone dipropionate (DIPROLENE) 0.05 % lotion  7/22/22   ProviderArielle MD   betamethasone valerate (VALISONE) 0.1 % ointment Apply 1 Application  topically to the appropriate area as directed 2 (Two) Times a Day.    Arielle Carmona MD   calcium citrate-vitamin d (CALCITRATE) 315-250 MG-UNIT tablet tablet Take  by mouth Daily.    Arielle Carmona MD   cyclobenzaprine (FLEXERIL) 10 MG tablet Take 1 tablet by mouth At Night As Needed for Muscle Spasms. 12/23/22   Olesya Cueva MD   donepezil (ARICEPT) 5 MG tablet TAKE 1 TABLET BY MOUTH AT  NIGHT 9/25/23   Olesya Cueva MD   gabapentin (NEURONTIN) 300 MG capsule TAKE 1 CAPSULE BY MOUTH  TWICE DAILY 6/20/23   Olesya Cueva MD   HYDROcodone-acetaminophen (NORCO) 5-325 MG per tablet Take 1 tablet by mouth Every 6 (Six) Hours As Needed for Moderate Pain. 5/23/24   Liilana Florian APRN   hydrocortisone 2.5 % cream Apply 1 Application topically to the appropriate area as directed 2 (Two) Times a Day.    Arielle Carmona MD   isosorbide mononitrate (IMDUR) 60 MG 24 hr tablet TAKE 1 AND 1/2 TABLETS BY MOUTH  DAILY 5/15/24   Leslie Clark MD   Lancets (OneTouch Delica Plus [id number]) misc USE   TO CHECK GLUCOSE ONCE DAILY 7/3/23   Olesya Cueva MD   lidocaine (Lidoderm) 5 % Place 1 patch on the skin as directed by provider Daily. Remove & Discard patch within 12 hours or as directed by MD 5/23/24   Liliana Florian APRN   lisinopril (PRINIVIL,ZESTRIL) 10 MG tablet TAKE 1 TABLET BY MOUTH DAILY 2/16/24   Olesya Cueva MD   metFORMIN (GLUCOPHAGE) 500 MG tablet TAKE 1 TABLET BY MOUTH TWICE  DAILY WITH A MEAL 10/6/23   Olesya Cueva MD   Multiple Vitamins-Minerals (MULTIVITAMIN WITH MINERALS) tablet tablet Take 1 tablet by mouth Daily.    Arielle Carmona MD   naproxen (NAPROSYN) 500 MG tablet Take 1 tablet by mouth 2 (Two) Times a Day As Needed for Moderate Pain. 4/15/24   Olesya Cueva MD   nitroglycerin (NITROSTAT) 0.4 MG SL tablet Place 1 tablet under the tongue Every 5 (Five) Minutes As Needed for Chest Pain. 6/24/21   Alejandra Mcdonald, APRN  "  OneTouch Ultra test strip 1 each by Other route Daily. E11.9 3/22/24   Olesya Cueva MD   pantoprazole (PROTONIX) 40 MG EC tablet TAKE 1 TABLET BY MOUTH DAILY 2/16/24   Olesya Cueva MD   lidocaine (LIDODERM) 5 % Place 1 patch on the skin as directed by provider Daily. Remove & Discard patch within 12 hours or as directed by MD 12/10/23 5/23/24  Shani Mancilla PA                                            Medical Decision Making      /72 (BP Location: Right arm, Patient Position: Lying)   Pulse 74   Temp 97.7 °F (36.5 °C)   Resp 20   Ht 167.6 cm (66\")   Wt 99.8 kg (220 lb)   SpO2 95%   BMI 35.51 kg/m²      Inspect reviewed    Radiology interpretation:  X-rays reviewed and interpreted by Dr. Gallegos: Degenerative shoulder films with intact hardware, chest x-ray with some cardiomegaly  Further interpretation by radiologist as above  Lab interpretation:  Labs all viewed by me and significant for,  Hemoglobin 14.8, BNP 43, delta troponin -1, glucose 100    EKG viewed and interpreted by Dr. Dr. Gallegos, sinus rhythm without acute ST depression or elevation      Appropriate PPE worn during exam.  Patient was seen in a allison bed due to overcrowding and census patient was seen by provider in triage with initial ordering done by her.    No hardware loosening on x-ray.  Findings are not typical anginal symptoms.  On my exam patient is resting comfortably with significant improvement of his pain.  He has no physical findings consistent with infection.  We did discuss the importance of close follow-up with his orthopedist to further evaluate.      i discussed findings with patient who voices understanding of discharge instructions, signs and symptoms requiring return to ED; discharged improved and in stable condition with follow up for re-evaluation.  This document is intended for medical expert use only. Reading of this document by patients and/or patient's family without participating medical staff " guidance may result in misinterpretation and unintended morbidity.  Any interpretation of such data is the responsibility of the patient and/or family member responsible for the patient in concert with their primary or specialist providers, not to be left for sources of online searches such as cisimple, Quikly or similar queries. Relying on these approaches to knowledge may result in misinterpretation, misguided goals of care and even death should patients or family members try recommendations outside of the realm of professional medical care in a supervised inpatient environment.         Problems Addressed:  Acute pain of left shoulder: complicated acute illness or injury    Amount and/or Complexity of Data Reviewed  Labs: ordered.  Radiology: ordered.    Risk  Prescription drug management.        Final diagnoses:   Acute pain of left shoulder       ED Disposition  ED Disposition       ED Disposition   Discharge    Condition   Stable    Comment   --               Olesya Cueva MD  [address] [facility] [address]  [place] [address] [phone]          Dany Knapp MD  [address] [facility] [address]  [place] [address] [phone]    Schedule an appointment as soon as possible for a visit            Medication List        New Prescriptions      HYDROcodone-acetaminophen 5-325 MG per tablet  Commonly known as: NORCO  Take 1 tablet by mouth Every 6 (Six) Hours As Needed for Moderate Pain.               Where to Get Your Medications        These medications were sent to [facility] [address] [place] [address] [place] - [address] [place] [address] - [phone]  - [phone]   [address] [facility] [place] [address]      Phone: [phone]   HYDROcodone-acetaminophen 5-325 MG per tablet  lidocaine 5 %            Liliana Florian, APRN  05/23/24 1950

## 2024-05-24 LAB
QT INTERVAL: 375 MS
QTC INTERVAL: 425 MS

## 2024-05-28 ENCOUNTER — OFFICE VISIT (OUTPATIENT)
Dept: CARDIOLOGY | Facility: CLINIC | Age: 79
End: 2024-05-28
Payer: MEDICARE

## 2024-05-28 VITALS
HEART RATE: 81 BPM | SYSTOLIC BLOOD PRESSURE: 123 MMHG | OXYGEN SATURATION: 99 % | WEIGHT: 214 LBS | HEIGHT: 66 IN | BODY MASS INDEX: 34.39 KG/M2 | DIASTOLIC BLOOD PRESSURE: 69 MMHG

## 2024-05-28 DIAGNOSIS — I25.10 CHRONIC CORONARY ARTERY DISEASE: ICD-10-CM

## 2024-05-28 DIAGNOSIS — I87.2 VENOUS INSUFFICIENCY OF LEG: Primary | ICD-10-CM

## 2024-05-28 DIAGNOSIS — E78.2 MIXED HYPERLIPIDEMIA: ICD-10-CM

## 2024-05-28 DIAGNOSIS — I83.811 VARICOSE VEINS OF RIGHT LOWER EXTREMITY WITH PAIN: ICD-10-CM

## 2024-05-28 DIAGNOSIS — I20.89 STABLE ANGINA PECTORIS: ICD-10-CM

## 2024-05-28 DIAGNOSIS — I10 ESSENTIAL HYPERTENSION: ICD-10-CM

## 2024-05-28 PROCEDURE — 3074F SYST BP LT 130 MM HG: CPT | Performed by: INTERNAL MEDICINE

## 2024-05-28 PROCEDURE — 3078F DIAST BP <80 MM HG: CPT | Performed by: INTERNAL MEDICINE

## 2024-05-28 PROCEDURE — 99214 OFFICE O/P EST MOD 30 MIN: CPT | Performed by: INTERNAL MEDICINE

## 2024-05-28 PROCEDURE — 1160F RVW MEDS BY RX/DR IN RCRD: CPT | Performed by: INTERNAL MEDICINE

## 2024-05-28 PROCEDURE — 1159F MED LIST DOCD IN RCRD: CPT | Performed by: INTERNAL MEDICINE

## 2024-05-28 RX ORDER — NITROGLYCERIN 0.4 MG/1
0.4 TABLET SUBLINGUAL
Qty: 30 TABLET | Refills: 2 | Status: SHIPPED | OUTPATIENT
Start: 2024-05-28

## 2024-06-06 ENCOUNTER — TELEPHONE (OUTPATIENT)
Dept: ORTHOPEDIC SURGERY | Facility: CLINIC | Age: 79
End: 2024-06-06
Payer: MEDICARE

## 2024-06-06 NOTE — TELEPHONE ENCOUNTER
Caller: TANYA SANDERS     Relationship to patient: PATIENT     Best call back number: [phone]    Chief complaint: LEFT SHOULDER PAIN.  LEFT SHOULDER SX WITH DR ESPINO 04/20/2022.  PATIENT WENT TO  ED 05/23/24 LEFT SHOULDER PAIN, ED NOTES LEFT SHOULDER XRAY     Type of visit: FOLLOW UP    Requested date: ASAP      If rescheduling, when is the original appointment: SCHEDULED FIRST AVAILABLE  07/08/2024    Additional notes:PATIENT WOULD LIKE TO BE SCHEDULED SOONER PLEASE

## 2024-06-29 DIAGNOSIS — E11.42 TYPE 2 DIABETES MELLITUS WITH DIABETIC POLYNEUROPATHY, WITHOUT LONG-TERM CURRENT USE OF INSULIN: ICD-10-CM

## 2024-06-30 RX ORDER — GABAPENTIN 300 MG/1
CAPSULE ORAL
Qty: 180 CAPSULE | Refills: 3 | Status: SHIPPED | OUTPATIENT
Start: 2024-06-30

## 2024-07-17 RX ORDER — ISOSORBIDE MONONITRATE 60 MG/1
TABLET, EXTENDED RELEASE ORAL
Qty: 135 TABLET | Refills: 3 | Status: SHIPPED | OUTPATIENT
Start: 2024-07-17

## 2024-08-12 RX ORDER — DONEPEZIL HYDROCHLORIDE 5 MG/1
TABLET, FILM COATED ORAL
Qty: 90 TABLET | Refills: 3 | Status: SHIPPED | OUTPATIENT
Start: 2024-08-12

## 2024-08-22 DIAGNOSIS — E11.8 TYPE 2 DIABETES MELLITUS WITH UNSPECIFIED COMPLICATIONS: ICD-10-CM

## 2024-08-23 RX ORDER — BLOOD SUGAR DIAGNOSTIC
1 STRIP MISCELLANEOUS DAILY
Qty: 50 EACH | Refills: 0 | Status: SHIPPED | OUTPATIENT
Start: 2024-08-23

## 2024-08-27 ENCOUNTER — HOSPITAL ENCOUNTER (OUTPATIENT)
Dept: GENERAL RADIOLOGY | Facility: HOSPITAL | Age: 79
Discharge: HOME | End: 2024-08-27
Admitting: FAMILY MEDICINE
Payer: MEDICARE

## 2024-08-27 ENCOUNTER — OFFICE VISIT (OUTPATIENT)
Dept: FAMILY MEDICINE CLINIC | Facility: CLINIC | Age: 79
End: 2024-08-27
Payer: MEDICARE

## 2024-08-27 VITALS
DIASTOLIC BLOOD PRESSURE: 72 MMHG | OXYGEN SATURATION: 92 % | HEART RATE: 83 BPM | WEIGHT: 216.5 LBS | HEIGHT: 66 IN | BODY MASS INDEX: 34.79 KG/M2 | TEMPERATURE: 98.1 F | SYSTOLIC BLOOD PRESSURE: 145 MMHG

## 2024-08-27 DIAGNOSIS — M54.50 CHRONIC MIDLINE LOW BACK PAIN WITHOUT SCIATICA: Primary | ICD-10-CM

## 2024-08-27 DIAGNOSIS — G89.29 CHRONIC MIDLINE LOW BACK PAIN WITHOUT SCIATICA: ICD-10-CM

## 2024-08-27 DIAGNOSIS — G89.29 CHRONIC MIDLINE LOW BACK PAIN WITHOUT SCIATICA: Primary | ICD-10-CM

## 2024-08-27 DIAGNOSIS — M54.50 CHRONIC MIDLINE LOW BACK PAIN WITHOUT SCIATICA: ICD-10-CM

## 2024-08-27 PROCEDURE — 1160F RVW MEDS BY RX/DR IN RCRD: CPT | Performed by: FAMILY MEDICINE

## 2024-08-27 PROCEDURE — 3077F SYST BP >= 140 MM HG: CPT | Performed by: FAMILY MEDICINE

## 2024-08-27 PROCEDURE — 3078F DIAST BP <80 MM HG: CPT | Performed by: FAMILY MEDICINE

## 2024-08-27 PROCEDURE — G2211 COMPLEX E/M VISIT ADD ON: HCPCS | Performed by: FAMILY MEDICINE

## 2024-08-27 PROCEDURE — 1159F MED LIST DOCD IN RCRD: CPT | Performed by: FAMILY MEDICINE

## 2024-08-27 PROCEDURE — 72100 X-RAY EXAM L-S SPINE 2/3 VWS: CPT

## 2024-08-27 PROCEDURE — 99213 OFFICE O/P EST LOW 20 MIN: CPT | Performed by: FAMILY MEDICINE

## 2024-08-27 PROCEDURE — 1125F AMNT PAIN NOTED PAIN PRSNT: CPT | Performed by: FAMILY MEDICINE

## 2024-08-27 NOTE — PROGRESS NOTES
"Chief Complaint  Back Pain (Lower ) and Fatigue (Would like to know if there is anything in his med list that could be causing this )    Subjective        Heri Vasquez presents to [facility]  Back Pain  This is a chronic problem. The current episode started more than 1 year ago. The problem occurs constantly. The problem has been worse since onset. The pain is present in the lumbar spine. The quality of the pain is described as aching. The pain does not radiate. The pain is moderate. The symptoms are aggravated by bending and position. Associated symptoms include numbness and tingling. Pertinent negatives include no abdominal pain, chest pain, dysuria, fever or leg pain. Risk factors include sedentary lifestyle, obesity and lack of exercise. He has tried NSAIDs, muscle relaxant and analgesics for the symptoms. The treatment provided mild relief.   Fatigue  Associated symptoms include fatigue and numbness. Pertinent negatives include no abdominal pain, chest pain or fever.       Objective   Vital Signs:  /72 (BP Location: Left arm, Patient Position: Sitting, Cuff Size: Large Adult)   Pulse 83   Temp 98.1 °F (36.7 °C) (Infrared)   Ht 167.6 cm (66\")   Wt 98.2 kg (216 lb 8 oz)   SpO2 92%   BMI 34.94 kg/m²   Estimated body mass index is 34.94 kg/m² as calculated from the following:    Height as of this encounter: 167.6 cm (66\").    Weight as of this encounter: 98.2 kg (216 lb 8 oz).            Physical Exam  Vitals and nursing note reviewed.   Constitutional:       General: He is not in acute distress.     Appearance: He is well-developed.   HENT:      Head: Normocephalic.   Eyes:      General: Lids are normal.      Conjunctiva/sclera: Conjunctivae normal.   Neck:      Thyroid: No thyroid mass or thyromegaly.      Trachea: Trachea normal.   Cardiovascular:      Rate and Rhythm: Normal rate and regular rhythm.      Heart sounds: Normal heart sounds.   Pulmonary:      Effort: " Pulmonary effort is normal.      Breath sounds: Normal breath sounds.   Abdominal:      Palpations: Abdomen is soft.   Musculoskeletal:      Cervical back: Normal range of motion.      Lumbar back: Tenderness present. Decreased range of motion.      Right lower leg: No edema.      Left lower leg: No edema.   Lymphadenopathy:      Cervical: No cervical adenopathy.   Skin:     General: Skin is warm and dry.   Neurological:      Mental Status: He is alert and oriented to person, place, and time.      Gait: Gait abnormal.   Psychiatric:         Attention and Perception: He is attentive.         Mood and Affect: Mood normal.         Speech: Speech normal.         Behavior: Behavior normal.        Result Review :  The following data was reviewed by: Olesya Cueva MD on 08/27/2024:  Common labs          9/22/2023    03:14 4/22/2024    09:11 5/23/2024    14:10   Common Labs   Glucose 124  85  109    BUN 18  14  15    Creatinine 0.95  0.86  0.88    Sodium 142  143  140    Potassium 3.9  4.1  4.3    Chloride 106  106  106    Calcium 8.9  9.4  9.5    Albumin  3.9  3.9    Total Bilirubin  0.4  0.4    Alkaline Phosphatase  81  86    AST (SGOT)  17  25    ALT (SGPT)  15  20    WBC 10.50   12.21    Hemoglobin 15.2   14.8    Hematocrit 47.4   49.7    Platelets 347   385    Total Cholesterol 113  100     Triglycerides 183  88     HDL Cholesterol 28  31     LDL Cholesterol  54  51     Hemoglobin A1C  5.70     Microalbumin, Urine  <1.2                 Assessment and Plan   Diagnoses and all orders for this visit:    1. Chronic midline low back pain without sciatica (Primary)  Assessment & Plan:  Rest, ice, exercise at tolerated.    Orders:  -     Back Brace  -     XR Spine Lumbar 2 or 3 View; Future             Follow Up   Return if symptoms worsen or fail to improve.  Patient was given instructions and counseling regarding his condition or for health maintenance advice. Please see specific information pulled into the AVS if  appropriate.

## 2024-09-09 NOTE — PROGRESS NOTES
"Subjective: Neuropathy     Patient ID: Heri Vasquez is a 79 y.o. male.    CHIEF COMPLAINT:Neuritis of right ulnar nerve     History of Present Illness Mr. Vasquez is an 79-year-old  male who was referred by Dr. Cueva as a new patient for \"neuritis of right ulnar nerve\".  He presented with his wife Shani for this visit.    The patient has a past medical history of: Tinea pedis, CAD, retinal hemorrhage of right eye, rectal bleed, arthritis, diverticulitis, fatty liver, GERD, hearing loss, hypertension, hyperlipidemia, hypertension, low back pain, neuropathy, diabetes, SIDDHARTHA treated with BiPAP, back pain treated in pain management.    The  patient's primarily complaints are: Numbness with sharp pains in right hand greater than left, tremor with action right hand greater than left.      The patient has had a trial with gabapentin that did not control the sharp pains.  The patient had hand surgery several years ago (10+) to correct the ulnar nerve on the right however he has not had surgery to correct the median nerve.  The patient had an EMG study of both arms completed on 3/25/2022 which showed severe right median neuropathy as well as a generalized motor or sensory neuropathy.  The patient was notified that having surgery on the severe right median neuropathy would help decrease his pain and burning in his hand.  He was notified that it would not control all of the burning and tingling as he has a generalized neuropathy.  He has some decreased sensation in the right plantar foot however he is deferring any EMG testing on legs.  He is diabetic and has been so for many years.  This could be a diabetic neuropathy.  I did discuss with him a trial on Lyrica which could help with pain control i.e. sharp shooting pains but would not control numbness.  I told the patient I would start him very low-dose which could be a subtherapeutic dose that would need to be increased at a later time.  I felt that this was the best " way to start Lyrica in an elderly individual.    Tremors: The patient states he has tremors in both hands when he is trying to do fine motor activities such as using a screwdriver or buttoning his shirt.  He reports lisinopril 10 mg has not controlled the tremor.  He would like to try a different medication.  He was notified that primidone or Mysoline is generally used for essential tremor and that I could start him very low-dose on this medication as well.        Complaint: Numbness tingling burning, tremors  Onset: unsure  Location: right arm and hand  Quality: numbness and tingling   Severity: can range from a 2-10, depends on what he is doing  Duration: constant  Frequency: daily  Timing: worse with activity  Worsening factors: holding something, writing  Alleviating factors: rest  Associated Signs and symptoms: lying on his side, tremors in both hands. Right is worse.  Current meds: Gabapentin not effective, lisinopril did not help tremor.  Past medications:   Family History: Tremor    Has has surgery with [employer] right Ulnar, but symptoms are now worse.      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 2.Degenerative change AC joint Electronically Signed: Jarod Chan MD  5/23/2024 3:09 PM EDT  Workstation ID: [id number]          Date of Study: 3/25/22  Referring Provider:DR WEAVER  History:    This patient is a 76-year-old male with history of diabetes.  In the past he has had ulnar nerve release surgery at the elbow.  He now complains of numbness in the hand.  Impression:  This is an abnormal study.  There is evidence of a severe right median neuropathy with most probable localization of a focal neuropathy at the wrist superimposed on a mild generalized motor or sensory neuropathy.  There is no evidence of focal ulnar neuropathy at the elbow at this time.  Electronically signed by :   Joseph Seipel, M.D.  March 25, 2022              The  following portions of the patient's history were reviewed and updated as appropriate: allergies, current medications, past family history, past medical history, past social history, past surgical history and problem list.      Family History   Problem Relation Age of Onset    Heart disease Mother         CHF    Hypertension Mother     Heart failure Mother     Diabetes Brother     Arthritis Maternal Grandmother     Stroke Maternal Grandfather     Diabetes Other     Diabetes Other     Stroke Other     Sleep apnea Neg Hx        Past Medical History:   Diagnosis Date    Arthritis     Dr Mosqueda    Coronary heart disease 2016    single vessel disease, nl stress test (copied from [facility])    Coronary heart disease 10/31/2017    cath 10/31/17 - Low % Blockages- N o Intervention   (copied from [facility])    Diverticulosis     Fatty liver     GERD (gastroesophageal reflux disease)     Hearing loss     Hyperlipemia     Hyperlipidemia     Hypertension     Low back pain     Neuropathy in diabetes     SIDDHARTHA treated with BiPAP     Pain management     injections Lumbar spine X2 with Muprhys Pain management @ [facility] (copied from [facility])    Physical therapy evaluation, initial     @ [facility] in [place] 6 weeks x3 per week, Kalen leal (copied from [facility])    Rectal bleed 2012    hemorrhoids    Retinal hemorrhage of right eye 2014    Sleep apnea     bipap    Tinea pedis of right foot 2023    Type 2 diabetes mellitus     Uses brace        Social History     Socioeconomic History    Marital status:      Spouse name: Shani   Tobacco Use    Smoking status: Former     Current packs/day: 0.00     Average packs/day: 1.5 packs/day for 30.0 years (45.0 ttl pk-yrs)     Types: Cigarettes     Start date: 1970     Quit date: 2000     Years since quittin.7    Smokeless tobacco: Former   Vaping Use    Vaping status: Never Used   Substance and Sexual Activity    Alcohol use: No    Drug use: No    Sexual  activity: Not Currently     Partners: Female         Current Outpatient Medications:     acetaminophen (TYLENOL) 650 MG 8 hr tablet, Take 1 tablet by mouth Every 8 (Eight) Hours As Needed for Mild Pain., Disp: , Rfl:     aspirin 81 MG EC tablet, Take 1 tablet by mouth Daily. LD 4/13, Disp: , Rfl:     atorvastatin (LIPITOR) 20 MG tablet, TAKE 1 TABLET BY MOUTH ONCE  DAILY, Disp: 90 tablet, Rfl: 3    betamethasone dipropionate (DIPROLENE) 0.05 % lotion, , Disp: , Rfl:     betamethasone valerate (VALISONE) 0.1 % ointment, Apply 1 Application topically to the appropriate area as directed 2 (Two) Times a Day., Disp: , Rfl:     calcium citrate-vitamin d (CALCITRATE) 315-250 MG-UNIT tablet tablet, Take  by mouth Daily., Disp: , Rfl:     donepezil (ARICEPT) 5 MG tablet, TAKE 1 TABLET BY MOUTH AT NIGHT, Disp: 90 tablet, Rfl: 3    hydrocortisone 2.5 % cream, Apply 1 Application topically to the appropriate area as directed 2 (Two) Times a Day., Disp: , Rfl:     isosorbide mononitrate (IMDUR) 60 MG 24 hr tablet, TAKE 1 AND 1/2 TABLETS BY MOUTH  DAILY, Disp: 135 tablet, Rfl: 3    Lancets (OneTouch Delica Plus [id number]) misc, USE   TO CHECK GLUCOSE ONCE DAILY, Disp: 100 each, Rfl: 4    lidocaine (Lidoderm) 5 %, Place 1 patch on the skin as directed by provider Daily. Remove & Discard patch within 12 hours or as directed by MD, Disp: 6 each, Rfl: 0    metFORMIN (GLUCOPHAGE) 500 MG tablet, TAKE 1 TABLET BY MOUTH TWICE  DAILY WITH A MEAL, Disp: 180 tablet, Rfl: 3    Multiple Vitamins-Minerals (MULTIVITAMIN WITH MINERALS) tablet tablet, Take 1 tablet by mouth Daily., Disp: , Rfl:     naproxen (NAPROSYN) 500 MG tablet, Take 1 tablet by mouth 2 (Two) Times a Day As Needed for Moderate Pain., Disp: 180 tablet, Rfl: 3    nitroglycerin (NITROSTAT) 0.4 MG SL tablet, Place 1 tablet under the tongue Every 5 (Five) Minutes As Needed for Chest Pain., Disp: 30 tablet, Rfl: 2    OneTouch Ultra Test test strip, 1 each by Other route Daily.  E11.9, Disp: 50 each, Rfl: 0    pantoprazole (PROTONIX) 40 MG EC tablet, TAKE 1 TABLET BY MOUTH DAILY, Disp: 90 tablet, Rfl: 3    pregabalin (Lyrica) 50 MG capsule, Take 1 capsule by mouth 3 (Three) Times a Day for 180 days., Disp: 90 capsule, Rfl: 5    primidone (MYSOLINE) 50 MG tablet, Take 0.5 tablets by mouth 3 (Three) Times a Day., Disp: 135 tablet, Rfl: 3    Review of Systems   HENT:  Positive for hearing loss and postnasal drip.    Musculoskeletal:  Positive for back pain and joint swelling.   Neurological:  Positive for tremors, light-headedness and numbness.   Hematological:  Bruises/bleeds easily.   All other systems reviewed and are negative.       I have reviewed ROS completed by medical assistant.     Objective:    Neurological Exam  Mental Status   Oriented to person, place, time and situation. Memory: Had to recount information to the patient several times.  He is on donepezil.. Speech is normal. Language is fluent with no aphasia. Attention and concentration are normal. Fund of knowledge is appropriate for level of education.    Cranial Nerves  CN II: Visual acuity is normal. Visual fields full to confrontation.  CN III, IV, VI: Extraocular movements intact bilaterally. Normal lids and orbits bilaterally. Pupils equal round and reactive to light bilaterally.  CN V:  Right: Facial sensation is normal. Jaw strength is normal.  Left: Facial sensation is normal on the left. Jaw strength is normal.  CN VII:  Right: There is no facial weakness.  Left: There is no facial weakness.  CN VIII: Very hard of hearing bilateral.  Intact to finger snapping..  CN IX, X:  Right: Palate is normal.  Left: Palate is normal.  CN XI:  Right: Sternocleidomastoid strength is normal. Trapezius strength is normal.  Left: Sternocleidomastoid strength is normal. Trapezius strength is normal.  CN XII: Tongue midline without atrophy or fasciculations.    Motor  Normal muscle bulk throughout. No fasciculations present. Normal  muscle tone.                                               Right                     Left  Deltoid                                   5                          5   Biceps                                   5                          5  Brachioradialis                      5                          5   Triceps                                  5                          5   Pronator                                5                          5   Supinator                              5                           5   Quadriceps                           5                          5   Hamstring                             5                          5   Gastrocnemius                     5                           5   Anterior tibialis                      5                          5   Posterior tibialis                    5                          5   Peroneal                               5                          5  Ankle dorsiflexor                   5                          5  Ankle plantar flexor              5                           5    Sensory  Light touch is normal in upper and lower extremities. Pinprick abnormality: Normal bilateral hands dorsum and plantar surfaces and arms, normal bilateral legs and dorsum of both feet.  Decreased right medial plantar area.. Vibration abnormality: Abnormal bilateral ankles intact in bilateral knees.     Reflexes                                            Right                      Left  Brachioradialis                    2+                         2+  Biceps                                 2+                         2+  Triceps                                2+                         2+  Finger flex                           2+                         2+  Hamstring                            2+                         2+  Patellar                                2+                         2+  Achilles                                2+                         2+  Right  Plantar: equivocal  Left Plantar: equivocal    Coordination  Right: Finger-to-nose normal. Rapid alternating movement abnormality: The patient could not complete rapid alternating movements on hands. . Heel-to-shin abnormality: The patient could not complete heel-to-shin secondary to body habitus..    Gait  Casual gait: Wide stance. Normal stride length. Antalgic gait.Normal toe walking. Normal heel walking. Tandem gait abnormality: Could walk 1 or 2 steps and had to stop. Romberg is absent. Able to rise from chair without using arms.     Assessment/Plan:  I spent 20 minutes reiterating the difference between a local nerve issue and systemic neuropathy both with the patient and his wife.  He was changed from gabapentin to low-dose Lyrica and is to let me know if this is beneficial.  For tremor the patient was changed from lisinopril to primidone 25 mg 3 times daily initially.  He was notified that both medications could be increased but that I wanted to be cautious and start him low-dose initially.  The patient states that he does use MyChart and was notified that is the best way to get a direct message to me that we could discuss changes in medication.  I did discuss with both the patient and his wife that these medications can cause drowsiness and if this is an issue to notify the clinic and we could go even slower on the medication titration of either drug.    Diagnoses and all orders for this visit:    1. Neuropathy (Primary)  -     pregabalin (Lyrica) 50 MG capsule; Take 1 capsule by mouth 3 (Three) Times a Day for 180 days.  Dispense: 90 capsule; Refill: 5    2. Tremor  -     primidone (MYSOLINE) 50 MG tablet; Take 0.5 tablets by mouth 3 (Three) Times a Day.  Dispense: 135 tablet; Refill: 3      I did check Inspect report for [place] in [place] on this patient.    Return for Next scheduled follow up   Marcia Pantoja .    I spent 50 minutes caring for Heri on this date of service. This time includes  time spent by me in the following activities: preparing for the visit, reviewing tests, obtaining and/or reviewing a separately obtained history, performing a medically appropriate examination and/or evaluation, counseling and educating the patient/family/caregiver, ordering medications, tests, or procedures, documenting information in the medical record, and independently interpreting results and communicating that information with the patient/family/caregiver.      This document has been electronically signed by Marcia ECHEVARRIA on September 10, 2024 10:33 EDT

## 2024-09-10 ENCOUNTER — OFFICE VISIT (OUTPATIENT)
Dept: NEUROLOGY | Facility: CLINIC | Age: 79
End: 2024-09-10
Payer: MEDICARE

## 2024-09-10 VITALS
BODY MASS INDEX: 34.55 KG/M2 | DIASTOLIC BLOOD PRESSURE: 66 MMHG | HEIGHT: 66 IN | SYSTOLIC BLOOD PRESSURE: 153 MMHG | WEIGHT: 215 LBS | HEART RATE: 71 BPM

## 2024-09-10 DIAGNOSIS — R25.1 TREMOR: ICD-10-CM

## 2024-09-10 DIAGNOSIS — G62.9 NEUROPATHY: Primary | ICD-10-CM

## 2024-09-10 RX ORDER — PREGABALIN 50 MG/1
50 CAPSULE ORAL 3 TIMES DAILY
Qty: 90 CAPSULE | Refills: 5 | Status: SHIPPED | OUTPATIENT
Start: 2024-09-10 | End: 2025-03-09

## 2024-09-10 RX ORDER — PRIMIDONE 50 MG/1
25 TABLET ORAL 3 TIMES DAILY
Qty: 135 TABLET | Refills: 3 | Status: SHIPPED | OUTPATIENT
Start: 2024-09-10 | End: 2025-09-10

## 2024-09-11 ENCOUNTER — PATIENT ROUNDING (BHMG ONLY) (OUTPATIENT)
Dept: NEUROLOGY | Facility: CLINIC | Age: 79
End: 2024-09-11
Payer: MEDICARE

## 2024-10-03 DIAGNOSIS — E11.8 TYPE 2 DIABETES MELLITUS WITH UNSPECIFIED COMPLICATIONS: ICD-10-CM

## 2024-10-04 RX ORDER — BLOOD SUGAR DIAGNOSTIC
STRIP MISCELLANEOUS
Qty: 100 EACH | Refills: 3 | Status: SHIPPED | OUTPATIENT
Start: 2024-10-04

## 2024-10-04 RX ORDER — LANCETS 33 GAUGE
1 EACH MISCELLANEOUS DAILY
Qty: 100 EACH | Refills: 3 | Status: SHIPPED | OUTPATIENT
Start: 2024-10-04

## 2024-10-07 ENCOUNTER — APPOINTMENT (OUTPATIENT)
Dept: CT IMAGING | Facility: HOSPITAL | Age: 79
End: 2024-10-07
Payer: MEDICARE

## 2024-10-07 ENCOUNTER — HOSPITAL ENCOUNTER (EMERGENCY)
Facility: HOSPITAL | Age: 79
Discharge: HOME | End: 2024-10-07
Attending: EMERGENCY MEDICINE | Admitting: EMERGENCY MEDICINE
Payer: MEDICARE

## 2024-10-07 VITALS
BODY MASS INDEX: 35.02 KG/M2 | HEIGHT: 66 IN | OXYGEN SATURATION: 99 % | TEMPERATURE: 97.8 F | RESPIRATION RATE: 16 BRPM | SYSTOLIC BLOOD PRESSURE: 146 MMHG | DIASTOLIC BLOOD PRESSURE: 68 MMHG | WEIGHT: 217.9 LBS | HEART RATE: 78 BPM

## 2024-10-07 DIAGNOSIS — R10.9 LEFT FLANK PAIN: Primary | ICD-10-CM

## 2024-10-07 LAB
ANION GAP SERPL CALCULATED.3IONS-SCNC: 10.2 MMOL/L (ref 5–15)
BASOPHILS # BLD AUTO: 0.11 10*3/MM3 (ref 0–0.2)
BASOPHILS NFR BLD AUTO: 0.9 % (ref 0–1.5)
BILIRUB UR QL STRIP: NEGATIVE
BUN SERPL-MCNC: 18 MG/DL (ref 8–23)
BUN/CREAT SERPL: 17.1 (ref 7–25)
CALCIUM SPEC-SCNC: 9.1 MG/DL (ref 8.6–10.5)
CHLORIDE SERPL-SCNC: 103 MMOL/L (ref 98–107)
CLARITY UR: CLEAR
CO2 SERPL-SCNC: 25.8 MMOL/L (ref 22–29)
COLOR UR: YELLOW
CREAT SERPL-MCNC: 1.05 MG/DL (ref 0.76–1.27)
DEPRECATED RDW RBC AUTO: 47.6 FL (ref 37–54)
EGFRCR SERPLBLD CKD-EPI 2021: 71.3 ML/MIN/1.73
EOSINOPHIL # BLD AUTO: 0.33 10*3/MM3 (ref 0–0.4)
EOSINOPHIL NFR BLD AUTO: 2.7 % (ref 0.3–6.2)
ERYTHROCYTE [DISTWIDTH] IN BLOOD BY AUTOMATED COUNT: 19.4 % (ref 12.3–15.4)
GLUCOSE SERPL-MCNC: 105 MG/DL (ref 65–99)
GLUCOSE UR STRIP-MCNC: NEGATIVE MG/DL
HCT VFR BLD AUTO: 49.4 % (ref 37.5–51)
HGB BLD-MCNC: 14.8 G/DL (ref 13–17.7)
HGB UR QL STRIP.AUTO: NEGATIVE
IMM GRANULOCYTES # BLD AUTO: 0.03 10*3/MM3 (ref 0–0.05)
IMM GRANULOCYTES NFR BLD AUTO: 0.2 % (ref 0–0.5)
KETONES UR QL STRIP: NEGATIVE
LEUKOCYTE ESTERASE UR QL STRIP.AUTO: NEGATIVE
LYMPHOCYTES # BLD AUTO: 1.45 10*3/MM3 (ref 0.7–3.1)
LYMPHOCYTES NFR BLD AUTO: 11.8 % (ref 19.6–45.3)
MCH RBC QN AUTO: 22.3 PG (ref 26.6–33)
MCHC RBC AUTO-ENTMCNC: 30 G/DL (ref 31.5–35.7)
MCV RBC AUTO: 74.4 FL (ref 79–97)
MONOCYTES # BLD AUTO: 0.77 10*3/MM3 (ref 0.1–0.9)
MONOCYTES NFR BLD AUTO: 6.3 % (ref 5–12)
NEUTROPHILS NFR BLD AUTO: 78.1 % (ref 42.7–76)
NEUTROPHILS NFR BLD AUTO: 9.58 10*3/MM3 (ref 1.7–7)
NITRITE UR QL STRIP: NEGATIVE
PH UR STRIP.AUTO: 6 [PH] (ref 5–8)
PLATELET # BLD AUTO: 379 10*3/MM3 (ref 140–450)
PMV BLD AUTO: 9.9 FL (ref 6–12)
POTASSIUM SERPL-SCNC: 4.1 MMOL/L (ref 3.5–5.2)
PROT UR QL STRIP: NEGATIVE
RBC # BLD AUTO: 6.64 10*6/MM3 (ref 4.14–5.8)
SODIUM SERPL-SCNC: 139 MMOL/L (ref 136–145)
SP GR UR STRIP: 1.01 (ref 1–1.03)
UROBILINOGEN UR QL STRIP: NORMAL
WBC NRBC COR # BLD AUTO: 12.27 10*3/MM3 (ref 3.4–10.8)

## 2024-10-07 PROCEDURE — 74176 CT ABD & PELVIS W/O CONTRAST: CPT

## 2024-10-07 PROCEDURE — 96374 THER/PROPH/DIAG INJ IV PUSH: CPT

## 2024-10-07 PROCEDURE — 99284 EMERGENCY DEPT VISIT MOD MDM: CPT

## 2024-10-07 PROCEDURE — 85025 COMPLETE CBC W/AUTO DIFF WBC: CPT | Performed by: EMERGENCY MEDICINE

## 2024-10-07 PROCEDURE — 25010000002 KETOROLAC TROMETHAMINE PER 15 MG: Performed by: EMERGENCY MEDICINE

## 2024-10-07 PROCEDURE — 81003 URINALYSIS AUTO W/O SCOPE: CPT | Performed by: EMERGENCY MEDICINE

## 2024-10-07 PROCEDURE — 80048 BASIC METABOLIC PNL TOTAL CA: CPT | Performed by: EMERGENCY MEDICINE

## 2024-10-07 RX ORDER — KETOROLAC TROMETHAMINE 30 MG/ML
15 INJECTION, SOLUTION INTRAMUSCULAR; INTRAVENOUS ONCE
Status: COMPLETED | OUTPATIENT
Start: 2024-10-07 | End: 2024-10-07

## 2024-10-07 RX ADMIN — KETOROLAC TROMETHAMINE 15 MG: 30 INJECTION, SOLUTION INTRAMUSCULAR at 16:50

## 2024-10-07 NOTE — DISCHARGE INSTRUCTIONS
It is not at all clear what is causing your symptoms.  It may be simply radiating pain from the musculature of your back.  It is also possible that you may have a slightly herniated disc up a little higher in your back than typically injured.  However, there is no sign of infection, kidney stone, diverticulitis, or other obvious cause of your discomfort.  Please follow-up with your doctor in a week if you are not feeling much better.  Follow-up sooner for fevers, severe pain in the abdomen, persistent vomiting, or any other emergency.

## 2024-10-07 NOTE — FSED PROVIDER NOTE
"Subjective   History of Present Illness  Patient is an 79-year-old male presents to the emergency department for left low back pain at times radiating around towards his abdomen starting over a week ago.  Patient states that he had climbed on a ladder to clean out his down spouse at home.  He states \"my back always hurts after I am up on the ladder\".  He states that what is unusual is the fact that the pain seems to be wrapping around.  He he has a history of kidney stones in the past but he remembers them being much more intense.  He has no dysuria or hematuria.  No fevers, chills, vomiting, or diarrhea.  No focal abdominal pain.  No history of diverticulitis.    History provided by:  Patient      Review of Systems    Past Medical History:   Diagnosis Date    Arthritis     Dr Mosqueda    Coronary heart disease 01/2016    single vessel disease, nl stress test (copied from [facility])    Coronary heart disease 10/31/2017    cath 10/31/17 - Low % Blockages- N o Intervention   (copied from [facility])    Diverticulosis     Fatty liver     GERD (gastroesophageal reflux disease)     Hearing loss     Hyperlipemia     Hyperlipidemia     Hypertension     Low back pain     Neuropathy in diabetes     SIDDHARTHA treated with BiPAP     Pain management     injections Lumbar spine X2 with Muprhys Pain management @ [facility] (copied from [facility])    Physical therapy evaluation, initial     @ [facility] in [place] 6 weeks x3 per week, Kalen leal (copied from [facility])    Rectal bleed 08/2012    hemorrhoids    Retinal hemorrhage of right eye 05/2014    Sleep apnea     bipap    Tinea pedis of right foot 07/02/2023    Type 2 diabetes mellitus     Uses brace        No Known Allergies    Past Surgical History:   Procedure Laterality Date    BELPHAROPTOSIS REPAIR  12/11/2017     Dr. grimes (copied from [facility])    BROW LIFT  12/11/2017    Dr. Grimes at [facility]    CARDIAC CATHETERIZATION  03/2012    at [facility]- single vessel disease. (copied from " [facility])    CARDIAC CATHETERIZATION  10/13/2017    no intervention - Low % Blockages.    CARDIAC CATHETERIZATION Left 2021    Procedure: LEFT HEART CATH with possible PCI;  Surgeon: Leslie Clark MD;  Location: [facility] CATH INVASIVE LOCATION;  Service: Cardiovascular;  Laterality: Left;  Local and IV sedation    CATARACT EXTRACTION  2017    brow lift and eye lid repair    COLON SURGERY  2014    colonscopy    COLONOSCOPY N/A 2019    Procedure: COLONOSCOPY with polypectomy x1;  Surgeon: Graham Byrd MD;  Location: [facility] ENDOSCOPY;  Service: Gastroenterology    CUBITAL TUNNEL RELEASE Right     HERNIA REPAIR  2008    umbilical hernia repair    JOINT REPLACEMENT      KNEE ARTHROSCOPY Right 2014    Dr. Panda pollack and Cataract Extraction, sep 11 and right 2014- [facility]. (copied from [facility])    TOTAL KNEE ARTHROPLASTY Left 2009    Dr. Mosqueda (copied from [facility])    TOTAL SHOULDER ARTHROPLASTY W/ DISTAL CLAVICLE EXCISION Left 2022    Procedure: TOTAL SHOULDER REVERSE ARTHROPLASTY;  Surgeon: Dany Knapp MD;  Location: [facility] MAIN OR;  Service: Orthopedics;  Laterality: Left;       Family History   Problem Relation Age of Onset    Heart disease Mother         CHF    Hypertension Mother     Heart failure Mother     Diabetes Brother     Arthritis Maternal Grandmother     Stroke Maternal Grandfather     Diabetes Other     Diabetes Other     Stroke Other     Sleep apnea Neg Hx        Social History     Socioeconomic History    Marital status:      Spouse name: Shani   Tobacco Use    Smoking status: Former     Current packs/day: 0.00     Average packs/day: 1.5 packs/day for 30.0 years (45.0 ttl pk-yrs)     Types: Cigarettes     Start date: 1970     Quit date: 2000     Years since quittin.7    Smokeless tobacco: Former   Vaping Use    Vaping status: Never Used   Substance and Sexual Activity    Alcohol use: No     Drug use: No    Sexual activity: Not Currently     Partners: Female           Objective   Physical Exam  Vitals and nursing note reviewed.   Constitutional:       General: He is not in acute distress.     Appearance: Normal appearance.   HENT:      Head: Normocephalic and atraumatic.      Mouth/Throat:      Mouth: Mucous membranes are moist.   Cardiovascular:      Rate and Rhythm: Normal rate and regular rhythm.      Pulses: Normal pulses.      Heart sounds: Normal heart sounds.   Pulmonary:      Effort: Pulmonary effort is normal.      Breath sounds: Normal breath sounds.   Abdominal:      General: Abdomen is flat.      Palpations: Abdomen is soft.      Tenderness: There is no abdominal tenderness.   Musculoskeletal:         General: Normal range of motion.   Skin:     General: Skin is warm and dry.   Neurological:      General: No focal deficit present.      Mental Status: He is alert.   Psychiatric:         Mood and Affect: Mood normal.         Procedures           ED Course  ED Course as of 10/07/24 1833   Mon Oct 07, 2024   1703 Urinalysis With Culture If Indicated - Urine, Clean Catch  No infection [SS]   1713 Basic Metabolic Panel(!)  Normal renal function [SS]   1832 Remarkable examination in the emergency department.  Other than a mild leukocytosis, the patient's laboratory studies are entirely unremarkable.  His urine is clean.  CT scan was performed to differentiate the differential diagnosis of ureterolithiasis, musculoskeletal back pain, pathologic fracture, or diverticulitis.  Thankfully, there were no acute findings.  Difficult to say what is exactly causing his radiating pain.  Cannot rule out disc bulge at a higher level, but favor musculoskeletal pain at this time.  Reassured and discharged home to follow-up if not improving. [SS]      ED Course User Index  [SS] Sample, Jeremy BORGES MD                                           Medical Decision Making  Problems Addressed:  Left flank pain: complicated  acute illness or injury    Amount and/or Complexity of Data Reviewed  Labs: ordered. Decision-making details documented in ED Course.  Radiology: ordered.    Risk  Prescription drug management.        Final diagnoses:   Left flank pain       ED Disposition  ED Disposition       ED Disposition   Discharge    Condition   Stable    Comment   --               Olesya Cueva MD  [facility] [address] [place] [facility]  [phone]    In 1 week           Medication List      No changes were made to your prescriptions during this visit.

## 2024-10-09 ENCOUNTER — PATIENT OUTREACH (OUTPATIENT)
Dept: CASE MANAGEMENT | Facility: OTHER | Age: 79
End: 2024-10-09
Payer: MEDICARE

## 2024-10-09 NOTE — OUTREACH NOTE
AMBULATORY CASE MANAGEMENT NOTE    Names and Relationships of Patient/Support Persons: Contact: Heri Vasquez VIVIEN; Relationship: Self -     Patient Outreach    Pt discharged from [facility] ED on 10/7/24, seen for left flank pain. RN-ACM outreach call made to pt. Explained role of RN-ACM and reason for call. Pt reports mild improvement in symptoms. Reviewed ED AVS with pt. Education provided. Pt reports he plans to follow up with his PCP. Reviewed SDOH. No needs or questions per pt, advised him to call with any. Follow up outreach prn.     Send Education  Questions/Answers      Flowsheet Row Most Recent Value   Annual Wellness Visit:  Patient Has Completed   Other Patient Education/Resources  24/7 [facility] Nurse Call Line   24/7 Nurse Call Line Education Method Verbal   Advanced Directives: --  [resources provided]          SDOH updated and reviewed with the patient during this program:  Financial Resource Strain: Low Risk  (10/9/2024)    Overall Financial Resource Strain (CARDIA)     Difficulty of Paying Living Expenses: Not very hard      --     Food Insecurity: No Food Insecurity (10/9/2024)    Hunger Vital Sign     Worried About Running Out of Food in the Last Year: Never true     Ran Out of Food in the Last Year: Never true      --     Transportation Needs: No Transportation Needs (10/9/2024)    PRAPARE - Transportation     Lack of Transportation (Medical): No     Lack of Transportation (Non-Medical): No         Lauri ROMERO  Ambulatory Case Management    10/9/2024, 15:40 EDT

## 2024-10-10 ENCOUNTER — TELEPHONE (OUTPATIENT)
Dept: FAMILY MEDICINE CLINIC | Facility: CLINIC | Age: 79
End: 2024-10-10

## 2024-10-10 NOTE — TELEPHONE ENCOUNTER
Caller: Heri Vasquez    Relationship to patient: Self    Best call back number: [phone]    New or established patient?  [] New  [x] Established    Date of discharge: 10.07.24    Facility discharged from:  [facility]    Diagnosis/Symptoms: BACK PAIN    Length of stay (If applicable): N/A    Specialty Only: Did you see a [facility] provider?    [] Yes  [x] No  If so, who?     PATIENT NEEDS TO BE SCHEDULE WITHIN 7 DAYS OF DISCHARGE DATE.    PLEASE CALL TO SCHEDULE.

## 2024-10-14 RX ORDER — ATORVASTATIN CALCIUM 20 MG/1
20 TABLET, FILM COATED ORAL DAILY
Qty: 90 TABLET | Refills: 3 | Status: SHIPPED | OUTPATIENT
Start: 2024-10-14

## 2024-10-16 ENCOUNTER — OFFICE VISIT (OUTPATIENT)
Dept: FAMILY MEDICINE CLINIC | Facility: CLINIC | Age: 79
End: 2024-10-16
Payer: MEDICARE

## 2024-10-16 ENCOUNTER — LAB (OUTPATIENT)
Dept: FAMILY MEDICINE CLINIC | Facility: CLINIC | Age: 79
End: 2024-10-16
Payer: MEDICARE

## 2024-10-16 VITALS
BODY MASS INDEX: 34.79 KG/M2 | HEART RATE: 70 BPM | DIASTOLIC BLOOD PRESSURE: 73 MMHG | HEIGHT: 66 IN | SYSTOLIC BLOOD PRESSURE: 154 MMHG | TEMPERATURE: 97.7 F | OXYGEN SATURATION: 95 % | WEIGHT: 216.5 LBS

## 2024-10-16 DIAGNOSIS — E11.8 TYPE 2 DIABETES MELLITUS WITH UNSPECIFIED COMPLICATIONS: ICD-10-CM

## 2024-10-16 DIAGNOSIS — K42.9 UMBILICAL HERNIA WITHOUT OBSTRUCTION AND WITHOUT GANGRENE: ICD-10-CM

## 2024-10-16 DIAGNOSIS — M54.42 CHRONIC LEFT-SIDED LOW BACK PAIN WITH LEFT-SIDED SCIATICA: Primary | ICD-10-CM

## 2024-10-16 DIAGNOSIS — G89.29 CHRONIC LEFT-SIDED LOW BACK PAIN WITH LEFT-SIDED SCIATICA: Primary | ICD-10-CM

## 2024-10-16 LAB
ALBUMIN SERPL-MCNC: 4.1 G/DL (ref 3.5–5.2)
ALBUMIN/GLOB SERPL: 1.4 G/DL
ALP SERPL-CCNC: 115 U/L (ref 39–117)
ALT SERPL W P-5'-P-CCNC: 24 U/L (ref 1–41)
ANION GAP SERPL CALCULATED.3IONS-SCNC: 13.1 MMOL/L (ref 5–15)
AST SERPL-CCNC: 33 U/L (ref 1–40)
BILIRUB SERPL-MCNC: 0.6 MG/DL (ref 0–1.2)
BUN SERPL-MCNC: 11 MG/DL (ref 8–23)
BUN/CREAT SERPL: 10.8 (ref 7–25)
CALCIUM SPEC-SCNC: 9.5 MG/DL (ref 8.6–10.5)
CHLORIDE SERPL-SCNC: 103 MMOL/L (ref 98–107)
CO2 SERPL-SCNC: 22.9 MMOL/L (ref 22–29)
CREAT SERPL-MCNC: 1.02 MG/DL (ref 0.76–1.27)
EGFRCR SERPLBLD CKD-EPI 2021: 73.8 ML/MIN/1.73
GLOBULIN UR ELPH-MCNC: 2.9 GM/DL
GLUCOSE SERPL-MCNC: 86 MG/DL (ref 65–99)
HBA1C MFR BLD: 5.7 % (ref 4.8–5.6)
POTASSIUM SERPL-SCNC: 4.9 MMOL/L (ref 3.5–5.2)
PROT SERPL-MCNC: 7 G/DL (ref 6–8.5)
SODIUM SERPL-SCNC: 139 MMOL/L (ref 136–145)

## 2024-10-16 PROCEDURE — 83036 HEMOGLOBIN GLYCOSYLATED A1C: CPT | Performed by: FAMILY MEDICINE

## 2024-10-16 PROCEDURE — 3077F SYST BP >= 140 MM HG: CPT | Performed by: FAMILY MEDICINE

## 2024-10-16 PROCEDURE — 99214 OFFICE O/P EST MOD 30 MIN: CPT | Performed by: FAMILY MEDICINE

## 2024-10-16 PROCEDURE — 1159F MED LIST DOCD IN RCRD: CPT | Performed by: FAMILY MEDICINE

## 2024-10-16 PROCEDURE — 1125F AMNT PAIN NOTED PAIN PRSNT: CPT | Performed by: FAMILY MEDICINE

## 2024-10-16 PROCEDURE — 3078F DIAST BP <80 MM HG: CPT | Performed by: FAMILY MEDICINE

## 2024-10-16 PROCEDURE — G2211 COMPLEX E/M VISIT ADD ON: HCPCS | Performed by: FAMILY MEDICINE

## 2024-10-16 PROCEDURE — 80053 COMPREHEN METABOLIC PANEL: CPT | Performed by: FAMILY MEDICINE

## 2024-10-16 PROCEDURE — 36415 COLL VENOUS BLD VENIPUNCTURE: CPT | Performed by: FAMILY MEDICINE

## 2024-10-16 PROCEDURE — 1160F RVW MEDS BY RX/DR IN RCRD: CPT | Performed by: FAMILY MEDICINE

## 2024-10-16 RX ORDER — PREGABALIN 75 MG/1
75 CAPSULE ORAL 3 TIMES DAILY
Qty: 90 CAPSULE | Refills: 1 | Status: SHIPPED | OUTPATIENT
Start: 2024-10-16

## 2024-10-16 NOTE — PROGRESS NOTES
"Chief Complaint  Back Pain (Was seen at the  on 10-07-24- pain when walking )    Subjective        Heri Vasquez presents to [facility]  Back Pain  This is a chronic problem. The current episode started more than 1 month ago. The problem occurs constantly. The problem has been coming and going since onset. The pain is present in the lumbar spine. The quality of the pain is described as aching and stabbing. The pain does not radiate. The pain is at a severity of 9/10. The pain is Worse during the day. The symptoms are aggravated by bending, position and twisting. Stiffness is present All day. Associated symptoms include abdominal pain, bladder incontinence and weakness. Pertinent negatives include no bowel incontinence, chest pain, dysuria, fever, leg pain, numbness, paresthesias, pelvic pain, perianal numbness, tingling or weight loss. Risk factors include obesity and sedentary lifestyle.       Objective   Vital Signs:  /73 (BP Location: Left arm, Patient Position: Sitting, Cuff Size: Large Adult)   Pulse 70   Temp 97.7 °F (36.5 °C) (Infrared)   Ht 167.6 cm (66\")   Wt 98.2 kg (216 lb 8 oz)   SpO2 95%   BMI 34.94 kg/m²   Estimated body mass index is 34.94 kg/m² as calculated from the following:    Height as of this encounter: 167.6 cm (66\").    Weight as of this encounter: 98.2 kg (216 lb 8 oz).            Physical Exam  Vitals and nursing note reviewed.   Constitutional:       General: He is not in acute distress.     Appearance: He is well-developed.   HENT:      Head: Normocephalic.   Eyes:      General: Lids are normal.      Conjunctiva/sclera: Conjunctivae normal.   Neck:      Thyroid: No thyroid mass or thyromegaly.      Trachea: Trachea normal.   Cardiovascular:      Rate and Rhythm: Normal rate and regular rhythm.      Heart sounds: Normal heart sounds.   Pulmonary:      Effort: Pulmonary effort is normal.      Breath sounds: Normal breath sounds.   Abdominal: "      Palpations: Abdomen is soft.      Hernia: A hernia is present.   Musculoskeletal:      Cervical back: Normal range of motion.      Lumbar back: Tenderness present.   Lymphadenopathy:      Cervical: No cervical adenopathy.   Skin:     General: Skin is warm and dry.   Neurological:      Mental Status: He is alert and oriented to person, place, and time. Mental status is at baseline.      Gait: Gait abnormal.   Psychiatric:         Attention and Perception: He is attentive.         Mood and Affect: Mood normal.         Speech: Speech normal.         Behavior: Behavior normal.        Result Review :  The following data was reviewed by: Olesya Cueva MD on 10/16/2024:  Common labs          4/22/2024    09:11 5/23/2024    14:10 10/7/2024    16:45   Common Labs   Glucose 85  109  105    BUN 14  15  18    Creatinine 0.86  0.88  1.05    Sodium 143  140  139    Potassium 4.1  4.3  4.1    Chloride 106  106  103    Calcium 9.4  9.5  9.1    Albumin 3.9  3.9     Total Bilirubin 0.4  0.4     Alkaline Phosphatase 81  86     AST (SGOT) 17  25     ALT (SGPT) 15  20     WBC  12.21  12.27    Hemoglobin  14.8  14.8    Hematocrit  49.7  49.4    Platelets  385  379    Total Cholesterol 100      Triglycerides 88      HDL Cholesterol 31      LDL Cholesterol  51      Hemoglobin A1C 5.70      Microalbumin, Urine <1.2                  Assessment and Plan   Diagnoses and all orders for this visit:    1. Chronic left-sided low back pain with left-sided sciatica (Primary)  Assessment & Plan:  He has contacted a back brace company, FAX order [phone], instead of [employer].     Orders:  -     pregabalin (Lyrica) 75 MG capsule; Take 1 capsule by mouth 3 (Three) Times a Day.  Dispense: 90 capsule; Refill: 1    2. Type 2 diabetes mellitus with unspecified complications  -     Hemoglobin A1c  -     Comprehensive Metabolic Panel    3. Umbilical hernia without obstruction and without gangrene  Assessment & Plan:  He is not interested in seeing  a surgeon at this time.                Follow Up   No follow-ups on file.  Patient was given instructions and counseling regarding his condition or for health maintenance advice. Please see specific information pulled into the AVS if appropriate.             Answers submitted by the patient for this visit:  Primary Reason for Visit (Submitted on 10/14/2024)  What is the primary reason for your visit?: Back Pain

## 2024-11-26 ENCOUNTER — OFFICE VISIT (OUTPATIENT)
Dept: CARDIOLOGY | Facility: CLINIC | Age: 79
End: 2024-11-26
Payer: MEDICARE

## 2024-11-26 VITALS
DIASTOLIC BLOOD PRESSURE: 74 MMHG | WEIGHT: 220.8 LBS | HEART RATE: 68 BPM | OXYGEN SATURATION: 95 % | HEIGHT: 66 IN | BODY MASS INDEX: 35.48 KG/M2 | SYSTOLIC BLOOD PRESSURE: 154 MMHG

## 2024-11-26 DIAGNOSIS — I10 ESSENTIAL HYPERTENSION: Primary | ICD-10-CM

## 2024-11-26 DIAGNOSIS — I25.10 CHRONIC CORONARY ARTERY DISEASE: ICD-10-CM

## 2024-11-26 DIAGNOSIS — E78.2 MIXED HYPERLIPIDEMIA: ICD-10-CM

## 2024-11-26 PROCEDURE — 1159F MED LIST DOCD IN RCRD: CPT | Performed by: INTERNAL MEDICINE

## 2024-11-26 PROCEDURE — 1160F RVW MEDS BY RX/DR IN RCRD: CPT | Performed by: INTERNAL MEDICINE

## 2024-11-26 PROCEDURE — 3077F SYST BP >= 140 MM HG: CPT | Performed by: INTERNAL MEDICINE

## 2024-11-26 PROCEDURE — 3078F DIAST BP <80 MM HG: CPT | Performed by: INTERNAL MEDICINE

## 2024-11-26 PROCEDURE — 93000 ELECTROCARDIOGRAM COMPLETE: CPT | Performed by: INTERNAL MEDICINE

## 2024-11-26 PROCEDURE — 99214 OFFICE O/P EST MOD 30 MIN: CPT | Performed by: INTERNAL MEDICINE

## 2024-11-26 NOTE — PROGRESS NOTES
Cardiology Office Visit      Encounter Date:  11/26/2024    Patient ID:   Heri Vasquez is a 79 y.o. male.    Reason For Followup:  coronary artery disease, dyslipidemia,     Brief Clinical History:  Dear Dr. Cueva, Olesya Bay MD    I had the pleasure of seeing Heri Vasquez today. As you are well aware, this is a 79 y.o. male  with history of mild coronary artery disease, ,  dyslipidemia, and gastroesophageal reflux disease.  He uses BiPAP machine for obstructive sleep apnea.  Here for follow-up    Impressions:/2021  Mild to moderate obstructive coronary disease involving the LAD and right coronary artery  Distal LAD with segment of intramyocardial bridging  Normal LV systolic function  Normal wall motion  Estimated LV ejection fraction of 60%        Interval History:  Denies any new cardiac symptoms  Denies any other new cardiac symptoms  Denies any symptoms of chest pain shortness of breath dizziness or syncope  Compliant with medical therapy      Assessment & Plan    Coronary artery disease stable with no anginal chest discomfort  Dyslipidemia.  Lipids are being checked periodically.  Hypertension under fairly good control.  Fatty liver  Obesity  Cardiac catheterization in May 2021 with a mild obstructive coronary artery disease  Hospitalization in September 2022 with extensive workup including a normal echocardiogram normal stress test normal CT chest    Recommendations:  Continue aspirin 81 mg p.o. once a day  Lipitor 20 mg p.o. once a day lisinopril 10 mg p.o. once a day isosorbide 90 mg p.o. once a day  Continue current medical therapy  Stable from cardiac standpoint  Home blood pressure monitoring  Continue current medical therapy continued aggressive risk factor modification  Cardiac work-up reviewed and discussed with the patient  Medical management for obstructive coronary artery disease  Recent workup and hospitalization medical records reviewed and discussed with patient and family  Continued  "aggressive risk factor modification  We will see patient in the office in 6 months  Thank you very much for allowing us to participate in the care of your patients        Vitals:  Vitals:    11/26/24 0932   BP: 154/74   Pulse: 68   SpO2: 95%   Weight: 100 kg (220 lb 12.8 oz)   Height: 167.6 cm (66\")       Physical Exam:    General: Alert, cooperative, no distress, appears stated age  Head:  Normocephalic, atraumatic, mucous membranes moist  Eyes:  Conjunctiva/corneas clear, EOM's intact     Neck:  Supple,  no adenopathy;      Lungs: Clear to auscultation bilaterally, no wheezes rhonchi rales are noted  Chest wall: No tenderness  Heart::  Regular rate and rhythm, S1 and S2 normal, no murmur, rub or gallop  Abdomen: Soft, non-tender, nondistended bowel sounds active  Extremities: No cyanosis, clubbing, or edema  Pulses: 2+ and symmetric all extremities  Skin:  No rashes or lesions  Neuro/psych: A&O x3. CN II through XII are grossly intact with appropriate affect              Lab Results   Component Value Date    GLUCOSE 86 10/16/2024    BUN 11 10/16/2024    CREATININE 1.02 10/16/2024    EGFR 73.8 10/16/2024    BCR 10.8 10/16/2024    K 4.9 10/16/2024    CO2 22.9 10/16/2024    CALCIUM 9.5 10/16/2024    ALBUMIN 4.1 10/16/2024    BILITOT 0.6 10/16/2024    AST 33 10/16/2024    ALT 24 10/16/2024     Results for orders placed during the hospital encounter of 11/13/23    Adult Transthoracic Echo Complete W/ Cont if Necessary Per Protocol    Interpretation Summary    Left ventricular ejection fraction appears to be 51 - 55%.    The right ventricular cavity is mildly dilated.    There is calcification of the aortic valve.     Results for orders placed during the hospital encounter of 05/26/21    Cardiac Catheterization/Vascular Study    Narrative  Table formatting from the original result was not included.  Cardiac Catheterization Operative Report    Heri Vasquez  [id number]  5/28/2021  @PCP@    He underwent cardiac " catheterization.    Indications for the procedure include: abnormal stress test.    Procedure Details:  The risks, benefits, complications, treatment options, and expected outcomes were discussed with the patient. The patient and/or family concurred with the proposed plan, giving informed consent.    After informed consent the patient was brought to the cath lab after appropriate IV hydration was begun and oral premedication was given. He was further sedated with fentanyl. He was prepped and draped in the usual manner. Using the modified Seldinger access technique, a 6 Kinyarwanda sheath was placed in the femoral artery. A left heart catheterization with coronary arteriography was performed. Findings are discussed below.    After the procedure was completed, sedation was stopped and the sheaths and catheters were all removed. Hemostasis was achieved per established hospital protocols.    Conscious sedation:  Conscious sedation was performed according to protocol.  I supervised and directed an independent trained observer with the assistance of monitoring the patient's level of consciousness and physiologic status throughout the procedure.  Intraoperative service time was 30 minutes.    Findings:    Hemodynamics  Central artery pressure systolic 130 and diastolic 60 with a mean pressure of 88 mmHg  LV end-diastolic pressure was 10 mmHg  No gradient across aortic valve on the pullback of the pigtail catheter    Left Main  left main coronary arteries angiographically normal bifurcates into left anterior descending and left circumflex arteries.  With the initial injection of the coronary arteries there was a diffuse coronary artery spasm which got improved with intracoronary nitroglycerin  RCA  dominant vessel large-caliber vessel.  Right coronary artery has a focal area of angiographic 40% stenosis.  Right coronary artery distally provides PDA and PLB branches both of them are angiographically normal.  LAD  large-caliber  vessel angiographically normal in the proximal portion distal right coronary artery after the first diagonal branch tapers down quickly with segment of intramyocardial bridging involving the mid to distal stent segment otherwise no focal angiographic obstructive coronary artery disease involving the LAD.  Still LAD has long segment portion of intramyocardial bridging.  Circ  moderate to large caliber vessel provides 2 marginal branches that are moderate size angiographically normal without any significant obstructive coronary artery disease.  Moderate size ramus intermediate branch that is angiographically normal  LV  normal LV systolic function normal wall motion estimated LV ejection fraction of 55 to 60%  Coronary Dominance  right coronary artery    Estimated Blood Loss:  Minimal    Specimens: None    Complications:  None; patient tolerated the procedure well.    Disposition: PACU - hemodynamically stable.    Condition: stable    Impressions:  Mild to moderate obstructive coronary disease involving the LAD and right coronary artery  Distal LAD with segment of intramyocardial bridging  Normal LV systolic function  Normal wall motion  Estimated LV ejection fraction of 60%    Recommendations:  Medical management for obstructive coronary artery disease  Continued aggressive risk factor modification     Lab Results   Component Value Date    CHOL 100 04/22/2024    TRIG 88 04/22/2024    HDL 31 (L) 04/22/2024    LDL 51 04/22/2024      Results for orders placed during the hospital encounter of 09/21/23    Stress Test With Myocardial Perfusion One Day    Interpretation Summary    Findings consistent with a normal ECG stress test.    Left ventricular ejection fraction is normal (Calculated EF = 67%).    Basal inferolateral artifact noted.    Myocardial perfusion imaging indicates a normal myocardial perfusion study with no evidence of ischemia.    Impressions are consistent with a low risk study.   Results for orders placed  in visit on 10/31/17    CARDIOVASCULAR STUDIES - CONVERTED    Narrative  HEART CATH PROCEDURE      Imported By: Olesya Williamson 11/17/2017 8:58:46 PM    _____________________________________________________________________    External Attachment:    Type: Image  Comment:  External Document      Signed before import by SAILAJA Jang MD  Filed automatically on 11/17/2017 at 8:59 PM  ________________________________________________________________________      Disclaimer: Converted Note message may not contain all data elements that existed in the [employer] source system. Please see [employer] for the original note details.           Objective:          Allergies:  No Known Allergies    Medication Review:     Current Outpatient Medications:     acetaminophen (TYLENOL) 650 MG 8 hr tablet, Take 1 tablet by mouth Every 8 (Eight) Hours As Needed for Mild Pain., Disp: , Rfl:     aspirin 81 MG EC tablet, Take 1 tablet by mouth Daily. LD 4/13, Disp: , Rfl:     atorvastatin (LIPITOR) 20 MG tablet, TAKE 1 TABLET BY MOUTH ONCE  DAILY, Disp: 90 tablet, Rfl: 3    betamethasone dipropionate (DIPROLENE) 0.05 % lotion, , Disp: , Rfl:     betamethasone valerate (VALISONE) 0.1 % ointment, Apply 1 Application topically to the appropriate area as directed 2 (Two) Times a Day., Disp: , Rfl:     calcium citrate-vitamin d (CALCITRATE) 315-250 MG-UNIT tablet tablet, Take  by mouth Daily., Disp: , Rfl:     donepezil (ARICEPT) 5 MG tablet, TAKE 1 TABLET BY MOUTH AT NIGHT, Disp: 90 tablet, Rfl: 3    hydrocortisone 2.5 % cream, Apply 1 Application topically to the appropriate area as directed 2 (Two) Times a Day., Disp: , Rfl:     isosorbide mononitrate (IMDUR) 60 MG 24 hr tablet, TAKE 1 AND 1/2 TABLETS BY MOUTH  DAILY, Disp: 135 tablet, Rfl: 3    Lancets (OneTouch Delica Plus [id number]) misc, Inject 1 Device under the skin into the appropriate area as directed Daily. Use to check blood sugar once daily.  E11.9, Disp: 100 each,  Rfl: 3    lidocaine (Lidoderm) 5 %, Place 1 patch on the skin as directed by provider Daily. Remove & Discard patch within 12 hours or as directed by MD, Disp: 6 each, Rfl: 0    metFORMIN (GLUCOPHAGE) 500 MG tablet, TAKE 1 TABLET BY MOUTH TWICE  DAILY WITH A MEAL, Disp: 180 tablet, Rfl: 3    Multiple Vitamins-Minerals (MULTIVITAMIN WITH MINERALS) tablet tablet, Take 1 tablet by mouth Daily., Disp: , Rfl:     naproxen (NAPROSYN) 500 MG tablet, Take 1 tablet by mouth 2 (Two) Times a Day As Needed for Moderate Pain., Disp: 180 tablet, Rfl: 3    nitroglycerin (NITROSTAT) 0.4 MG SL tablet, Place 1 tablet under the tongue Every 5 (Five) Minutes As Needed for Chest Pain., Disp: 30 tablet, Rfl: 2    OneTouch Ultra Test test strip, Check blood sugar once daily.  E11.9, Disp: 100 each, Rfl: 3    pantoprazole (PROTONIX) 40 MG EC tablet, TAKE 1 TABLET BY MOUTH DAILY, Disp: 90 tablet, Rfl: 3    pregabalin (Lyrica) 75 MG capsule, Take 1 capsule by mouth 3 (Three) Times a Day., Disp: 90 capsule, Rfl: 1    primidone (MYSOLINE) 50 MG tablet, Take 0.5 tablets by mouth 3 (Three) Times a Day., Disp: 135 tablet, Rfl: 3    Family History:  Family History   Problem Relation Age of Onset    Heart disease Mother         CHF    Hypertension Mother     Heart failure Mother     Diabetes Brother     Arthritis Maternal Grandmother     Stroke Maternal Grandfather     Diabetes Other     Diabetes Other     Stroke Other     Sleep apnea Neg Hx        Past Medical History:  Past Medical History:   Diagnosis Date    Arthritis     Dr Mosqueda    Coronary heart disease 01/2016    single vessel disease, nl stress test (copied from [facility])    Coronary heart disease 10/31/2017    cath 10/31/17 - Low % Blockages- N o Intervention   (copied from [facility])    Diverticulosis     Fatty liver     GERD (gastroesophageal reflux disease)     Hearing loss     Hyperlipemia     Hyperlipidemia     Hypertension     Low back pain     Neuropathy in diabetes     SIDDHARTHA  treated with BiPAP     Pain management     injections Lumbar spine X2 with [facility] @ [facility] (copied from [facility])    Physical therapy evaluation, initial     @ [facility] in [place] 6 weeks x3 per week, Kalen leal (copied from [facility])    Rectal bleed 08/2012    hemorrhoids    Retinal hemorrhage of right eye 05/2014    Sleep apnea     bipap    Tinea pedis of right foot 07/02/2023    Type 2 diabetes mellitus     Uses brace        Past surgical History:  Past Surgical History:   Procedure Laterality Date    BELPHAROPTOSIS REPAIR  12/11/2017     Dr. grimes (copied from [facility])    BROW LIFT  12/11/2017    Dr. Grimes at [facility]    CARDIAC CATHETERIZATION  03/2012    at [facility]- single vessel disease. (copied from [facility])    CARDIAC CATHETERIZATION  10/13/2017    no intervention - Low % Blockages.    CARDIAC CATHETERIZATION Left 05/28/2021    Procedure: LEFT HEART CATH with possible PCI;  Surgeon: Leslie Clark MD;  Location: [facility] CATH INVASIVE LOCATION;  Service: Cardiovascular;  Laterality: Left;  Local and IV sedation    CATARACT EXTRACTION  12/11/2017    brow lift and eye lid repair    COLON SURGERY  06/2014    colonscopy    COLONOSCOPY N/A 07/12/2019    Procedure: COLONOSCOPY with polypectomy x1;  Surgeon: Graham Byrd MD;  Location: [facility] ENDOSCOPY;  Service: Gastroenterology    CUBITAL TUNNEL RELEASE Right     HERNIA REPAIR  11/2008    umbilical hernia repair    JOINT REPLACEMENT      KNEE ARTHROSCOPY Right 11/2014    Dr. Panda YARBROUGHIK      lasik and Cataract Extraction, sep 11 and right Sept. 25th, 2014- [facility]. [facility]    TOTAL KNEE ARTHROPLASTY Left 11/2009    Dr[facility] Mos[facility]eda [facility]    TOTAL SHOULDER ARTHROPLASTY W/ DISTAL CLAVICLE EXCISION Left 04/20/2022    Procedure: TOTAL SHOULDER REVERSE ARTHROPLASTY;  Surgeon: Dany Knapp MD;  Location: [facility] MAIN OR;  Service: Orthopedics;  Laterality: Left;       Social History:  Social  History     Socioeconomic History    Marital status:      Spouse name: Shani   Tobacco Use    Smoking status: Former     Current packs/day: 0.00     Average packs/day: 1.5 packs/day for 30.0 years (45.0 ttl pk-yrs)     Types: Cigarettes     Start date: 1970     Quit date: 2000     Years since quittin.9    Smokeless tobacco: Former   Vaping Use    Vaping status: Never Used   Substance and Sexual Activity    Alcohol use: No    Drug use: No    Sexual activity: Not Currently     Partners: Female       Review of Systems:  The following systems were reviewed as they relate to the cardiovascular system: Constitutional, Eyes, ENT, Cardiovascular, Respiratory, Gastrointestinal, Integumentary, Neurological, Psychiatric, Hematologic, Endocrine, Musculoskeletal, and Genitourinary. The pertinent cardiovascular findings are reported above with all other cardiovascular points within those systems being negative.    Diagnostic Study Review:     Current Electrocardiogram:    ECG 12 Lead    Date/Time: 2024 10:02 AM  Performed by: Leslie Clark MD    Authorized by: Leslie Clark MD  Comparison: compared with previous ECG   Similar to previous ECG  Rhythm: sinus rhythm  Rate: normal  BPM: 68  Conduction: conduction normal  QRS axis: normal  Other findings: non-specific ST-T wave changes    Clinical impression: abnormal EKG                NOTE: The following portions of the patient's history were reviewed and updated this visit as appropriate: allergies, current medications, past family history, past medical history, past social history, past surgical history and problem list.

## 2024-12-05 ENCOUNTER — HOSPITAL ENCOUNTER (EMERGENCY)
Facility: HOSPITAL | Age: 79
Discharge: HOME | End: 2024-12-05
Attending: EMERGENCY MEDICINE | Admitting: EMERGENCY MEDICINE
Payer: MEDICARE

## 2024-12-05 ENCOUNTER — APPOINTMENT (OUTPATIENT)
Dept: GENERAL RADIOLOGY | Facility: HOSPITAL | Age: 79
End: 2024-12-05
Payer: MEDICARE

## 2024-12-05 ENCOUNTER — APPOINTMENT (OUTPATIENT)
Dept: CT IMAGING | Facility: HOSPITAL | Age: 79
End: 2024-12-05
Payer: MEDICARE

## 2024-12-05 VITALS
HEART RATE: 82 BPM | WEIGHT: 210 LBS | BODY MASS INDEX: 32.96 KG/M2 | SYSTOLIC BLOOD PRESSURE: 177 MMHG | DIASTOLIC BLOOD PRESSURE: 89 MMHG | TEMPERATURE: 98.1 F | OXYGEN SATURATION: 97 % | HEIGHT: 67 IN | RESPIRATION RATE: 20 BRPM

## 2024-12-05 DIAGNOSIS — S40.012A CONTUSION OF LEFT SHOULDER, INITIAL ENCOUNTER: ICD-10-CM

## 2024-12-05 DIAGNOSIS — S09.90XA INJURY OF HEAD, INITIAL ENCOUNTER: Primary | ICD-10-CM

## 2024-12-05 PROCEDURE — 99283 EMERGENCY DEPT VISIT LOW MDM: CPT | Performed by: EMERGENCY MEDICINE

## 2024-12-05 PROCEDURE — 72125 CT NECK SPINE W/O DYE: CPT

## 2024-12-05 PROCEDURE — 99284 EMERGENCY DEPT VISIT MOD MDM: CPT

## 2024-12-05 PROCEDURE — 73030 X-RAY EXAM OF SHOULDER: CPT

## 2024-12-05 PROCEDURE — 70450 CT HEAD/BRAIN W/O DYE: CPT

## 2024-12-05 NOTE — DISCHARGE INSTRUCTIONS
Do not wear the sling continuously.  Remove the sling once or twice a day and gentle range of motion exercises to the left shoulder.  Please follow-up with your doctor or orthopedics.  Apply cool compress to sore areas.  Take Tylenol as needed for discomfort.  Gentle stretching slow movement exercises to reduce spasms.  Follow-up with your provider.  Seek immediate medical attention if having any concerns.

## 2024-12-05 NOTE — FSED PROVIDER NOTE
Subjective   History of Present Illness    Patient 79-year-old man status post mechanical fall earlier today.  He landed on his left shoulder complaining of moderate pain which is worse with movement.  No elbow or wrist pain.  He believes he may have hit his head but is unsure and has amnesia to the event.  He denies any loss of consciousness.  No vomiting.  No numbness or weakness.    Review of Systems    Past Medical History:   Diagnosis Date    Arthritis     Dr Mosqueda    Coronary heart disease 01/2016    single vessel disease, nl stress test (copied from [facility])    Coronary heart disease 10/31/2017    cath 10/31/17 - Low % Blockages- N o Intervention   (copied from [facility])    Diverticulosis     Fatty liver     GERD (gastroesophageal reflux disease)     Hearing loss     Hyperlipemia     Hyperlipidemia     Hypertension     Low back pain     Neuropathy in diabetes     SIDDHARTHA treated with BiPAP     Pain management     injections Lumbar spine X2 with Muprhys Pain management @ [facility] (copied from [facility])    Physical therapy evaluation, initial     @ [facility] in [place] 6 weeks x3 per week, Kalen leal (copied from [facility])    Rectal bleed 08/2012    hemorrhoids    Retinal hemorrhage of right eye 05/2014    Sleep apnea     bipap    Tinea pedis of right foot 07/02/2023    Type 2 diabetes mellitus     Uses brace        No Known Allergies    Past Surgical History:   Procedure Laterality Date    BELPHAROPTOSIS REPAIR  12/11/2017     Dr. grimes (copied from [facility])    BROW LIFT  12/11/2017    Dr. Grimes at [facility]    CARDIAC CATHETERIZATION  03/2012    at [facility]- single vessel disease. (copied from [facility])    CARDIAC CATHETERIZATION  10/13/2017    no intervention - Low % Blockages.    CARDIAC CATHETERIZATION Left 05/28/2021    Procedure: LEFT HEART CATH with possible PCI;  Surgeon: Leslie Clark MD;  Location: [facility] CATH INVASIVE LOCATION;  Service: Cardiovascular;  Laterality: Left;  Local and IV  sedation    CATARACT EXTRACTION  2017    brow lift and eye lid repair    COLON SURGERY  2014    colonscopy    COLONOSCOPY N/A 2019    Procedure: COLONOSCOPY with polypectomy x1;  Surgeon: Graham Byrd MD;  Location: [facility] ENDOSCOPY;  Service: Gastroenterology    CUBITAL TUNNEL RELEASE Right     HERNIA REPAIR  2008    umbilical hernia repair    JOINT REPLACEMENT      KNEE ARTHROSCOPY Right 2014    Dr. Mosqueda    LASIK      lasik and Cataract Extraction, sep 11 and right 2014- [facility]. (copied from [facility])    TOTAL KNEE ARTHROPLASTY Left 2009    Dr. Mosqueda (copied from [facility])    TOTAL SHOULDER ARTHROPLASTY W/ DISTAL CLAVICLE EXCISION Left 2022    Procedure: TOTAL SHOULDER REVERSE ARTHROPLASTY;  Surgeon: Dany Knapp MD;  Location: [facility] MAIN OR;  Service: Orthopedics;  Laterality: Left;       Family History   Problem Relation Age of Onset    Heart disease Mother         CHF    Hypertension Mother     Heart failure Mother     Diabetes Brother     Arthritis Maternal Grandmother     Stroke Maternal Grandfather     Diabetes Other     Diabetes Other     Stroke Other     Sleep apnea Neg Hx        Social History     Socioeconomic History    Marital status:      Spouse name: Shani   Tobacco Use    Smoking status: Former     Current packs/day: 0.00     Average packs/day: 1.5 packs/day for 30.0 years (45.0 ttl pk-yrs)     Types: Cigarettes     Start date: 1970     Quit date: 2000     Years since quittin.9    Smokeless tobacco: Former   Vaping Use    Vaping status: Never Used   Substance and Sexual Activity    Alcohol use: No    Drug use: No    Sexual activity: Not Currently     Partners: Female           Objective   Physical Exam  Vitals and nursing note reviewed.   Constitutional:       General: He is in acute distress.      Appearance: Normal appearance. He is not ill-appearing or toxic-appearing.   HENT:      Head: Normocephalic and  atraumatic.      Comments: Scalp and facial examination without open wounds or hematoma.     Nose: Nose normal.      Mouth/Throat:      Mouth: Mucous membranes are moist.   Eyes:      Extraocular Movements: Extraocular movements intact.   Cardiovascular:      Rate and Rhythm: Normal rate and regular rhythm.      Pulses: Normal pulses.   Pulmonary:      Effort: Pulmonary effort is normal.      Breath sounds: Normal breath sounds.   Chest:      Chest wall: No tenderness.   Abdominal:      Palpations: Abdomen is soft.   Musculoskeletal:         General: Tenderness present.      Cervical back: No tenderness.      Comments: Left upper extremity examination reveals tenderness to the lateral left shoulder.  There is full range of motion on passive range of motion.  No laxity.  No elbow or wrist pain.  Patient is neuro vastly intact in the left hand with 2+ radial pulse present.   Skin:     General: Skin is warm and dry.      Capillary Refill: Capillary refill takes less than 2 seconds.   Neurological:      General: No focal deficit present.      Mental Status: He is alert.         Procedures           ED Course                                           Medical Decision Making  Patient mechanical fall falling onto left shoulder.  Incident occurred earlier today.  Although he denies any loss of consciousness he has amnesia to the event and a CT of the head will be obtained.  He does have tenderness to the left lateral shoulder but full range of motion on passive movement.  Due to distracting injury CT of the cervical spine will also be obtained.  No elbow pain or wrist pain.  Patient is a resident-left hand.  CT of the head is unremarkable other than chronic changes.  Imaging of left shoulder shows intact hardware without any acute findings.  No acute fracture of the cervical spine.  There is changes noted at C4-C5 however the patient does not have any pain and this was done for distracting injury.  Patient has been advised  to follow-up with primary provider.  Patient will return or seek [facility] having any concerns.    Final diagnoses:   Injury of head, initial encounter   Contusion of left shoulder, initial encounter       ED Disposition  ED Disposition       ED Disposition   Discharge    Condition   Stable    Comment   --               Olesya Cueva MD  [address] [place] [facility]  [phone]    In 1 week           Medication List      No changes were made to your prescriptions during this visit.

## 2025-01-08 DIAGNOSIS — M54.42 CHRONIC LEFT-SIDED LOW BACK PAIN WITH LEFT-SIDED SCIATICA: ICD-10-CM

## 2025-01-08 DIAGNOSIS — G89.29 CHRONIC LEFT-SIDED LOW BACK PAIN WITH LEFT-SIDED SCIATICA: ICD-10-CM

## 2025-01-08 RX ORDER — PREGABALIN 75 MG/1
75 CAPSULE ORAL 3 TIMES DAILY
Qty: 90 CAPSULE | Refills: 0 | Status: SHIPPED | OUTPATIENT
Start: 2025-01-08

## 2025-01-10 RX ORDER — NAPROXEN 500 MG/1
500 TABLET ORAL 2 TIMES DAILY PRN
Qty: 180 TABLET | Refills: 3 | Status: SHIPPED | OUTPATIENT
Start: 2025-01-10

## 2025-01-10 RX ORDER — PANTOPRAZOLE SODIUM 40 MG/1
40 TABLET, DELAYED RELEASE ORAL DAILY
Qty: 90 TABLET | Refills: 3 | Status: SHIPPED | OUTPATIENT
Start: 2025-01-10

## 2025-01-16 ENCOUNTER — APPOINTMENT (OUTPATIENT)
Dept: GENERAL RADIOLOGY | Facility: HOSPITAL | Age: 80
End: 2025-01-16
Payer: MEDICARE

## 2025-01-16 ENCOUNTER — APPOINTMENT (OUTPATIENT)
Dept: CT IMAGING | Facility: HOSPITAL | Age: 80
End: 2025-01-16
Payer: MEDICARE

## 2025-01-16 ENCOUNTER — HOSPITAL ENCOUNTER (OUTPATIENT)
Facility: HOSPITAL | Age: 80
Setting detail: OBSERVATION
Discharge: HOME | End: 2025-01-17
Attending: STUDENT IN AN ORGANIZED HEALTH CARE EDUCATION/TRAINING PROGRAM | Admitting: STUDENT IN AN ORGANIZED HEALTH CARE EDUCATION/TRAINING PROGRAM
Payer: MEDICARE

## 2025-01-16 DIAGNOSIS — R07.81 RIB PAIN ON RIGHT SIDE: ICD-10-CM

## 2025-01-16 DIAGNOSIS — R42 DIZZINESS: Primary | ICD-10-CM

## 2025-01-16 LAB
ALBUMIN SERPL-MCNC: 4.1 G/DL (ref 3.5–5.2)
ALBUMIN/GLOB SERPL: 1.8 G/DL
ALP SERPL-CCNC: 102 U/L (ref 39–117)
ALT SERPL W P-5'-P-CCNC: 30 U/L (ref 1–41)
ANION GAP SERPL CALCULATED.3IONS-SCNC: 11.4 MMOL/L (ref 5–15)
AST SERPL-CCNC: 35 U/L (ref 1–40)
BASOPHILS # BLD AUTO: 0.13 10*3/MM3 (ref 0–0.2)
BASOPHILS NFR BLD AUTO: 1 % (ref 0–1.5)
BILIRUB SERPL-MCNC: 0.4 MG/DL (ref 0–1.2)
BILIRUB UR QL STRIP: NEGATIVE
BUN SERPL-MCNC: 10 MG/DL (ref 8–23)
BUN/CREAT SERPL: 10.9 (ref 7–25)
CALCIUM SPEC-SCNC: 9.3 MG/DL (ref 8.6–10.5)
CHLORIDE SERPL-SCNC: 104 MMOL/L (ref 98–107)
CLARITY UR: CLEAR
CO2 SERPL-SCNC: 24.6 MMOL/L (ref 22–29)
COLOR UR: YELLOW
CREAT SERPL-MCNC: 0.92 MG/DL (ref 0.76–1.27)
D DIMER PPP FEU-MCNC: 0.33 MCGFEU/ML (ref 0–0.81)
DEPRECATED RDW RBC AUTO: 46.5 FL (ref 37–54)
EGFRCR SERPLBLD CKD-EPI 2021: 83.6 ML/MIN/1.73
EOSINOPHIL # BLD AUTO: 0.52 10*3/MM3 (ref 0–0.4)
EOSINOPHIL NFR BLD AUTO: 4 % (ref 0.3–6.2)
ERYTHROCYTE [DISTWIDTH] IN BLOOD BY AUTOMATED COUNT: 19 % (ref 12.3–15.4)
GEN 5 1HR TROPONIN T REFLEX: 16 NG/L
GLOBULIN UR ELPH-MCNC: 2.3 GM/DL
GLUCOSE BLDC GLUCOMTR-MCNC: 141 MG/DL (ref 70–105)
GLUCOSE SERPL-MCNC: 99 MG/DL (ref 65–99)
GLUCOSE UR STRIP-MCNC: NEGATIVE MG/DL
HCT VFR BLD AUTO: 48.1 % (ref 37.5–51)
HGB BLD-MCNC: 14.4 G/DL (ref 13–17.7)
HGB UR QL STRIP.AUTO: NEGATIVE
IMM GRANULOCYTES # BLD AUTO: 0.07 10*3/MM3 (ref 0–0.05)
IMM GRANULOCYTES NFR BLD AUTO: 0.5 % (ref 0–0.5)
KETONES UR QL STRIP: NEGATIVE
LEUKOCYTE ESTERASE UR QL STRIP.AUTO: NEGATIVE
LYMPHOCYTES # BLD AUTO: 1.59 10*3/MM3 (ref 0.7–3.1)
LYMPHOCYTES NFR BLD AUTO: 12.2 % (ref 19.6–45.3)
MAGNESIUM SERPL-MCNC: 2.1 MG/DL (ref 1.6–2.4)
MCH RBC QN AUTO: 22.2 PG (ref 26.6–33)
MCHC RBC AUTO-ENTMCNC: 29.9 G/DL (ref 31.5–35.7)
MCV RBC AUTO: 74.1 FL (ref 79–97)
MONOCYTES # BLD AUTO: 0.88 10*3/MM3 (ref 0.1–0.9)
MONOCYTES NFR BLD AUTO: 6.8 % (ref 5–12)
NEUTROPHILS NFR BLD AUTO: 75.5 % (ref 42.7–76)
NEUTROPHILS NFR BLD AUTO: 9.81 10*3/MM3 (ref 1.7–7)
NITRITE UR QL STRIP: NEGATIVE
NRBC BLD AUTO-RTO: 0 /100 WBC (ref 0–0.2)
NT-PROBNP SERPL-MCNC: 44 PG/ML (ref 0–1800)
PH UR STRIP.AUTO: 7.5 [PH] (ref 5–8)
PLATELET # BLD AUTO: 383 10*3/MM3 (ref 140–450)
PMV BLD AUTO: 9.8 FL (ref 6–12)
POTASSIUM SERPL-SCNC: 4.3 MMOL/L (ref 3.5–5.2)
PROT SERPL-MCNC: 6.4 G/DL (ref 6–8.5)
PROT UR QL STRIP: NEGATIVE
RBC # BLD AUTO: 6.49 10*6/MM3 (ref 4.14–5.8)
SODIUM SERPL-SCNC: 140 MMOL/L (ref 136–145)
SP GR UR STRIP: 1.01 (ref 1–1.03)
TROPONIN T NUMERIC DELTA: 5 NG/L
TROPONIN T SERPL HS-MCNC: 11 NG/L
TSH SERPL DL<=0.05 MIU/L-ACNC: 1.6 UIU/ML (ref 0.27–4.2)
UROBILINOGEN UR QL STRIP: NORMAL
WBC NRBC COR # BLD AUTO: 13 10*3/MM3 (ref 3.4–10.8)

## 2025-01-16 PROCEDURE — 25810000003 SODIUM CHLORIDE 0.9 % SOLUTION

## 2025-01-16 PROCEDURE — 82948 REAGENT STRIP/BLOOD GLUCOSE: CPT

## 2025-01-16 PROCEDURE — G0378 HOSPITAL OBSERVATION PER HR: HCPCS

## 2025-01-16 PROCEDURE — 73030 X-RAY EXAM OF SHOULDER: CPT

## 2025-01-16 PROCEDURE — 80053 COMPREHEN METABOLIC PANEL: CPT

## 2025-01-16 PROCEDURE — 99285 EMERGENCY DEPT VISIT HI MDM: CPT

## 2025-01-16 PROCEDURE — 85379 FIBRIN DEGRADATION QUANT: CPT

## 2025-01-16 PROCEDURE — 36415 COLL VENOUS BLD VENIPUNCTURE: CPT

## 2025-01-16 PROCEDURE — 93005 ELECTROCARDIOGRAM TRACING: CPT

## 2025-01-16 PROCEDURE — 83880 ASSAY OF NATRIURETIC PEPTIDE: CPT

## 2025-01-16 PROCEDURE — 71101 X-RAY EXAM UNILAT RIBS/CHEST: CPT

## 2025-01-16 PROCEDURE — 70450 CT HEAD/BRAIN W/O DYE: CPT

## 2025-01-16 PROCEDURE — 83735 ASSAY OF MAGNESIUM: CPT

## 2025-01-16 PROCEDURE — 84443 ASSAY THYROID STIM HORMONE: CPT

## 2025-01-16 PROCEDURE — 81003 URINALYSIS AUTO W/O SCOPE: CPT

## 2025-01-16 PROCEDURE — 84484 ASSAY OF TROPONIN QUANT: CPT

## 2025-01-16 PROCEDURE — 85025 COMPLETE CBC W/AUTO DIFF WBC: CPT

## 2025-01-16 RX ORDER — BISACODYL 5 MG/1
5 TABLET, DELAYED RELEASE ORAL DAILY PRN
Status: DISCONTINUED | OUTPATIENT
Start: 2025-01-16 | End: 2025-01-17 | Stop reason: HOSPADM

## 2025-01-16 RX ORDER — MECLIZINE HYDROCHLORIDE 25 MG/1
25 TABLET ORAL 3 TIMES DAILY PRN
Status: DISCONTINUED | OUTPATIENT
Start: 2025-01-16 | End: 2025-01-17 | Stop reason: HOSPADM

## 2025-01-16 RX ORDER — ONDANSETRON 2 MG/ML
4 INJECTION INTRAMUSCULAR; INTRAVENOUS EVERY 6 HOURS PRN
Status: DISCONTINUED | OUTPATIENT
Start: 2025-01-16 | End: 2025-01-17 | Stop reason: HOSPADM

## 2025-01-16 RX ORDER — AMOXICILLIN 250 MG
2 CAPSULE ORAL 2 TIMES DAILY PRN
Status: DISCONTINUED | OUTPATIENT
Start: 2025-01-16 | End: 2025-01-17 | Stop reason: HOSPADM

## 2025-01-16 RX ORDER — ACETAMINOPHEN 160 MG/5ML
650 SOLUTION ORAL EVERY 4 HOURS PRN
Status: DISCONTINUED | OUTPATIENT
Start: 2025-01-16 | End: 2025-01-17 | Stop reason: HOSPADM

## 2025-01-16 RX ORDER — NICOTINE POLACRILEX 4 MG
15 LOZENGE BUCCAL
Status: DISCONTINUED | OUTPATIENT
Start: 2025-01-16 | End: 2025-01-17 | Stop reason: HOSPADM

## 2025-01-16 RX ORDER — IBUPROFEN 600 MG/1
1 TABLET ORAL
Status: DISCONTINUED | OUTPATIENT
Start: 2025-01-16 | End: 2025-01-17 | Stop reason: HOSPADM

## 2025-01-16 RX ORDER — SODIUM CHLORIDE 0.9 % (FLUSH) 0.9 %
10 SYRINGE (ML) INJECTION AS NEEDED
Status: DISCONTINUED | OUTPATIENT
Start: 2025-01-16 | End: 2025-01-17 | Stop reason: HOSPADM

## 2025-01-16 RX ORDER — INSULIN LISPRO 100 [IU]/ML
2-9 INJECTION, SOLUTION INTRAVENOUS; SUBCUTANEOUS
Status: DISCONTINUED | OUTPATIENT
Start: 2025-01-16 | End: 2025-01-17 | Stop reason: HOSPADM

## 2025-01-16 RX ORDER — BISACODYL 10 MG
10 SUPPOSITORY, RECTAL RECTAL DAILY PRN
Status: DISCONTINUED | OUTPATIENT
Start: 2025-01-16 | End: 2025-01-17 | Stop reason: HOSPADM

## 2025-01-16 RX ORDER — NITROGLYCERIN 0.4 MG/1
0.4 TABLET SUBLINGUAL
Status: DISCONTINUED | OUTPATIENT
Start: 2025-01-16 | End: 2025-01-17 | Stop reason: HOSPADM

## 2025-01-16 RX ORDER — ACETAMINOPHEN 650 MG/1
650 SUPPOSITORY RECTAL EVERY 4 HOURS PRN
Status: DISCONTINUED | OUTPATIENT
Start: 2025-01-16 | End: 2025-01-17 | Stop reason: HOSPADM

## 2025-01-16 RX ORDER — SODIUM CHLORIDE 9 MG/ML
100 INJECTION, SOLUTION INTRAVENOUS CONTINUOUS
Status: DISPENSED | OUTPATIENT
Start: 2025-01-16 | End: 2025-01-17

## 2025-01-16 RX ORDER — SODIUM CHLORIDE 9 MG/ML
40 INJECTION, SOLUTION INTRAVENOUS AS NEEDED
Status: DISCONTINUED | OUTPATIENT
Start: 2025-01-16 | End: 2025-01-17 | Stop reason: HOSPADM

## 2025-01-16 RX ORDER — DEXTROSE MONOHYDRATE 25 G/50ML
25 INJECTION, SOLUTION INTRAVENOUS
Status: DISCONTINUED | OUTPATIENT
Start: 2025-01-16 | End: 2025-01-17 | Stop reason: HOSPADM

## 2025-01-16 RX ORDER — LIDOCAINE 4 G/G
1 PATCH TOPICAL ONCE
Status: COMPLETED | OUTPATIENT
Start: 2025-01-16 | End: 2025-01-17

## 2025-01-16 RX ORDER — SODIUM CHLORIDE 0.9 % (FLUSH) 0.9 %
10 SYRINGE (ML) INJECTION EVERY 12 HOURS SCHEDULED
Status: DISCONTINUED | OUTPATIENT
Start: 2025-01-16 | End: 2025-01-17 | Stop reason: HOSPADM

## 2025-01-16 RX ORDER — ACETAMINOPHEN 325 MG/1
650 TABLET ORAL EVERY 4 HOURS PRN
Status: DISCONTINUED | OUTPATIENT
Start: 2025-01-16 | End: 2025-01-17 | Stop reason: HOSPADM

## 2025-01-16 RX ORDER — POLYETHYLENE GLYCOL 3350 17 G/17G
17 POWDER, FOR SOLUTION ORAL DAILY PRN
Status: DISCONTINUED | OUTPATIENT
Start: 2025-01-16 | End: 2025-01-17 | Stop reason: HOSPADM

## 2025-01-16 RX ADMIN — LIDOCAINE PAIN RELIEF 1 PATCH: 560 PATCH TOPICAL at 18:59

## 2025-01-16 RX ADMIN — SODIUM CHLORIDE 100 ML/HR: 9 INJECTION, SOLUTION INTRAVENOUS at 22:02

## 2025-01-16 RX ADMIN — Medication 10 ML: at 22:02

## 2025-01-16 NOTE — Clinical Note
Level of Care: Telemetry [5]   Admitting Physician: LLANES ALVAREZ, CARLOS [[id number]]   Attending Physician: LLANES ALVAREZ, CARLOS [[id number]]

## 2025-01-16 NOTE — ED NOTES
Patient states that he has been having right sided chest pain that it coming up into his shoulder and into his shoulder blade for about one week now. Patient states that he has been shoveling snow, he fell one month ago but states he fell on his left sided. He states that he also has been having dizzy spells for the last two months intermittently.

## 2025-01-16 NOTE — ED PROVIDER NOTES
Subjective   History of Present Illness  79-year-old male with history of hyperlipidemia, hypertension, diabetes, CAD presents the ED with complaints of right-sided chest pain that radiates into the right scapular region that is been ongoing for the last week.  Patient reports that he was shoveling snow around the time of this started as well.  Patient reports that it will intermittently become a sharp sensation in the chest and causes him to be dizzy.  He also has dizziness with standing that has been ongoing for a while now.  Reports chronic tingling of the hand but no other numbness or tingling.  Denies weakness, syncope, fever, cough, headache, changes in vision, recent falls or hitting head, history of IV drug use, abdominal pain, nausea or vomiting,  SOA    PCP: Anay  Cardio: P[facility]la        Review of Systems   Constitutional:  Negative for fever.   Respiratory:  Negative for shortness of breath.    Cardiovascular:  Positive for chest pain.   Gastrointestinal:  Negative for abdominal pain, nausea and vomiting.   Neurological:  Positive for dizziness and light-headedness. Negative for syncope, facial asymmetry, weakness and headaches.       Past Medical History:   Diagnosis Date    Arthritis     Dr Mosqueda    Coronary heart disease 01/2016    single vessel disease, nl stress test (copied from [facility])    Coronary heart disease 10/31/2017    cath 10/31/17 - Low % Blockages- N o Intervention   (copied from [facility])    Diverticulosis     Fatty liver     GERD (gastroesophageal reflux disease)     Hearing loss     Hyperlipemia     Hyperlipidemia     Hypertension     Low back pain     Neuropathy in diabetes     SIDDHARTHA treated with BiPAP     Pain management     injections Lumbar spine X2 with Muprhys Pain management @ [facility] (copied from [facility])    Physical therapy evaluation, initial     @ [facility] in [place] 6 weeks x3 per week, Kalen leal (copied from [facility])    Rectal bleed 08/2012    hemorrhoids     Retinal hemorrhage of right eye 05/2014    Sleep apnea     bipap    Tinea pedis of right foot 07/02/2023    Type 2 diabetes mellitus     Uses brace        No Known Allergies    Past Surgical History:   Procedure Laterality Date    BELPHAROPTOSIS REPAIR  12/11/2017     Dr. grimes (copied from [facility])    BROW LIFT  12/11/2017    Dr. Grimes at [facility]    CARDIAC CATHETERIZATION  03/2012    at [facility]- single vessel disease. (copied from [facility])    CARDIAC CATHETERIZATION  10/13/2017    no intervention - Low % Blockages.    CARDIAC CATHETERIZATION Left 05/28/2021    Procedure: LEFT HEART CATH with possible PCI;  Surgeon: Leslie Clark MD;  [facility];  Service: Cardiovascular;  Laterality: Left;  Local and IV sedation    CATARACT EXTRACTION  12/11/2017    brow lift and eye lid repair    COLON SURGERY  06/2014    colonscopy    COLONOSCOPY N/A 07/12/2019    Procedure: COLONOSCOPY with polypectomy x1;  Surgeon: Graham Byrd MD;  Location: [facility] ENDOSCOPY;  Service: Gastroenterology    CUBITAL TUNNEL RELEASE Right     HERNIA REPAIR  11/2008    umbilical hernia repair    JOINT REPLACEMENT      KNEE ARTHROSCOPY Right 11/2014    Dr. Panda pollack and Cataract Extraction, sep 11 and right Sept. 25th, 2014- [facility]. (copied from [facility])    TOTAL KNEE ARTHROPLASTY Left 11/2009    Dr. Mosqueda (copied from [facility])    TOTAL SHOULDER ARTHROPLASTY W/ DISTAL CLAVICLE EXCISION Left 04/20/2022    Procedure: TOTAL SHOULDER REVERSE ARTHROPLASTY;  Surgeon: Dany Knapp MD;  [facility] MA[facility];  Service: Orthopedics;  Laterality: Left;       Family History   Problem Relation Age of Onset    Heart disease Mother         CHF    Hypertension Mother     Heart failure Mother     Diabetes Brother     Arthritis Maternal Grandmother     Stroke Maternal Grandfather     Diabetes Other     Diabetes Other     Stroke Other     Sleep apnea Neg Hx        Social History      Socioeconomic History    Marital status:      Spouse name: Shani   Tobacco Use    Smoking status: Former     Current packs/day: 0.00     Average packs/day: 1.5 packs/day for 30.0 years (45.0 ttl pk-yrs)     Types: Cigarettes     Start date: 1970     Quit date: 2000     Years since quittin.0    Smokeless tobacco: Former   Vaping Use    Vaping status: Never Used   Substance and Sexual Activity    Alcohol use: No    Drug use: No    Sexual activity: Not Currently     Partners: Female           Objective   Physical Exam  Vitals reviewed.   HENT:      Head: Normocephalic.   Eyes:      General: No visual field deficit.     Extraocular Movements: Extraocular movements intact.      Conjunctiva/sclera: Conjunctivae normal.      Pupils: Pupils are equal, round, and reactive to light.   Cardiovascular:      Rate and Rhythm: Normal rate and regular rhythm.      Pulses: Normal pulses.      Heart sounds: Normal heart sounds.   Pulmonary:      Effort: Pulmonary effort is normal.      Breath sounds: Normal breath sounds.   Chest:      Chest wall: Tenderness present. No crepitus.          Comments: Reproducible tenderness to the right rib area with palpation.  No crepitus felt.  Patient has full range of motion of upper extremities  Abdominal:      General: Bowel sounds are normal.      Palpations: Abdomen is soft.      Tenderness: There is no abdominal tenderness.   Musculoskeletal:         General: Normal range of motion.   Skin:     General: Skin is warm and dry.   Neurological:      General: No focal deficit present.      Mental Status: He is alert and oriented to person, place, and time.      GCS: GCS eye subscore is 4. GCS verbal subscore is 5. GCS motor subscore is 6.      Cranial Nerves: No dysarthria or facial asymmetry.      Motor: No weakness or pronator drift.   Psychiatric:         Mood and Affect: Mood normal.         Behavior: Behavior normal.         Procedures    EKG independently interpreted  "by Dr. Franco as sinus rhythm at a rate of 68.  Compared to previous from 5/23/2024 that was sinus rhythm at a rate of 77         ED Course  ED Course as of 01/16/25 2024   Thu Jan 16, 2025   1505 EKG requested [KB]   1613 D-Dimer, Quant: 0.33  CT PE not warranted [KB]   1613 Marked ready for CT [KB]   1940 Spoke to Trina CONNORS with hospitalist group who agrees to admit patient [KB]   2016 9/2023 stress test:  ·  Findings consistent with a normal ECG stress test.  ·  Left ventricular ejection fraction is normal (Calculated EF = 67%).  ·  Basal inferolateral artifact noted.  ·  Myocardial perfusion imaging indicates a normal myocardial perfusion study with no evidence of ischemia.  ·  Impressions are consistent with a low risk study.   [KB]      ED Course User Index  [KB] Orlin Murrieta APRN      /67   Pulse 71   Temp 98.2 °F (36.8 °C) (Oral)   Resp 17   Ht 170.2 cm (67\")   Wt 101 kg (223 lb 5.2 oz)   SpO2 96%   BMI 34.98 kg/m²   Labs Reviewed   CBC WITH AUTO DIFFERENTIAL - Abnormal; Notable for the following components:       Result Value    WBC 13.00 (*)     RBC 6.49 (*)     MCV 74.1 (*)     MCH 22.2 (*)     MCHC 29.9 (*)     RDW 19.0 (*)     Lymphocyte % 12.2 (*)     Neutrophils, Absolute 9.81 (*)     Eosinophils, Absolute 0.52 (*)     Immature Grans, Absolute 0.07 (*)     All other components within normal limits   HIGH SENSITIVITIY TROPONIN T 1HR - Abnormal; Notable for the following components:    Troponin T Numeric Delta 5 (*)     All other components within normal limits    Narrative:     High Sensitive Troponin T Reference Range:  <14.0 ng/L- Negative Female for AMI  <22.0 ng/L- Negative Male for AMI  >=14 - Abnormal Female indicating possible myocardial injury.  >=22 - Abnormal Male indicating possible myocardial injury.   Clinicians would have to utilize clinical acumen, EKG, Troponin, and serial changes to determine if it is an Acute Myocardial Infarction or myocardial injury due to an " "underlying chronic condition.        URINALYSIS W/ MICROSCOPIC IF INDICATED (NO CULTURE) - Normal    Narrative:     Urine microscopic not indicated.   BNP (IN-HOUSE) - Normal    Narrative:     This assay is used as an aid in the diagnosis of individuals suspected of having heart failure. It can be used as an aid in the diagnosis of acute decompensated heart failure (ADHF) in patients presenting with signs and symptoms of ADHF to the emergency department (ED). In addition, NT-proBNP of <300 pg/mL indicates ADHF is not likely.    Age Range Result Interpretation  NT-proBNP Concentration (pg/mL:      <50             Positive            >450                   Gray                 300-450                    Negative             <300    50-75           Positive            >900                  Gray                300-900                  Negative            <300      >75             Positive            >1800                  Gray                300-1800                  Negative            <300   D-DIMER, QUANTITATIVE - Normal    Narrative:     According to the assay 's published package insert, a normal (<0.50 MCGFEU/mL) D-dimer result in conjunction with a non-high clinical probability assessment, excludes deep vein thrombosis (DVT) and pulmonary embolism (PE) with high sensitivity.    D-dimer values increase with age and this can make VTE exclusion of an older population difficult. To address this, the American College of [employer], based on best available evidence and recent guidelines, recommends that clinicians use age-adjusted D-dimer thresholds in patients greater than 50 years of age with: a) a low probability of PE who do not meet all Pulmonary Embolism Rule Out Criteria, or b) in those with intermediate probability of PE.   The formula for an age-adjusted D-dimer cut-off is \"age/100\".  For example, a 60 year old patient would have an age-adjusted cut-off of 0.60 MCGFEU/mL and an 80 year old 0.80 " MCGFEU/mL.   TROPONIN - Normal    Narrative:     High Sensitive Troponin T Reference Range:  <14.0 ng/L- Negative Female for AMI  <22.0 ng/L- Negative Male for AMI  >=14 - Abnormal Female indicating possible myocardial injury.  >=22 - Abnormal Male indicating possible myocardial injury.   Clinicians would have to utilize clinical acumen, EKG, Troponin, and serial changes to determine if it is an Acute Myocardial Infarction or myocardial injury due to an underlying chronic condition.        MAGNESIUM - Normal   TSH RFX ON ABNORMAL TO FREE T4 - Normal   COMPREHENSIVE METABOLIC PANEL    Narrative:     GFR Categories in Chronic Kidney Disease (CKD)      GFR Category          GFR (mL/min/1.73)    Interpretation  G1                     90 or greater         Normal or high (1)  G2                      60-89                Mild decrease (1)  G3a                   45-59                Mild to moderate decrease  G3b                   30-44                Moderate to severe decrease  G4                    15-29                Severe decrease  G5                    14 or less           Kidney failure          (1)In the absence of evidence of kidney disease, neither GFR category G1 or G2 fulfill the criteria for CKD.    eGFR calculation 2021 CKD-EPI creatinine equation, which does not include race as a factor   CBC AND DIFFERENTIAL    Narrative:     The following orders were created for panel order CBC & Differential.  Procedure                               Abnormality         Status                     ---------                               -----------         ------                     CBC Auto Differential[[id number]]        Abnormal            Final result               Scan Slide[[id number]]                                                                    Please view results for these tests on the individual orders.     Medications   sodium chloride 0.9 % flush 10 mL (has no administration in time range)   Lidocaine 4 % 1  patch (1 patch Transdermal Medication Applied 1/16/25 1859)     CT Head Without Contrast    Result Date: 1/16/2025  Impression: 1.No acute intracranial process identified. 2.Findings suggestive of mild chronic small vessel ischemic disease. 3.Mucosal disease with some high density secretions within right sphenoid sinus, which could represent a component of inspissated secretions versus fungal colonization. Electronically Signed: Cristi Basurto MD  1/16/2025 4:43 PM EST  Workstation ID: [id number]    XR Shoulder 2+ View Right    Result Date: 1/16/2025  Impression: No radiographic evidence of acute fracture or dislocation. Electronically Signed: John Bell MD  1/16/2025 4:07 PM EST  Workstation ID: [id number]    XR Ribs Right With PA Chest    Result Date: 1/16/2025  Impression: No radiographic evidence of acute displaced right rib fracture or pneumothorax. Electronically Signed: John Bell MD  1/16/2025 4:03 PM EST  Workstation ID: [id number]                                                    Medical Decision Making  Patient was seen for the above complaints.  IV was established and blood work was obtained to assess for electrolyte abnormalities, cardiac function, infection.  White blood cell count 13, hemoglobin 14.4, TSH 1.6, mag 2.1, serial troponins negative however has a delta of 5, electrolytes within normal limits, BNP 44.  UA obtained, this was negative.  Right rib x-ray with PA chest obtained and independently interpreted by radiologist as:  No radiographic evidence of acute displaced right rib fracture or pneumothorax.    X-ray of the right shoulder was also obtained and independently interpreted by the radiologist as negative.  Head CT independently interpreted as:  1.No acute intracranial process identified.   2.Findings suggestive of mild chronic small vessel ischemic disease.   3.Mucosal disease with some high density secretions within right sphenoid sinus, which could represent a component of  inspissated secretions versus fungal colonization.     Patient did not have any focal deficits on assessment.  Considered CT PE however D-dimer was negative.  Nursing staff attempted to obtain orthostatic vital signs however patient became very dizzy with position changes, no hypotension noted.  Lidocaine patch was applied to the rib.  Due to ongoing dizziness, will be admitted to the hospitalist service for further evaluation and management.  Discussed with Trina CONNORS with hospitalist group who agrees to admit patient.  Discussed plan of care with patient and family at bedside who verbalized understanding and were agreeable with plan of care at this time.    Based on the clinical findings at this time I anticipate the patient will require a 2 midnight stay    Problems Addressed:  Dizziness: acute illness or injury  Rib pain on right side: acute illness or injury    Amount and/or Complexity of Data Reviewed  Labs: ordered. Decision-making details documented in ED Course.  Radiology: ordered and independent interpretation performed. Decision-making details documented in ED Course.  ECG/medicine tests: ordered and independent interpretation performed. Decision-making details documented in ED Course.    Risk  OTC drugs.  Prescription drug management.  Decision regarding hospitalization.        Final diagnoses:   Dizziness   Rib pain on right side       ED Disposition  ED Disposition       ED Disposition   Decision to Admit    Condition   --    Comment   Level of Care: Telemetry [5]   Admitting Physician: LLANES ALVAREZ, CARLOS [[id number]]   Attending Physician: LLANES ALVAREZ, CARLOS [[id number]]                 No follow-up provider specified.       Medication List      No changes were made to your prescriptions during this visit.            Orlin Murrieta APRN  01/16/25 2024

## 2025-01-16 NOTE — ED NOTES
Attempted to order meal tray for wife at bedside. On hold for 10mins. Will try to call back before cafe closes

## 2025-01-17 ENCOUNTER — READMISSION MANAGEMENT (OUTPATIENT)
Dept: CALL CENTER | Facility: HOSPITAL | Age: 80
End: 2025-01-17
Payer: MEDICARE

## 2025-01-17 VITALS
SYSTOLIC BLOOD PRESSURE: 162 MMHG | RESPIRATION RATE: 14 BRPM | OXYGEN SATURATION: 91 % | BODY MASS INDEX: 35.05 KG/M2 | HEIGHT: 67 IN | DIASTOLIC BLOOD PRESSURE: 72 MMHG | TEMPERATURE: 97.6 F | WEIGHT: 223.33 LBS | HEART RATE: 76 BPM

## 2025-01-17 LAB
ANION GAP SERPL CALCULATED.3IONS-SCNC: 9.5 MMOL/L (ref 5–15)
BUN SERPL-MCNC: 10 MG/DL (ref 8–23)
BUN/CREAT SERPL: 10.8 (ref 7–25)
CALCIUM SPEC-SCNC: 9.1 MG/DL (ref 8.6–10.5)
CHLORIDE SERPL-SCNC: 105 MMOL/L (ref 98–107)
CO2 SERPL-SCNC: 24.5 MMOL/L (ref 22–29)
CREAT SERPL-MCNC: 0.93 MG/DL (ref 0.76–1.27)
DEPRECATED RDW RBC AUTO: 46.9 FL (ref 37–54)
EGFRCR SERPLBLD CKD-EPI 2021: 82.5 ML/MIN/1.73
ERYTHROCYTE [DISTWIDTH] IN BLOOD BY AUTOMATED COUNT: 19.7 % (ref 12.3–15.4)
GLUCOSE BLDC GLUCOMTR-MCNC: 106 MG/DL (ref 70–105)
GLUCOSE BLDC GLUCOMTR-MCNC: 128 MG/DL (ref 70–105)
GLUCOSE SERPL-MCNC: 101 MG/DL (ref 65–99)
HCT VFR BLD AUTO: 50.5 % (ref 37.5–51)
HGB BLD-MCNC: 15.1 G/DL (ref 13–17.7)
MCH RBC QN AUTO: 22 PG (ref 26.6–33)
MCHC RBC AUTO-ENTMCNC: 29.9 G/DL (ref 31.5–35.7)
MCV RBC AUTO: 73.6 FL (ref 79–97)
PLATELET # BLD AUTO: 406 10*3/MM3 (ref 140–450)
PMV BLD AUTO: 10.3 FL (ref 6–12)
POTASSIUM SERPL-SCNC: 4.1 MMOL/L (ref 3.5–5.2)
QT INTERVAL: 376 MS
QTC INTERVAL: 399 MS
RBC # BLD AUTO: 6.86 10*6/MM3 (ref 4.14–5.8)
SODIUM SERPL-SCNC: 139 MMOL/L (ref 136–145)
WBC NRBC COR # BLD AUTO: 13.17 10*3/MM3 (ref 3.4–10.8)

## 2025-01-17 PROCEDURE — 97162 PT EVAL MOD COMPLEX 30 MIN: CPT

## 2025-01-17 PROCEDURE — G0378 HOSPITAL OBSERVATION PER HR: HCPCS

## 2025-01-17 PROCEDURE — 80048 BASIC METABOLIC PNL TOTAL CA: CPT

## 2025-01-17 PROCEDURE — 85027 COMPLETE CBC AUTOMATED: CPT

## 2025-01-17 PROCEDURE — 82948 REAGENT STRIP/BLOOD GLUCOSE: CPT

## 2025-01-17 RX ORDER — MECLIZINE HYDROCHLORIDE 25 MG/1
12.5 TABLET ORAL 3 TIMES DAILY PRN
Qty: 20 TABLET | Refills: 0 | Status: SHIPPED | OUTPATIENT
Start: 2025-01-17

## 2025-01-17 NOTE — PLAN OF CARE
Problem: Adult Inpatient Plan of Care  Goal: Absence of Hospital-Acquired Illness or Injury  Intervention: Identify and Manage Fall Risk  Recent Flowsheet Documentation  Taken 1/17/2025 0305 by Mallorie Bustamante RN  Safety Promotion/Fall Prevention:   safety round/check completed   room organization consistent   nonskid shoes/slippers when out of bed   activity supervised   assistive device/personal items within reach   clutter free environment maintained   fall prevention program maintained  Goal: Optimal Comfort and Wellbeing  Intervention: Provide Person-Centered Care  Recent Flowsheet Documentation  Taken 1/17/2025 0305 by Mallorie Bustamante RN  Trust Relationship/Rapport:   care explained   questions answered   questions encouraged   thoughts/feelings acknowledged  Goal: Readiness for Transition of Care  Intervention: Mutually Develop Transition Plan  Recent Flowsheet Documentation  Taken 1/17/2025 0237 by Mallorie Bustamante RN  Transportation Anticipated: family or friend will provide  Patient/Family Anticipated Services at Transition: none  Patient/Family Anticipates Transition to: home with family  Taken 1/17/2025 0234 by Mallorie Bustamante RN  Equipment Currently Used at Home:   bipap   rollator   cane, straight   Goal Outcome Evaluation:

## 2025-01-17 NOTE — PLAN OF CARE
"Goal Outcome Evaluation:              Outcome Evaluation: Pt is an 78 y/o male presenting to [facility] on 1/16 with c/o R sided chest pain and dizziness, specifically with standing.   1/16: CT head: No acute intracranial process identified.  1/16: XR R shoulder: No radiographic evidence of acute fracture or dislocation.  PMH: CAD, HLD, DM with Neuropathy.  Pt A&O x4. Pt reports PLOF of living with wife and daughter in [facility] with curb x2 to enter (porch and doorway); PLOF of (I) with household/community mobility/ambulation, very seldomly using cane; owns Rwx. Pt still driving. Pt reports 2 falls in last 6 months (moving heavy appliances).  Orthostatic Vitals:  SUPINE /73 mmHg,  SITTING /72 mmHg, pulse 71 bpm.   STANDING /72 mmHg, pulse 81 bpm.  STANDING BP post gait 171/82 mmHg, pulse 86 bpm.  Pt demos (I) with bed mobility, supervision with transfers and gait x150 ft with s/s hypotension monitored - pt asymptomatic. No skilled PT needs identified and pt confirms, \"I feel close to my normal.\" PT will sign off.    Anticipated Discharge Disposition (PT): home                        "

## 2025-01-17 NOTE — THERAPY EVALUATION
Patient Name: Heri Vasquez  : 1945    MRN: [id number]                              Today's Date: 2025       Admit Date: 2025    Visit Dx:     ICD-10-CM ICD-9-CM   1. Dizziness  R42 780.4   2. Rib pain on right side  R07.81 786.50     Patient Active Problem List   Diagnosis    Varicose veins of right lower extremity with pain    Nasal congestion    Actinic keratosis    Arthritis    Bradycardia    Chronic coronary artery disease    Degeneration of intervertebral disc of lumbar region    Dependent edema    Diabetic peripheral neuropathy    Encounter for general adult medical examination without abnormal findings    Fatigue    Gastroesophageal reflux disease    Hay fever    Hearing loss    Hyperlipidemia    Knee pain    Lightheadedness    Strain of lumbar region    Type 2 diabetes mellitus with unspecified complications    Screening for prostate cancer    Sciatic nerve pain, right    Hip pain, right    RUQ abdominal pain    Gallbladder polyp    Fatty liver    Left-sided low back pain with left-sided sciatica    Chronic right shoulder pain    Mass of joint of right shoulder    Cervical radiculopathy    Cervical radiculitis    Essential hypertension    Venous insufficiency of leg    Varicose veins of lower extremity    Infected sebaceous cyst    Neuritis of right ulnar nerve    Epistaxis    Acute right-sided thoracic back pain    Memory changes    Chest pain    Class 1 obesity    Stable angina pectoris    SIDDHARTHA treated with BiPAP    Sciatic pain, right    Right arm pain    Acute pain of left shoulder    DJD of left shoulder    Need for vaccination    Neck muscle spasm    Medicare annual wellness visit, subsequent    Traumatic closed nondisplaced fracture of rib with routine healing, right    Lump of skin of right upper extremity    Episodic tension-type headache, not intractable    Tinea pedis of right foot    Contusion of face    Contusion of abdominal wall    Fall    Sciatica of right side    Chronic  midline low back pain without sciatica    Umbilical hernia without obstruction and without gangrene    Dizziness     Past Medical History:   Diagnosis Date    Arthritis     Dr Mosqueda    Coronary heart disease 01/2016    single vessel disease, nl stress test (copied from [employer])    Coronary heart disease 10/31/2017    cath 10/31/17 - Low % Blockages- N o Intervention   (copied from [employer])    Diverticulosis     Fatty liver     GERD (gastroesophageal reflux disease)     Hearing loss     Hyperlipemia     Hyperlipidemia     Hypertension     Low back pain     Neuropathy in diabetes     SIDDHARTHA treated with BiPAP     Pain management     injections Lumbar spine X2 with [facility] @ [facility] (copied from [employer])    Physical therapy evaluation, initial     @ [facility] in [place] 6 weeks x3 per week, [facility] (copied from [employer])    Rectal bleed 08/2012    hemorrhoids    Retinal hemorrhage of right eye 05/2014    Sleep apnea     bipap    Tinea pedis of right foot 07/02/2023    Type 2 diabetes mellitus     Uses brace      Past Surgical History:   Procedure Laterality Date    BELPHAROPTOSIS REPAIR  12/11/2017     Dr. grimes (copied from [employer] [facility])    BROW LIFT  12/11/2017    Dr. Grimes at [facility]    CARDIAC CATHETERIZATION  03/2012    at [facility]- single vessel disease. (copied from [employer])    CARDIAC CATHETERIZATION  10/13/2017    no intervention - Low % Blockages.    CARDIAC CATHETERIZATION Left 05/28/2021    Procedure: LEFT HEART CATH with possible PCI;  Surgeon: Leslie Clark MD;  Location: [facility] CATH INVASIVE LOCATION;  Service: Cardiovascular;  Laterality: Left;  Local and IV sedation    CATARACT EXTRACTION  12/11/2017    brow lift and eye lid repair    COLON SURGERY  06/2014    colonscopy    COLONOSCOPY N/A 07/12/2019    Procedure: COLONOSCOPY with polypectomy x1;  Surgeon: Graham Byrd MD;  Location: [facility] ENDOSCOPY;  Service: Gastroenterology    CUBITAL TUNNEL RELEASE Right     HERNIA  REPAIR  11/2008    umbilical hernia repair    JOINT REPLACEMENT      KNEE ARTHROSCOPY Right 11/2014    Dr. Panda VASQUEZ      lasik and Cataract Extraction, sep 11 and right Sept. 25th, 2014- [facility]. (copied from [facility])    TOTAL KNEE ARTHROPLASTY Left 11/2009    Dr. Mosqueda (copied from [facility])    TOTAL SHOULDER ARTHROPLASTY W/ DISTAL CLAVICLE EXCISION Left 04/20/2022    Procedure: TOTAL SHOULDER REVERSE ARTHROPLASTY;  Surgeon: Dany Knapp MD;  Location: [facility] MAIN OR;  Service: Orthopedics;  Laterality: Left;      General Information       Row Name 01/17/25 1357          Physical Therapy Time and Intention    Document Type evaluation  -JV     Mode of Treatment physical therapy  -JV       Row Name 01/17/25 1357          General Information    Patient Profile Reviewed yes  -JV     Prior Level of Function independent:;all household mobility;community mobility;driving;using stairs  -JV     Existing Precautions/Restrictions fall  -JV     Barriers to Rehab medically complex  -JV       Row Name 01/17/25 1357          Living Environment    People in Home spouse;child(griselda), adult  -JV       Row Name 01/17/25 1357          Home Main Entrance    Number of Stairs, Main Entrance two  -JV     Stair Railings, Main Entrance none  -JV       Row Name 01/17/25 1357          Stairs Within Home, Primary    Number of Stairs, Within Home, Primary none  -JV       Row Name 01/17/25 1357          Cognition    Orientation Status (Cognition) oriented x 4  -JV       Row Name 01/17/25 1357          Safety Issues/Impairments Affecting Functional Mobility    Impairments Affecting Function (Mobility) endurance/activity tolerance  -JV               User Key  (r) = Recorded By, (t) = Taken By, (c) = Cosigned By      Initials Name Provider Type    JV Eleonora Inman Physical Therapist                   Mobility       Row Name 01/17/25 1358          Bed Mobility    Bed Mobility supine-sit-supine  -JV     Supine-Sit-Supine  [place] (Bed Mobility) modified independence  -JV     Assistive Device (Bed Mobility) bed rails;head of bed elevated  -JV       Row Name 01/17/25 1358          Bed-Chair Transfer    Bed-Chair [place] (Transfers) supervision;verbal cues;nonverbal cues (demo/gesture)  -JV       Row Name 01/17/25 1358          Sit-Stand Transfer    Sit-Stand [place] (Transfers) verbal cues;nonverbal cues (demo/gesture);supervision  -JV       [facility] Name 01/17/25 1358          Gait/Stairs (Locomotion)    [place] Level (Gait) supervision;verbal cues;nonverbal cues (demo/gesture)  -JV     Distance in Feet (Gait) 150  -JV     Deviations/Abnormal Patterns (Gait) colleen decreased;gait speed decreased  -JV     Bilateral Gait Deviations decreased arm swing;forward flexed posture  -JV     Comment, (Gait/Stairs) pt with slow colleen but no overt LOB; pt does require increased time with turning; denies dizziness throughout  -JV               User Key  (r) = Recorded By, (t) = Taken By, (c) = Cosigned By      Initials Name Provider Type    Eleonora Ponce Physical Therapist                   Obj/Interventions       Row Name 01/17/25 1400          Range of Motion Comprehensive    General Range of Motion bilateral lower extremity ROM WFL  -JV       [facility] Name 01/17/25 1400          Strength Comprehensive (MMT)    Comment, General Manual Muscle Testing (MMT) Assessment BLE grossly 4/5  -JV       [facility] Name 01/17/25 1400          Balance    Balance Assessment standing static balance;standing dynamic balance  -JV     Static Standing Balance supervision  -J     Dynamic Standing Balance supervision  -J     Position/Device Used, Standing Balance unsupported  -JV       Row Name 01/17/25 1400          Sensory Assessment (Somatosensory)    Sensory Assessment (Somatosensory) LE sensation intact  -J               User Key  (r) = Recorded By, (t) = Taken By, (c) = Cosigned By      Initials Name Provider Type    Eleonora Ponce  "Physical Therapist                   Goals/Plan    No documentation.                  Clinical Impression       Row Name 01/17/25 1402          Pain    Pretreatment Pain Rating 0/10 - no pain  -JV     Posttreatment Pain Rating 0/10 - no pain  -JV       Row Name 01/17/25 1402          Plan of Care Review    Outcome Evaluation Pt is an 78 y/o male presenting to [facility] on 1/16 with c/o R sided chest pain and dizziness, specifically with standing.   1/16: CT head: No acute intracranial process identified.  1/16: XR R shoulder: No radiographic evidence of acute fracture or dislocation.  PMH: CAD, HLD, DM with Neuropathy.  Pt A&O x4. Pt reports PLOF of living with wife and daughter in [facility] with curb x2 to enter (porch and doorway); PLOF of (I) with household/community mobility/ambulation, very seldomly using cane; owns Rwx. Pt still driving. Pt reports 2 falls in last 6 months (moving heavy appliances).  Orthostatic Vitals:  SUPINE /73 mmHg,  SITTING /72 mmHg, pulse 71 bpm.   STANDING /72 mmHg, pulse 81 bpm.  STANDING BP post gait 171/82 mmHg, pulse 86 bpm.  Pt demos (I) with bed mobility, supervision with transfers and gait x150 ft with s/s hypotension monitored - pt asymptomatic. No skilled PT needs identified and pt confirms, \"I feel close to my normal.\" PT will sign off.  -JV       Row Name 01/17/25 1402          Therapy Assessment/Plan (PT)    Criteria for Skilled Interventions Met (PT) no;no problems identified which require skilled intervention  -JV     Therapy Frequency (PT) evaluation only  -JV       Row Name 01/17/25 1402          Vital Signs    Pre Systolic BP Rehab 140  -JV     Pre Treatment Diastolic BP 73  -JV     Intra Systolic BP Rehab 162  -JV     Intra Treatment Diastolic BP 72  -JV     Post Systolic BP Rehab 166  -JV     Post Treatment Diastolic BP 72  -JV     Pre Patient Position Supine  -JV     Intra Patient Position Sitting  -JV     Post Patient Position Standing  -JV       Row Name " "01/17/25 1402          Positioning and Restraints    Pre-Treatment Position in bed  -JV     Post Treatment Position bed  -JV     In Bed notified nsg;fowlers;call light within reach;encouraged to call for assist;exit alarm on;with family/caregiver  -DENA               User Key  (r) = Recorded By, (t) = Taken By, (c) = Cosigned By      Initials Name Provider Type    Eleonora Ponce Physical Therapist                   Outcome Measures    No documentation.                                Physical Therapy Education       Title: PT OT SLP Therapies (Done)       Topic: Physical Therapy (Done)       Point: Mobility training (Done)       Learning Progress Summary            Patient Acceptance, E,TB, VU by DENA at 1/17/2025 1405                                      User Key       Initials Effective Dates Name Provider Type Discipline    DENA 03/30/21 -  Eleonora Inman Physical Therapist PT                  PT Recommendation and Plan     Outcome Evaluation: Pt is an 80 y/o male presenting to [facility] on 1/16 with c/o R sided chest pain and dizziness, specifically with standing.   1/16: CT head: No acute intracranial process identified.  1/16: XR R shoulder: No radiographic evidence of acute fracture or dislocation.  PMH: CAD, HLD, DM with Neuropathy.  Pt A&O x4. Pt reports PLOF of living with wife and daughter in [facility] with curb x2 to enter (porch and doorway); PLOF of (I) with household/community mobility/ambulation, very seldomly using cane; owns Rwx. Pt still driving. Pt reports 2 falls in last 6 months (moving heavy appliances).  Orthostatic Vitals:  SUPINE /73 mmHg,  SITTING /72 mmHg, pulse 71 bpm.   STANDING /72 mmHg, pulse 81 bpm.  STANDING BP post gait 171/82 mmHg, pulse 86 bpm.  Pt demos (I) with bed mobility, supervision with transfers and gait x150 ft with s/s hypotension monitored - pt asymptomatic. No skilled PT needs identified and pt confirms, \"I feel close to my normal.\" PT will sign off.     " Time Calculation:         PT Charges       Row Name 01/17/25 1406             Time Calculation    Start Time 1118  -JV      Stop Time 1145  -JV      Time Calculation (min) 27 min  -CHIQUIV      PT Received On 01/17/25  -JV         Time Calculation- PT    Total Timed Code Minutes- PT 0 minute(s)  -JV                User Key  (r) = Recorded By, (t) = Taken By, (c) = Cosigned By      Initials Name Provider Type    Eleonora Ponce Physical Therapist                  Therapy Charges for Today       Code Description Service Date Service Provider Modifiers Qty    77649970920 HC PT EVAL MOD COMPLEXITY 4 1/17/2025 Eleonora Inman GP 1            PT G-Codes  AM-PAC 6 Clicks Score (PT): 21  PT Discharge Summary  Anticipated Discharge Disposition (PT): home    Eleonora Inman  1/17/2025

## 2025-01-17 NOTE — DISCHARGE SUMMARY
Discharge Note   Heri Vasquez 1945 [id number]     Date of Admission:1/16/2025     Date of Discharge: 01/17/25     Admission Diagnosis: Dizziness [R42]  Rib pain on right side [R07.81]     Discharge Diagnosis:     Dizziness    Chronic coronary artery disease    Type 2 diabetes mellitus with unspecified complications    Essential hypertension       Consults: none    Hospital Course: Heri Vasquez is a 79 y.o. male with a previous medical history of CAD, HLD, DM with Neuropathy who presented to [facility] on 1/16/2025 with complaints of right sided chest pain and dizziness.  He states that the dizziness has been occurring when he stands up for awhile now. His chest pain started after shoveling snow and radiates to his shoulder. At times, it becomes a stabbing chest pain and is so severe that he gets dizzy.   UA is negative. CT head showed no acute abnormality.  X rays of the shoulder and ribs show no acute abnormalities.  He received normal saline.  His BP and dizziness improved inpatient. Echo OP to be done. PT see the pt and safe to go home.     Vitals:    01/17/25 1100   BP:    Pulse: 70   Resp:    Temp:    SpO2: 91%        Disposition: home      Discharged Condition: good    Activity: ambulate in house    Diet:  Diet Order   Procedures    Diet: Cardiac; Healthy Heart (2-3 Na+); Fluid Consistency: Thin (IDDSI 0)        Labs:    Results from last 7 days   Lab Units 01/17/25  0400 01/16/25  1531   WBC 10*3/mm3 13.17* 13.00*   HEMOGLOBIN g/dL 15.1 14.4   HEMATOCRIT % 50.5 48.1   PLATELETS 10*3/mm3 406 383       Results from last 7 days   Lab Units 01/17/25  0400 01/16/25  1531   SODIUM mmol/L 139 140   POTASSIUM mmol/L 4.1 4.3   CHLORIDE mmol/L 105 104   CO2 mmol/L 24.5 24.6   BUN mg/dL 10 10   CREATININE mg/dL 0.93 0.92                  Discharge Medications        ASK your doctor about these medications        Instructions Start Date   acetaminophen 650 MG 8 hr tablet  Commonly known as: TYLENOL    650 mg, Oral, Every 8 Hours PRN      aspirin 81 MG EC tablet   81 mg, Oral, Daily, LD 4/13      atorvastatin 20 MG tablet  Commonly known as: LIPITOR   20 mg, Oral, Daily      betamethasone valerate 0.1 % ointment  Commonly known as: VALISONE   2 Times Daily      calcium citrate-vitamin d 315-250 MG-UNIT tablet tablet  Commonly known as: CALCITRATE   1 tablet, 2 Times Daily      donepezil 5 MG tablet  Commonly known as: ARICEPT   TAKE 1 TABLET BY MOUTH AT NIGHT      hydrocortisone 2.5 % cream   2 Times Daily      isosorbide mononitrate 60 MG 24 hr tablet  Commonly known as: IMDUR   TAKE 1 AND 1/2 TABLETS BY MOUTH  DAILY      lidocaine 5 %  Commonly known as: Lidoderm   1 patch, Transdermal, Every 24 Hours, Remove & Discard patch within 12 hours or as directed by MD      metFORMIN 500 MG tablet  Commonly known as: GLUCOPHAGE   TAKE 1 TABLET BY MOUTH TWICE  DAILY WITH A MEAL      multivitamin with minerals tablet tablet   1 tablet, Daily      naproxen 500 MG tablet  Commonly known as: NAPROSYN   500 mg, Oral, 2 Times Daily PRN      nitroglycerin 0.4 MG SL tablet  Commonly known as: NITROSTAT   0.4 mg, Sublingual, Every 5 Minutes PRN      pantoprazole 40 MG EC tablet  Commonly known as: PROTONIX   40 mg, Oral, Daily      pregabalin 75 MG capsule  Commonly known as: LYRICA   75 mg, Oral, 3 Times Daily      primidone 50 MG tablet  Commonly known as: MYSOLINE   25 mg, Oral, 3 Times Daily                Spent at least 35 minutes in the management of patient's care including but not limited to physical exam, review of vital signs, labs, cultures and imaging studies, discussing the hospital stay along with plan of care at home, preparation and coordinating of discharge, arranging follow up care and referrals as indicated. Plan also discussed with [facility].    Matt Alba MD  Hospitalist  01/17/25   11:17 EST

## 2025-01-18 NOTE — OUTREACH NOTE
Prep Survey      Flowsheet Row Responses   [facility] patient discharged from? [facility]   Is LACE score < 7 ? No   Eligibility [facility]   Date of Admission 01/16/25   Date of Discharge 01/17/25   Discharge Disposition Home or Self Care   Discharge diagnosis Rib pain on right side   Does the patient have one of the following disease processes/diagnoses(primary or secondary)? Other   Does the patient have Home health ordered? No   Is there a DME ordered? No   Prep survey completed? Yes            DEO LOPEZ - Registered Nurse

## 2025-01-20 ENCOUNTER — TRANSITIONAL CARE MANAGEMENT TELEPHONE ENCOUNTER (OUTPATIENT)
Dept: CALL CENTER | Facility: HOSPITAL | Age: 80
End: 2025-01-20
Payer: MEDICARE

## 2025-01-20 NOTE — OUTREACH NOTE
Call Center TCM Note      Flowsheet Row Responses   [facility] patient discharged from? [facility]   Does the patient have one of the following disease processes/diagnoses(primary or secondary)? Other   TCM attempt successful? Yes   Call start time 1115   Call end time 1119   Meds reviewed with patient/caregiver? Yes   Is the patient having any side effects they believe may be caused by any medication additions or changes? No   Does the patient have all medications ordered at discharge? No   What is keeping the patient from filling the prescriptions? --  [States will  medication today.]   Nursing Interventions No intervention needed   Is the patient taking all medications as directed (includes completed medication regime)? No   What is preventing the patient from taking all medications as directed? Other   Nursing Interventions Nurse provided patient education   Medication comments States not having any dizziness, but will  medication today.   Comments Patient declined to schedule hospital f/u appt with PCP. States will f/u with cardiology and PCP at his convenience.   Does the patient have an appointment with their PCP within 7-14 days of discharge? No   Nursing Interventions Patient declined scheduling/rescheduling appointment at this time, Routed TCM call to PCP office   Has home health visited the patient within 72 hours of discharge? N/A   Psychosocial issues? No   Did the patient receive a copy of their discharge instructions? Yes   Nursing interventions Reviewed instructions with patient   What is the patient's perception of their health status since discharge? Improving   Is the patient/caregiver able to teach back signs and symptoms related to disease process for when to call PCP? Yes   Is the patient/caregiver able to teach back signs and symptoms related to disease process for when to call 911? Yes   Is the patient/caregiver able to teach back the hierarchy of who to call/visit for  symptoms/problems? PCP, Specialist, Home health nurse, Urgent Care, ED, 911 Yes   If the patient is a current smoker, are they able to teach back resources for cessation? Not a smoker   TCM call completed? Yes   Wrap up additional comments Patient states is improving. Denies any dizziness/chest pain or other s/s today. Denies any needs. TCM complete.   Call end time 1119   Would this patient benefit from a Referral to [facility] Social Work? No   Is the patient interested in additional calls from an ambulatory ? No            Elissa Estes RN    1/20/2025, 11:20 EST

## 2025-01-28 ENCOUNTER — READMISSION MANAGEMENT (OUTPATIENT)
Dept: CALL CENTER | Facility: HOSPITAL | Age: 80
End: 2025-01-28
Payer: MEDICARE

## 2025-01-28 ENCOUNTER — OFFICE VISIT (OUTPATIENT)
Dept: SLEEP MEDICINE | Facility: CLINIC | Age: 80
End: 2025-01-28
Payer: MEDICARE

## 2025-01-28 VITALS
HEART RATE: 86 BPM | HEIGHT: 68 IN | WEIGHT: 220 LBS | SYSTOLIC BLOOD PRESSURE: 149 MMHG | BODY MASS INDEX: 33.34 KG/M2 | DIASTOLIC BLOOD PRESSURE: 66 MMHG | OXYGEN SATURATION: 94 %

## 2025-01-28 DIAGNOSIS — I10 ESSENTIAL HYPERTENSION: ICD-10-CM

## 2025-01-28 DIAGNOSIS — G47.33 OSA TREATED WITH BIPAP: Primary | ICD-10-CM

## 2025-01-28 DIAGNOSIS — E66.811 CLASS 1 OBESITY: ICD-10-CM

## 2025-01-28 PROCEDURE — G0463 HOSPITAL OUTPT CLINIC VISIT: HCPCS

## 2025-01-28 NOTE — PROGRESS NOTES
"  [facility]  Sleep medicine  [facility], [address]  [place] [address]  Phone   Fax         SLEEP CLINIC FOLLOW UP PROGRESS NOTE.    Heri Vasquez  1945  79 y.o.  male      PCP: Olesya Cueva MD      Date of visit: 1/28/2025    Chief Complaint   Patient presents with    Follow-up    Sleep Apnea       HPI:  This is a 79 y.o. years old patient who has a history of obstructive sleep apnea is here for  the annual compliance follow-up.  Patient is using positive airway pressure therapy with auto BiPAP and the symptoms of snoring, non-restorative sleep and daytime excessive sleepiness have improved significantly on the therapy. Normally goes to bed at 11 PM and wakes up at 7 AM.  The patient wakes up 1 time(s) during the night and has no problem going back to sleep.  Feels refreshed after waking up.  Patient also denies headaches and nasal congestion.     Medications and allergies are reviewed by me and documented in the encounter.     SOCIAL ( habits pertaining to sleep medicine)  History tobacco use:No   History of alcohol use: 0 per week  Caffeine use: 3     REVIEW OF SYSTEMS:   Lambert Sleepiness Scale :Total score: 6   Nasal congestion:No   Dry mouth/nose:No   Post nasal drip; Yes     PHYSICAL EXAMINATION:  CONSTITUTIONAL:  Vitals:    01/28/25 0900   BP: 149/66   BP Location: Left arm   Patient Position: Sitting   Pulse: 86   SpO2: 94%   Weight: 99.8 kg (220 lb)   Height: 172.7 cm (68\")    Body mass index is 33.45 kg/m².   NOSE: nasal passages are clear, no nasal polyps, septum in the midline.  THROAT: throat is clear, oral airway Mallampati class 3  RESP SYSTEM: Breath sounds are normal, no wheezes or crackles  CARDIOVASULAR: Heart rate is regular without murmur. No edema      Data reviewed:  The Smart card downloaded on 1/28/2025 has been reviewed independently by me for compliance and discussed the data with the patient.   Compliance; 96%  More than 4 " hr use, 93%  Average use of the device 78 hours and 35 minutes per night  Residual AHI: 5 /hr (goal < 5.0 /hr)  Mask type: Nasal mask  Device: BiPAP auto   DME: [employer]      ASSESSMENT AND PLAN:  Obstructive sleep apnea ( G 47.33).  The symptoms of sleep apnea have improved with the device and the treatment.  Patient's compliance with the device is excellent for treatment of sleep apnea.  I have independently reviewed the smart card down load and discussed with the patient the download data and encouarged the patient to continue to use the device.The residual AHI is acceptable. The device is benefiting the patient and the device is medically necessary.  Without proper control of sleep apnea and good compliance there is a increased risk for hypertension, diabetes mellitus and nonrestorative sleep with hypersomnia which can increase risk for motor vehicle accidents.  Untreated sleep apnea is also a risk factor for development of atrial fibrillation, pulmonary hypertension and stroke. The patient is also instructed to get the supplies from the [employer] company and and change them on a regular basis.  A prescription for supplies has been sent to the [employer] company.  I have also discussed the good sleep hygiene habits and adequate amount of sleep needed for good health.  Obesity, class 1 with BMI is Body mass index is 33.45 kg/m².. I have discuss the relationship between the weight and sleep apnea. The benefit of weight loss in reducing severity of sleep apnea was discussed. Discussed diet and exercise with the patient to achieve ideal BMI   Return in about 1 year (around 1/28/2026) for with smart card down load. . Patient's questions were answered.        Nancy Harris MD  Sleep Medicine.  Medical Director, [facility] sleep [facility]  1/28/2025 ,

## 2025-01-28 NOTE — OUTREACH NOTE
Medical Week 2 Survey      Flowsheet Row Responses   [facility] patient discharged from? [facility]   Does the patient have one of the following disease processes/diagnoses(primary or secondary)? Other   Week 2 attempt successful? Yes   Call start time 1530   Discharge diagnosis Rib pain on right side   Call end time 1536   Meds reviewed with patient/caregiver? Yes   Does the patient have all medications ordered at discharge? Yes   Medication comments Pt states that he has not used Meclizine because he does not know when the dizzy spells will hit and they do not last long.   Does the patient have a primary care provider?  Yes   Does the patient have an appointment with their PCP within 7 days of discharge? Greater than 7 days   What is preventing the patient from scheduling follow up appointments within 7 days of discharge? Earlier appointment not available   Has the patient kept scheduled appointments due by today? Yes   Comments Pt reports ongoing pain on R. side of chest radiating to his shoulder, pt reports that nothing helps the pain. Pt reports ongoing dizziness, he notices that feeling when he changes positions quickly bends over or turns head quickly, when pt is still or is not moving swiftly then dizziness resolves. Pt denies SOA. Pt monitors his BP randomly, he reports.Pt reports that he is tolerating po intake WNL.   What is the patient's perception of their health status since discharge? Improving   Is the patient/caregiver able to teach back signs and symptoms related to disease process for when to call PCP? Yes   Is the patient/caregiver able to teach back signs and symptoms related to disease process for when to call 911? Yes   Week 2 Call Completed? Yes   Revoked No further contact(revokes)-requires comment   Call end time 1536            Aydee ALONZO - Registered Nurse

## 2025-02-04 DIAGNOSIS — G89.29 CHRONIC LEFT-SIDED LOW BACK PAIN WITH LEFT-SIDED SCIATICA: ICD-10-CM

## 2025-02-04 DIAGNOSIS — M54.42 CHRONIC LEFT-SIDED LOW BACK PAIN WITH LEFT-SIDED SCIATICA: ICD-10-CM

## 2025-02-04 RX ORDER — PREGABALIN 75 MG/1
75 CAPSULE ORAL 3 TIMES DAILY
Qty: 90 CAPSULE | Refills: 5 | Status: SHIPPED | OUTPATIENT
Start: 2025-02-04

## 2025-02-07 ENCOUNTER — OFFICE VISIT (OUTPATIENT)
Dept: FAMILY MEDICINE CLINIC | Facility: CLINIC | Age: 80
End: 2025-02-07
Payer: MEDICARE

## 2025-02-07 VITALS
BODY MASS INDEX: 33.34 KG/M2 | SYSTOLIC BLOOD PRESSURE: 135 MMHG | RESPIRATION RATE: 18 BRPM | TEMPERATURE: 97.9 F | DIASTOLIC BLOOD PRESSURE: 73 MMHG | HEIGHT: 68 IN | OXYGEN SATURATION: 94 % | WEIGHT: 220 LBS | HEART RATE: 73 BPM

## 2025-02-07 DIAGNOSIS — R10.11 RUQ ABDOMINAL PAIN: ICD-10-CM

## 2025-02-07 DIAGNOSIS — R42 LIGHTHEADED: ICD-10-CM

## 2025-02-07 DIAGNOSIS — R53.83 OTHER FATIGUE: Primary | ICD-10-CM

## 2025-02-07 DIAGNOSIS — R07.9 CHEST PAIN, UNSPECIFIED TYPE: ICD-10-CM

## 2025-02-07 NOTE — PROGRESS NOTES
Transitional Care Follow Up Visit  Subjective     Heri Vasquez is a 79 y.o. male who presents for a transitional care management visit.    Within 48 business hours after discharge our office contacted him via telephone to coordinate his care and needs.      I reviewed and discussed the details of that call along with the discharge summary, hospital problems, inpatient lab results, inpatient diagnostic studies, and consultation reports with Heri.     Current outpatient and discharge medications have been reconciled for the patient.  Reviewed by: Olesya Cueva MD          1/17/2025     7:04 PM   Date of TCM Phone Call   [facility]   Date of Admission 1/16/2025   Date of Discharge 1/17/2025   Discharge Disposition Home or Self Care     Risk for Readmission (LACE) No data recorded    History of Present Illness   Course During Hospital Stay:   Heri Vasquez is a 79 y.o. male with a previous medical history of CAD, HLD, DM with Neuropathy who presented to [facility] on 1/16/2025 with complaints of right sided chest pain and dizziness.  He states that the dizziness has been occurring when he stands up for awhile now. His chest pain started after shoveling snow and radiates to his shoulder. At times, it becomes a stabbing chest pain and is so severe that he gets dizzy.   UA is negative. CT head showed no acute abnormality.  X rays of the shoulder and ribs show no acute abnormalities.  He received normal saline.  His BP and dizziness improved inpatient. Echo was ordered but it was not done while he was at the hospital.  PT see the pt and safe to go home.      The following portions of the patient's history were reviewed and updated as appropriate: allergies, current medications, past family history, past medical history, past social history, past surgical history, and problem list.    Review of Systems    Objective   /73 (BP Location: Left arm, Patient Position: Sitting, Cuff Size:  "Large Adult)   Pulse 73   Temp 97.9 °F (36.6 °C) (Temporal)   Resp 18   Ht 172.7 cm (68\")   Wt 99.8 kg (220 lb)   SpO2 94%   BMI 33.45 kg/m²   Physical Exam  Vitals and nursing note reviewed.   Constitutional:       General: He is not in acute distress.     Appearance: He is well-developed.   HENT:      Head: Normocephalic.   Eyes:      General: Lids are normal.      Conjunctiva/sclera: Conjunctivae normal.   Neck:      Thyroid: No thyroid mass or thyromegaly.      Trachea: Trachea normal.   Cardiovascular:      Rate and Rhythm: Normal rate and regular rhythm.      Heart sounds: Normal heart sounds.   Pulmonary:      Effort: Pulmonary effort is normal.      Breath sounds: Normal breath sounds.   Abdominal:      Palpations: Abdomen is soft.   Musculoskeletal:      Cervical back: Normal range of motion.   Lymphadenopathy:      Cervical: No cervical adenopathy.   Skin:     General: Skin is warm and dry.   Neurological:      Mental Status: He is alert and oriented to person, place, and time.   Psychiatric:         Attention and Perception: He is attentive.         Mood and Affect: Mood normal.         Speech: Speech normal.         Behavior: Behavior normal.         Assessment & Plan   Diagnoses and all orders for this visit:    1. Other fatigue (Primary)  -     Adult Transthoracic Echo Complete W/ Cont if Necessary Per Protocol; Future    2. Lightheaded  -     Adult Transthoracic Echo Complete W/ Cont if Necessary Per Protocol; Future    3. Chest pain, unspecified type  -     Adult Transthoracic Echo Complete W/ Cont if Necessary Per Protocol; Future    4. RUQ abdominal pain  -     US Abdomen Limited; Future                   "

## 2025-02-11 NOTE — PROGRESS NOTES
Subjective: Neuropathy and tremor    Patient ID: Heri Vasquez is a 79 y.o. male.    History of Present Illness Mr. Vasquez is an 79-year-old  male who was initially referred by Dr. Cueva for evaluation of neuropathy and tremor.  At his last visit he was changed to Lyrica and is titrated to 75 mg 3 times daily.  He was also changed to primidone for tremor, currently taking 25 mg 3 times daily.  The patient feels his neuropathy is controlled and would like to continue the Lyrica 3 times a day 75 mg.  He states the tremor is not completely controlled and that he is having breakthrough tremor when trying to do fine motor movements.  He has tolerated 25 mg 3 times daily and I will change his dose to 50 mg twice daily.  If the tremor is still bothering him in the middle of the day he is to call the clinic so that we can change it to 50 mg 3 times daily.  I will schedule the patient back for a follow-up visit in 6 months.    Left ulnar neuropathy  Location: Left hand  Quality: Burning tingling  Severity: Controlled with medication  Duration: All the time  Frequency: Every day  Timing: Unrelated  Context: Unrelated  Modifying factors:  Lyrica  Associated Signs and symptoms:    Current meds: Lyrica 75 mg 3 times daily, patient feels it is beneficial and would like to continue this dose      Tremors-   Location: Bilateral hands right worse than the left   Quality: Tremor in both hands  Severity: Can be severe, primidone has decreased somewhat  Duration:  varies  Frequency: Every day  Timing: Unrelated  Context: Doing fine motor movements  Modifying factors: Primidone 25 mg 3 times daily helped a little  Associated Signs and symptoms:    Current meds: Primidone 25 mg 3 times daily    Testing:   Date of Study: 3/25/22   EMG Results:  1.  The right median sensory study was attempted no nerve action potentials recorded.  The right median motor distal latency was markedly prolonged forearm conduction velocity was  normal.  2.  The right ulnar sensory distal latency was mildly prolonged.  The conduction velocity below elbow was slightly reduced with no significant drop in velocity across the elbow segment of the ulnar nerve.  3.  EMG of the muscles examined in the C5-T1 myotomes were essentially normal except for the abductor pollicis brevis which showed both acute and chronic neurogenic change and mild decrease recruitment in the [facility] musculus muscles.  Impression:  This is an abnormal study.  There is evidence of a severe right median neuropathy with most probable localization of a focal neuropathy at the wrist superimposed on a mild generalized motor or sensory neuropathy.  There is no evidence of focal ulnar neuropathy at the elbow at this time.  Electronically signed by :  Joseph Seipel, M.D.  March 25, 2022    CT Head  Narrative & Impression  CT HEAD WO CONTRAST  Date of Exam: 1/16/2025 4:35 PM EST   Indication: intermittent dizziness.  Comparison: December 5, 2024  IMPRESSION:  Impression:  1.No acute intracranial process identified.  2.Findings suggestive of mild chronic small vessel ischemic disease.  3.Mucosal disease with some high density secretions within right sphenoid sinus, which could represent a component of inspissated secretions versus fungal colonization.  Electronically Signed: Cristi Basurto MD    1/16/2025 4:43 PM EST    Workstation ID: [id number]      The following portions of the patient's history were reviewed and updated as appropriate: allergies, current medications, past family history, past medical history, past social history, past surgical history and problem list.    Family History   Problem Relation Age of Onset    Heart disease Mother         CHF    Hypertension Mother     Heart failure Mother     Diabetes Brother     Arthritis Maternal Grandmother     Stroke Maternal Grandfather     Diabetes Other     Diabetes Other     Stroke Other     Sleep apnea Neg Hx        Past Medical  History:   Diagnosis Date    Arthritis     Dr Mosqueda    Coronary heart disease 2016    single vessel disease, nl stress test (copied from [employer])    Coronary heart disease 10/31/2017    cath 10/31/17 - Low % Blockages- N o Intervention   (copied from [employer])    Diverticulosis     Fatty liver     GERD (gastroesophageal reflux disease)     Hearing loss     Hyperlipemia     Hyperlipidemia     Hypertension     Low back pain     Neuropathy in diabetes     SIDDHARTHA treated with BiPAP     Pain management     injections Lumbar spine X2 with [facility] @ [facility] (copied from [facility])    Physical therapy evaluation, initial     @ [facility] in [place] 6 weeks x3 per week, [facility] (copied from [facility])    Rectal bleed 2012    hemorrhoids    Retinal hemorrhage of right eye 2014    Sleep apnea     bipap    Tinea pedis of right foot 2023    Type 2 diabetes mellitus     Uses brace        Social History     Socioeconomic History    Marital status:      Spouse name: Shani   Tobacco Use    Smoking status: Former     Current packs/day: 0.00     Average packs/day: 1.5 packs/day for 30.0 years (45.0 ttl pk-yrs)     Types: Cigarettes     Start date: 1970     Quit date: 2000     Years since quittin.1    Smokeless tobacco: Former   Vaping Use    Vaping status: Never Used   Substance and Sexual Activity    Alcohol use: No    Drug use: No    Sexual activity: Not Currently     Partners: Female         Current Outpatient Medications:     acetaminophen (TYLENOL) 650 MG 8 hr tablet, Take 1 tablet by mouth Every 8 (Eight) Hours As Needed for Mild Pain., Disp: , Rfl:     aspirin 81 MG EC tablet, Take 1 tablet by mouth Daily. LD , Disp: , Rfl:     atorvastatin (LIPITOR) 20 MG tablet, TAKE 1 TABLET BY MOUTH ONCE  DAILY, Disp: 90 tablet, Rfl: 3    betamethasone valerate (VALISONE) 0.1 % ointment, Apply 1 Application topically to the appropriate area as directed 2 (Two) Times a Day., Disp: ,  Rfl:     calcium citrate-vitamin d (CALCITRATE) 315-250 MG-UNIT tablet tablet, Take 1 tablet by mouth 2 (Two) Times a Day., Disp: , Rfl:     donepezil (ARICEPT) 5 MG tablet, TAKE 1 TABLET BY MOUTH AT NIGHT, Disp: 90 tablet, Rfl: 3    hydrocortisone 2.5 % cream, Apply 1 Application topically to the appropriate area as directed 2 (Two) Times a Day., Disp: , Rfl:     isosorbide mononitrate (IMDUR) 60 MG 24 hr tablet, TAKE 1 AND 1/2 TABLETS BY MOUTH  DAILY, Disp: 135 tablet, Rfl: 3    lidocaine (Lidoderm) 5 %, Place 1 patch on the skin as directed by provider Daily. Remove & Discard patch within 12 hours or as directed by MD, Disp: 6 each, Rfl: 0    meclizine (ANTIVERT) 25 MG tablet, Take 0.5 tablets by mouth 3 (Three) Times a Day As Needed for Dizziness., Disp: 20 tablet, Rfl: 0    metFORMIN (GLUCOPHAGE) 500 MG tablet, TAKE 1 TABLET BY MOUTH TWICE  DAILY WITH A MEAL, Disp: 180 tablet, Rfl: 3    Multiple Vitamins-Minerals (MULTIVITAMIN WITH MINERALS) tablet tablet, Take 1 tablet by mouth Daily., Disp: , Rfl:     naproxen (NAPROSYN) 500 MG tablet, TAKE 1 TABLET BY MOUTH TWICE  DAILY AS NEEDED FOR MODERATE  PAIN, Disp: 180 tablet, Rfl: 3    nitroglycerin (NITROSTAT) 0.4 MG SL tablet, Place 1 tablet under the tongue Every 5 (Five) Minutes As Needed for Chest Pain., Disp: 30 tablet, Rfl: 2    pantoprazole (PROTONIX) 40 MG EC tablet, TAKE 1 TABLET BY MOUTH DAILY, Disp: 90 tablet, Rfl: 3    pregabalin (LYRICA) 75 MG capsule, Take 1 capsule by mouth 3 (Three) Times a Day., Disp: 90 capsule, Rfl: 5    primidone (MYSOLINE) 50 MG tablet, Take 1 tablet by mouth 2 (Two) Times a Day., Disp: 180 tablet, Rfl: 3    Review of Systems       I have reviewed ROS completed by medical assistant.     Objective:      Neurological Exam  Mental Status  Awake and alert. Oriented only to person, place, time and situation. Speech is normal. Language is fluent with no aphasia. Attention and concentration are normal. Fund of knowledge is appropriate  for level of education.    Cranial Nerves  CN II: Right visual acuity: Normal. Left visual acuity: Normal. Right normal visual field. Left normal visual field.  CN III, IV, VI: Extraocular movements intact bilaterally. No nystagmus. Normal saccades. Normal smooth pursuit.   Right pupil: 3 mm.   Left pupil: 3 mm.  CN VII:  Right: There is no facial weakness.  Left: There is no facial weakness.    Motor  Normal muscle bulk throughout. No fasciculations present.    Sensory  Light touch is normal in upper and lower extremities.     Gait  Casual gait is normal including stance, stride, and arm swing. Slightly slow.         Assessment/Plan:  Discussion: As the patient's discomfort with his ulnar neuropathy is controlled I will continue the Lyrica 75 mg 3 times daily.  For tremor we will slightly increase the primidone to 50 mg twice daily.  He was notified that it could make him sleepy.  He is to call the clinic if he feels this needs to be changed to 3 times daily.      Diagnoses and all orders for this visit:    1. Neuritis of right ulnar nerve (Primary)  -     pregabalin (LYRICA) 75 MG capsule; Take 1 capsule by mouth 3 (Three) Times a Day.  Dispense: 90 capsule; Refill: 5    2. Tremor  -     primidone (MYSOLINE) 50 MG tablet; Take 1 tablet by mouth 2 (Two) Times a Day.  Dispense: 180 tablet; Refill: 3          Return in about 6 months (around 8/12/2025) for [facility].     I spent 32 minutes caring for Heri on this date of service. This time includes time spent by me in the following activities: preparing for the visit, reviewing tests, obtaining and/or reviewing a separately obtained history, performing a medically appropriate examination and/or evaluation, counseling and educating the patient/family/caregiver, ordering medications, tests, or procedures, and documenting information in the medical record.      This document has been electronically signed by SWATHI Huynh on February 12, 2025 11:52 EST

## 2025-02-12 ENCOUNTER — OFFICE VISIT (OUTPATIENT)
Dept: NEUROLOGY | Facility: CLINIC | Age: 80
End: 2025-02-12
Payer: MEDICARE

## 2025-02-12 VITALS
SYSTOLIC BLOOD PRESSURE: 119 MMHG | HEIGHT: 68 IN | BODY MASS INDEX: 33.34 KG/M2 | WEIGHT: 220 LBS | HEART RATE: 69 BPM | DIASTOLIC BLOOD PRESSURE: 70 MMHG

## 2025-02-12 DIAGNOSIS — G56.21 NEURITIS OF RIGHT ULNAR NERVE: Primary | ICD-10-CM

## 2025-02-12 DIAGNOSIS — R25.1 TREMOR: ICD-10-CM

## 2025-02-12 PROCEDURE — 1159F MED LIST DOCD IN RCRD: CPT | Performed by: NURSE PRACTITIONER

## 2025-02-12 PROCEDURE — 1160F RVW MEDS BY RX/DR IN RCRD: CPT | Performed by: NURSE PRACTITIONER

## 2025-02-12 PROCEDURE — 3074F SYST BP LT 130 MM HG: CPT | Performed by: NURSE PRACTITIONER

## 2025-02-12 PROCEDURE — 3078F DIAST BP <80 MM HG: CPT | Performed by: NURSE PRACTITIONER

## 2025-02-12 PROCEDURE — 99214 OFFICE O/P EST MOD 30 MIN: CPT | Performed by: NURSE PRACTITIONER

## 2025-02-12 RX ORDER — PREGABALIN 75 MG/1
75 CAPSULE ORAL 3 TIMES DAILY
Qty: 90 CAPSULE | Refills: 5 | Status: SHIPPED | OUTPATIENT
Start: 2025-02-12

## 2025-02-12 RX ORDER — PRIMIDONE 50 MG/1
50 TABLET ORAL 2 TIMES DAILY
Qty: 180 TABLET | Refills: 3 | Status: SHIPPED | OUTPATIENT
Start: 2025-02-12 | End: 2026-02-12

## 2025-02-21 ENCOUNTER — HOSPITAL ENCOUNTER (OUTPATIENT)
Dept: ULTRASOUND IMAGING | Facility: HOSPITAL | Age: 80
Discharge: HOME | End: 2025-02-21
Payer: MEDICARE

## 2025-02-21 ENCOUNTER — HOSPITAL ENCOUNTER (OUTPATIENT)
Dept: CARDIOLOGY | Facility: HOSPITAL | Age: 80
Discharge: HOME | End: 2025-02-21
Payer: MEDICARE

## 2025-02-21 VITALS
SYSTOLIC BLOOD PRESSURE: 156 MMHG | BODY MASS INDEX: 33.34 KG/M2 | WEIGHT: 220 LBS | HEIGHT: 68 IN | DIASTOLIC BLOOD PRESSURE: 77 MMHG

## 2025-02-21 DIAGNOSIS — R42 LIGHTHEADED: ICD-10-CM

## 2025-02-21 DIAGNOSIS — R53.83 OTHER FATIGUE: ICD-10-CM

## 2025-02-21 DIAGNOSIS — R10.11 RUQ ABDOMINAL PAIN: ICD-10-CM

## 2025-02-21 DIAGNOSIS — R07.9 CHEST PAIN, UNSPECIFIED TYPE: ICD-10-CM

## 2025-02-21 LAB
AORTIC DIMENSIONLESS INDEX: 0.81 (DI)
AV MEAN PRESS GRAD SYS DOP V1V2: 3 MMHG
AV VMAX SYS DOP: 128 CM/SEC
BH CV ECHO MEAS - AO MAX PG: 6.6 MMHG
BH CV ECHO MEAS - AO V2 VTI: 28.3 CM
BH CV ECHO MEAS - AVA(I,D): 3.2 CM2
BH CV ECHO MEAS - EDV(CUBED): 132.7 ML
BH CV ECHO MEAS - EDV(MOD-SP4): 165 ML
BH CV ECHO MEAS - EF(MOD-SP4): 53.2 %
BH CV ECHO MEAS - ESV(CUBED): 54.9 ML
BH CV ECHO MEAS - ESV(MOD-SP4): 77.2 ML
BH CV ECHO MEAS - FS: 25.5 %
BH CV ECHO MEAS - IVS/LVPW: 1 CM
BH CV ECHO MEAS - IVSD: 0.9 CM
BH CV ECHO MEAS - LA DIMENSION: 4.5 CM
BH CV ECHO MEAS - LAT PEAK E' VEL: 8.8 CM/SEC
BH CV ECHO MEAS - LV DIASTOLIC VOL/BSA (35-75): 77.5 CM2
BH CV ECHO MEAS - LV MASS(C)D: 163.6 GRAMS
BH CV ECHO MEAS - LV MAX PG: 4.3 MMHG
BH CV ECHO MEAS - LV MEAN PG: 2 MMHG
BH CV ECHO MEAS - LV SYSTOLIC VOL/BSA (12-30): 36.3 CM2
BH CV ECHO MEAS - LV V1 MAX: 104 CM/SEC
BH CV ECHO MEAS - LV V1 VTI: 23.9 CM
BH CV ECHO MEAS - LVIDD: 5.1 CM
BH CV ECHO MEAS - LVIDS: 3.8 CM
BH CV ECHO MEAS - LVOT AREA: 3.8 CM2
BH CV ECHO MEAS - LVOT DIAM: 2.2 CM
BH CV ECHO MEAS - LVPWD: 0.9 CM
BH CV ECHO MEAS - MED PEAK E' VEL: 8.5 CM/SEC
BH CV ECHO MEAS - MV A MAX VEL: 93.4 CM/SEC
BH CV ECHO MEAS - MV DEC SLOPE: 307 CM/SEC2
BH CV ECHO MEAS - MV DEC TIME: 0.19 SEC
BH CV ECHO MEAS - MV E MAX VEL: 87.8 CM/SEC
BH CV ECHO MEAS - MV E/A: 0.94
BH CV ECHO MEAS - MV MAX PG: 4.7 MMHG
BH CV ECHO MEAS - MV MEAN PG: 2 MMHG
BH CV ECHO MEAS - MV P1/2T: 80.5 MSEC
BH CV ECHO MEAS - MV V2 VTI: 35.4 CM
BH CV ECHO MEAS - MVA(P1/2T): 2.7 CM2
BH CV ECHO MEAS - MVA(VTI): 2.6 CM2
BH CV ECHO MEAS - PA ACC TIME: 0.14 SEC
BH CV ECHO MEAS - PA V2 MAX: 91.4 CM/SEC
BH CV ECHO MEAS - PULM A REVS DUR: 0.14 SEC
BH CV ECHO MEAS - PULM A REVS VEL: 18.7 CM/SEC
BH CV ECHO MEAS - PULM DIAS VEL: 40.3 CM/SEC
BH CV ECHO MEAS - PULM S/D: 1.23
BH CV ECHO MEAS - PULM SYS VEL: 49.7 CM/SEC
BH CV ECHO MEAS - RAP SYSTOLE: 3 MMHG
BH CV ECHO MEAS - RV MAX PG: 0.99 MMHG
BH CV ECHO MEAS - RV V1 MAX: 49.8 CM/SEC
BH CV ECHO MEAS - RV V1 VTI: 12.4 CM
BH CV ECHO MEAS - SV(LVOT): 90.9 ML
BH CV ECHO MEAS - SV(MOD-SP4): 87.8 ML
BH CV ECHO MEAS - SVI(LVOT): 42.7 ML/M2
BH CV ECHO MEAS - SVI(MOD-SP4): 41.3 ML/M2
BH CV ECHO MEAS - TAPSE (>1.6): 2.24 CM
BH CV ECHO MEASUREMENTS AVERAGE E/E' RATIO: 10.15
BH CV XLRA - RV BASE: 4.2 CM
BH CV XLRA - RV MID: 3.7 CM
BH CV XLRA - TDI S': 9.7 CM/SEC
LEFT ATRIUM VOLUME INDEX: 29.2 ML/M2
SINUS: 3.2 CM
STJ: 2.5 CM

## 2025-02-21 PROCEDURE — 76705 ECHO EXAM OF ABDOMEN: CPT

## 2025-02-21 PROCEDURE — 25010000002 SULFUR HEXAFLUORIDE MICROSPH 60.7-25 MG RECONSTITUTED SUSPENSION: Performed by: FAMILY MEDICINE

## 2025-02-21 PROCEDURE — 93306 TTE W/DOPPLER COMPLETE: CPT

## 2025-02-21 RX ADMIN — SULFUR HEXAFLUORIDE 2 ML: KIT at 10:13

## 2025-05-12 RX ORDER — DONEPEZIL HYDROCHLORIDE 5 MG/1
5 TABLET, FILM COATED ORAL
Qty: 90 TABLET | Refills: 0 | Status: SHIPPED | OUTPATIENT
Start: 2025-05-12

## 2025-05-28 RX ORDER — ISOSORBIDE MONONITRATE 60 MG/1
90 TABLET, EXTENDED RELEASE ORAL DAILY
Qty: 135 TABLET | Refills: 3 | Status: SHIPPED | OUTPATIENT
Start: 2025-05-28

## 2025-06-27 ENCOUNTER — OFFICE VISIT (OUTPATIENT)
Dept: CARDIOLOGY | Facility: CLINIC | Age: 80
End: 2025-06-27
Payer: MEDICARE

## 2025-06-27 VITALS
HEIGHT: 68 IN | SYSTOLIC BLOOD PRESSURE: 130 MMHG | OXYGEN SATURATION: 95 % | BODY MASS INDEX: 33.89 KG/M2 | DIASTOLIC BLOOD PRESSURE: 78 MMHG | WEIGHT: 223.6 LBS | HEART RATE: 75 BPM

## 2025-06-27 DIAGNOSIS — I25.10 CHRONIC CORONARY ARTERY DISEASE: ICD-10-CM

## 2025-06-27 DIAGNOSIS — I20.89 STABLE ANGINA PECTORIS: Primary | ICD-10-CM

## 2025-06-27 DIAGNOSIS — E78.2 MIXED HYPERLIPIDEMIA: ICD-10-CM

## 2025-06-27 DIAGNOSIS — I83.811 VARICOSE VEINS OF RIGHT LOWER EXTREMITY WITH PAIN: ICD-10-CM

## 2025-06-27 DIAGNOSIS — I10 ESSENTIAL HYPERTENSION: ICD-10-CM

## 2025-06-27 DIAGNOSIS — I87.2 VENOUS INSUFFICIENCY OF LEG: ICD-10-CM

## 2025-06-27 NOTE — PROGRESS NOTES
Cardiology Office Visit      Encounter Date:  06/27/2025    Patient ID:   Heri Vasquez is a 80 y.o. male.    Reason For Followup:  coronary artery disease, dyslipidemia,     Brief Clinical History:  Dear Dr. Cueva, Olesya Bay MD    I had the pleasure of seeing Heri Vasquez today. As you are well aware, this is a 80 y.o. male  with history of mild coronary artery disease, ,  dyslipidemia, and gastroesophageal reflux disease.  He uses BiPAP machine for obstructive sleep apnea.  Here for follow-up    Impressions:/2021  Mild to moderate obstructive coronary disease involving the LAD and right coronary artery  Distal LAD with segment of intramyocardial bridging  Normal LV systolic function  Normal wall motion  Estimated LV ejection fraction of 60%        Interval History:  Denies any new cardiac symptoms  Denies any other new cardiac symptoms  Denies any symptoms of chest pain shortness of breath dizziness or syncope  Compliant with medical therapy      Assessment & Plan    Coronary artery disease stable with no anginal chest discomfort  Dyslipidemia.  Lipids are being checked periodically.  Hypertension under fairly good control.  Fatty liver  Obesity/Body mass index is 34 kg/m².   Cardiac catheterization in May 2021 with a mild obstructive coronary artery disease  Hospitalization in September 2022 with extensive workup including a normal echocardiogram normal stress test normal CT chest    Recommendations:  Continue aspirin 81 mg p.o. once a day  Lipitor 20 mg p.o. once a day lisinopril 10 mg p.o. once a day isosorbide 90 mg p.o. once a day  Continue current medical therapy  Stable from cardiac standpoint  Home blood pressure monitoring  Continue current medical therapy continued aggressive risk factor modification  Cardiac work-up reviewed and discussed with the patient  Medical management for obstructive coronary artery disease  Recent workup and hospitalization medical records reviewed and discussed with  "patient and family  Continued aggressive risk factor modification  Medical charts from recent emergency room visit reviewed and discussed with patient  No new cardiac symptoms  Continue current management  Continue aggressive risk factor modification  Prior workup labs medications and cath findings reviewed and discussed with patient  We will see patient in the office in 6 months  Thank you very much for allowing us to participate in the care of your patients        Vitals:  Vitals:    06/27/25 1240   BP: 130/78   BP Location: Left arm   Patient Position: Sitting   Cuff Size: Large Adult   Pulse: 75   SpO2: 95%   Weight: 101 kg (223 lb 9.6 oz)   Height: 172.7 cm (68\")       Physical Exam:    General: Alert, cooperative, no distress, appears stated age  Head:  Normocephalic, atraumatic, mucous membranes moist  Eyes:  Conjunctiva/corneas clear, EOM's intact     Neck:  Supple,  no adenopathy;      Lungs: Clear to auscultation bilaterally, no wheezes rhonchi rales are noted  Chest wall: No tenderness  Heart::  Regular rate and rhythm, S1 and S2 normal, no murmur, rub or gallop  Abdomen: Soft, non-tender, nondistended bowel sounds active  Extremities: No cyanosis, clubbing, or edema  Pulses: 2+ and symmetric all extremities  Skin:  No rashes or lesions  Neuro/psych: A&O x3. CN II through XII are grossly intact with appropriate affect              Lab Results   Component Value Date    GLUCOSE 101 (H) 01/17/2025    BUN 10 01/17/2025    CREATININE 0.93 01/17/2025    EGFR 82.5 01/17/2025    BCR 10.8 01/17/2025    K 4.1 01/17/2025    CO2 24.5 01/17/2025    CALCIUM 9.1 01/17/2025    ALBUMIN 4.1 01/16/2025    BILITOT 0.4 01/16/2025    AST 35 01/16/2025    ALT 30 01/16/2025     Results for orders placed during the hospital encounter of 02/21/25    Adult Transthoracic Echo Complete W/ Cont if Necessary Per Protocol    Interpretation Summary    Left ventricular systolic function is normal. Left ventricular ejection fraction " appears to be 56 - 60%.    Left ventricular diastolic function is consistent with (grade I) impaired relaxation.    The left atrial cavity is mild to moderately dilated.    Estimated right ventricular systolic pressure from tricuspid regurgitation is normal (<35 mmHg).     Results for orders placed during the hospital encounter of 05/26/21    Cardiac Catheterization/Vascular Study    Narrative  Table formatting from the original result was not included.  Cardiac Catheterization Operative Report    Heri Vasquez  [id number]  5/28/2021  @PCP@    He underwent cardiac catheterization.    Indications for the procedure include: abnormal stress test.    Procedure Details:  The risks, benefits, complications, treatment options, and expected outcomes were discussed with the patient. The patient and/or family concurred with the proposed plan, giving informed consent.    After informed consent the patient was brought to the cath lab after appropriate IV hydration was begun and oral premedication was given. He was further sedated with fentanyl. He was prepped and draped in the usual manner. Using the modified Seldinger access technique, a 6 Khmer sheath was placed in the femoral artery. A left heart catheterization with coronary arteriography was performed. Findings are discussed below.    After the procedure was completed, sedation was stopped and the sheaths and catheters were all removed. Hemostasis was achieved per established hospital protocols.    Conscious sedation:  Conscious sedation was performed according to protocol.  I supervised and directed an independent trained observer with the assistance of monitoring the patient's level of consciousness and physiologic status throughout the procedure.  Intraoperative service time was 30 minutes.    Findings:    Hemodynamics  Central artery pressure systolic 130 and diastolic 60 with a mean pressure of 88 mmHg  LV end-diastolic pressure was 10 mmHg  No gradient across  aortic valve on the pullback of the pigtail catheter    Left Main  left main coronary arteries angiographically normal bifurcates into left anterior descending and left circumflex arteries.  With the initial injection of the coronary arteries there was a diffuse coronary artery spasm which got improved with intracoronary nitroglycerin  RCA  dominant vessel large-caliber vessel.  Right coronary artery has a focal area of angiographic 40% stenosis.  Right coronary artery distally provides PDA and PLB branches both of them are angiographically normal.  LAD  large-caliber vessel angiographically normal in the proximal portion distal right coronary artery after the first diagonal branch tapers down quickly with segment of intramyocardial bridging involving the mid to distal stent segment otherwise no focal angiographic obstructive coronary artery disease involving the LAD.  Still LAD has long segment portion of intramyocardial bridging.  Circ  moderate to large caliber vessel provides 2 marginal branches that are moderate size angiographically normal without any significant obstructive coronary artery disease.  Moderate size ramus intermediate branch that is angiographically normal  LV  normal LV systolic function normal wall motion estimated LV ejection fraction of 55 to 60%  Coronary Dominance  right coronary artery    Estimated Blood Loss:  Minimal    Specimens: None    Complications:  None; patient tolerated the procedure well.    Disposition: PACU - hemodynamically stable.    Condition: stable    Impressions:  Mild to moderate obstructive coronary disease involving the LAD and right coronary artery  Distal LAD with segment of intramyocardial bridging  Normal LV systolic function  Normal wall motion  Estimated LV ejection fraction of 60%    Recommendations:  Medical management for obstructive coronary artery disease  Continued aggressive risk factor modification     Lab Results   Component Value Date    CHOL 100  04/22/2024    TRIG 88 04/22/2024    HDL 31 (L) 04/22/2024    LDL 51 04/22/2024      Results for orders placed during the hospital encounter of 09/21/23    Stress Test With Myocardial Perfusion One Day    Interpretation Summary    Findings consistent with a normal ECG stress test.    Left ventricular ejection fraction is normal (Calculated EF = 67%).    Basal inferolateral artifact noted.    Myocardial perfusion imaging indicates a normal myocardial perfusion study with no evidence of ischemia.    Impressions are consistent with a low risk study.   Results for orders placed during the hospital encounter of 01/25/19    [facility] - CARDIOLOGY           Objective:          Allergies:  No Known Allergies    Medication Review:     Current Outpatient Medications:     acetaminophen (TYLENOL) 650 MG 8 hr tablet, Take 1 tablet by mouth Every 8 (Eight) Hours As Needed for Mild Pain., Disp: , Rfl:     aspirin 81 MG EC tablet, Take 1 tablet by mouth Daily. LD 4/13, Disp: , Rfl:     atorvastatin (LIPITOR) 20 MG tablet, TAKE 1 TABLET BY MOUTH ONCE  DAILY, Disp: 90 tablet, Rfl: 3    betamethasone valerate (VALISONE) 0.1 % ointment, Apply 1 Application topically to the appropriate area as directed 2 (Two) Times a Day., Disp: , Rfl:     calcium citrate-vitamin d (CALCITRATE) 315-250 MG-UNIT tablet tablet, Take 1 tablet by mouth 2 (Two) Times a Day., Disp: , Rfl:     donepezil (ARICEPT) 5 MG tablet, TAKE 1 TABLET BY MOUTH AT NIGHT, Disp: 90 tablet, Rfl: 0    hydrocortisone 2.5 % cream, Apply 1 Application topically to the appropriate area as directed 2 (Two) Times a Day., Disp: , Rfl:     isosorbide mononitrate (IMDUR) 60 MG 24 hr tablet, TAKE 1 AND 1/2 TABLETS BY MOUTH  DAILY, Disp: 135 tablet, Rfl: 3    lidocaine (Lidoderm) 5 %, Place 1 patch on the skin as directed by provider Daily. Remove & Discard patch within 12 hours or as directed by MD, Disp: 6 each, Rfl: 0    meclizine (ANTIVERT) 25 MG tablet, Take 0.5 tablets by mouth 3  (Three) Times a Day As Needed for Dizziness., Disp: 20 tablet, Rfl: 0    metFORMIN (GLUCOPHAGE) 500 MG tablet, TAKE 1 TABLET BY MOUTH TWICE  DAILY WITH A MEAL, Disp: 180 tablet, Rfl: 0    Multiple Vitamins-Minerals (MULTIVITAMIN WITH MINERALS) tablet tablet, Take 1 tablet by mouth Daily., Disp: , Rfl:     naproxen (NAPROSYN) 500 MG tablet, TAKE 1 TABLET BY MOUTH TWICE  DAILY AS NEEDED FOR MODERATE  PAIN, Disp: 180 tablet, Rfl: 3    nitroglycerin (NITROSTAT) 0.4 MG SL tablet, Place 1 tablet under the tongue Every 5 (Five) Minutes As Needed for Chest Pain., Disp: 30 tablet, Rfl: 2    pantoprazole (PROTONIX) 40 MG EC tablet, TAKE 1 TABLET BY MOUTH DAILY, Disp: 90 tablet, Rfl: 3    pregabalin (LYRICA) 75 MG capsule, Take 1 capsule by mouth 3 (Three) Times a Day., Disp: 90 capsule, Rfl: 5    primidone (MYSOLINE) 50 MG tablet, Take 1 tablet by mouth 2 (Two) Times a Day., Disp: 180 tablet, Rfl: 3    Family History:  Family History   Problem Relation Age of Onset    Heart disease Mother         CHF    Hypertension Mother     Heart failure Mother     Diabetes Brother     Arthritis Maternal Grandmother     Stroke Maternal Grandfather     Diabetes Other     Diabetes Other     Stroke Other     Sleep apnea Neg Hx        Past Medical History:  Past Medical History:   Diagnosis Date    Arthritis     Dr Mosqueda    Coronary heart disease 01/2016    single vessel disease, nl stress test (copied from [facility])    Coronary heart disease 10/31/2017    cath 10/31/17 - Low % Blockages- N o Intervention   (copied from [facility])    Diverticulosis     Fatty liver     GERD (gastroesophageal reflux disease)     Hearing loss     Hyperlipemia     Hyperlipidemia     Hypertension     Low back pain     Neuropathy in diabetes     SIDDHARTHA treated with BiPAP     Pain management     injections Lumbar spine X2 with [facility] @ [facility] (copied from [facility])    Physical therapy evaluation, initial     @ [facility] in [place] 6 weeks x3 per week,  Kalen leal (copied from [facility])    Rectal bleed 08/2012    hemorrhoids    Retinal hemorrhage of right eye 05/2014    Sleep apnea     bipap    Tinea pedis of right foot 07/02/2023    Type 2 diabetes mellitus     Uses brace        Past surgical History:  Past Surgical History:   Procedure Laterality Date    BELPHAROPTOSIS REPAIR  12/11/2017     Dr. grimes (copied from [facility])    BROW LIFT  12/11/2017    Dr. Grimes at [facility]    CARDIAC CATHETERIZATION  03/2012    at [facility]- single vessel disease. (copied from [facility])    CARDIAC CATHETERIZATION  10/13/2017    no intervention - Low % Blockages.    CARDIAC CATHETERIZATION Left 05/28/2021    Procedure: LEFT HEART CATH with possible PCI;  Surgeon: Leslie Clark MD;  Location: [facility] CATH INVASIVE LOCATION;  Service: Cardiovascular;  Laterality: Left;  Local and IV sedation    CATARACT EXTRACTION  12/11/2017    brow lift and eye lid repair    COLON SURGERY  06/2014    colonscopy    COLONOSCOPY N/A 07/12/2019    Procedure: COLONOSCOPY with polypectomy x1;  Surgeon: Graham Byrd MD;  Location: [facility] ENDOSCOPY;  Service: Gastroenterology    CUBITAL TUNNEL RELEASE Right     HERNIA REPAIR  11/2008    umbilical hernia repair    JOINT REPLACEMENT      KNEE ARTHROSCOPY Right 11/2014    Dr. Mosqueda    LASIK      lasik and Cataract Extraction, sep 11 and right Sept. 25th, 2014- [facility]. ([facility])    TOTAL KNEE ARTHROPLASTY Left 11/2009    Dr. Mosqueda (copied from [facility])    TOTAL SHOULDER ARTHROPLASTY W/ DISTAL CLAVICLE EXCISION Left 04/20/2022    Procedure: TOTAL SHOULDER REVERSE ARTHROPLASTY;  Surgeon: Dany Knapp MD;  Location: [facility] MAIN OR;  Service: Orthopedics;  Laterality: Left;       Social History:  Social History     Socioeconomic History    Marital status:      Spouse name: Shani   Tobacco Use    Smoking status: Former     Current packs/day: 0.00     Average packs/day: 1.5 packs/day for 30.0 years (45.0 ttl  pk-yrs)     Types: Cigarettes     Start date: 1970     Quit date: 2000     Years since quittin.5    Smokeless tobacco: Former   Vaping Use    Vaping status: Never Used   Substance and Sexual Activity    Alcohol use: No    Drug use: No    Sexual activity: Not Currently     Partners: Female     Birth control/protection: Vasectomy       Review of Systems:  The following systems were reviewed as they relate to the cardiovascular system: Constitutional, Eyes, ENT, Cardiovascular, Respiratory, Gastrointestinal, Integumentary, Neurological, Psychiatric, Hematologic, Endocrine, Musculoskeletal, and Genitourinary. The pertinent cardiovascular findings are reported above with all other cardiovascular points within those systems being negative.    Diagnostic Study Review:     Current Electrocardiogram:    ECG 12 Lead    Date/Time: 2025 12:50 PM  Performed by: Leslie Clark MD    Authorized by: Leslie Clark MD  Comparison: compared with previous ECG   Similar to previous ECG  Rhythm: sinus rhythm  Rate: normal  BPM: 75  Conduction: conduction normal  Conduction: left anterior fascicular block  QRS axis: left  Other findings: non-specific ST-T wave changes    Clinical impression: abnormal EKG                NOTE: The following portions of the patient's history were reviewed and updated this visit as appropriate: allergies, current medications, past family history, past medical history, past social history, past surgical history and problem list.

## 2025-07-08 RX ORDER — ATORVASTATIN CALCIUM 20 MG/1
20 TABLET, FILM COATED ORAL DAILY
Qty: 90 TABLET | Refills: 3 | Status: SHIPPED | OUTPATIENT
Start: 2025-07-08

## 2025-07-08 RX ORDER — DONEPEZIL HYDROCHLORIDE 5 MG/1
5 TABLET, FILM COATED ORAL
Qty: 90 TABLET | Refills: 3 | OUTPATIENT
Start: 2025-07-08

## 2025-07-08 RX ORDER — DONEPEZIL HYDROCHLORIDE 5 MG/1
5 TABLET, FILM COATED ORAL
Qty: 90 TABLET | Refills: 0 | Status: SHIPPED | OUTPATIENT
Start: 2025-07-08

## 2025-08-11 DIAGNOSIS — R25.1 TREMOR: ICD-10-CM

## 2025-08-11 RX ORDER — PRIMIDONE 50 MG/1
50 TABLET ORAL 2 TIMES DAILY
Qty: 180 TABLET | Refills: 3 | Status: SHIPPED | OUTPATIENT
Start: 2025-08-11 | End: 2026-08-11

## 2025-08-20 ENCOUNTER — OFFICE VISIT (OUTPATIENT)
Dept: NEUROLOGY | Facility: CLINIC | Age: 80
End: 2025-08-20
Payer: MEDICARE

## 2025-08-20 VITALS
WEIGHT: 223 LBS | BODY MASS INDEX: 33.8 KG/M2 | HEART RATE: 80 BPM | DIASTOLIC BLOOD PRESSURE: 73 MMHG | HEIGHT: 68 IN | SYSTOLIC BLOOD PRESSURE: 144 MMHG

## 2025-08-20 DIAGNOSIS — G56.21 NEURITIS OF RIGHT ULNAR NERVE: ICD-10-CM

## 2025-08-20 DIAGNOSIS — R25.1 TREMOR: ICD-10-CM

## 2025-08-20 PROCEDURE — 3077F SYST BP >= 140 MM HG: CPT | Performed by: NURSE PRACTITIONER

## 2025-08-20 PROCEDURE — 1160F RVW MEDS BY RX/DR IN RCRD: CPT | Performed by: NURSE PRACTITIONER

## 2025-08-20 PROCEDURE — 1159F MED LIST DOCD IN RCRD: CPT | Performed by: NURSE PRACTITIONER

## 2025-08-20 PROCEDURE — 99213 OFFICE O/P EST LOW 20 MIN: CPT | Performed by: NURSE PRACTITIONER

## 2025-08-20 PROCEDURE — 3078F DIAST BP <80 MM HG: CPT | Performed by: NURSE PRACTITIONER

## 2025-08-20 RX ORDER — LANCETS 33 GAUGE
EACH MISCELLANEOUS
COMMUNITY
Start: 2025-07-26

## 2025-08-20 RX ORDER — BLOOD SUGAR DIAGNOSTIC
STRIP MISCELLANEOUS
COMMUNITY
Start: 2025-07-25

## 2025-08-20 RX ORDER — PREGABALIN 75 MG/1
75 CAPSULE ORAL 3 TIMES DAILY
Qty: 90 CAPSULE | Refills: 5 | Status: SHIPPED | OUTPATIENT
Start: 2025-08-20

## 2025-08-20 RX ORDER — PRIMIDONE 50 MG/1
50 TABLET ORAL 3 TIMES DAILY
Qty: 270 TABLET | Refills: 3 | Status: SHIPPED | OUTPATIENT
Start: 2025-08-20 | End: 2026-08-20

## 2025-08-30 DIAGNOSIS — E11.8 TYPE 2 DIABETES MELLITUS WITH UNSPECIFIED COMPLICATIONS: ICD-10-CM

## 2025-08-30 RX ORDER — BLOOD SUGAR DIAGNOSTIC
STRIP MISCELLANEOUS
Qty: 100 EACH | Refills: 0 | Status: SHIPPED | OUTPATIENT
Start: 2025-08-30

## (undated) DEVICE — CATH DIAG IMPULSE FL4 6F 100CM

## (undated) DEVICE — DECANTER: Brand: UNBRANDED

## (undated) DEVICE — SLV SCD CALF HEMOFORCE DVT THERP REPROC MD

## (undated) DEVICE — SOL IRRIG NACL 1000ML

## (undated) DEVICE — SUT VIC 2/0 CT1 27IN J259H

## (undated) DEVICE — T-MAX DISPOSABLE FACE MASK 8 PER BOX

## (undated) DEVICE — DUAL CUT SAGITTAL BLADE

## (undated) DEVICE — SOL IRR NACL 0.9PCT ARTHROMATIC 3000ML

## (undated) DEVICE — ADHS SKIN PREMIERPRO EXOFIN TOPICAL HI/VISC .5ML

## (undated) DEVICE — SUT MNCRYL 3/0 Y936H

## (undated) DEVICE — PK ENDO GI 50

## (undated) DEVICE — UNDERGLV SURG BIOGEL INDICAT PI SZ8 BLU

## (undated) DEVICE — PINNACLE INTRODUCER SHEATH: Brand: PINNACLE

## (undated) DEVICE — PAPR PRNT PK SONY W RIBN UPC55

## (undated) DEVICE — DRSNG SURESITE WNDW 4X4.5

## (undated) DEVICE — UNDYED BRAIDED (POLYGLACTIN 910), SYNTHETIC ABSORBABLE SUTURE: Brand: COATED VICRYL

## (undated) DEVICE — GLV SURG SIGNATURE ESSENTIAL PF LTX SZ8.5

## (undated) DEVICE — WRAP SHOULDER COLD THERAPY

## (undated) DEVICE — SOLUTION,WATER,IRRIGATION,1000ML,STERILE: Brand: MEDLINE

## (undated) DEVICE — SUT VIC 2/0 CT2 27IN J269H

## (undated) DEVICE — PK TRY HEART CATH 50

## (undated) DEVICE — PK TOTL SHLDR 50

## (undated) DEVICE — SUT VIC 0 CT1 CR8 18IN J840D

## (undated) DEVICE — CATH DIAG IMPULSE FR4 6F 100CM

## (undated) DEVICE — TOWEL,OR,DSP,ST,WHITE,DLX,4/PK,20PK/CS: Brand: MEDLINE

## (undated) DEVICE — MARKR SKIN W/RULR

## (undated) DEVICE — CATH DIAG IMPULSE PIG .056 6F 110CM

## (undated) DEVICE — GW PTFE EMERALD HEPCOAT FC J TIP STD .035 3MM 150CM

## (undated) DEVICE — ST ACC MICROPUNCTURE STFF/CANN PLAT/TP 4F 21G 40CM

## (undated) DEVICE — KT SURG TURNOVER 050

## (undated) DEVICE — GLV SURG SENSICARE PI ORTHO SZ8 LF STRL

## (undated) DEVICE — SINGLE-USE BIOPSY FORCEPS: Brand: RADIAL JAW 4

## (undated) DEVICE — RADIFOCUS OBTURATOR: Brand: RADIFOCUS